# Patient Record
Sex: MALE | Race: WHITE | NOT HISPANIC OR LATINO | Employment: OTHER | URBAN - NONMETROPOLITAN AREA
[De-identification: names, ages, dates, MRNs, and addresses within clinical notes are randomized per-mention and may not be internally consistent; named-entity substitution may affect disease eponyms.]

---

## 2017-03-21 ENCOUNTER — TRANSCRIBE ORDERS (OUTPATIENT)
Dept: LAB | Facility: HOSPITAL | Age: 67
End: 2017-03-21

## 2017-03-21 DIAGNOSIS — N18.30 CHRONIC KIDNEY DISEASE, STAGE III (MODERATE) (HCC): Primary | ICD-10-CM

## 2017-05-10 ENCOUNTER — LAB (OUTPATIENT)
Dept: LAB | Facility: HOSPITAL | Age: 67
End: 2017-05-10

## 2017-05-10 DIAGNOSIS — E29.1 MALE HYPOGONADISM: ICD-10-CM

## 2017-05-10 DIAGNOSIS — N40.0 BENIGN PROSTATIC HYPERPLASIA, PRESENCE OF LOWER URINARY TRACT SYMPTOMS UNSPECIFIED, UNSPECIFIED MORPHOLOGY: Primary | ICD-10-CM

## 2017-05-10 DIAGNOSIS — N18.30 CHRONIC KIDNEY DISEASE, STAGE III (MODERATE) (HCC): ICD-10-CM

## 2017-05-10 LAB
25(OH)D3 SERPL-MCNC: 31.1 NG/ML
ALBUMIN SERPL-MCNC: 4.4 G/DL (ref 3.2–4.8)
ANION GAP SERPL CALCULATED.3IONS-SCNC: 8 MMOL/L (ref 3–11)
BASOPHILS # BLD AUTO: 0.04 10*3/MM3 (ref 0–0.2)
BASOPHILS NFR BLD AUTO: 0.5 % (ref 0–1)
BUN BLD-MCNC: 23 MG/DL (ref 9–23)
BUN/CREAT SERPL: 16.4 (ref 7–25)
CALCIUM SPEC-SCNC: 10 MG/DL (ref 8.7–10.4)
CHLORIDE SERPL-SCNC: 102 MMOL/L (ref 99–109)
CO2 SERPL-SCNC: 32 MMOL/L (ref 20–31)
CREAT BLD-MCNC: 1.4 MG/DL (ref 0.6–1.3)
DEPRECATED RDW RBC AUTO: 47.5 FL (ref 37–54)
EOSINOPHIL # BLD AUTO: 0.2 10*3/MM3 (ref 0.1–0.3)
EOSINOPHIL NFR BLD AUTO: 2.3 % (ref 0–3)
ERYTHROCYTE [DISTWIDTH] IN BLOOD BY AUTOMATED COUNT: 14 % (ref 11.3–14.5)
ESTRADIOL SERPL HS-MCNC: 32 PG/ML
GFR SERPL CREATININE-BSD FRML MDRD: 51 ML/MIN/1.73
GLUCOSE BLD-MCNC: 147 MG/DL (ref 70–100)
HCT VFR BLD AUTO: 44.8 % (ref 38.9–50.9)
HGB BLD-MCNC: 14.2 G/DL (ref 13.1–17.5)
IMM GRANULOCYTES # BLD: 0.04 10*3/MM3 (ref 0–0.03)
IMM GRANULOCYTES NFR BLD: 0.5 % (ref 0–0.6)
LYMPHOCYTES # BLD AUTO: 3.73 10*3/MM3 (ref 0.6–4.8)
LYMPHOCYTES NFR BLD AUTO: 43.7 % (ref 24–44)
MCH RBC QN AUTO: 29.5 PG (ref 27–31)
MCHC RBC AUTO-ENTMCNC: 31.7 G/DL (ref 32–36)
MCV RBC AUTO: 93.1 FL (ref 80–99)
MONOCYTES # BLD AUTO: 0.74 10*3/MM3 (ref 0–1)
MONOCYTES NFR BLD AUTO: 8.7 % (ref 0–12)
NEUTROPHILS # BLD AUTO: 3.79 10*3/MM3 (ref 1.5–8.3)
NEUTROPHILS NFR BLD AUTO: 44.3 % (ref 41–71)
PHOSPHATE SERPL-MCNC: 3.8 MG/DL (ref 2.4–5.1)
PLATELET # BLD AUTO: 210 10*3/MM3 (ref 150–450)
PMV BLD AUTO: 11.1 FL (ref 6–12)
POTASSIUM BLD-SCNC: 5 MMOL/L (ref 3.5–5.5)
RBC # BLD AUTO: 4.81 10*6/MM3 (ref 4.2–5.76)
SODIUM BLD-SCNC: 142 MMOL/L (ref 132–146)
TESTOST SERPL-MCNC: 185.47 NG/DL (ref 86.98–780.1)
WBC NRBC COR # BLD: 8.54 10*3/MM3 (ref 3.5–10.8)

## 2017-05-10 PROCEDURE — 80069 RENAL FUNCTION PANEL: CPT | Performed by: UROLOGY

## 2017-05-10 PROCEDURE — 82670 ASSAY OF TOTAL ESTRADIOL: CPT | Performed by: UROLOGY

## 2017-05-10 PROCEDURE — 82306 VITAMIN D 25 HYDROXY: CPT | Performed by: UROLOGY

## 2017-05-10 PROCEDURE — 84403 ASSAY OF TOTAL TESTOSTERONE: CPT | Performed by: UROLOGY

## 2017-05-10 PROCEDURE — 85025 COMPLETE CBC W/AUTO DIFF WBC: CPT | Performed by: UROLOGY

## 2017-05-10 PROCEDURE — 84153 ASSAY OF PSA TOTAL: CPT | Performed by: UROLOGY

## 2017-05-10 PROCEDURE — 36415 COLL VENOUS BLD VENIPUNCTURE: CPT

## 2017-05-10 PROCEDURE — 83970 ASSAY OF PARATHORMONE: CPT | Performed by: INTERNAL MEDICINE

## 2017-05-11 LAB — PSA SERPL-MCNC: 1.3 NG/ML (ref 0–4)

## 2017-05-12 LAB — PTH-INTACT SERPL-MCNC: 30 PG/ML (ref 15–65)

## 2017-05-16 ENCOUNTER — OFFICE VISIT (OUTPATIENT)
Dept: INTERNAL MEDICINE | Facility: CLINIC | Age: 67
End: 2017-05-16

## 2017-05-16 VITALS
HEART RATE: 78 BPM | HEIGHT: 70 IN | TEMPERATURE: 98 F | SYSTOLIC BLOOD PRESSURE: 138 MMHG | BODY MASS INDEX: 45.1 KG/M2 | DIASTOLIC BLOOD PRESSURE: 76 MMHG | OXYGEN SATURATION: 95 % | WEIGHT: 315 LBS

## 2017-05-16 DIAGNOSIS — I48.91 ATRIAL FIBRILLATION, UNSPECIFIED TYPE (HCC): ICD-10-CM

## 2017-05-16 DIAGNOSIS — M50.30 DEGENERATION OF CERVICAL INTERVERTEBRAL DISC: ICD-10-CM

## 2017-05-16 DIAGNOSIS — K21.9 GASTROESOPHAGEAL REFLUX DISEASE, ESOPHAGITIS PRESENCE NOT SPECIFIED: ICD-10-CM

## 2017-05-16 DIAGNOSIS — E78.2 MIXED HYPERLIPIDEMIA: ICD-10-CM

## 2017-05-16 DIAGNOSIS — E03.8 OTHER SPECIFIED HYPOTHYROIDISM: ICD-10-CM

## 2017-05-16 DIAGNOSIS — E11.9 TYPE 2 DIABETES MELLITUS WITHOUT COMPLICATION, WITHOUT LONG-TERM CURRENT USE OF INSULIN (HCC): ICD-10-CM

## 2017-05-16 DIAGNOSIS — I10 BENIGN ESSENTIAL HYPERTENSION: Primary | ICD-10-CM

## 2017-05-16 PROCEDURE — 90670 PCV13 VACCINE IM: CPT | Performed by: INTERNAL MEDICINE

## 2017-05-16 PROCEDURE — G0009 ADMIN PNEUMOCOCCAL VACCINE: HCPCS | Performed by: INTERNAL MEDICINE

## 2017-05-16 PROCEDURE — 99214 OFFICE O/P EST MOD 30 MIN: CPT | Performed by: INTERNAL MEDICINE

## 2017-05-16 RX ORDER — TIZANIDINE 4 MG/1
8 TABLET ORAL
COMMUNITY
Start: 2017-04-27

## 2017-05-16 RX ORDER — MORPHINE SULFATE 60 MG/1
60 TABLET, FILM COATED, EXTENDED RELEASE ORAL 2 TIMES DAILY
COMMUNITY
Start: 2017-05-03 | End: 2019-05-21 | Stop reason: SDUPTHER

## 2017-05-16 RX ORDER — GLIPIZIDE 5 MG/1
5 TABLET ORAL 3 TIMES DAILY
Qty: 270 TABLET | Refills: 1 | Status: SHIPPED | OUTPATIENT
Start: 2017-05-16 | End: 2017-05-17 | Stop reason: SDUPTHER

## 2017-05-16 RX ORDER — SIMVASTATIN 20 MG
20 TABLET ORAL NIGHTLY
Qty: 90 TABLET | Refills: 3 | Status: SHIPPED | OUTPATIENT
Start: 2017-05-16 | End: 2017-09-19 | Stop reason: SDUPTHER

## 2017-05-16 RX ORDER — OMEPRAZOLE 40 MG/1
40 CAPSULE, DELAYED RELEASE ORAL DAILY
Qty: 90 CAPSULE | Refills: 3 | Status: SHIPPED | OUTPATIENT
Start: 2017-05-16 | End: 2017-09-19 | Stop reason: SDUPTHER

## 2017-05-16 RX ORDER — LEVOTHYROXINE SODIUM 0.05 MG/1
50 TABLET ORAL DAILY
Qty: 90 TABLET | Refills: 3 | Status: SHIPPED | OUTPATIENT
Start: 2017-05-16 | End: 2017-09-19 | Stop reason: SDUPTHER

## 2017-05-17 ENCOUNTER — OFFICE VISIT (OUTPATIENT)
Dept: UROLOGY | Facility: CLINIC | Age: 67
End: 2017-05-17

## 2017-05-17 VITALS — HEIGHT: 70 IN | OXYGEN SATURATION: 95 % | WEIGHT: 315 LBS | BODY MASS INDEX: 45.1 KG/M2

## 2017-05-17 DIAGNOSIS — E29.1 HYPOGONADISM IN MALE: Primary | ICD-10-CM

## 2017-05-17 DIAGNOSIS — R79.89 LOW TESTOSTERONE: ICD-10-CM

## 2017-05-17 LAB
BILIRUB BLD-MCNC: NEGATIVE MG/DL
CHOLEST SERPL-MCNC: 139 MG/DL (ref 0–199)
CLARITY, POC: CLEAR
COLOR UR: YELLOW
GLUCOSE UR STRIP-MCNC: NEGATIVE MG/DL
HBA1C MFR BLD: 7.7 %
HDLC SERPL-MCNC: 39 MG/DL (ref 40–60)
KETONES UR QL: NEGATIVE
LDLC SERPL CALC-MCNC: 63 MG/DL (ref 0–99)
LEUKOCYTE EST, POC: NEGATIVE
NITRITE UR-MCNC: NEGATIVE MG/ML
PH UR: 6 [PH] (ref 5–8)
PROT UR STRIP-MCNC: NEGATIVE MG/DL
RBC # UR STRIP: NEGATIVE /UL
SP GR UR: 1.02 (ref 1–1.03)
T4 FREE SERPL-MCNC: 1.44 NG/DL (ref 0.78–2.19)
TRIGL SERPL-MCNC: 184 MG/DL
TSH SERPL DL<=0.005 MIU/L-ACNC: 3.69 MIU/ML (ref 0.47–4.68)
UROBILINOGEN UR QL: NORMAL
VLDLC SERPL CALC-MCNC: 36.8 MG/DL

## 2017-05-17 PROCEDURE — 81003 URINALYSIS AUTO W/O SCOPE: CPT | Performed by: UROLOGY

## 2017-05-17 PROCEDURE — 99213 OFFICE O/P EST LOW 20 MIN: CPT | Performed by: UROLOGY

## 2017-05-17 PROCEDURE — 11980 IMPLANT HORMONE PELLET(S): CPT | Performed by: UROLOGY

## 2017-05-17 RX ORDER — GLIPIZIDE 10 MG/1
10 TABLET ORAL
Qty: 60 TABLET | Refills: 5 | Status: SHIPPED | OUTPATIENT
Start: 2017-05-17 | End: 2017-09-19 | Stop reason: SDUPTHER

## 2017-08-04 ENCOUNTER — OFFICE VISIT (OUTPATIENT)
Dept: INTERNAL MEDICINE | Facility: CLINIC | Age: 67
End: 2017-08-04

## 2017-08-04 ENCOUNTER — OFFICE VISIT (OUTPATIENT)
Dept: UROLOGY | Facility: CLINIC | Age: 67
End: 2017-08-04

## 2017-08-04 VITALS
BODY MASS INDEX: 45.1 KG/M2 | WEIGHT: 315 LBS | HEART RATE: 64 BPM | TEMPERATURE: 99.6 F | DIASTOLIC BLOOD PRESSURE: 78 MMHG | SYSTOLIC BLOOD PRESSURE: 140 MMHG | HEIGHT: 70 IN | OXYGEN SATURATION: 95 %

## 2017-08-04 VITALS
SYSTOLIC BLOOD PRESSURE: 124 MMHG | OXYGEN SATURATION: 96 % | BODY MASS INDEX: 45.1 KG/M2 | HEART RATE: 94 BPM | HEIGHT: 70 IN | TEMPERATURE: 98.1 F | RESPIRATION RATE: 16 BRPM | WEIGHT: 315 LBS | DIASTOLIC BLOOD PRESSURE: 78 MMHG

## 2017-08-04 DIAGNOSIS — N40.1 BPH (BENIGN PROSTATIC HYPERTROPHY) WITH URINARY OBSTRUCTION: ICD-10-CM

## 2017-08-04 DIAGNOSIS — N13.8 BPH (BENIGN PROSTATIC HYPERTROPHY) WITH URINARY OBSTRUCTION: ICD-10-CM

## 2017-08-04 DIAGNOSIS — Z87.440 HISTORY OF UTI: Primary | ICD-10-CM

## 2017-08-04 DIAGNOSIS — E29.1 HYPOGONADISM MALE: ICD-10-CM

## 2017-08-04 DIAGNOSIS — R35.0 URINARY FREQUENCY: Primary | ICD-10-CM

## 2017-08-04 LAB
BILIRUB BLD-MCNC: NEGATIVE MG/DL
CLARITY, POC: CLEAR
COLOR UR: YELLOW
GLUCOSE UR STRIP-MCNC: NEGATIVE MG/DL
KETONES UR QL: NEGATIVE
LEUKOCYTE EST, POC: NEGATIVE
NITRITE UR-MCNC: NEGATIVE MG/ML
PH UR: 5.5 [PH] (ref 5–8)
PROT UR STRIP-MCNC: NEGATIVE MG/DL
RBC # UR STRIP: NEGATIVE /UL
SP GR UR: 1.01 (ref 1–1.03)
UROBILINOGEN UR QL: NORMAL

## 2017-08-04 PROCEDURE — 76857 US EXAM PELVIC LIMITED: CPT | Performed by: UROLOGY

## 2017-08-04 PROCEDURE — 99214 OFFICE O/P EST MOD 30 MIN: CPT | Performed by: UROLOGY

## 2017-08-04 PROCEDURE — 99213 OFFICE O/P EST LOW 20 MIN: CPT | Performed by: NURSE PRACTITIONER

## 2017-08-04 PROCEDURE — 81003 URINALYSIS AUTO W/O SCOPE: CPT | Performed by: UROLOGY

## 2017-08-04 RX ORDER — TAMSULOSIN HYDROCHLORIDE 0.4 MG/1
1 CAPSULE ORAL NIGHTLY
Qty: 30 CAPSULE | Refills: 0 | Status: SHIPPED | OUTPATIENT
Start: 2017-08-04 | End: 2017-09-05

## 2017-08-04 RX ORDER — CEFUROXIME AXETIL 250 MG/1
TABLET ORAL
COMMUNITY
Start: 2017-06-28 | End: 2017-08-04

## 2017-08-04 RX ORDER — SPIRONOLACTONE 25 MG/1
25 TABLET ORAL DAILY
COMMUNITY
Start: 2017-07-05 | End: 2021-08-30

## 2017-08-04 RX ORDER — SODIUM PHOSPHATE,MONO-DIBASIC 19G-7G/118
1 ENEMA (ML) RECTAL 2 TIMES DAILY
COMMUNITY

## 2017-08-04 NOTE — PROGRESS NOTES
[facility]- UROLOGY  [address], [address]  [place], [place] [address]  [phone]      08/04/2017    Freddie Obinna  1950        Pelvic Ultrasound with PVR    A transabdominal pelvic ultrasound was performed using a 3.5 MHz transducer of a B-K Packer through the suprapubic area.     The purpose of the study was to investigate the patient's voiding difficulty.  There was minimal bladder wall thickening noted.  There were no intravesical filling defects seen.  The post void residual of 38.4 ml was noted.  Prostate was small and was noted to be 18.1 grams.  There was a protrusion of the prostate into the bladder.  Ultrasound images will be scanned into the chart for reference.       CPT code 30785        Performed by Beltran Zelaya MD

## 2017-08-04 NOTE — PROGRESS NOTES
Chief Complaint  Urinary Tract Infection (fup uti patient states he had 6 weeks ago,states it stil burns when he urinates.)        TARUN Yeboah is a 67 y.o. male who     Vitals:    08/04/17 1317   BP: 140/78   Pulse: 64   Temp: 99.6 °F (37.6 °C)   SpO2: 95%       Past Medical History  Past Medical History:   Diagnosis Date   • Back pain    • Hypertension    • Low testosterone    • Sleep apnea        Past Surgical History  Past Surgical History:   Procedure Laterality Date   • APPENDECTOMY     • CARPAL TUNNEL RELEASE     • CATARACT EXTRACTION     • SHOULDER SURGERY     • TOTAL KNEE ARTHROPLASTY         Medications  has a current medication list which includes the following prescription(s): anastrozole, apixaban, aspirin, bumetanide, cefuroxime, digoxin, glipizide, isosorbide mononitrate, levothyroxine, losartan, metformin, metoprolol tartrate, glucosamine chond complex/msm, morphine, multivital, fish oil, omeprazole, oxycodone-acetaminophen, simvastatin, spironolactone, spironolactone-hydrochlorothiazide, and tizanidine.      Allergies  Allergies   Allergen Reactions   • Duloxetine    • Sulfa Antibiotics      He reports renal failure after a round of sulfa   • Sulfamethoxazole-Trimethoprim        Social History  Social History     Social History Narrative       Family History  He has no family history of bladder or kidney cancer  He has no family history of kidney stones      AUA Symptom Score:      Review of Systems  Review of Systems    Physical Exam  Physical Exam    Labs Recent and today in the office:  Results for orders placed or performed in visit on 08/04/17   POC Urinalysis Dipstick, Automated   Result Value Ref Range    Color Yellow Yellow, Straw, Dark Yellow, Elinor    Clarity, UA Clear Clear    Glucose, UA Negative Negative, 1000 mg/dL (3+) mg/dL    Bilirubin Negative Negative    Ketones, UA Negative Negative    Specific Gravity  1.015 1.005 - 1.030    Blood, UA Negative Negative    pH, Urine 5.5 5.0 - 8.0     Protein, POC Negative Negative mg/dL    Urobilinogen, UA Normal Normal    Leukocytes Negative Negative    Nitrite, UA Negative Negative         Assessment & Plan

## 2017-08-04 NOTE — PROGRESS NOTES
Chief Complaint / Reason:      Chief Complaint   Patient presents with   • Fever     up to 100.4 x 6 days. Saw Dr. Zelaya earlier today. Urine was fine.        Subjective     HPI  Presents today for fever for 6 days. He states that the highest was 100.4. He saw Dr. Zelaya urology today and indicated urine was normal. Did not repeat or send culture at this visit today. Denies burning but does report frequency and states he never feels completely empty following voiding. Has tried dietary modifications along with increase fluid intake with minimal relief. Patient does not appear to be in any distress and is currently afebrile.    History taken from: patient    PMH/FH/Social History were reviewed and updated appropriately in the electronic medical record.     Review of Systems:   Review of Systems   Constitutional: Positive for fatigue and fever.   Respiratory: Negative.  Negative for shortness of breath.    Cardiovascular: Negative.  Negative for palpitations and leg swelling.   Gastrointestinal: Negative for abdominal pain.   Genitourinary: Positive for frequency and urgency.   Musculoskeletal: Positive for arthralgias, back pain, neck pain and neck stiffness.   Allergic/Immunologic: Positive for environmental allergies.   Neurological: Negative for numbness.   All other systems reviewed and are negative.    All other systems were reviewed and are negative.  Exceptions are noted in the subjective or above.      Objective     Vital Signs  Vitals:    08/04/17 1617   BP: 124/78   Pulse: 94   Resp: 16   Temp: 98.1 °F (36.7 °C)   SpO2: 96%       Body mass index is 46.17 kg/(m^2).    Physical Exam   Constitutional: He is oriented to person, place, and time. He appears well-developed and well-nourished. No distress.   Cardiovascular: Normal heart sounds and intact distal pulses.    Pulmonary/Chest: Effort normal and breath sounds normal.   Abdominal: Soft. Bowel sounds are normal. There is no tenderness. There is no CVA  tenderness.   Lymphadenopathy:     He has no cervical adenopathy.   Neurological: He is alert and oriented to person, place, and time.   Skin: Skin is warm and dry.   Psychiatric: He has a normal mood and affect. His behavior is normal. Judgment and thought content normal.   Nursing note and vitals reviewed.       Results Review:    I reviewed the patient's urine results from Dr. Zelaya's office.Results are below.    Brief Urine Lab Results  (Last result in the past 365 days)      Color   Clarity   Blood   Leuk Est   Nitrite   Protein   CREAT   Urine HCG        08/04/17 1319 Yellow Clear Negative Negative Negative Negative                 Medication Review:     Current Outpatient Prescriptions:   •  anastrozole (ARIMIDEX) 1 MG chemo tablet, Take 1 tablet by mouth 2 (two) times a week. Take on Sunday and Thursday, Disp: 12 tablet, Rfl: 3  •  apixaban (ELIQUIS) 5 MG tablet tablet, Take 1 tablet by mouth 2 (two) times a day., Disp: , Rfl:   •  aspirin 81 MG tablet, Take 1 tablet by mouth daily., Disp: , Rfl:   •  bumetanide (BUMEX) 1 MG tablet, Take 1 tablet by mouth Daily., Disp: , Rfl:   •  digoxin (LANOXIN) 125 MCG tablet, Take 1 tablet by mouth daily., Disp: , Rfl:   •  glipiZIDE (GLUCOTROL) 10 MG tablet, Take 1 tablet by mouth 2 (Two) Times a Day Before Meals., Disp: 60 tablet, Rfl: 5  •  glucosamine-chondroitin 500-400 MG capsule capsule, Take  by mouth 3 (Three) Times a Day With Meals., Disp: , Rfl:   •  isosorbide mononitrate (IMDUR) 60 MG 24 hr tablet, Take 1 tablet by mouth daily., Disp: , Rfl:   •  levothyroxine (SYNTHROID, LEVOTHROID) 50 MCG tablet, Take 1 tablet by mouth Daily., Disp: 90 tablet, Rfl: 3  •  losartan (COZAAR) 100 MG tablet, Take 1 tablet by mouth daily., Disp: , Rfl:   •  metFORMIN (GLUCOPHAGE) 500 MG tablet,  TAKE THREE TABLETS BY MOUTH EVERY MORNING AND TAKE TWO TABLETS BY MOUTH EVERY EVENING, Disp: 300 tablet, Rfl: 3  •  metoprolol tartrate (LOPRESSOR) 100 MG tablet, 2 (two) times a day.,  Disp: , Rfl:   •  Misc Natural Products (GLUCOSAMINE CHOND COMPLEX/MSM) tablet, Take 1 tablet by mouth daily. As directed, Disp: , Rfl:   •  Morphine (MS CONTIN) 60 MG 12 hr tablet, 60 mg 2 (Two) Times a Day., Disp: , Rfl:   •  Multiple Vitamins-Minerals (MULTIVITAL) tablet, Take  by mouth daily., Disp: , Rfl:   •  Omega-3 Fatty Acids (FISH OIL) 1200 MG capsule capsule, Take  by mouth. Take as directed, Disp: , Rfl:   •  omeprazole (priLOSEC) 40 MG capsule, Take 1 capsule by mouth Daily., Disp: 90 capsule, Rfl: 3  •  oxyCODONE-acetaminophen (PERCOCET)  MG per tablet, Every 8 (Eight) Hours As Needed., Disp: , Rfl:   •  simvastatin (ZOCOR) 20 MG tablet, Take 1 tablet by mouth Every Night., Disp: 90 tablet, Rfl: 3  •  spironolactone (ALDACTONE) 25 MG tablet, , Disp: , Rfl:   •  spironolactone-hydrochlorothiazide (ALDACTAZIDE) 25-25 MG tablet, Take 1 tablet by mouth daily., Disp: , Rfl:   •  tiZANidine (ZANAFLEX) 4 MG tablet, Take 4 mg by mouth every night at bedtime., Disp: , Rfl:   •  tamsulosin (FLOMAX) 0.4 MG capsule 24 hr capsule, Take 1 capsule by mouth Every Night., Disp: 30 capsule, Rfl: 0    Assessment/Plan   Freddie was seen today for fever.    Diagnoses and all orders for this visit:    Urinary frequency  -     tamsulosin (FLOMAX) 0.4 MG capsule 24 hr capsule; Take 1 capsule by mouth Every Night.  Recommend patient to limit caffeine and drinking directly before bed.   Discussed worsening signs and symptoms to closely monitor any blood in urine.   Discussed prostate issues and decreased mobility.  Reviewed patient most recent PSA level.   Follow up in 4 weeks and PRN    Patti Marmolejo, SHIRA  08/04/2017

## 2017-08-04 NOTE — PROGRESS NOTES
Chief Complaint  Urinary Tract Infection (fup uti patient states he had 6 weeks ago,states it stil burns when he urinates.)        TARUN Yeboah is a 67 y.o. male who returns today to rule out urinary tract infection.  He recently had surgery on his neck in [place] and his preop blood work suggested had a urinary tract infection.  We'll obtain that culture and the colony count was less than 100,000 Escherichia coli.  He was treated with antibiotics and returns today stating he is now having some burning when he voids but is primarily concerned about a temperature recently measured at home.    Vitals:    08/04/17 1317   BP: 140/78   Pulse: 64   Temp: 99.6 °F (37.6 °C)   SpO2: 95%       Past Medical History  Past Medical History:   Diagnosis Date   • Back pain    • Hypertension    • Low testosterone    • Sleep apnea        Past Surgical History  Past Surgical History:   Procedure Laterality Date   • APPENDECTOMY     • CARPAL TUNNEL RELEASE     • CATARACT EXTRACTION     • SHOULDER SURGERY     • TOTAL KNEE ARTHROPLASTY         Medications  has a current medication list which includes the following prescription(s): anastrozole, apixaban, aspirin, bumetanide, cefuroxime, digoxin, glipizide, isosorbide mononitrate, levothyroxine, losartan, metformin, metoprolol tartrate, glucosamine chond complex/msm, morphine, multivital, fish oil, omeprazole, oxycodone-acetaminophen, simvastatin, spironolactone, spironolactone-hydrochlorothiazide, and tizanidine.      Allergies  Allergies   Allergen Reactions   • Duloxetine    • Sulfa Antibiotics      He reports renal failure after a round of sulfa   • Sulfamethoxazole-Trimethoprim        Social History  Social History     Social History Narrative       Family History  He has no family history of bladder or kidney cancer  He has no family history of kidney stones      AUA Symptom Score:      Review of Systems  Review of Systems   Constitutional: Positive for fever.   Genitourinary:  Positive for dysuria.   All other systems reviewed and are negative.      Physical Exam  Physical Exam    Labs Recent and today in the office:  Results for orders placed or performed in visit on 08/04/17   POC Urinalysis Dipstick, Automated   Result Value Ref Range    Color Yellow Yellow, Straw, Dark Yellow, Elinor    Clarity, UA Clear Clear    Glucose, UA Negative Negative, 1000 mg/dL (3+) mg/dL    Bilirubin Negative Negative    Ketones, UA Negative Negative    Specific Gravity  1.015 1.005 - 1.030    Blood, UA Negative Negative    pH, Urine 5.5 5.0 - 8.0    Protein, POC Negative Negative mg/dL    Urobilinogen, UA Normal Normal    Leukocytes Negative Negative    Nitrite, UA Negative Negative         Assessment & Plan  Bladder scan reveals minimal postvoid residual on her nails and a small prostate and 18 g.  His urine is crystal clear with no signs of infection so I don't see any  pathology to explain a fever.  I suggested he check back with his family medical doctor for further investigation and he can return here as scheduled in 6 weeks for reevaluation of his hypogonadism prior to returning to [place].

## 2017-08-18 ENCOUNTER — OFFICE VISIT (OUTPATIENT)
Dept: INTERNAL MEDICINE | Facility: CLINIC | Age: 67
End: 2017-08-18

## 2017-08-18 VITALS
HEIGHT: 70 IN | SYSTOLIC BLOOD PRESSURE: 98 MMHG | OXYGEN SATURATION: 94 % | RESPIRATION RATE: 16 BRPM | BODY MASS INDEX: 45.1 KG/M2 | TEMPERATURE: 96.5 F | WEIGHT: 315 LBS | HEART RATE: 72 BPM | DIASTOLIC BLOOD PRESSURE: 58 MMHG

## 2017-08-18 DIAGNOSIS — Z09 FOLLOW UP: Primary | ICD-10-CM

## 2017-08-18 DIAGNOSIS — R03.1 LOW BLOOD PRESSURE READING: ICD-10-CM

## 2017-08-18 DIAGNOSIS — R35.0 URINARY FREQUENCY: ICD-10-CM

## 2017-08-18 PROCEDURE — 99213 OFFICE O/P EST LOW 20 MIN: CPT | Performed by: NURSE PRACTITIONER

## 2017-08-18 NOTE — PROGRESS NOTES
Chief Complaint / Reason:      Chief Complaint   Patient presents with   • Urinary Frequency     f/u-2 week       Subjective     HPI  Patient presents today for two-week follow-up regarding urinary frequency.  He states that he feels like the Flomax has helped some but he still continues to get up frequently at night.  He is on Bumex but takes it during the dayBut it depends on what time of the day he eats as when he takes it.  He states that he has gone up frequently for 30 years now during the night.  Patient states that his blood pressure has been lower that he feels fine.  He is accompanied by his wife who wrote in on a motorcycle.  Discussed with patient and wife safety issues with low blood pressure and on blood thinners.  He stated that if he feels that he wouldn't drive.  Discussed blood pressure parameters with patient and wife otherwise vital signs are stable.  Patient states he probably has not been drinking enough fluids and taking the Bumex along with continuing his blood pressure medicine.  He has lost 1 pound since last visit.  He denies any shortness of breath, chest pain or heart palpitations.  History taken from: patient    PMH/FH/Social History were reviewed and updated appropriately in the electronic medical record.     Review of Systems:   Review of Systems   Constitutional: Positive for fatigue.        Patient states he has occasional fever   Respiratory: Negative.  Negative for shortness of breath.    Cardiovascular: Negative.  Negative for palpitations and leg swelling.   Gastrointestinal: Negative for abdominal pain.   Genitourinary: Positive for frequency and urgency.   Musculoskeletal: Positive for arthralgias, back pain, neck pain and neck stiffness.   Allergic/Immunologic: Positive for environmental allergies.   Neurological: Negative for numbness.   All other systems reviewed and are negative.    All other systems were reviewed and are negative.  Exceptions are noted in the subjective  or above.      Objective     Vital Signs  Vitals:    08/18/17 1435   BP: 98/58   Pulse: 72   Resp: 16   Temp: 96.5 °F (35.8 °C)   SpO2: 94%       Body mass index is 46.02 kg/(m^2).    Physical Exam   Constitutional: He is oriented to person, place, and time. He appears well-developed and well-nourished. No distress.   Cardiovascular: Normal rate, regular rhythm, normal heart sounds and intact distal pulses.    Pulmonary/Chest: Effort normal and breath sounds normal.   Abdominal: Soft. Bowel sounds are normal. There is no tenderness. There is no CVA tenderness.   Musculoskeletal: He exhibits edema (Mild edema in bilateral lower extremities) and tenderness.        Left shoulder: He exhibits decreased range of motion, tenderness, pain and decreased strength. He exhibits normal pulse.   Neurological: He is alert and oriented to person, place, and time.   Skin: Skin is warm and dry.   Psychiatric: He has a normal mood and affect. His behavior is normal. Judgment and thought content normal.   Nursing note and vitals reviewed.      Medication Review:     Current Outpatient Prescriptions:   •  anastrozole (ARIMIDEX) 1 MG chemo tablet, Take 1 tablet by mouth 2 (two) times a week. Take on Sunday and Thursday, Disp: 12 tablet, Rfl: 3  •  apixaban (ELIQUIS) 5 MG tablet tablet, Take 1 tablet by mouth 2 (two) times a day., Disp: , Rfl:   •  aspirin 81 MG tablet, Take 1 tablet by mouth daily., Disp: , Rfl:   •  bumetanide (BUMEX) 1 MG tablet, Take 1 tablet by mouth Daily., Disp: , Rfl:   •  digoxin (LANOXIN) 125 MCG tablet, Take 1 tablet by mouth daily., Disp: , Rfl:   •  glipiZIDE (GLUCOTROL) 10 MG tablet, Take 1 tablet by mouth 2 (Two) Times a Day Before Meals., Disp: 60 tablet, Rfl: 5  •  glucosamine-chondroitin 500-400 MG capsule capsule, Take  by mouth 3 (Three) Times a Day With Meals., Disp: , Rfl:   •  isosorbide mononitrate (IMDUR) 60 MG 24 hr tablet, Take 1 tablet by mouth daily., Disp: , Rfl:   •  levothyroxine  (SYNTHROID, LEVOTHROID) 50 MCG tablet, Take 1 tablet by mouth Daily., Disp: 90 tablet, Rfl: 3  •  losartan (COZAAR) 100 MG tablet, Take 1 tablet by mouth daily., Disp: , Rfl:   •  metFORMIN (GLUCOPHAGE) 500 MG tablet,  TAKE THREE TABLETS BY MOUTH EVERY MORNING AND TAKE TWO TABLETS BY MOUTH EVERY EVENING, Disp: 300 tablet, Rfl: 3  •  metoprolol tartrate (LOPRESSOR) 100 MG tablet, 2 (two) times a day., Disp: , Rfl:   •  Misc Natural Products (GLUCOSAMINE CHOND COMPLEX/MSM) tablet, Take 1 tablet by mouth daily. As directed, Disp: , Rfl:   •  Morphine (MS CONTIN) 60 MG 12 hr tablet, 60 mg 2 (Two) Times a Day., Disp: , Rfl:   •  Multiple Vitamins-Minerals (MULTIVITAL) tablet, Take  by mouth daily., Disp: , Rfl:   •  Omega-3 Fatty Acids (FISH OIL) 1200 MG capsule capsule, Take  by mouth. Take as directed, Disp: , Rfl:   •  omeprazole (priLOSEC) 40 MG capsule, Take 1 capsule by mouth Daily., Disp: 90 capsule, Rfl: 3  •  oxyCODONE-acetaminophen (PERCOCET)  MG per tablet, Every 8 (Eight) Hours As Needed., Disp: , Rfl:   •  simvastatin (ZOCOR) 20 MG tablet, Take 1 tablet by mouth Every Night., Disp: 90 tablet, Rfl: 3  •  spironolactone (ALDACTONE) 25 MG tablet, , Disp: , Rfl:   •  spironolactone-hydrochlorothiazide (ALDACTAZIDE) 25-25 MG tablet, Take 1 tablet by mouth daily., Disp: , Rfl:   •  tamsulosin (FLOMAX) 0.4 MG capsule 24 hr capsule, Take 1 capsule by mouth Every Night., Disp: 30 capsule, Rfl: 0  •  tiZANidine (ZANAFLEX) 4 MG tablet, Take 4 mg by mouth every night at bedtime., Disp: , Rfl:     Assessment/Plan   Freddie was seen today for urinary frequency.    Diagnoses and all orders for this visit:    Follow up    Urinary frequency  Recommend patient take medication as prescribed.  Advised patient to avoid drinking late at night  Low blood pressure reading  Recommend patient closely monitor blood pressure and contact office if remains low.  Discussed blood pressure parameters and signs and symptoms of worsening  condition.  Reviewed patient's medication with him and his wife.    Follow-up when necessary as he has a scheduled appointment with Dr. Vermeesch on 9/19/17  SHIRA Brian  08/18/2017

## 2017-08-29 ENCOUNTER — APPOINTMENT (OUTPATIENT)
Dept: CT IMAGING | Facility: HOSPITAL | Age: 67
End: 2017-08-29

## 2017-08-29 ENCOUNTER — HOSPITAL ENCOUNTER (EMERGENCY)
Facility: HOSPITAL | Age: 67
Discharge: HOME | End: 2017-08-29
Attending: EMERGENCY MEDICINE | Admitting: EMERGENCY MEDICINE

## 2017-08-29 VITALS
HEIGHT: 69 IN | WEIGHT: 312 LBS | RESPIRATION RATE: 18 BRPM | SYSTOLIC BLOOD PRESSURE: 148 MMHG | BODY MASS INDEX: 46.21 KG/M2 | TEMPERATURE: 97.7 F | OXYGEN SATURATION: 92 % | HEART RATE: 82 BPM | DIASTOLIC BLOOD PRESSURE: 78 MMHG

## 2017-08-29 DIAGNOSIS — N28.9 RENAL INSUFFICIENCY: Primary | ICD-10-CM

## 2017-08-29 DIAGNOSIS — N39.0 URINARY TRACT INFECTION, SITE UNSPECIFIED: ICD-10-CM

## 2017-08-29 LAB
ALBUMIN SERPL-MCNC: 4.5 G/DL (ref 3.5–5)
ALBUMIN/GLOB SERPL: 1.4 G/DL (ref 1–2)
ALP SERPL-CCNC: 57 U/L (ref 38–126)
ALT SERPL W P-5'-P-CCNC: 39 U/L (ref 13–69)
ANION GAP SERPL CALCULATED.3IONS-SCNC: 18.7 MMOL/L
APTT PPP: 30.4 SECONDS (ref 25–36)
AST SERPL-CCNC: 27 U/L (ref 15–46)
BACTERIA UR QL AUTO: ABNORMAL /HPF
BASOPHILS # BLD AUTO: 0.04 10*3/MM3 (ref 0–0.2)
BASOPHILS NFR BLD AUTO: 0.2 % (ref 0–2.5)
BILIRUB SERPL-MCNC: 1.4 MG/DL (ref 0.2–1.3)
BILIRUB UR QL STRIP: NEGATIVE
BUN BLD-MCNC: 23 MG/DL (ref 7–20)
BUN/CREAT SERPL: 12.1 (ref 6.3–21.9)
CALCIUM SPEC-SCNC: 9.6 MG/DL (ref 8.4–10.2)
CHLORIDE SERPL-SCNC: 99 MMOL/L (ref 98–107)
CLARITY UR: ABNORMAL
CO2 SERPL-SCNC: 26 MMOL/L (ref 26–30)
COLOR UR: YELLOW
CREAT BLD-MCNC: 1.9 MG/DL (ref 0.6–1.3)
DEPRECATED RDW RBC AUTO: 45.2 FL (ref 37–54)
EOSINOPHIL # BLD AUTO: 0 10*3/MM3 (ref 0–0.7)
EOSINOPHIL NFR BLD AUTO: 0 % (ref 0–7)
ERYTHROCYTE [DISTWIDTH] IN BLOOD BY AUTOMATED COUNT: 14.4 % (ref 11.5–14.5)
GFR SERPL CREATININE-BSD FRML MDRD: 36 ML/MIN/1.73
GLOBULIN UR ELPH-MCNC: 3.3 GM/DL
GLUCOSE BLD-MCNC: 97 MG/DL (ref 74–98)
GLUCOSE UR STRIP-MCNC: NEGATIVE MG/DL
HCT VFR BLD AUTO: 43.8 % (ref 42–52)
HGB BLD-MCNC: 14.2 G/DL (ref 14–18)
HGB UR QL STRIP.AUTO: ABNORMAL
HYALINE CASTS UR QL AUTO: ABNORMAL /LPF
IMM GRANULOCYTES # BLD: 0.1 10*3/MM3 (ref 0–0.06)
IMM GRANULOCYTES NFR BLD: 0.6 % (ref 0–0.6)
INR PPP: 1.98 (ref 0.9–1.1)
KETONES UR QL STRIP: NEGATIVE
LEUKOCYTE ESTERASE UR QL STRIP.AUTO: ABNORMAL
LYMPHOCYTES # BLD AUTO: 2.57 10*3/MM3 (ref 0.6–3.4)
LYMPHOCYTES NFR BLD AUTO: 14.3 % (ref 10–50)
MCH RBC QN AUTO: 28.1 PG (ref 27–31)
MCHC RBC AUTO-ENTMCNC: 32.4 G/DL (ref 30–37)
MCV RBC AUTO: 86.6 FL (ref 80–94)
MONOCYTES # BLD AUTO: 2.84 10*3/MM3 (ref 0–0.9)
MONOCYTES NFR BLD AUTO: 15.8 % (ref 0–12)
NEUTROPHILS # BLD AUTO: 12.4 10*3/MM3 (ref 2–6.9)
NEUTROPHILS NFR BLD AUTO: 69.1 % (ref 37–80)
NITRITE UR QL STRIP: POSITIVE
NRBC BLD MANUAL-RTO: 0 /100 WBC (ref 0–0)
NT-PROBNP SERPL-MCNC: 2570 PG/ML (ref 0–125)
PH UR STRIP.AUTO: 5.5 [PH] (ref 5–8)
PLATELET # BLD AUTO: 177 10*3/MM3 (ref 130–400)
PMV BLD AUTO: 10.5 FL (ref 6–12)
POTASSIUM BLD-SCNC: 4.7 MMOL/L (ref 3.5–5.1)
PROT SERPL-MCNC: 7.8 G/DL (ref 6.3–8.2)
PROT UR QL STRIP: ABNORMAL
PROTHROMBIN TIME: 21.7 SECONDS (ref 9.3–12.1)
RBC # BLD AUTO: 5.06 10*6/MM3 (ref 4.7–6.1)
RBC # UR: ABNORMAL /HPF
REF LAB TEST METHOD: ABNORMAL
SODIUM BLD-SCNC: 139 MMOL/L (ref 137–145)
SP GR UR STRIP: 1.02 (ref 1–1.03)
SQUAMOUS #/AREA URNS HPF: ABNORMAL /HPF
TROPONIN I SERPL-MCNC: <0.012 NG/ML (ref 0–0.03)
UROBILINOGEN UR QL STRIP: ABNORMAL
WBC NRBC COR # BLD: 17.95 10*3/MM3 (ref 4.8–10.8)
WBC UR QL AUTO: ABNORMAL /HPF

## 2017-08-29 PROCEDURE — 87077 CULTURE AEROBIC IDENTIFY: CPT | Performed by: PHYSICIAN ASSISTANT

## 2017-08-29 PROCEDURE — 83880 ASSAY OF NATRIURETIC PEPTIDE: CPT | Performed by: PHYSICIAN ASSISTANT

## 2017-08-29 PROCEDURE — 70450 CT HEAD/BRAIN W/O DYE: CPT

## 2017-08-29 PROCEDURE — 84484 ASSAY OF TROPONIN QUANT: CPT | Performed by: PHYSICIAN ASSISTANT

## 2017-08-29 PROCEDURE — 85730 THROMBOPLASTIN TIME PARTIAL: CPT | Performed by: PHYSICIAN ASSISTANT

## 2017-08-29 PROCEDURE — 93005 ELECTROCARDIOGRAM TRACING: CPT | Performed by: PHYSICIAN ASSISTANT

## 2017-08-29 PROCEDURE — 87086 URINE CULTURE/COLONY COUNT: CPT | Performed by: PHYSICIAN ASSISTANT

## 2017-08-29 PROCEDURE — 81001 URINALYSIS AUTO W/SCOPE: CPT | Performed by: PHYSICIAN ASSISTANT

## 2017-08-29 PROCEDURE — 99283 EMERGENCY DEPT VISIT LOW MDM: CPT

## 2017-08-29 PROCEDURE — 96365 THER/PROPH/DIAG IV INF INIT: CPT

## 2017-08-29 PROCEDURE — 85610 PROTHROMBIN TIME: CPT | Performed by: PHYSICIAN ASSISTANT

## 2017-08-29 PROCEDURE — 25010000002 CEFTRIAXONE: Performed by: PHYSICIAN ASSISTANT

## 2017-08-29 PROCEDURE — 87186 SC STD MICRODIL/AGAR DIL: CPT | Performed by: PHYSICIAN ASSISTANT

## 2017-08-29 PROCEDURE — 85025 COMPLETE CBC W/AUTO DIFF WBC: CPT | Performed by: PHYSICIAN ASSISTANT

## 2017-08-29 PROCEDURE — 80053 COMPREHEN METABOLIC PANEL: CPT | Performed by: PHYSICIAN ASSISTANT

## 2017-08-29 RX ORDER — NITROFURANTOIN 25; 75 MG/1; MG/1
100 CAPSULE ORAL 2 TIMES DAILY
Qty: 14 CAPSULE | Refills: 0 | Status: SHIPPED | OUTPATIENT
Start: 2017-08-29 | End: 2017-09-05

## 2017-08-29 RX ORDER — SODIUM CHLORIDE 0.9 % (FLUSH) 0.9 %
10 SYRINGE (ML) INJECTION AS NEEDED
Status: DISCONTINUED | OUTPATIENT
Start: 2017-08-29 | End: 2017-08-29 | Stop reason: HOSPADM

## 2017-08-29 RX ADMIN — CEFTRIAXONE 1 G: 1 INJECTION, POWDER, FOR SOLUTION INTRAMUSCULAR; INTRAVENOUS at 20:00

## 2017-08-31 LAB — BACTERIA SPEC AEROBE CULT: ABNORMAL

## 2017-09-05 ENCOUNTER — HOSPITAL ENCOUNTER (OUTPATIENT)
Dept: GENERAL RADIOLOGY | Facility: HOSPITAL | Age: 67
Discharge: HOME | End: 2017-09-05
Admitting: ORTHOPAEDIC SURGERY

## 2017-09-05 ENCOUNTER — APPOINTMENT (OUTPATIENT)
Dept: PREADMISSION TESTING | Facility: HOSPITAL | Age: 67
End: 2017-09-05

## 2017-09-05 ENCOUNTER — OFFICE VISIT (OUTPATIENT)
Dept: INTERNAL MEDICINE | Facility: CLINIC | Age: 67
End: 2017-09-05

## 2017-09-05 VITALS — HEIGHT: 70 IN | WEIGHT: 308.4 LBS | BODY MASS INDEX: 44.15 KG/M2

## 2017-09-05 VITALS
HEART RATE: 88 BPM | TEMPERATURE: 99.3 F | DIASTOLIC BLOOD PRESSURE: 70 MMHG | OXYGEN SATURATION: 97 % | BODY MASS INDEX: 44.52 KG/M2 | SYSTOLIC BLOOD PRESSURE: 120 MMHG | HEIGHT: 70 IN | WEIGHT: 311 LBS

## 2017-09-05 DIAGNOSIS — N28.9 RENAL INSUFFICIENCY: ICD-10-CM

## 2017-09-05 DIAGNOSIS — N30.00 ACUTE CYSTITIS WITHOUT HEMATURIA: Primary | ICD-10-CM

## 2017-09-05 LAB
ALBUMIN SERPL-MCNC: 4.5 G/DL (ref 3.2–4.8)
ALBUMIN/GLOB SERPL: 1.6 G/DL (ref 1.5–2.5)
ALP SERPL-CCNC: 89 U/L (ref 25–100)
ALT SERPL W P-5'-P-CCNC: 90 U/L (ref 7–40)
ANION GAP SERPL CALCULATED.3IONS-SCNC: 7 MMOL/L (ref 3–11)
AST SERPL-CCNC: 51 U/L (ref 0–33)
BILIRUB BLD-MCNC: NEGATIVE MG/DL
BILIRUB SERPL-MCNC: 0.4 MG/DL (ref 0.3–1.2)
BUN BLD-MCNC: 24 MG/DL (ref 9–23)
BUN SERPL-MCNC: 23 MG/DL (ref 7–20)
BUN/CREAT SERPL: 15 (ref 7–25)
BUN/CREAT SERPL: 16.4 (ref 6.3–21.9)
CALCIUM SERPL-MCNC: 10.3 MG/DL (ref 8.4–10.2)
CALCIUM SPEC-SCNC: 10.1 MG/DL (ref 8.7–10.4)
CHLORIDE SERPL-SCNC: 104 MMOL/L (ref 99–109)
CHLORIDE SERPL-SCNC: 99 MMOL/L (ref 98–107)
CLARITY, POC: CLEAR
CO2 SERPL-SCNC: 28 MMOL/L (ref 26–30)
CO2 SERPL-SCNC: 29 MMOL/L (ref 20–31)
COLOR UR: YELLOW
CREAT BLD-MCNC: 1.6 MG/DL (ref 0.6–1.3)
CREAT SERPL-MCNC: 1.4 MG/DL (ref 0.6–1.3)
DEPRECATED RDW RBC AUTO: 45 FL (ref 37–54)
ERYTHROCYTE [DISTWIDTH] IN BLOOD BY AUTOMATED COUNT: 14.1 % (ref 11.3–14.5)
GFR SERPL CREATININE-BSD FRML MDRD: 43 ML/MIN/1.73
GLOBULIN UR ELPH-MCNC: 2.9 GM/DL
GLUCOSE BLD-MCNC: 102 MG/DL (ref 70–100)
GLUCOSE SERPL-MCNC: 176 MG/DL (ref 74–98)
GLUCOSE UR STRIP-MCNC: NEGATIVE MG/DL
HBA1C MFR BLD: 6.4 % (ref 4.8–5.6)
HCT VFR BLD AUTO: 49.3 % (ref 38.9–50.9)
HGB BLD-MCNC: 16.6 G/DL (ref 13.1–17.5)
INR PPP: 1.11
KETONES UR QL: NEGATIVE
LEUKOCYTE EST, POC: NEGATIVE
MCH RBC QN AUTO: 29.2 PG (ref 27–31)
MCHC RBC AUTO-ENTMCNC: 33.7 G/DL (ref 32–36)
MCV RBC AUTO: 86.6 FL (ref 80–99)
NITRITE UR-MCNC: NEGATIVE MG/ML
PH UR: 5 [PH] (ref 5–8)
PLATELET # BLD AUTO: 278 10*3/MM3 (ref 150–450)
PMV BLD AUTO: 9.8 FL (ref 6–12)
POTASSIUM BLD-SCNC: 5.1 MMOL/L (ref 3.5–5.5)
POTASSIUM SERPL-SCNC: 4.9 MMOL/L (ref 3.5–5.1)
PROT SERPL-MCNC: 7.4 G/DL (ref 5.7–8.2)
PROT UR STRIP-MCNC: NEGATIVE MG/DL
PROTHROMBIN TIME: 12.1 SECONDS (ref 9.6–11.5)
RBC # BLD AUTO: 5.69 10*6/MM3 (ref 4.2–5.76)
RBC # UR STRIP: NEGATIVE /UL
SODIUM BLD-SCNC: 140 MMOL/L (ref 132–146)
SODIUM SERPL-SCNC: 141 MMOL/L (ref 137–145)
SP GR UR: 1.01 (ref 1–1.03)
UROBILINOGEN UR QL: NORMAL
WBC NRBC COR # BLD: 9.67 10*3/MM3 (ref 3.5–10.8)

## 2017-09-05 PROCEDURE — 99213 OFFICE O/P EST LOW 20 MIN: CPT | Performed by: INTERNAL MEDICINE

## 2017-09-05 PROCEDURE — 81003 URINALYSIS AUTO W/O SCOPE: CPT | Performed by: INTERNAL MEDICINE

## 2017-09-05 PROCEDURE — 36415 COLL VENOUS BLD VENIPUNCTURE: CPT

## 2017-09-05 PROCEDURE — 85027 COMPLETE CBC AUTOMATED: CPT | Performed by: ORTHOPAEDIC SURGERY

## 2017-09-05 PROCEDURE — 80053 COMPREHEN METABOLIC PANEL: CPT | Performed by: ORTHOPAEDIC SURGERY

## 2017-09-05 PROCEDURE — 83036 HEMOGLOBIN GLYCOSYLATED A1C: CPT | Performed by: ORTHOPAEDIC SURGERY

## 2017-09-05 PROCEDURE — 71020 HC CHEST PA AND LATERAL: CPT

## 2017-09-05 PROCEDURE — 85610 PROTHROMBIN TIME: CPT | Performed by: ORTHOPAEDIC SURGERY

## 2017-09-05 NOTE — PAT
MEASURED FOR TEDS/SCDS IN PAT    CALF MEASUREMENT   19   LENGTH MEASUREMENT 16      Patient has a history of Stage III renal disease due to diabetes.  Increased kidney levels recently from 1.4 creat to 1.9 in the ER on 8/29/17.  Patient was also diagnosed with a UTI (+3 Bacteria in urine) in the ER non 8/29/17 with urinary culture showing e coli <100,000.  Patient was started on macrobid for 7 days.  Patient will taking last dose of macrobid toady (9/5/17) after dinnertine.  Patient denies any symptoms of frequency, burning, or flank pain.  Patient went to see primary care doctor today for follow up of UTI.  PCP ordered repeat UA and BMP (results) pending.  I did not obtain a UA in PAT because patient was still taking his antibiotics.    Leesa Rosales notified of recent UTI and round of antibiotics.  Leesa was going to speak with Dr Ray to see if a UA needed to be repeated after antibiotics are completed.    Patient cleared by cardiologist (Dr Rodriguez) on 8/17/17.  Patient to stop Eliquis 5 days before surgery per Dr Rodriguez's instructions.    Too early to draw type and screen in Preop.  Please obtain type and screen for 2 units in preop.

## 2017-09-05 NOTE — PROGRESS NOTES
"Chief Complaint   Patient presents with   • Urinary Tract Infection     [facility] ER FOLLOW UP - HAS ONE MORE DAY OF ANTIBIOTICS      Subjective   Freddie Yeboah is a 67 y.o. male.     HPI Comments: Here for ER follow up from UTI.    Was seen about a week ago and given macrobid.   UC grew E coli sensitive to macrobid.   Was told to hold his bumex for a few days due to worsening renal function and then resume med after a few days of ABX.   He tries to drink mostly water.  Tries to stay away from most carbonated beverages.   He denies any dysuria or hematuria.  No current fevers or chills.  No abdominal pain.  Urinates early am as usual.           The following portions of the patient's history were reviewed and updated as appropriate: allergies, current medications, past family history, past medical history, past social history, past surgical history and problem list.    Review of Systems   Constitutional: Positive for fever. Negative for chills and diaphoresis.   Gastrointestinal: Negative for abdominal pain, constipation and diarrhea.   Genitourinary: Negative.    All other systems reviewed and are negative.      Objective   /70  Pulse 88  Temp 99.3 °F (37.4 °C)  Ht 69.5\" (176.5 cm)  Wt (!) 311 lb (141 kg)  SpO2 97%  BMI 45.27 kg/m2  Body mass index is 45.27 kg/(m^2).  Physical Exam   Constitutional: He is oriented to person, place, and time. He appears well-developed and well-nourished.   HENT:   Head: Normocephalic and atraumatic.   Mouth/Throat: Oropharynx is clear and moist.   Eyes: Conjunctivae and EOM are normal. Pupils are equal, round, and reactive to light.   Neck: Normal range of motion. Neck supple. No thyromegaly present.   Cardiovascular: Normal rate, regular rhythm and intact distal pulses.    Currently in normal sinus rhythm with systolic murmur grade 2/6 at left sternal border   Pulmonary/Chest: Effort normal and breath sounds normal. No respiratory distress.   Abdominal: Soft. Bowel sounds are " normal.   No CVA tenderness and no suprapubic tenderness   Musculoskeletal: He exhibits no edema.   Lymphadenopathy:     He has no cervical adenopathy.   Neurological: He is alert and oriented to person, place, and time. No cranial nerve deficit.   Psychiatric: He has a normal mood and affect. Judgment normal.   Nursing note and vitals reviewed.      Assessment/Plan   Freddie Yeboah is here today and the following problems have been addressed:      Freddie was seen today for urinary tract infection.    Diagnoses and all orders for this visit:    Acute cystitis without hematuria  -     Basic Metabolic Panel  -     POC Urinalysis Dipstick, Automated; Future    Renal insufficiency  -     Basic Metabolic Panel    Will check ua again today  Labs as noted  Stay well hydrated  Finish macrobid    RTc prn    Please note that portions of this note were completed with a voice recognition program.  Efforts were made to edit dictation, but occasionally words are mistranscribed.

## 2017-09-08 ENCOUNTER — ANESTHESIA EVENT (OUTPATIENT)
Dept: PERIOP | Facility: HOSPITAL | Age: 67
End: 2017-09-08

## 2017-09-11 ENCOUNTER — APPOINTMENT (OUTPATIENT)
Dept: GENERAL RADIOLOGY | Facility: HOSPITAL | Age: 67
End: 2017-09-11

## 2017-09-11 ENCOUNTER — HOSPITAL ENCOUNTER (INPATIENT)
Facility: HOSPITAL | Age: 67
LOS: 1 days | Discharge: HOME HEALTH | End: 2017-09-12
Attending: ORTHOPAEDIC SURGERY | Admitting: ORTHOPAEDIC SURGERY

## 2017-09-11 ENCOUNTER — ANESTHESIA (OUTPATIENT)
Dept: PERIOP | Facility: HOSPITAL | Age: 67
End: 2017-09-11

## 2017-09-11 DIAGNOSIS — Z78.9 IMPAIRED MOBILITY AND ADLS: Primary | ICD-10-CM

## 2017-09-11 DIAGNOSIS — Z74.09 IMPAIRED MOBILITY AND ADLS: Primary | ICD-10-CM

## 2017-09-11 DIAGNOSIS — Z74.09 IMPAIRED FUNCTIONAL MOBILITY, BALANCE, GAIT, AND ENDURANCE: ICD-10-CM

## 2017-09-11 PROBLEM — M19.019 SHOULDER ARTHRITIS: Status: ACTIVE | Noted: 2017-09-11

## 2017-09-11 PROBLEM — Z96.612 STATUS POST TOTAL REPLACEMENT OF LEFT SHOULDER: Status: ACTIVE | Noted: 2017-09-11

## 2017-09-11 LAB
ABO GROUP BLD: NORMAL
BILIRUB UR QL STRIP: NEGATIVE
BLD GP AB SCN SERPL QL: NEGATIVE
CLARITY UR: CLEAR
COLOR UR: YELLOW
GLUCOSE BLDC GLUCOMTR-MCNC: 130 MG/DL (ref 70–130)
GLUCOSE BLDC GLUCOMTR-MCNC: 133 MG/DL (ref 70–130)
GLUCOSE BLDC GLUCOMTR-MCNC: 207 MG/DL (ref 70–130)
GLUCOSE BLDC GLUCOMTR-MCNC: 243 MG/DL (ref 70–130)
GLUCOSE UR STRIP-MCNC: NEGATIVE MG/DL
HGB UR QL STRIP.AUTO: NEGATIVE
KETONES UR QL STRIP: NEGATIVE
LEUKOCYTE ESTERASE UR QL STRIP.AUTO: NEGATIVE
NITRITE UR QL STRIP: NEGATIVE
PH UR STRIP.AUTO: <=5 [PH] (ref 5–8)
POTASSIUM BLDA-SCNC: 4.75 MMOL/L (ref 3.5–5.3)
PROT UR QL STRIP: NEGATIVE
RH BLD: POSITIVE
SP GR UR STRIP: 1.02 (ref 1–1.03)
UROBILINOGEN UR QL STRIP: NORMAL

## 2017-09-11 PROCEDURE — 94799 UNLISTED PULMONARY SVC/PX: CPT

## 2017-09-11 PROCEDURE — 25010000002 ONDANSETRON PER 1 MG: Performed by: NURSE ANESTHETIST, CERTIFIED REGISTERED

## 2017-09-11 PROCEDURE — 25010000002 FENTANYL CITRATE (PF) 100 MCG/2ML SOLUTION: Performed by: NURSE ANESTHETIST, CERTIFIED REGISTERED

## 2017-09-11 PROCEDURE — C1713 ANCHOR/SCREW BN/BN,TIS/BN: HCPCS | Performed by: ORTHOPAEDIC SURGERY

## 2017-09-11 PROCEDURE — C1776 JOINT DEVICE (IMPLANTABLE): HCPCS | Performed by: ORTHOPAEDIC SURGERY

## 2017-09-11 PROCEDURE — 25010000002 PROPOFOL 1000 MG/ML EMULSION: Performed by: NURSE ANESTHETIST, CERTIFIED REGISTERED

## 2017-09-11 PROCEDURE — 25010000002 MIDAZOLAM PER 1 MG: Performed by: NURSE ANESTHETIST, CERTIFIED REGISTERED

## 2017-09-11 PROCEDURE — 73020 X-RAY EXAM OF SHOULDER: CPT

## 2017-09-11 PROCEDURE — 0RRK0JZ REPLACEMENT OF LEFT SHOULDER JOINT WITH SYNTHETIC SUBSTITUTE, OPEN APPROACH: ICD-10-PCS | Performed by: ORTHOPAEDIC SURGERY

## 2017-09-11 PROCEDURE — 25010000002 PHENYLEPHRINE PER 1 ML: Performed by: NURSE ANESTHETIST, CERTIFIED REGISTERED

## 2017-09-11 PROCEDURE — 63710000001 INSULIN LISPRO (HUMAN) PER 5 UNITS: Performed by: NURSE PRACTITIONER

## 2017-09-11 PROCEDURE — 25010000002 ROPIVACAINE HCL-NACL 0.2-0.9 % SOLUTION: Performed by: NURSE ANESTHETIST, CERTIFIED REGISTERED

## 2017-09-11 PROCEDURE — 86850 RBC ANTIBODY SCREEN: CPT | Performed by: ORTHOPAEDIC SURGERY

## 2017-09-11 PROCEDURE — 81003 URINALYSIS AUTO W/O SCOPE: CPT | Performed by: ORTHOPAEDIC SURGERY

## 2017-09-11 PROCEDURE — 94660 CPAP INITIATION&MGMT: CPT

## 2017-09-11 PROCEDURE — 0LS40ZZ REPOSITION LEFT UPPER ARM TENDON, OPEN APPROACH: ICD-10-PCS | Performed by: ORTHOPAEDIC SURGERY

## 2017-09-11 PROCEDURE — 25010000002 PROPOFOL 10 MG/ML EMULSION: Performed by: NURSE ANESTHETIST, CERTIFIED REGISTERED

## 2017-09-11 PROCEDURE — 86920 COMPATIBILITY TEST SPIN: CPT

## 2017-09-11 PROCEDURE — 25010000002 NEOSTIGMINE 10 MG/10ML SOLUTION: Performed by: NURSE ANESTHETIST, CERTIFIED REGISTERED

## 2017-09-11 PROCEDURE — 84132 ASSAY OF SERUM POTASSIUM: CPT | Performed by: ORTHOPAEDIC SURGERY

## 2017-09-11 PROCEDURE — 86901 BLOOD TYPING SEROLOGIC RH(D): CPT | Performed by: ORTHOPAEDIC SURGERY

## 2017-09-11 PROCEDURE — 86900 BLOOD TYPING SEROLOGIC ABO: CPT | Performed by: ORTHOPAEDIC SURGERY

## 2017-09-11 PROCEDURE — 82962 GLUCOSE BLOOD TEST: CPT

## 2017-09-11 DEVICE — STEM HUM/SHLDR UNIVERS APEX 10X180MM: Type: IMPLANTABLE DEVICE | Site: SHOULDER | Status: FUNCTIONAL

## 2017-09-11 DEVICE — IMPLANTABLE DEVICE: Type: IMPLANTABLE DEVICE | Site: SHOULDER | Status: FUNCTIONAL

## 2017-09-11 DEVICE — TOTL ARTH SHLDR S3: Type: IMPLANTABLE DEVICE | Site: SHOULDER | Status: FUNCTIONAL

## 2017-09-11 DEVICE — GLEN UNIVERS VAULTLOCK MD: Type: IMPLANTABLE DEVICE | Site: SHOULDER | Status: FUNCTIONAL

## 2017-09-11 DEVICE — DEPUY CMW 2 FAST SET BONE CEMENT 20G: Type: IMPLANTABLE DEVICE | Site: SHOULDER | Status: FUNCTIONAL

## 2017-09-11 RX ORDER — PANTOPRAZOLE SODIUM 40 MG/1
40 TABLET, DELAYED RELEASE ORAL
Status: DISCONTINUED | OUTPATIENT
Start: 2017-09-11 | End: 2017-09-12 | Stop reason: HOSPADM

## 2017-09-11 RX ORDER — BUPIVACAINE HYDROCHLORIDE 2.5 MG/ML
INJECTION, SOLUTION EPIDURAL; INFILTRATION; INTRACAUDAL AS NEEDED
Status: DISCONTINUED | OUTPATIENT
Start: 2017-09-11 | End: 2017-09-11 | Stop reason: SURG

## 2017-09-11 RX ORDER — NEOSTIGMINE METHYLSULFATE 1 MG/ML
INJECTION, SOLUTION INTRAVENOUS AS NEEDED
Status: DISCONTINUED | OUTPATIENT
Start: 2017-09-11 | End: 2017-09-11 | Stop reason: SURG

## 2017-09-11 RX ORDER — PROPOFOL 10 MG/ML
VIAL (ML) INTRAVENOUS AS NEEDED
Status: DISCONTINUED | OUTPATIENT
Start: 2017-09-11 | End: 2017-09-11 | Stop reason: SURG

## 2017-09-11 RX ORDER — MORPHINE SULFATE 30 MG/1
60 TABLET, FILM COATED, EXTENDED RELEASE ORAL EVERY 12 HOURS SCHEDULED
Status: DISCONTINUED | OUTPATIENT
Start: 2017-09-11 | End: 2017-09-12 | Stop reason: HOSPADM

## 2017-09-11 RX ORDER — OXYCODONE AND ACETAMINOPHEN 7.5; 325 MG/1; MG/1
2 TABLET ORAL EVERY 4 HOURS PRN
Status: DISCONTINUED | OUTPATIENT
Start: 2017-09-11 | End: 2017-09-12 | Stop reason: HOSPADM

## 2017-09-11 RX ORDER — GLYCOPYRROLATE 0.2 MG/ML
INJECTION INTRAMUSCULAR; INTRAVENOUS AS NEEDED
Status: DISCONTINUED | OUTPATIENT
Start: 2017-09-11 | End: 2017-09-11 | Stop reason: SURG

## 2017-09-11 RX ORDER — OXYCODONE AND ACETAMINOPHEN 10; 325 MG/1; MG/1
1 TABLET ORAL EVERY 4 HOURS PRN
Status: DISCONTINUED | OUTPATIENT
Start: 2017-09-11 | End: 2017-09-12 | Stop reason: HOSPADM

## 2017-09-11 RX ORDER — ESMOLOL HYDROCHLORIDE 10 MG/ML
INJECTION INTRAVENOUS AS NEEDED
Status: DISCONTINUED | OUTPATIENT
Start: 2017-09-11 | End: 2017-09-11 | Stop reason: SURG

## 2017-09-11 RX ORDER — NICOTINE POLACRILEX 4 MG
15 LOZENGE BUCCAL
Status: DISCONTINUED | OUTPATIENT
Start: 2017-09-11 | End: 2017-09-12 | Stop reason: HOSPADM

## 2017-09-11 RX ORDER — ISOSORBIDE MONONITRATE 60 MG/1
60 TABLET, EXTENDED RELEASE ORAL DAILY
Status: DISCONTINUED | OUTPATIENT
Start: 2017-09-12 | End: 2017-09-12 | Stop reason: HOSPADM

## 2017-09-11 RX ORDER — ONDANSETRON 2 MG/ML
4 INJECTION INTRAMUSCULAR; INTRAVENOUS EVERY 6 HOURS PRN
Status: DISCONTINUED | OUTPATIENT
Start: 2017-09-11 | End: 2017-09-12 | Stop reason: HOSPADM

## 2017-09-11 RX ORDER — LEVOTHYROXINE SODIUM 0.05 MG/1
50 TABLET ORAL NIGHTLY
Status: DISCONTINUED | OUTPATIENT
Start: 2017-09-11 | End: 2017-09-12 | Stop reason: HOSPADM

## 2017-09-11 RX ORDER — ATORVASTATIN CALCIUM 10 MG/1
10 TABLET, FILM COATED ORAL NIGHTLY
Status: DISCONTINUED | OUTPATIENT
Start: 2017-09-11 | End: 2017-09-12 | Stop reason: HOSPADM

## 2017-09-11 RX ORDER — FAMOTIDINE 20 MG/1
20 TABLET, FILM COATED ORAL ONCE
Status: COMPLETED | OUTPATIENT
Start: 2017-09-11 | End: 2017-09-11

## 2017-09-11 RX ORDER — CEFAZOLIN SODIUM IN 0.9 % NACL 3 G/100 ML
3 INTRAVENOUS SOLUTION, PIGGYBACK (ML) INTRAVENOUS ONCE
Status: DISCONTINUED | OUTPATIENT
Start: 2017-09-11 | End: 2017-09-11 | Stop reason: HOSPADM

## 2017-09-11 RX ORDER — MAGNESIUM HYDROXIDE 1200 MG/15ML
LIQUID ORAL AS NEEDED
Status: DISCONTINUED | OUTPATIENT
Start: 2017-09-11 | End: 2017-09-11 | Stop reason: HOSPADM

## 2017-09-11 RX ORDER — CEFAZOLIN SODIUM IN 0.9 % NACL 3 G/100 ML
3 INTRAVENOUS SOLUTION, PIGGYBACK (ML) INTRAVENOUS EVERY 8 HOURS
Status: COMPLETED | OUTPATIENT
Start: 2017-09-11 | End: 2017-09-11

## 2017-09-11 RX ORDER — HYDROMORPHONE HYDROCHLORIDE 1 MG/ML
0.5 INJECTION, SOLUTION INTRAMUSCULAR; INTRAVENOUS; SUBCUTANEOUS
Status: DISCONTINUED | OUTPATIENT
Start: 2017-09-11 | End: 2017-09-12 | Stop reason: HOSPADM

## 2017-09-11 RX ORDER — FAMOTIDINE 10 MG/ML
20 INJECTION, SOLUTION INTRAVENOUS ONCE
Status: DISCONTINUED | OUTPATIENT
Start: 2017-09-11 | End: 2017-09-11

## 2017-09-11 RX ORDER — SODIUM CHLORIDE 9 MG/ML
INJECTION, SOLUTION INTRAVENOUS CONTINUOUS PRN
Status: DISCONTINUED | OUTPATIENT
Start: 2017-09-11 | End: 2017-09-11 | Stop reason: SURG

## 2017-09-11 RX ORDER — NALOXONE HCL 0.4 MG/ML
0.1 VIAL (ML) INJECTION
Status: DISCONTINUED | OUTPATIENT
Start: 2017-09-11 | End: 2017-09-12 | Stop reason: HOSPADM

## 2017-09-11 RX ORDER — HYDRALAZINE HYDROCHLORIDE 20 MG/ML
10 INJECTION INTRAMUSCULAR; INTRAVENOUS EVERY 6 HOURS PRN
Status: DISCONTINUED | OUTPATIENT
Start: 2017-09-11 | End: 2017-09-12 | Stop reason: HOSPADM

## 2017-09-11 RX ORDER — FENTANYL CITRATE 50 UG/ML
INJECTION, SOLUTION INTRAMUSCULAR; INTRAVENOUS AS NEEDED
Status: DISCONTINUED | OUTPATIENT
Start: 2017-09-11 | End: 2017-09-11 | Stop reason: SURG

## 2017-09-11 RX ORDER — METOPROLOL TARTRATE 100 MG/1
100 TABLET ORAL EVERY 12 HOURS SCHEDULED
Status: DISCONTINUED | OUTPATIENT
Start: 2017-09-11 | End: 2017-09-12 | Stop reason: HOSPADM

## 2017-09-11 RX ORDER — MIDAZOLAM HYDROCHLORIDE 1 MG/ML
INJECTION INTRAMUSCULAR; INTRAVENOUS AS NEEDED
Status: DISCONTINUED | OUTPATIENT
Start: 2017-09-11 | End: 2017-09-11 | Stop reason: SURG

## 2017-09-11 RX ORDER — ASPIRIN 325 MG
325 TABLET, DELAYED RELEASE (ENTERIC COATED) ORAL DAILY
Status: DISCONTINUED | OUTPATIENT
Start: 2017-09-12 | End: 2017-09-12 | Stop reason: HOSPADM

## 2017-09-11 RX ORDER — OXYCODONE AND ACETAMINOPHEN 10; 325 MG/1; MG/1
1 TABLET ORAL EVERY 8 HOURS PRN
Status: DISCONTINUED | OUTPATIENT
Start: 2017-09-11 | End: 2017-09-12 | Stop reason: HOSPADM

## 2017-09-11 RX ORDER — ONDANSETRON 2 MG/ML
INJECTION INTRAMUSCULAR; INTRAVENOUS AS NEEDED
Status: DISCONTINUED | OUTPATIENT
Start: 2017-09-11 | End: 2017-09-11 | Stop reason: SURG

## 2017-09-11 RX ORDER — DOCUSATE SODIUM 100 MG/1
100 CAPSULE, LIQUID FILLED ORAL 2 TIMES DAILY PRN
Status: DISCONTINUED | OUTPATIENT
Start: 2017-09-11 | End: 2017-09-12 | Stop reason: HOSPADM

## 2017-09-11 RX ORDER — ROPIVACAINE IN 0.9% SOD CHL/PF 0.2% 545ML
6 ELASTOMERIC PUMP, HI VARIABLE RATE INJECTION CONTINUOUS
Status: DISCONTINUED | OUTPATIENT
Start: 2017-09-11 | End: 2017-09-12 | Stop reason: HOSPADM

## 2017-09-11 RX ORDER — LOSARTAN POTASSIUM 50 MG/1
100 TABLET ORAL DAILY
Status: DISCONTINUED | OUTPATIENT
Start: 2017-09-12 | End: 2017-09-12 | Stop reason: HOSPADM

## 2017-09-11 RX ORDER — GLIPIZIDE 10 MG/1
10 TABLET ORAL
Status: DISCONTINUED | OUTPATIENT
Start: 2017-09-11 | End: 2017-09-12 | Stop reason: HOSPADM

## 2017-09-11 RX ORDER — ATRACURIUM BESYLATE 10 MG/ML
INJECTION, SOLUTION INTRAVENOUS AS NEEDED
Status: DISCONTINUED | OUTPATIENT
Start: 2017-09-11 | End: 2017-09-11 | Stop reason: SURG

## 2017-09-11 RX ORDER — LIDOCAINE HYDROCHLORIDE 10 MG/ML
0.5 INJECTION, SOLUTION EPIDURAL; INFILTRATION; INTRACAUDAL; PERINEURAL ONCE AS NEEDED
Status: COMPLETED | OUTPATIENT
Start: 2017-09-11 | End: 2017-09-11

## 2017-09-11 RX ORDER — TIZANIDINE 4 MG/1
8 TABLET ORAL EVERY 12 HOURS SCHEDULED
Status: DISCONTINUED | OUTPATIENT
Start: 2017-09-11 | End: 2017-09-12 | Stop reason: HOSPADM

## 2017-09-11 RX ORDER — SODIUM CHLORIDE, SODIUM LACTATE, POTASSIUM CHLORIDE, CALCIUM CHLORIDE 600; 310; 30; 20 MG/100ML; MG/100ML; MG/100ML; MG/100ML
9 INJECTION, SOLUTION INTRAVENOUS CONTINUOUS
Status: DISCONTINUED | OUTPATIENT
Start: 2017-09-11 | End: 2017-09-12 | Stop reason: HOSPADM

## 2017-09-11 RX ORDER — SPIRONOLACTONE 25 MG/1
25 TABLET ORAL DAILY
Status: DISCONTINUED | OUTPATIENT
Start: 2017-09-11 | End: 2017-09-12 | Stop reason: HOSPADM

## 2017-09-11 RX ORDER — DEXTROSE MONOHYDRATE 25 G/50ML
25 INJECTION, SOLUTION INTRAVENOUS
Status: DISCONTINUED | OUTPATIENT
Start: 2017-09-11 | End: 2017-09-12 | Stop reason: HOSPADM

## 2017-09-11 RX ORDER — DIGOXIN 125 MCG
125 TABLET ORAL
Status: DISCONTINUED | OUTPATIENT
Start: 2017-09-12 | End: 2017-09-12 | Stop reason: HOSPADM

## 2017-09-11 RX ORDER — LIDOCAINE HYDROCHLORIDE 10 MG/ML
INJECTION, SOLUTION EPIDURAL; INFILTRATION; INTRACAUDAL; PERINEURAL AS NEEDED
Status: DISCONTINUED | OUTPATIENT
Start: 2017-09-11 | End: 2017-09-11 | Stop reason: SURG

## 2017-09-11 RX ORDER — SODIUM CHLORIDE 0.9 % (FLUSH) 0.9 %
1-10 SYRINGE (ML) INJECTION AS NEEDED
Status: DISCONTINUED | OUTPATIENT
Start: 2017-09-11 | End: 2017-09-11 | Stop reason: HOSPADM

## 2017-09-11 RX ADMIN — PHENYLEPHRINE HYDROCHLORIDE 100 MCG: 10 INJECTION INTRAVENOUS at 07:55

## 2017-09-11 RX ADMIN — PROPOFOL 25 MCG/KG/MIN: 10 INJECTION, EMULSION INTRAVENOUS at 07:50

## 2017-09-11 RX ADMIN — OXYCODONE HYDROCHLORIDE AND ACETAMINOPHEN 1 TABLET: 10; 325 TABLET ORAL at 20:02

## 2017-09-11 RX ADMIN — ONDANSETRON 4 MG: 2 INJECTION INTRAMUSCULAR; INTRAVENOUS at 08:48

## 2017-09-11 RX ADMIN — GLYCOPYRROLATE 0.4 MG: 0.2 INJECTION, SOLUTION INTRAMUSCULAR; INTRAVENOUS at 09:07

## 2017-09-11 RX ADMIN — INSULIN LISPRO 3 UNITS: 100 INJECTION, SOLUTION INTRAVENOUS; SUBCUTANEOUS at 23:08

## 2017-09-11 RX ADMIN — SODIUM CHLORIDE: 9 INJECTION, SOLUTION INTRAVENOUS at 07:34

## 2017-09-11 RX ADMIN — LEVOTHYROXINE SODIUM 50 MCG: 50 TABLET ORAL at 21:15

## 2017-09-11 RX ADMIN — LIDOCAINE HYDROCHLORIDE 0.3 ML: 10 INJECTION, SOLUTION EPIDURAL; INFILTRATION; INTRACAUDAL; PERINEURAL at 06:49

## 2017-09-11 RX ADMIN — NEOSTIGMINE METHYLSULFATE 2 MG: 1 INJECTION, SOLUTION INTRAVENOUS at 09:07

## 2017-09-11 RX ADMIN — MIDAZOLAM HYDROCHLORIDE 2 MG: 1 INJECTION, SOLUTION INTRAMUSCULAR; INTRAVENOUS at 07:20

## 2017-09-11 RX ADMIN — PROPOFOL 200 MG: 10 INJECTION, EMULSION INTRAVENOUS at 07:41

## 2017-09-11 RX ADMIN — Medication 6 ML/HR: at 09:13

## 2017-09-11 RX ADMIN — BUPIVACAINE HYDROCHLORIDE 20 ML: 2.5 INJECTION, SOLUTION EPIDURAL; INFILTRATION; INTRACAUDAL; PERINEURAL at 07:01

## 2017-09-11 RX ADMIN — INSULIN LISPRO 3 UNITS: 100 INJECTION, SOLUTION INTRAVENOUS; SUBCUTANEOUS at 16:29

## 2017-09-11 RX ADMIN — LIDOCAINE HYDROCHLORIDE 50 MG: 10 INJECTION, SOLUTION EPIDURAL; INFILTRATION; INTRACAUDAL; PERINEURAL at 07:41

## 2017-09-11 RX ADMIN — BUPIVACAINE HYDROCHLORIDE 5 ML: 2.5 INJECTION, SOLUTION EPIDURAL; INFILTRATION; INTRACAUDAL; PERINEURAL at 09:11

## 2017-09-11 RX ADMIN — CEFAZOLIN SODIUM IN SODIUM CHLORIDE 0.9% IV SOLN 3 GM/100ML 3 G: 3-0.9/1 SOLUTION at 16:28

## 2017-09-11 RX ADMIN — METOPROLOL TARTRATE 100 MG: 100 TABLET ORAL at 21:14

## 2017-09-11 RX ADMIN — FAMOTIDINE 20 MG: 20 TABLET ORAL at 06:49

## 2017-09-11 RX ADMIN — OXYCODONE HYDROCHLORIDE AND ACETAMINOPHEN 1 TABLET: 10; 325 TABLET ORAL at 11:55

## 2017-09-11 RX ADMIN — METFORMIN HYDROCHLORIDE 500 MG: 500 TABLET, FILM COATED ORAL at 16:30

## 2017-09-11 RX ADMIN — OXYCODONE HYDROCHLORIDE AND ACETAMINOPHEN 1 TABLET: 10; 325 TABLET ORAL at 16:30

## 2017-09-11 RX ADMIN — FENTANYL CITRATE 100 MCG: 50 INJECTION, SOLUTION INTRAMUSCULAR; INTRAVENOUS at 07:20

## 2017-09-11 RX ADMIN — ESMOLOL HYDROCHLORIDE 30 MG: 10 INJECTION, SOLUTION INTRAVENOUS at 07:41

## 2017-09-11 RX ADMIN — PHENYLEPHRINE HYDROCHLORIDE 100 MCG: 10 INJECTION INTRAVENOUS at 08:35

## 2017-09-11 RX ADMIN — CEFAZOLIN SODIUM IN SODIUM CHLORIDE 0.9% IV SOLN 3 GM/100ML 3 G: 3-0.9/1 SOLUTION at 21:15

## 2017-09-11 RX ADMIN — TIZANIDINE 8 MG: 4 TABLET ORAL at 21:14

## 2017-09-11 RX ADMIN — SODIUM CHLORIDE, POTASSIUM CHLORIDE, SODIUM LACTATE AND CALCIUM CHLORIDE 9 ML/HR: 600; 310; 30; 20 INJECTION, SOLUTION INTRAVENOUS at 10:12

## 2017-09-11 RX ADMIN — ATRACURIUM BESYLATE 50 MG: 10 INJECTION, SOLUTION INTRAVENOUS at 07:41

## 2017-09-11 RX ADMIN — MORPHINE SULFATE 60 MG: 30 TABLET, EXTENDED RELEASE ORAL at 21:15

## 2017-09-11 RX ADMIN — OXYCODONE HYDROCHLORIDE AND ACETAMINOPHEN 1 TABLET: 10; 325 TABLET ORAL at 16:36

## 2017-09-11 RX ADMIN — GLIPIZIDE 10 MG: 10 TABLET ORAL at 16:30

## 2017-09-11 RX ADMIN — ATORVASTATIN CALCIUM 10 MG: 10 TABLET, FILM COATED ORAL at 21:16

## 2017-09-11 RX ADMIN — SODIUM CHLORIDE, POTASSIUM CHLORIDE, SODIUM LACTATE AND CALCIUM CHLORIDE 9 ML/HR: 600; 310; 30; 20 INJECTION, SOLUTION INTRAVENOUS at 06:50

## 2017-09-11 RX ADMIN — PHENYLEPHRINE HYDROCHLORIDE 200 MCG: 10 INJECTION INTRAVENOUS at 08:01

## 2017-09-11 NOTE — H&P
Patient Name: Freddie Yeboah Jr.  MRN: [id number]  : 1950  DOS: 2017    Attending: William Ray MD    Primary Care Provider: Marilyn K. Vermeesch, MD      Chief complaint:  Left shoulder pain    Subjective   Patient is a 67 y.o. male presented for left total shoulder replacement and biceps tenodesis by Dr. Ray under GA. He tolerated surgery well and is admitted for further medical management.  Has been painful for many years; conservative treatments failed to provide long-term pain relief.  He had previous left shoulder surgery about 10 years ago.    When seen postop he is doing well.  His pain is well controlled. He denies nausea, shortness of breath or chest pain. No hx of DVT or PE.    He has cardiac history including hypertension, hyperlipidemia, A. Fib. He is followed by Dr. Rodriguez, cardiology, and received preop cardiac clearance.    ( Seen later in his room, he is doing well. Pain is controlled. No n/v/sob. Some numbness in left hand but able to move it well. Of note is that patient is followed by  for pain management and is on chronic opiates.)wy    Allergies:  Allergies   Allergen Reactions   • Duloxetine Itching   • Sulfa Antibiotics Other (See Comments)     He reports renal failure after a round of sulfa   • Sulfamethoxazole-Trimethoprim Other (See Comments)     Had to be hospitalized in ICU; renal impairment        Meds:  Prescriptions Prior to Admission   Medication Sig Dispense Refill Last Dose   • anastrozole (ARIMIDEX) 1 MG chemo tablet Take 1 tablet by mouth 2 (two) times a week. Take on  and Thursday 12 tablet 3 9/10/2017 at 0900   • digoxin (LANOXIN) 125 MCG tablet Take 125 mcg by mouth Daily.   2017 at 0500   • glipiZIDE (GLUCOTROL) 10 MG tablet Take 1 tablet by mouth 2 (Two) Times a Day Before Meals. 60 tablet 5 9/10/2017 at 2000   • glucosamine-chondroitin 500-400 MG capsule capsule Take 1 capsule by mouth 2 (Two) Times a Day.   9/10/2017 at    • isosorbide  mononitrate (IMDUR) 60 MG 24 hr tablet Take 60 mg by mouth Daily With Breakfast.   9/11/2017 at 0500   • levothyroxine (SYNTHROID, LEVOTHROID) 50 MCG tablet Take 1 tablet by mouth Daily. (Patient taking differently: Take 50 mcg by mouth Every Night.) 90 tablet 3 9/10/2017 at 2000   • losartan (COZAAR) 100 MG tablet Take 100 mg by mouth Daily.   9/11/2017 at 0500   • metFORMIN (GLUCOPHAGE) 500 MG tablet  TAKE THREE TABLETS BY MOUTH EVERY MORNING AND TAKE TWO TABLETS BY MOUTH EVERY EVENING (Patient taking differently: 2 (Two) Times a Day With Meals.  TAKE THREE TABLETS BY MOUTH EVERY MORNING AND TAKE TWO TABLETS BY MOUTH EVERY EVENING ) 300 tablet 3 9/10/2017 at 2000   • metoprolol tartrate (LOPRESSOR) 100 MG tablet Take 100 mg by mouth 2 (Two) Times a Day.   9/11/2017 at 0500   • Morphine (MS CONTIN) 60 MG 12 hr tablet 60 mg 2 (Two) Times a Day.   9/11/2017 at 0500   • Multiple Vitamins-Minerals (MULTIVITAL) tablet Take 1 tablet by mouth Daily.   9/10/2017 at 0800   • Omega-3 Fatty Acids (FISH OIL) 1200 MG capsule capsule Take 1,200 mg by mouth 2 (Two) Times a Day With Meals. Take as directed   9/6/2017   • omeprazole (priLOSEC) 40 MG capsule Take 1 capsule by mouth Daily. 90 capsule 3 9/10/2017 at 0800   • oxyCODONE-acetaminophen (PERCOCET)  MG per tablet Take 1 tablet by mouth Every 8 (Eight) Hours As Needed for Moderate Pain .   9/10/2017 at 2000   • simvastatin (ZOCOR) 20 MG tablet Take 1 tablet by mouth Every Night. 90 tablet 3 9/10/2017 at 2000   • spironolactone (ALDACTONE) 25 MG tablet 25 mg Daily.   9/10/2017 at 0800   • tiZANidine (ZANAFLEX) 4 MG tablet Take 8 mg by mouth every night at bedtime.   9/10/2017 at 2100   • apixaban (ELIQUIS) 5 MG tablet tablet Take 1 tablet by mouth 2 (two) times a day.   9/6/2017   • aspirin 81 MG tablet Take 1 tablet by mouth daily.   9/6/2017       History:   Past Medical History:   Diagnosis Date   • Arthritis     shoulder, knee. multi joint    • Atrial fibrillation   "  • Back pain    • Chronic kidney disease     stage III due to diabetes   • Diabetes mellitus     several years; checks sugars at home-usually run 100-112   • Diabetic nephropathy    • Disease of thyroid gland     hypothyroidism   • GERD (gastroesophageal reflux disease)    • Hypertension    • Low testosterone    • Sleep apnea     setting of 11   • Urinary tract infection     most recent diagnosis 8/29/17 in the ER (+3 bacteria)-started on Macrobid and rechecked by PCP on 9/5/17     Past Surgical History:   Procedure Laterality Date   • ANTERIOR CERVICAL FUSION     • APPENDECTOMY     • BACK SURGERY     • CARPAL TUNNEL RELEASE Bilateral    • CATARACT EXTRACTION     • CHOLECYSTECTOMY     • COLONOSCOPY  2013   • FINGER/THUMB ARTHROPLASTY Left     thumb replacement   • SHOULDER SURGERY     • TOTAL KNEE ARTHROPLASTY Left      History reviewed. No pertinent family history.  Social History   Substance Use Topics   • Smoking status: Former Smoker     Packs/day: 3.00     Years: 34.00     Quit date: 2002   • Smokeless tobacco: Never Used   • Alcohol use No   He is  with 2 children.  He is retired contractor.    Review of Systems  All systems were reviewed and negative except for:  Gastrointestinal: postitive for  constipation    Vital Signs  /67 (BP Location: Right arm, Patient Position: Lying)  Pulse 70  Temp 97.6 °F (36.4 °C) (Oral)   Resp 16  Ht 69.5\" (176.5 cm)  Wt (!) 308 lb (140 kg)  SpO2 94%  BMI 44.83 kg/m2    Physical Exam:    General Appearance:    Alert, cooperative, in no acute distress. [place]   Head:    Normocephalic, without obvious abnormality, atraumatic   Eyes:            Lids and lashes normal, conjunctivae and sclerae normal, no   icterus, no pallor, corneas clear, PERRLA   Ears:    Ears appear intact with no abnormalities noted   Neck:   No adenopathy, supple, trachea midline, no thyromegaly, no     carotid bruit, no JVD   Lungs:     Clear to auscultation,respirations regular, even and    "                unlabored    Heart:    Regular rhythm and normal rate, normal S1 and S2, no            murmur, no gallop, no rub, no click   Abdomen:     Normal bowel sounds, no masses, no organomegaly, soft        non-tender, non-distended, no guarding, no rebound                 tenderness   Genitalia:    Deferred   Extremities:   Right upper extremity in sling.  Nerve block present.  Covaderm clean dry intact.  Good cap refill and movement of left digits    Pulses:   Pulses palpable and equal bilaterally   Skin:   No bleeding, bruising or rash   Neurologic:   Cranial nerves 2 - 12 grossly intact, sensation intact      I reviewed the patient's new clinical results.         Results from last 7 days  Lab Units 09/05/17  1516   WBC 10*3/mm3 9.67   HEMOGLOBIN g/dL 16.6   HEMATOCRIT % 49.3   PLATELETS 10*3/mm3 278       Results from last 7 days  Lab Units 09/05/17  1516 09/05/17  0954   SODIUM mmol/L 140 141   POTASSIUM mmol/L 5.1 4.9   CHLORIDE mmol/L 104 99   CO2 mmol/L 29.0  --    TOTAL CO2, ARTERIAL mmol/L  --  28.0   BUN mg/dL 24* 23*   CREATININE mg/dL 1.60* 1.40*   CALCIUM mg/dL 10.1 10.3*   BILIRUBIN mg/dL 0.4  --    ALK PHOS U/L 89  --    ALT (SGPT) U/L 90*  --    AST (SGOT) U/L 51*  --    GLUCOSE mg/dL 102*  --      Lab Results   Component Value Date    HGBA1C 6.40 (H) 09/05/2017       Assessment and Plan:   Principal Problem:    Status post total replacement of left shoulder  Active Problems:    Atrial fibrillation    Benign essential hypertension    Benign prostatic hyperplasia    Gastroesophageal reflux disease    Hyperlipidemia    Hypothyroidism    Obstructive sleep apnea syndrome    Renal insufficiency    Type 2 diabetes mellitus without complication    Shoulder arthritis    Postop Pain.    Chronic pain, chronic opiates.    Plan  1. PT/OT- LUE sling, pendulum exercises  2. Pain control-prns, interscalene nerve block  3. IS-encourage  4. DVT proph- mechs/ASA  5. Bowel regimen  6. Resume home medications as  appropriate  7. Monitor post-op labs  8. DC planning for home, likely tomorrow    HTN, HLD, Afib  - Continue Home digoxin, Imdur, Cozaar, Lopressor, Aldactone and Zocor  - Resume Eliquis when okay with Dr. Ray  - Monitor BP q4 hrs  - Holding parameters for BP meds  - PRN hydralazine for SBP >170  GERD  - Continue home PPI    DM  - hgb A1c on 9/5/17 6.4  - Accuchecks ACHS with low dose SSI  - Continue home Glucotrol and metformin per Dr. Ray    Hypothyroid  - Continue home Synthroid  Renal insufficiency  - BMP in a.m.    LUIS FERNANDO  - CPAP at night  - Monitor O2 sats    For pain control, agree with resuming MS Contin and prn Oxy, along with Prn IV meds. Nerve block /interscalene will be a great help. wy  Seen and examined by me. Agree with above. Discussed with patient and family.       Heather Eckert MD  09/11/17  1:54 PM

## 2017-09-11 NOTE — BRIEF OP NOTE
TOTAL SHOULDER ARTHROPLASTY  Procedure Note    Freddie Yeboah JrGisela  9/11/2017    Pre-op Diagnosis:   * No pre-op diagnosis entered *    Post-op Diagnosis:     * No Diagnosis Codes entered *    Procedure/CPT® Codes:      Procedure(s):  TOTAL SHOULDER ARTHROPLASTY LEFT    Surgeon(s):  William Ray MD    Anesthesia: General with Block    Staff:   Circulator: Corine Aponte; Negro Randall RN  Scrub Person: Carmencita Ferguson  Vendor Representative: Chad Conley  Nursing Assistant: Ashvin Cordon  Assistant: OSWALD Hdz    Estimated Blood Loss: *No blood loss documented*  Urine Voided: * No values recorded between 9/11/2017  7:33 AM and 9/11/2017  9:22 AM *    Specimens:                * No specimens in log *      Drains:           Findings: please see OP note    Complications: None noted      William Ray MD     Date: 9/11/2017  Time: 9:32 AM

## 2017-09-11 NOTE — H&P
Pre-Op H&P  Freddie Yeboah Jr.  [id number]  1950    Chief complaint: Left shoulder pain    HPI:    Patient is a 67 y.o.male presents with left shoulder pain and has osteoarthritis and here today for left total shoulder replacement     Review of Systems:  General ROS: negative for chills, fever or skin lesions;  No changes since last office visit  Cardiovascular ROS: no chest pain or dyspnea on exertion.  Follow with Dr. Rodriguez for afib with + cardiac clearance  Respiratory ROS: no cough, shortness of breath, or wheezing  :  No dysuria/frequency.  Recent UTI with treatment of Macrobid and repeat U/A with PCP negative    Allergies:   Allergies   Allergen Reactions   • Duloxetine Itching   • Sulfa Antibiotics Other (See Comments)     He reports renal failure after a round of sulfa   • Sulfamethoxazole-Trimethoprim Other (See Comments)     Had to be hospitalized in ICU; renal impairment        Home Meds:    Prescriptions Prior to Admission   Medication Sig Dispense Refill Last Dose   • anastrozole (ARIMIDEX) 1 MG chemo tablet Take 1 tablet by mouth 2 (two) times a week. Take on Sunday and Thursday 12 tablet 3 9/10/2017 at 0900   • digoxin (LANOXIN) 125 MCG tablet Take 125 mcg by mouth Daily.   9/11/2017 at 0500   • glipiZIDE (GLUCOTROL) 10 MG tablet Take 1 tablet by mouth 2 (Two) Times a Day Before Meals. 60 tablet 5 9/10/2017 at 2000   • glucosamine-chondroitin 500-400 MG capsule capsule Take 1 capsule by mouth 2 (Two) Times a Day.   9/10/2017 at 2000   • isosorbide mononitrate (IMDUR) 60 MG 24 hr tablet Take 60 mg by mouth Daily With Breakfast.   9/11/2017 at 0500   • levothyroxine (SYNTHROID, LEVOTHROID) 50 MCG tablet Take 1 tablet by mouth Daily. (Patient taking differently: Take 50 mcg by mouth Every Night.) 90 tablet 3 9/10/2017 at 2000   • losartan (COZAAR) 100 MG tablet Take 100 mg by mouth Daily.   9/11/2017 at 0500   • metFORMIN (GLUCOPHAGE) 500 MG tablet  TAKE THREE TABLETS BY MOUTH EVERY MORNING AND TAKE  TWO TABLETS BY MOUTH EVERY EVENING (Patient taking differently: 2 (Two) Times a Day With Meals.  TAKE THREE TABLETS BY MOUTH EVERY MORNING AND TAKE TWO TABLETS BY MOUTH EVERY EVENING ) 300 tablet 3 9/10/2017 at 2000   • metoprolol tartrate (LOPRESSOR) 100 MG tablet Take 100 mg by mouth 2 (Two) Times a Day.   9/11/2017 at 0500   • Morphine (MS CONTIN) 60 MG 12 hr tablet 60 mg 2 (Two) Times a Day.   9/11/2017 at 0500   • Multiple Vitamins-Minerals (MULTIVITAL) tablet Take 1 tablet by mouth Daily.   9/10/2017 at 0800   • Omega-3 Fatty Acids (FISH OIL) 1200 MG capsule capsule Take 1,200 mg by mouth 2 (Two) Times a Day With Meals. Take as directed   9/6/2017   • omeprazole (priLOSEC) 40 MG capsule Take 1 capsule by mouth Daily. 90 capsule 3 9/10/2017 at 0800   • oxyCODONE-acetaminophen (PERCOCET)  MG per tablet Take 1 tablet by mouth Every 8 (Eight) Hours As Needed for Moderate Pain .   9/10/2017 at 2000   • simvastatin (ZOCOR) 20 MG tablet Take 1 tablet by mouth Every Night. 90 tablet 3 9/10/2017 at 2000   • spironolactone (ALDACTONE) 25 MG tablet 25 mg Daily.   9/10/2017 at 0800   • tiZANidine (ZANAFLEX) 4 MG tablet Take 8 mg by mouth every night at bedtime.   9/10/2017 at 2100   • apixaban (ELIQUIS) 5 MG tablet tablet Take 1 tablet by mouth 2 (two) times a day.   9/6/2017   • aspirin 81 MG tablet Take 1 tablet by mouth daily.   9/6/2017       PMH:   Past Medical History:   Diagnosis Date   • Arthritis     shoulder, knee. multi joint    • Atrial fibrillation    • Back pain    • Chronic kidney disease     stage III due to diabetes   • Diabetes mellitus     several years; checks sugars at home-usually run 100-112   • Diabetic nephropathy    • Disease of thyroid gland     hypothyroidism   • GERD (gastroesophageal reflux disease)    • Hypertension    • Low testosterone    • Sleep apnea     setting of 11   • Urinary tract infection     most recent diagnosis 8/29/17 in the ER (+3 bacteria)-started on Macrobid and  "rechecked by PCP on 9/5/17     PSH:    Past Surgical History:   Procedure Laterality Date   • ANTERIOR CERVICAL FUSION     • APPENDECTOMY     • BACK SURGERY     • CARPAL TUNNEL RELEASE Bilateral    • CATARACT EXTRACTION     • CHOLECYSTECTOMY     • COLONOSCOPY  2013   • FINGER/THUMB ARTHROPLASTY Left     thumb replacement   • SHOULDER SURGERY     • TOTAL KNEE ARTHROPLASTY Left        Immunization History:  Influenza: no  Pneumococcal: yes  < 5 years  Tetanus: unknown    Social History:   Tobacco:   History   Smoking Status   • Former Smoker   • Packs/day: 3.00   • Years: 34.00   • Quit date: 2002   Smokeless Tobacco   • Never Used      Alcohol:     History   Alcohol Use No       Vitals:           /81  Pulse 69  Temp 97.9 °F (36.6 °C) (Temporal Artery )   Resp 20  Ht 69.5\" (176.5 cm)  Wt (!) 308 lb (140 kg)  SpO2 92%  BMI 44.83 kg/m2    Physical Exam:  General Appearance:    Alert, cooperative, no distress, appears stated age   Head:    Normocephalic, without obvious abnormality, atraumatic   Lungs:     Clear to auscultation bilaterally, respirations unlabored    Heart:   Irregular rate and rhythm, S1 and S2 normal, no murmur, rub    or gallop    Abdomen:    Soft, non-tender.  +bowel sounds   Breast Exam:    deferred   Genitalia:    deferred   Extremities:   Extremities normal, atraumatic, no cyanosis or edema   Skin:   Skin color, texture, turgor normal, no rashes or lesions   Neurologic:   Grossly intact   Results Review  I reviewed the patient's new clinical results.    Cancer Staging (if applicable)  Cancer Patient: __ yes _x_no __unknown; If yes, clinical stage T:__ N:__M:__, stage group or __N/A    Impression: Osteoarthritis    Plan: Left total shoulder replacement    Gretchen Bashir, APRN   9/11/2017   6:47 AM  "

## 2017-09-12 VITALS
SYSTOLIC BLOOD PRESSURE: 114 MMHG | RESPIRATION RATE: 16 BRPM | WEIGHT: 308 LBS | HEIGHT: 70 IN | BODY MASS INDEX: 44.09 KG/M2 | DIASTOLIC BLOOD PRESSURE: 73 MMHG | HEART RATE: 64 BPM | TEMPERATURE: 97.3 F | OXYGEN SATURATION: 93 %

## 2017-09-12 LAB
ANION GAP SERPL CALCULATED.3IONS-SCNC: 7 MMOL/L (ref 3–11)
BASOPHILS # BLD AUTO: 0.01 10*3/MM3 (ref 0–0.2)
BASOPHILS NFR BLD AUTO: 0.1 % (ref 0–1)
BUN BLD-MCNC: 25 MG/DL (ref 9–23)
BUN/CREAT SERPL: 19.2 (ref 7–25)
CALCIUM SPEC-SCNC: 10 MG/DL (ref 8.7–10.4)
CHLORIDE SERPL-SCNC: 103 MMOL/L (ref 99–109)
CO2 SERPL-SCNC: 29 MMOL/L (ref 20–31)
CREAT BLD-MCNC: 1.3 MG/DL (ref 0.6–1.3)
DEPRECATED RDW RBC AUTO: 45.5 FL (ref 37–54)
EOSINOPHIL # BLD AUTO: 0.01 10*3/MM3 (ref 0–0.3)
EOSINOPHIL NFR BLD AUTO: 0.1 % (ref 0–3)
ERYTHROCYTE [DISTWIDTH] IN BLOOD BY AUTOMATED COUNT: 14.3 % (ref 11.3–14.5)
GFR SERPL CREATININE-BSD FRML MDRD: 55 ML/MIN/1.73
GLUCOSE BLD-MCNC: 157 MG/DL (ref 70–100)
GLUCOSE BLDC GLUCOMTR-MCNC: 155 MG/DL (ref 70–130)
HCT VFR BLD AUTO: 42 % (ref 38.9–50.9)
HGB BLD-MCNC: 13.5 G/DL (ref 13.1–17.5)
IMM GRANULOCYTES # BLD: 0.07 10*3/MM3 (ref 0–0.03)
IMM GRANULOCYTES NFR BLD: 0.4 % (ref 0–0.6)
LYMPHOCYTES # BLD AUTO: 3.04 10*3/MM3 (ref 0.6–4.8)
LYMPHOCYTES NFR BLD AUTO: 17.5 % (ref 24–44)
MCH RBC QN AUTO: 28 PG (ref 27–31)
MCHC RBC AUTO-ENTMCNC: 32.1 G/DL (ref 32–36)
MCV RBC AUTO: 87 FL (ref 80–99)
MONOCYTES # BLD AUTO: 0.85 10*3/MM3 (ref 0–1)
MONOCYTES NFR BLD AUTO: 4.9 % (ref 0–12)
NEUTROPHILS # BLD AUTO: 13.39 10*3/MM3 (ref 1.5–8.3)
NEUTROPHILS NFR BLD AUTO: 77 % (ref 41–71)
PLATELET # BLD AUTO: 277 10*3/MM3 (ref 150–450)
PMV BLD AUTO: 9.9 FL (ref 6–12)
POTASSIUM BLD-SCNC: 5.1 MMOL/L (ref 3.5–5.5)
RBC # BLD AUTO: 4.83 10*6/MM3 (ref 4.2–5.76)
SODIUM BLD-SCNC: 139 MMOL/L (ref 132–146)
WBC NRBC COR # BLD: 17.37 10*3/MM3 (ref 3.5–10.8)

## 2017-09-12 PROCEDURE — 97530 THERAPEUTIC ACTIVITIES: CPT

## 2017-09-12 PROCEDURE — 82962 GLUCOSE BLOOD TEST: CPT

## 2017-09-12 PROCEDURE — 85025 COMPLETE CBC W/AUTO DIFF WBC: CPT | Performed by: ORTHOPAEDIC SURGERY

## 2017-09-12 PROCEDURE — 80048 BASIC METABOLIC PNL TOTAL CA: CPT | Performed by: ORTHOPAEDIC SURGERY

## 2017-09-12 PROCEDURE — 94799 UNLISTED PULMONARY SVC/PX: CPT

## 2017-09-12 PROCEDURE — 97165 OT EVAL LOW COMPLEX 30 MIN: CPT

## 2017-09-12 PROCEDURE — 97161 PT EVAL LOW COMPLEX 20 MIN: CPT

## 2017-09-12 RX ORDER — ROPIVACAINE IN 0.9% SOD CHL/PF 0.2% 545ML
6 ELASTOMERIC PUMP, HI VARIABLE RATE INJECTION CONTINUOUS
Start: 2017-09-12

## 2017-09-12 RX ORDER — OXYCODONE AND ACETAMINOPHEN 7.5; 325 MG/1; MG/1
1 TABLET ORAL EVERY 6 HOURS PRN
Qty: 28 TABLET
Start: 2017-09-12 | End: 2018-11-01

## 2017-09-12 RX ORDER — PSEUDOEPHEDRINE HCL 30 MG
100 TABLET ORAL 2 TIMES DAILY PRN
Qty: 60 EACH | Refills: 0 | Status: SHIPPED | OUTPATIENT
Start: 2017-09-12

## 2017-09-12 RX ADMIN — OXYCODONE HYDROCHLORIDE AND ACETAMINOPHEN 1 TABLET: 10; 325 TABLET ORAL at 06:51

## 2017-09-12 RX ADMIN — ISOSORBIDE MONONITRATE 60 MG: 60 TABLET, EXTENDED RELEASE ORAL at 08:02

## 2017-09-12 RX ADMIN — METOPROLOL TARTRATE 100 MG: 100 TABLET ORAL at 08:02

## 2017-09-12 RX ADMIN — GLIPIZIDE 10 MG: 10 TABLET ORAL at 08:35

## 2017-09-12 RX ADMIN — ASPIRIN 325 MG: 325 TABLET, DELAYED RELEASE ORAL at 08:03

## 2017-09-12 RX ADMIN — OXYCODONE HYDROCHLORIDE AND ACETAMINOPHEN 1 TABLET: 10; 325 TABLET ORAL at 11:10

## 2017-09-12 RX ADMIN — LOSARTAN POTASSIUM 100 MG: 50 TABLET ORAL at 08:01

## 2017-09-12 RX ADMIN — OXYCODONE HYDROCHLORIDE AND ACETAMINOPHEN 1 TABLET: 10; 325 TABLET ORAL at 02:55

## 2017-09-12 RX ADMIN — METFORMIN HYDROCHLORIDE 500 MG: 500 TABLET, FILM COATED ORAL at 08:03

## 2017-09-12 RX ADMIN — MORPHINE SULFATE 60 MG: 30 TABLET, EXTENDED RELEASE ORAL at 08:02

## 2017-09-12 RX ADMIN — PANTOPRAZOLE SODIUM 40 MG: 40 TABLET, DELAYED RELEASE ORAL at 05:45

## 2017-09-12 RX ADMIN — INSULIN LISPRO 2 UNITS: 100 INJECTION, SOLUTION INTRAVENOUS; SUBCUTANEOUS at 08:03

## 2017-09-12 RX ADMIN — TIZANIDINE 8 MG: 4 TABLET ORAL at 08:01

## 2017-09-12 RX ADMIN — SPIRONOLACTONE 25 MG: 25 TABLET, FILM COATED ORAL at 08:03

## 2017-09-12 NOTE — NURSING NOTE
Acute Pain Service:  On-Q teaching completed with patient and spouse.  Video demonstration, handout and bracelet provided with [facility] on call central phone number.  Instructed to call with any questions or concerns.  Patient verbalized understanding.  Service will continue to follow until catheter DC'd.  Please contact patient at Home: [phone] if needed.

## 2017-09-12 NOTE — PLAN OF CARE
Problem: Patient Care Overview (Adult)  Goal: Plan of Care Review  Outcome: Ongoing (interventions implemented as appropriate)    09/12/17 1319   Coping/Psychosocial Response Interventions   Plan Of Care Reviewed With patient;spouse   Patient Care Overview   Progress progress toward functional goals as expected   Outcome Evaluation   Outcome Summary/Follow up Plan Pt ambulated 200 feet with CGA, demo mild unsteadiness. Pt anticipates d/c home with assist today.

## 2017-09-12 NOTE — DISCHARGE SUMMARY
Patient Name: Freddie Yeboah Jr.  MRN: [id number]  : 1950  DOS: 2017    Attending: Cyndy att. providers found    Primary Care Provider: Marilyn K. Vermeesch, MD    Date of Admission:.2017  5:24 AM    Date of Discharge:  2017    Discharge Diagnosis: Principal Problem:    Status post total replacement of left shoulder  Active Problems:    Atrial fibrillation    Benign essential hypertension    Benign prostatic hyperplasia    Gastroesophageal reflux disease    Hyperlipidemia    Hypothyroidism    Obstructive sleep apnea syndrome    Renal insufficiency    Type 2 diabetes mellitus without complication    Shoulder arthritis    Leukocytosis , likely reactive.     Hospital Course  Patient is a 67 y.o. male presented for left total shoulder replacement and biceps tenodesis by Dr. Ray under GA. He tolerated surgery well and is admitted for further medical management.  Has been painful for many years; conservative treatments failed to provide long-term pain relief.  He had previous left shoulder surgery about 10 years ago.      He has cardiac history including hypertension, hyperlipidemia, A. Fib. He is followed by Dr. Rodriguez, cardiology, and received preop cardiac clearance.    Patient was provided pain medications as needed for pain control, along with interscalene nerve block infusion of Ropivacaine.    The patient was seen by OT and was taught exercises for his left arm.  The patient used an IS for atelectasis prophylaxis and mechanicals for DVT prophylaxis.  Home medications were resumed as appropriate, and labs were monitored and remained fairly stable.     With the progress he has made, he is ready for DC home today.    The patient will have an On Q pump ( instructed on it during this admit)  Discussed with patient regarding plan and he shows understanding and agreement.        Procedures Performed  Date; 2017     Preoperative diagnosis; left shoulder glenohumeral osteoarthritis and  "bicipital tenosynovitis     Postoperative diagnosis; same     Procedure; left total shoulder replacement and biceps tenodesis     Surgeon; William Ray M.D.       Pertinent Test Results:    I reviewed the patient's new clinical results.     Results from last 7 days  Lab Units 09/12/17  0924   WBC 10*3/mm3 17.37*   HEMOGLOBIN g/dL 13.5   HEMATOCRIT % 42.0   PLATELETS 10*3/mm3 277       Results from last 7 days  Lab Units 09/12/17  0924   SODIUM mmol/L 139   POTASSIUM mmol/L 5.1   CHLORIDE mmol/L 103   CO2 mmol/L 29.0   BUN mg/dL 25*   CREATININE mg/dL 1.30   CALCIUM mg/dL 10.0   GLUCOSE mg/dL 157*   Results for NIYAH CROWE JR. (MRN [id number]) as of 9/12/2017 10:34   Ref. Range 9/11/2017 15:51 9/11/2017 22:09 9/12/2017 07:06   Glucose Latest Ref Range: 70 - 130 mg/dL 243 (H) 207 (H) 155 (H)     I reviewed the patient's new imaging including images and reports.      Physical therapy: OT IE completed. Education completed with pt/spouse for LUE: axilla care, HEP, precautions, leon-dressing, positioning and sling mgmt. Pt set to d/c home with spouse assist today. OT to d/c at this time.    Discharge Assessment:    Vital Signs  /73  Pulse 64  Temp 97.3 °F (36.3 °C) (Oral)   Resp 16  Ht 69.5\" (176.5 cm)  Wt (!) 308 lb (140 kg)  SpO2 93%  BMI 44.83 kg/m2  No data recorded.      General Appearance:    Alert, cooperative, in no acute distress   Lungs:     Clear to auscultation,respirations regular, even and                   unlabored    Heart:    Regular rhythm and normal rate, normal S1 and S2   Abdomen:     Normal bowel sounds, no masses, no organomegaly, soft        non-tender, non-distended, no guarding, no rebound                 tenderness   Extremities:   RUE in sling.  Nerve block present.  Covaderm clean dry intact.  Good cap refill and movement of left digits    Pulses:   Pulses palpable and equal bilaterally   Skin:   No bleeding, bruising or rash   Neurologic:   Cranial nerves 2 - 12 grossly " intact, sensation intact       Discharge Disposition: Home    Discharge Medications   Obinna Freddie .   Home Medication Instructions DARLING:[id number]    Printed on:09/13/17 0072   Medication Information                      anastrozole (ARIMIDEX) 1 MG chemo tablet  Take 1 tablet by mouth 2 (two) times a week. Take on Sunday and Thursday             apixaban (ELIQUIS) 5 MG tablet tablet  Take 1 tablet by mouth 2 (Two) Times a Day. Resume tomorrow 9/13/17             aspirin 81 MG tablet  Take 1 tablet by mouth daily.             digoxin (LANOXIN) 125 MCG tablet  Take 125 mcg by mouth Daily.             docusate sodium 100 MG capsule  Take 100 mg by mouth 2 (Two) Times a Day As Needed (constipation).             glipiZIDE (GLUCOTROL) 10 MG tablet  Take 1 tablet by mouth 2 (Two) Times a Day Before Meals.             glucosamine-chondroitin 500-400 MG capsule capsule  Take 1 capsule by mouth 2 (Two) Times a Day.             isosorbide mononitrate (IMDUR) 60 MG 24 hr tablet  Take 60 mg by mouth Daily With Breakfast.             levothyroxine (SYNTHROID, LEVOTHROID) 50 MCG tablet  Take 1 tablet by mouth Daily.             losartan (COZAAR) 100 MG tablet  Take 100 mg by mouth Daily.             metFORMIN (GLUCOPHAGE) 500 MG tablet   TAKE THREE TABLETS BY MOUTH EVERY MORNING AND TAKE TWO TABLETS BY MOUTH EVERY EVENING             metoprolol tartrate (LOPRESSOR) 100 MG tablet  Take 100 mg by mouth 2 (Two) Times a Day.             Morphine (MS CONTIN) 60 MG 12 hr tablet  60 mg 2 (Two) Times a Day.             Multiple Vitamins-Minerals (MULTIVITAL) tablet  Take 1 tablet by mouth Daily.             Omega-3 Fatty Acids (FISH OIL) 1200 MG capsule capsule  Take 1,200 mg by mouth 2 (Two) Times a Day With Meals. Take as directed             omeprazole (priLOSEC) 40 MG capsule  Take 1 capsule by mouth Daily.             oxyCODONE-acetaminophen (PERCOCET) 7.5-325 MG per tablet  Take 1 tablet by mouth Every 6 (Six) Hours As Needed  for Moderate Pain  or Severe Pain .             Ropivacaine HCl-NaCl (NAROPIN) 0.2-0.9 %  12 mg/hr by Peripheral Nerve route Continuous.             simvastatin (ZOCOR) 20 MG tablet  Take 1 tablet by mouth Every Night.             spironolactone (ALDACTONE) 25 MG tablet  25 mg Daily.             tiZANidine (ZANAFLEX) 4 MG tablet  Take 8 mg by mouth every night at bedtime.                 Discharge Diet: Consistent carb diet    Activity at Discharge: RUE sling, pendulum exercises    Follow-up Appointments  Dr. Ray per his orders    Discharge took over 30 min.  Seen and examined by me. Agree with above. Discussed with patient and wife.     Heather Eckert MD  09/13/17  12:59 PM

## 2017-09-12 NOTE — THERAPY DISCHARGE NOTE
Acute Care - Physical Therapy Initial Eval/Discharge  [facility]     Patient Name: Freddie Yeboah Jr.  : 1950  MRN: [id number]  Today's Date: 2017   Onset of Illness/Injury or Date of Surgery Date: 17  Date of Referral to PT: 17  Referring Physician: MD Cory      Admit Date: 2017    Visit Dx:    ICD-10-CM ICD-9-CM   1. Impaired mobility and ADLs Z74.09 799.89   2. Impaired functional mobility, balance, gait, and endurance Z74.09 V49.89     Patient Active Problem List   Diagnosis   • Elevated alanine aminotransferase (ALT) level   • Asthma   • Atrial fibrillation   • Back pain   • Benign essential hypertension   • Benign prostatic hyperplasia   • Chronic pain   • Edema of lower extremity   • Gastroesophageal reflux disease   • Hyperlipidemia   • Hypothyroidism   • Low back pain   • Adiposity   • Obstructive sleep apnea syndrome   • Osteoarthritis   • BMI 45.0-49.9, adult   • Carotid bruit   • Cor pulmonale   • Hypercalcemia   • Renal insufficiency   • Secondary pulmonary hypertension   • Type 2 diabetes mellitus without complication   • Degeneration of cervical intervertebral disc   • Acute cystitis without hematuria   • Shoulder arthritis   • Status post total replacement of left shoulder     Past Medical History:   Diagnosis Date   • Arthritis     shoulder, knee. multi joint    • Atrial fibrillation    • Back pain    • Chronic kidney disease     stage III due to diabetes   • Diabetes mellitus     several years; checks sugars at home-usually run 100-112   • Diabetic nephropathy    • Disease of thyroid gland     hypothyroidism   • GERD (gastroesophageal reflux disease)    • Hypertension    • Low testosterone    • Sleep apnea     setting of 11   • Urinary tract infection     most recent diagnosis 17 in the ER (+3 bacteria)-started on Macrobid and rechecked by PCP on 17     Past Surgical History:   Procedure Laterality Date   • ANTERIOR CERVICAL FUSION     • APPENDECTOMY     •  BACK SURGERY     • CARPAL TUNNEL RELEASE Bilateral    • CATARACT EXTRACTION     • CHOLECYSTECTOMY     • COLONOSCOPY  2013   • FINGER/THUMB ARTHROPLASTY Left     thumb replacement   • [place] RECONSTR TOTAL SHOULDER IMPLANT Left 9/11/2017    Procedure: TOTAL SHOULDER ARTHROPLASTY LEFT;  Surgeon: William Ray MD;  Location: [facility];  Service: Orthopedics   • SHOULDER SURGERY     • TOTAL KNEE ARTHROPLASTY Left           PT ASSESSMENT (last 72 hours)      PT Evaluation       09/12/17 1106 09/12/17 1022    Rehab Evaluation    Document Type  discharge evaluation/summary  -LM    Subjective Information  no complaints;agree to therapy  -LM    Patient Effort, Rehab Treatment  excellent  -LM    Symptoms Noted During/After Treatment  none  -LM    General Information    Patient Profile Review  yes  -LM    Onset of Illness/Injury or Date of Surgery Date  09/11/17  -LM    Referring Physician  MD Cory  -LM    General Observations  Pt received supine in bed, onQ, spouse present.  -LM    Pertinent History Of Current Problem  Pt admitted for surgical management of L shoulder pain that failed to improve with conservative treatment.  -LM    Precautions/Limitations  shoulder precautions   NWB LUE, LUE Sling  -LM    Prior Level of Function  independent:;all household mobility;community mobility;gait;transfer;bed mobility;ADL's;driving;using stairs  -LM    Equipment Currently Used at Home bipap/ cpap;glucometer   CPAP from [facility].  -RS bipap/ cpap;cane, straight   owns cane  -LM    Plans/Goals Discussed With  patient;spouse/S.O.;agreed upon  -LM    Risks Reviewed  patient:;spouse/S.O.:;LOB;nausea/vomiting;dizziness;increased discomfort;change in vital signs  -LM    Benefits Reviewed  patient:;spouse/S.O.:;improve function;increase independence;increase strength;increase balance;decrease pain  -LM    Barriers to Rehab  none identified  -LM    Living Environment    Lives With spouse  -RS spouse  -LM    Living Arrangements house  -RS  house  -LM    Home Accessibility bed and bath on same level  -RS no concerns  -LM    Transportation Available car;family or friend will provide  -RS     Living Environment Comment  Spouse available to assist.  -LM    Clinical Impression    Date of Referral to PT  09/11/17  -LM    PT Diagnosis  s/p L TSA  -LM    Prognosis  good  -LM    Patient/Family Goals Statement  return home  -LM    Criteria for Skilled Therapeutic Interventions Met  no;no problems identified which require skilled intervention  -LM    Rehab Potential  other (see comments)   Pt to D/C today  -LM    Pain Assessment    Pain Assessment  No/denies pain  -LM    Cognitive Assessment/Intervention    Current Cognitive/Communication Assessment  functional  -LM    Orientation Status  oriented x 4  -LM    Follows Commands/Answers Questions  100% of the time  -LM    Personal Safety  WNL/WFL  -LM    Personal Safety Interventions  gait belt;supervised activity;nonskid shoes/slippers when out of bed  -LM    ROM (Range of Motion)    General ROM  no range of motion deficits identified   defer UE to OT  -LM    MMT (Manual Muscle Testing)    General MMT Assessment  no strength deficits identified   defer UE to OT  -LM    Bed Mobility, Assessment/Treatment    Bed Mobility, Assistive Device  bed rails  -LM    Bed Mob, Supine to Sit, [place]  conditional independence  -LM    Transfer Assessment/Treatment    Transfers, Sit-Stand [place]  supervision required  -LM    Transfers, Stand-Sit [place]  supervision required  -LM    Transfer, Comment  Pt demo good safety awareness  -LM    Gait Assessment/Treatment    Gait, [place] Level  contact guard assist;verbal cues required  -LM    Gait, Assistive Device  other (see comments)   support at gait belt  -LM    Gait, Distance (Feet)  200  -LM    Gait, Gait Pattern Analysis  swing-through gait  -LM    Gait, Gait Deviations  bilateral:;kary decreased;step length decreased;weight-shifting ability decreased   -LM    Gait, Safety Issues  balance decreased during turns;sequencing ability decreased;step length decreased  -LM    Gait, Impairments  impaired balance  -LM    Gait, Comment  Pt ambulated with step-through gait pattern, demo mild unsteadiness and intermittently held onto wall support.   -LM    Motor Skills/Interventions    Additional Documentation  Balance Skills Training (Group)  -LM    Balance Skills Training    Sitting-Level of Assistance  Independent  -LM    Standing-Level of Assistance  Close supervision  -LM    Sensory Assessment/Intervention    Sensory Impairment  --   pt denies n/t in BLEs  -LM    Positioning and Restraints    Pre-Treatment Position  in bed  -LM    Post Treatment Position  bed  -LM    In Bed  notified nsg;sitting EOB;call light within reach;encouraged to call for assist;with family/caregiver;with other staff   with anesthesia staff  -      09/12/17 0816 09/12/17 0750    Rehab Evaluation    Document Type therapy note (daily note);discharge evaluation/summary  -TB     Subjective Information no complaints;agree to therapy  -TB     Patient Effort, Rehab Treatment excellent  -TB     Symptoms Noted During/After Treatment none  -TB     Symptoms Noted Comment NWB L UE, Sling AAT, Pendulum ther ex L shoulder; AROM h/w/e/and scapular retractions per MD order  -TB     General Information    Patient Profile Review yes  -TB     Onset of Illness/Injury or Date of Surgery Date 09/11/17  -TB     Referring Physician oCry  -TB     General Observations Pt rec'vd sitting EOB, room air, IV access and onQ intact; spouse present for teaching  -TB     Pertinent History Of Current Problem Pt admitted for surgical mgmt of L Shoulder pain; s/p L TSA with bicep tenodesis  -TB     Precautions/Limitations shoulder precautions  -TB     Prior Level of Function independent:;all household mobility;community mobility;transfer;bed mobility;ADL's;driving;shopping;using stairs  -TB     Equipment Currently Used at Home  glucometer;bipap/ cpap  -TB     Plans/Goals Discussed With patient and family;agreed upon  -TB     Risks Reviewed patient and family:;increased discomfort;increased drainage;lines disloged  -TB     Benefits Reviewed patient and family:;improve function;increase independence;increase knowledge  -TB     Barriers to Rehab none identified  -TB     Living Environment    Lives With spouse  -TB     Living Arrangements house  -TB     Home Accessibility no concerns  -TB     Vital Signs    Pre Treatment Diastolic BP --   stable  -TB     O2 Delivery Post Treatment room air  -TB     Pre Patient Position Sitting  -TB     Intra Patient Position Standing  -TB     Post Patient Position Sitting  -TB     Pain Assessment    Pain Assessment Rodriguez-Cotton FACES  -TB     Rodriguez-Cotton FACES Pain Rating 2  -TB     Pain Location Elbow  -TB     Pain Orientation Left  -TB     Pain Intervention(s) Ambulation/increased activity;Repositioned  -TB     Response to Interventions Pt tolerated HEP and ADL well  -TB     Vision Assessment/Intervention    Visual Impairment WFL with corrective lenses  -TB     Cognitive Assessment/Intervention    Current Cognitive/Communication Assessment functional  -TB     Orientation Status oriented x 4  -TB     Follows Commands/Answers Questions 100% of the time  -TB     Personal Safety WNL/WFL  -TB     Personal Safety Interventions fall prevention program maintained  -TB     Short/Long Term Memory intact short term memory;intact long term memory;other (see comments)   Shoulder precautions  -TB     ROM (Range of Motion)    General ROM upper extremity range of motion deficits identified  -TB     General UE Assessment    ROM shoulder, left: UE ROM deficit  -TB     ROM Detail R UE WFL; L UE WFL h/w/e, shoulder deferred  -TB     MMT (Manual Muscle Testing)    General MMT Assessment no strength deficits identified  -TB     General MMT Assessment Detail R WFL; L deferred  -TB     Bed Mobility, Assessment/Treatment    Bed  Mobility, Assistive Device bed rails  -TB     Bed Mob, Supine to Sit, [place] conditional independence  -TB     Transfer Assessment/Treatment    Transfers, Sit-Stand [place] supervision required  -TB     Transfers, Stand-Sit [place] supervision required  -TB     Transfer, Comment Good safety, no LOB  -TB     Balance Skills Training    Sitting-Level of Assistance Independent  -TB     Standing-Level of Assistance Close supervision  -TB     Therapy Exercises    Right Upper Extremity AROM:;sitting;shoulder extension/flexion;shoulder abduction/adduction;shoulder horizontal abd/add;shoulder ER/IR;elbow flexion/extension;pronation/supination;hand pumps  -TB     Left Upper Extremity PROM:;shoulder pendulum;AAROM:;elbow flexion/extension;AROM:;pronation/supination;hand pumps;shoulder protraction/retraction   HEP completed per MD order  -TB     Fine Motor Coordination Training    Opposition Right:;intact  -TB     Sensory Assessment/Intervention    Light Touch LUE  -TB LUE  -AL    LUE Light Touch mild impairment  -TB mild impairment  -AL    Positioning and Restraints    Pre-Treatment Position in bed  -TB     Post Treatment Position bed  -TB     In Bed sitting EOB;call light within reach;with family/caregiver  -TB       09/11/17 2002       General Information    Equipment Currently Used at Home glucometer;bipap/ cpap  -LF     Living Environment    Lives With spouse  -LF     Living Arrangements house  -LF     Home Accessibility no concerns  -LF     Stair Railings at Home none  -LF     Type of Financial/Environmental Concern none  -LF     Transportation Available car;family or friend will provide  -LF       User Key  (r) = Recorded By, (t) = Taken By, (c) = Cosigned By    Initials Name Provider Type    TB Kristen Blanchard OT Occupational Therapist    RS Isabella Sorto V     CATA Long RN Registered Nurse    LF Brenda Bernard RN Registered Nurse    LM Paulina Katz, PT Physical Therapist           Physical Therapy Education     Title: PT OT SLP Therapies (Active)     Topic: Physical Therapy (Active)     Point: Mobility training (Done)    Learning Progress Summary    Learner Readiness Method Response Comment Documented by Status   Patient Acceptance SRINIVASA LÓPEZ DU Reviewed benefits of activity, safety with mobility.  09/12/17 1318 Done   Significant Other Acceptance SRINIVASA LÓPEZ DU Reviewed benefits of activity, safety with mobility.  09/12/17 1318 Done               Point: Body mechanics (Done)    Learning Progress Summary    Learner Readiness Method Response Comment Documented by Status   Patient Acceptance SRINIVASA LÓPEZ DU Reviewed benefits of activity, safety with mobility.  09/12/17 1318 Done   Significant Other Acceptance SRINIVASA LÓPEZ DU Reviewed benefits of activity, safety with mobility.  09/12/17 1318 Done               Point: Precautions (Done)    Learning Progress Summary    Learner Readiness Method Response Comment Documented by Status   Patient Acceptance SRINIVASA LÓPEZ DU Reviewed benefits of activity, safety with mobility.  09/12/17 1318 Done   Significant Other Acceptance SRINIVASA LÓPEZ DU Reviewed benefits of activity, safety with mobility.  09/12/17 1318 Done                      User Key     Initials Effective Dates Name Provider Type Discipline     06/09/17 -  Paulina Katz, PT Physical Therapist PT                PT Recommendation and Plan  Anticipated Equipment Needs At Discharge: other (see comments) (none)  Anticipated Discharge Disposition: home with assist  PT Frequency: other (see comments) (pt to d/c today)  Plan of Care Review  Plan Of Care Reviewed With: patient, spouse  Progress: progress toward functional goals as expected  Outcome Summary/Follow up Plan: Pt ambulated 200 feet with CGA, demo mild unsteadiness. Pt anticipates d/c home with assist today.               Outcome Measures       09/12/17 1022 09/12/17 0816       How much help from another person do you currently need...    Turning from  your back to your side while in flat bed without using bedrails? 4  -LM      Moving from lying on back to sitting on the side of a flat bed without bedrails? 4  -LM      Moving to and from a bed to a chair (including a wheelchair)? 3  -LM      Standing up from a chair using your arms (e.g., wheelchair, bedside chair)? 3  -LM      Climbing 3-5 steps with a railing? 3  -LM      To walk in hospital room? 3  -LM      AM-PAC 6 Clicks Score 20  -LM      How much help from another is currently needed...    Putting on and taking off regular lower body clothing?  2  -TB     Bathing (including washing, rinsing, and drying)  2  -TB     Toileting (which includes using toilet bed pan or urinal)  2  -TB     Putting on and taking off regular upper body clothing  2  -TB     Taking care of personal grooming (such as brushing teeth)  3  -TB     Eating meals  3  -TB     Score  14  -TB     Functional Assessment    Outcome Measure Options AM-PAC 6 Clicks Basic Mobility (PT)  -LM AM-PAC 6 Clicks Daily Activity (OT)  -TB       User Key  (r) = Recorded By, (t) = Taken By, (c) = Cosigned By    Initials Name Provider Type    TB Kristen Blanchard, OT Occupational Therapist    LM Paulina Katz PT Physical Therapist           Time Calculation:         PT Charges       09/12/17 1320          Time Calculation    Start Time 1022  -LM      PT Received On 09/12/17  -LM      PT Goal Re-Cert Due Date 09/22/17  -LM      Time Calculation- PT    Total Timed Code Minutes- PT 10 minute(s)  -LM        User Key  (r) = Recorded By, (t) = Taken By, (c) = Cosigned By    Initials Name Provider Type    SAGAR Katz PT Physical Therapist          Therapy Charges for Today     Code Description Service Date Service Provider Modifiers Qty    [id number] HC PT EVAL LOW COMPLEXITY 3 9/12/2017 Paulina Katz PT GP 1          PT G-Codes  Outcome Measure Options: AM-PAC 6 Clicks Basic Mobility (PT)    PT Discharge Summary  Anticipated Discharge  Disposition: home with assist  Reason for Discharge: Discharge from facility  Outcomes Achieved: Discharge from facility occurred on same date as evluation  Discharge Destination: Home with assist    Paulina Katz, PT  9/12/2017

## 2017-09-12 NOTE — PROGRESS NOTES
Discharge Planning Assessment  [facility]     Patient Name: Freddie Yeboah Jr.  MRN: [id number]  Today's Date: 9/12/2017    Admit Date: 9/11/2017          Discharge Needs Assessment       09/12/17 1106    Living Environment    Lives With spouse    Living Arrangements house    Home Accessibility bed and bath on same level    Transportation Available car;family or friend will provide    Living Environment    Quality Of Family Relationships supportive;helpful;involved    Able to Return to Prior Living Arrangements yes    Living Arrangement Comments Mr. Yeboah lives with his wife in a one story home in [place].  Mrs. Yeboah can assist patient at home as needed.    Discharge Needs Assessment    Readmission Within The Last 30 Days no previous admission in last 30 days    Outpatient/Agency/Support Group Needs homecare agency (specify level of care)    Community Agency Name(S) --   [facility],     Equipment Currently Used at Home bipap/ cpap;glucometer   CPAP from [facility].    Equipment Needed After Discharge none    Discharge Facility/Level Of Care Needs home with home health    Discharge Disposition home healthcare service    Discharge Contact Information if Applicable Wife Alia,  [phone]            Discharge Plan       09/12/17 1111    Case Management/Social Work Plan    Plan Home with [facility]    Patient/Family In Agreement With Plan yes    Additional Comments Met with Mr. Yeboah and his wife at the bedside to discuss discharge planning.  Mr. Yeboah has sufficient assistance at home from his wife.  No DME needs.  Discussed home health and Mr. Yeboah requested [facility].  Referred patient to [facility] with [facility].  No other needs identified.  Mr. Yeboah's wife will be transporting him home by car at discharge.  CM will continue to follow.        Discharge Placement     No information found        Expected Discharge Date and Time     Expected Discharge Date Expected Discharge Time     Sep 12, 2017               Demographic Summary       09/12/17 1104    Referral Information    Admission Type inpatient    Arrived From home or self-care    Reason For Consult discharge planning    Record Reviewed clinical discipline documentation;history and physical;medical record    Contact Information    Permission Granted to Share Information With     Primary Care Physician Information    Name Marilyn Vermeesch MD            Functional Status       09/12/17 1104    Functional Status Current    Communication 0-->understands/communicates without difficulty    Functional Status Prior    Ambulation 0-->independent    Transferring 0-->independent    Toileting 0-->independent    Bathing 0-->independent    Dressing 0-->independent    Eating 0-->independent    Communication 0-->understands/communicates without difficulty    Swallowing 0-->swallows foods/liquids without difficulty    IADL    Medications independent    Meal Preparation independent    Housekeeping independent    Laundry independent    Shopping independent    Oral Care independent    Activity Tolerance    Current Activity Limitations none    Usual Activity Tolerance good    Cognitive/Perceptual/Developmental    Current Mental Status/Cognitive Functioning no deficits noted    Recent Changes in Mental Status/Cognitive Functioning no changes    Employment/Financial    Employment/Finance Comments Mr. Yeboah has prescription drug coverage with Medicare, Med D and Misc supplement.  Verified insurance with patient.  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            Psychosocial     None            Abuse/Neglect     None            Legal       09/12/17 1106    Legal    Legal Comments No advance directives.            Substance Abuse       09/12/17 1106    Substance Use, Patient    Substance Use past tobacco use;current alcohol use    Tobacco Type cigarettes, tobacco            Patient Forms     None          Isabella Sorto

## 2017-09-12 NOTE — PROGRESS NOTES
Subjective:  The patient rested comfortably overnight with an expected amount of postoperative pain.  No events overnight.   Medications/Allergies:  Scheduled Meds:  aspirin 325 mg Oral Daily   atorvastatin 10 mg Oral Nightly   digoxin 125 mcg Oral Daily   glipiZIDE 10 mg Oral BID AC   insulin lispro 0-7 Units Subcutaneous 4x Daily AC & at Bedtime   isosorbide mononitrate 60 mg Oral Daily   levothyroxine 50 mcg Oral Nightly   losartan 100 mg Oral Daily   metFORMIN 500 mg Oral BID With Meals   metoprolol tartrate 100 mg Oral Q12H   Morphine 60 mg Oral Q12H   pantoprazole 40 mg Oral Q AM   spironolactone 25 mg Oral Daily   tiZANidine 8 mg Oral Q12H     Continuous Infusions:  lactated ringers 9 mL/hr Last Rate: 9 mL/hr (09/11/17 1012)   Ropivacaine HCl-NaCl 6 mL/hr Last Rate: 6 mL/hr (09/11/17 0913)     PRN Meds:.dextrose  •  dextrose  •  docusate sodium  •  glucagon (human recombinant)  •  hydrALAZINE  •  HYDROmorphone **AND** naloxone  •  influenza vaccine  •  ondansetron  •  ondansetron  •  oxyCODONE-acetaminophen  •  oxyCODONE-acetaminophen  •  oxyCODONE-acetaminophen  •  sodium chloride  Duloxetine; Sulfa antibiotics; and Sulfamethoxazole-trimethoprim    Objective:  Vital Signs   Temp:  [97.6 °F (36.4 °C)-98.6 °F (37 °C)] 98.5 °F (36.9 °C)  Heart Rate:  [54-76] 66  Resp:  [16-17] 17  BP: (107-132)/(58-87) 110/87    Results Review:   I reviewed the patient's new clinical results.    Results from last 7 days  Lab Units 09/05/17  1516   WBC 10*3/mm3 9.67   HEMOGLOBIN g/dL 16.6   HEMATOCRIT % 49.3   PLATELETS 10*3/mm3 278       Results from last 7 days  Lab Units 09/05/17  1516   SODIUM mmol/L 140   POTASSIUM mmol/L 5.1   CHLORIDE mmol/L 104   CO2 mmol/L 29.0   BUN mg/dL 24*   CREATININE mg/dL 1.60*   GLUCOSE mg/dL 102*   CALCIUM mg/dL 10.1       Results from last 7 days  Lab Units 09/05/17  1516   INR  1.11       Results from last 7 days  Lab Units 09/05/17  1516   SODIUM mmol/L 140   POTASSIUM mmol/L 5.1   CHLORIDE  mmol/L 104   CO2 mmol/L 29.0   BUN mg/dL 24*   CREATININE mg/dL 1.60*   CALCIUM mg/dL 10.1   ALK PHOS U/L 89   ALT (SGPT) U/L 90*   AST (SGOT) U/L 51*   GLUCOSE mg/dL 102*       Imaging Results (last 24 hours)     Procedure Component Value Units Date/Time    XR Shoulder 1 View Left [[id number]] Collected:  09/11/17 1107     Updated:  09/11/17 1602    Narrative:       EXAMINATION: XR SHOULDER 1 VW, LEFT-09/11/2017:      INDICATION: SHOULDER PAIN, ANY ETIOLOGY.      COMPARISON: NONE.     FINDINGS: Single view of the left shoulder reveals the patient to be  status post total left shoulder arthroplasty. Please see the procedure  report for full details. No hardware complication or malalignment  identified of the prosthesis.       Impression:       The patient is status post left shoulder replacement. No  hardware complication or malalignment identified of the prosthesis.     D:  09/11/2017  E:  09/11/2017     This report was finalized on 9/11/2017 4:00 PM by Dr. Blanka Looney MD.             Physical Exam:  General: comfortable  Vascular: 2+ radial pulses, symmetric; digits are pink and well perfused  Operative Shoulder Exam: sling is in place  Neurologic: sensation to light touch is intact distally, wrist flexion/wrist extension/hand intrinsics intact, Intact sensation over the deltoid  Dermatologic: surgical dressing is well appearing - clean, dry, and intact; no obvious signs or symptoms of infection or DVT    Assessment/Plan:  The patient is comfortable and does desire to be discharged home today.  We will make arrangements for discharge.  I appreciate the medical management of the internal medicine team.  The patient is okay for discharge once cleared by the medical team.    POD #: 1  Pain control - well controlled  Dressing - clean and can be removed by patient on POD#3 at home.  Okay for RN to replace prior to discharge if dressing is saturated  Sling - in place  PT/OT - sling teaching, non-weight bearing,  elbow/wrist/hand exercises only, pendulums for showering on POD #3 at home and for dressing  Anesthesia - RN will arrange with anesthesia team for OnQ prior to discharge    I discussed the patients findings and my recommendations with patient    OSWALD Hdz  09/12/17  7:11 AM

## 2017-09-12 NOTE — PLAN OF CARE
Problem: Patient Care Overview (Adult)  Goal: Plan of Care Review  Outcome: Ongoing (interventions implemented as appropriate)    09/12/17 0444   Coping/Psychosocial Response Interventions   Plan Of Care Reviewed With patient   Patient Care Overview   Progress progress toward functional goals as expected   Outcome Evaluation   Outcome Summary/Follow up Plan Pt awake off and on duringthe 7p shift. CPAP in use while asleep. OnQ infusing at 6 ml without problems. Pain controlled with PO meds. Pt anticipating discharge on Tues.          Problem: Perioperative Period (Adult)  Goal: Signs and Symptoms of Listed Potential Problems Will be Absent or Manageable (Perioperative Period)  Outcome: Ongoing (interventions implemented as appropriate)    Problem: Mobility, Physical Impaired (Adult)  Goal: Identify Related Risk Factors and Signs and Symptoms  Outcome: Ongoing (interventions implemented as appropriate)  Goal: Enhanced Mobility Skills  Outcome: Ongoing (interventions implemented as appropriate)  Goal: Enhanced Functionality Ability  Outcome: Ongoing (interventions implemented as appropriate)

## 2017-09-12 NOTE — THERAPY DISCHARGE NOTE
Acute Care - Occupational Therapy Initial Eval/Discharge  [facility]     Patient Name: Freddie Yeboah Jr.  : 1950  MRN: [id number]  Today's Date: 2017  Onset of Illness/Injury or Date of Surgery Date: 17  Date of Referral to OT: 17  Referring Physician: Cory      Admit Date: 2017       ICD-10-CM ICD-9-CM   1. Impaired mobility and ADLs Z74.09 799.89     Patient Active Problem List   Diagnosis   • Elevated alanine aminotransferase (ALT) level   • Asthma   • Atrial fibrillation   • Back pain   • Benign essential hypertension   • Benign prostatic hyperplasia   • Chronic pain   • Edema of lower extremity   • Gastroesophageal reflux disease   • Hyperlipidemia   • Hypothyroidism   • Low back pain   • Adiposity   • Obstructive sleep apnea syndrome   • Osteoarthritis   • BMI 45.0-49.9, adult   • Carotid bruit   • Cor pulmonale   • Hypercalcemia   • Renal insufficiency   • Secondary pulmonary hypertension   • Type 2 diabetes mellitus without complication   • Degeneration of cervical intervertebral disc   • Acute cystitis without hematuria   • Shoulder arthritis   • Status post total replacement of left shoulder     Past Medical History:   Diagnosis Date   • Arthritis     shoulder, knee. multi joint    • Atrial fibrillation    • Back pain    • Chronic kidney disease     stage III due to diabetes   • Diabetes mellitus     several years; checks sugars at home-usually run 100-112   • Diabetic nephropathy    • Disease of thyroid gland     hypothyroidism   • GERD (gastroesophageal reflux disease)    • Hypertension    • Low testosterone    • Sleep apnea     setting of 11   • Urinary tract infection     most recent diagnosis 17 in the ER (+3 bacteria)-started on Macrobid and rechecked by PCP on 17     Past Surgical History:   Procedure Laterality Date   • ANTERIOR CERVICAL FUSION     • APPENDECTOMY     • BACK SURGERY     • CARPAL TUNNEL RELEASE Bilateral    • CATARACT EXTRACTION     •  CHOLECYSTECTOMY     • COLONOSCOPY  2013   • FINGER/THUMB ARTHROPLASTY Left     thumb replacement   • SHOULDER SURGERY     • TOTAL KNEE ARTHROPLASTY Left           OT ASSESSMENT FLOWSHEET (last 72 hours)      OT Evaluation       09/12/17 0940 09/12/17 0816 09/11/17 2002          Rehab Evaluation    Document Type  therapy note (daily note);discharge evaluation/summary  -TB       Subjective Information  no complaints;agree to therapy  -TB       Patient Effort, Rehab Treatment  excellent  -TB       Symptoms Noted During/After Treatment  none  -TB       Symptoms Noted Comment  NWB L UE, Sling AAT, Pendulum ther ex L shoulder; AROM h/w/e/and scapular retractions per MD order  -TB       General Information    Patient Profile Review  yes  -TB       Onset of Illness/Injury or Date of Surgery Date  09/11/17  -TB       Referring Physician  Cory  -TB       General Observations  Pt rec'vd sitting EOB, room air, IV access and onQ intact; spouse present for teaching  -TB       Pertinent History Of Current Problem  Pt admitted for surgical mgmt of L Shoulder pain; s/p L TSA with bicep tenodesis  -TB       Precautions/Limitations  shoulder precautions  -TB       Prior Level of Function  independent:;all household mobility;community mobility;transfer;bed mobility;ADL's;driving;shopping;using stairs  -TB       Equipment Currently Used at Home  glucometer;bipap/ cpap  -TB glucometer;bipap/ cpap  -LF      Plans/Goals Discussed With  patient and family;agreed upon  -TB       Risks Reviewed  patient and family:;increased discomfort;increased drainage;lines disloged  -TB       Benefits Reviewed  patient and family:;improve function;increase independence;increase knowledge  -TB       Barriers to Rehab  none identified  -TB       Living Environment    Lives With  spouse  -TB spouse  -LF      Living Arrangements  house  -TB house  -LF      Home Accessibility  no concerns  -TB no concerns  -LF      Stair Railings at Home   none  -LF       Type of Financial/Environmental Concern   none  -LF      Transportation Available   car;family or friend will provide  -LF      Clinical Impression    Date of Referral to OT  09/11/17  -TB       OT Diagnosis  Impaired mobility, transfer and ADL  -TB       Therapy Frequency  evaluation only  -TB       Anticipated Equipment Needs At Discharge  --   None  -TB       Anticipated Discharge Disposition home with assist  -TB        Functional Level Prior    Ambulation   0-->independent  -LF      Transferring   0-->independent  -LF      Toileting   0-->independent  -LF      Bathing   0-->independent  -LF      Dressing   0-->independent  -LF      Eating   0-->independent  -LF      Communication   0-->understands/communicates without difficulty  -LF      Swallowing   0-->swallows foods/liquids without difficulty  -LF      Prior Functional Level Comment  Independent  -TB baseline  -LF      Vital Signs    Pre Treatment Diastolic BP  --   stable  -TB       O2 Delivery Post Treatment  room air  -TB       Pre Patient Position  Sitting  -TB       Intra Patient Position  Standing  -TB       Post Patient Position  Sitting  -TB       Pain Assessment    Pain Assessment  Rodriguez-Cotton FACES  -TB       Rodriguez-Cotton FACES Pain Rating  2  -TB       Pain Location  Elbow  -TB       Pain Orientation  Left  -TB       Pain Intervention(s)  Ambulation/increased activity;Repositioned  -TB       Response to Interventions  Pt tolerated HEP and ADL well  -TB       Vision Assessment/Intervention    Visual Impairment  WFL with corrective lenses  -TB       Cognitive Assessment/Intervention    Current Cognitive/Communication Assessment  functional  -TB       Orientation Status  oriented x 4  -TB       Follows Commands/Answers Questions  100% of the time  -TB       Personal Safety  WNL/WFL  -TB       Personal Safety Interventions  fall prevention program maintained  -TB       Short/Long Term Memory  intact short term memory;intact long term memory;other (see  comments)   Shoulder precautions  -TB       ROM (Range of Motion)    General ROM  upper extremity range of motion deficits identified  -TB       General UE Assessment    ROM  shoulder, left: UE ROM deficit  -TB       ROM Detail  R UE WFL; L UE WFL h/w/e, shoulder deferred  -TB       MMT (Manual Muscle Testing)    General MMT Assessment  no strength deficits identified  -TB       General MMT Assessment Detail  R WFL; L deferred  -TB       Bed Mobility, Assessment/Treatment    Bed Mobility, Assistive Device  bed rails  -TB       Bed Mob, Supine to Sit, Patterson  conditional independence  -TB       Transfer Assessment/Treatment    Transfers, Sit-Stand Patterson  supervision required  -TB       Transfers, Stand-Sit Patterson  supervision required  -TB       Transfer, Comment  Good safety, no LOB  -TB       Functional Mobility    Functional Mobility- Ind. Level  supervision required  -TB       Functional Mobility- Comment  good safety, no LOB  -TB       Upper Body Bathing Assessment/Training    UB Bathing Assess/Train, Comment  Education completed for L UE axilla care  -TB       Upper Body Dressing Assessment/Training    UB Dressing Assess/Train, Clothing Type  donning:;button up   sling mgmt  -TB       UB Dressing Assess/Train, Assist Device  leon technique  -TB       UB Dressing Assess/Train, Position  sitting;edge of bed  -TB       UB Dressing Assess/Train, Patterson  maximum assist (25% patient effort);verbal cues required  -TB       UB Dressing Assess/Train, Impairments  ROM decreased;sensation decreased;strength decreased;pain  -TB       UB Dressing Assess/Train, Comment  Education completed for L UE dressing strategies - spouse demonstrates good understanding  -TB       Lower Body Dressing Assessment/Training    LB Dressing Assess/Train, Clothing Type  donning:;pants  -TB       LB Dressing Assess/Train, Position  sitting;standing  -TB       LB Dressing Assess/Train, Patterson  moderate assist (50%  patient effort);verbal cues required  -TB       LB Dressing Assess/Train, Comment  Spouse assisted  -TB       Balance Skills Training    Sitting-Level of Assistance  Independent  -TB       Standing-Level of Assistance  Close supervision  -TB       Therapy Exercises    Right Upper Extremity  AROM:;sitting;shoulder extension/flexion;shoulder abduction/adduction;shoulder horizontal abd/add;shoulder ER/IR;elbow flexion/extension;pronation/supination;hand pumps  -TB       Left Upper Extremity  PROM:;shoulder pendulum;AAROM:;elbow flexion/extension;AROM:;pronation/supination;hand pumps;shoulder protraction/retraction   HEP completed per MD order  -TB       Fine Motor Coordination Training    Opposition  Right:;intact  -TB       Sensory Assessment/Intervention    Light Touch  LUE  -TB       LUE Light Touch  mild impairment  -TB       Positioning and Restraints    Pre-Treatment Position  in bed  -TB       Post Treatment Position  bed  -TB       In Bed  sitting EOB;call light within reach;with family/caregiver  -TB         User Key  (r) = Recorded By, (t) = Taken By, (c) = Cosigned By    Initials Name Effective Dates    TB Kristen Blanchard OT 06/22/15 -     LF Brenda Bernard RN 03/20/17 -           Occupational Therapy Education     Title: PT OT SLP Therapies (Done)     Topic: Occupational Therapy (Done)     Point: ADL training (Done)    Description: Instruct learner(s) on proper safety adaptation and remediation techniques during self care or transfers.   Instruct in proper use of assistive devices.    Learning Progress Summary    Learner Readiness Method Response Comment Documented by Status   Patient Acceptance E,D,TB,H VU,DU Education completed for shoulder precautions/NWB L UE and HEP per MD order. Teaching completed for L UE: axilla care, leon-dressing, sling mgmt and positioning TB 09/12/17 0936 Done   Family Acceptance E,D,TB,H VU,DU Education completed for shoulder precautions/NWB L UE and HEP per MD order.  Teaching completed for L UE: axilla care, leon-dressing, sling mgmt and positioning TB 09/12/17 0936 Done               Point: Home exercise program (Done)    Description: Instruct learner(s) on appropriate technique for monitoring, assisting and/or progressing therapeutic exercises/activities.    Learning Progress Summary    Learner Readiness Method Response Comment Documented by Status   Patient Acceptance E,D,TB,H VU,DU Education completed for shoulder precautions/NWB L UE and HEP per MD order. Teaching completed for L UE: axilla care, leon-dressing, sling mgmt and positioning TB 09/12/17 0936 Done   Family Acceptance E,D,TB,H VU,DU Education completed for shoulder precautions/NWB L UE and HEP per MD order. Teaching completed for L UE: axilla care, leon-dressing, sling mgmt and positioning TB 09/12/17 0936 Done               Point: Precautions (Done)    Description: Instruct learner(s) on prescribed precautions during self-care and functional transfers.    Learning Progress Summary    Learner Readiness Method Response Comment Documented by Status   Patient Acceptance E,D,TB,H VU,DU Education completed for shoulder precautions/NWB L UE and HEP per MD order. Teaching completed for L UE: axilla care, leon-dressing, sling mgmt and positioning TB 09/12/17 0936 Done   Family Acceptance E,D,TB,H VU,DU Education completed for shoulder precautions/NWB L UE and HEP per MD order. Teaching completed for L UE: axilla care, leon-dressing, sling mgmt and positioning TB 09/12/17 0936 Done                      User Key     Initials Effective Dates Name Provider Type Discipline     06/22/15 -  Kristen Blanchard OT Occupational Therapist OT                OT Recommendation and Plan  Anticipated Equipment Needs At Discharge:  (None)  Anticipated Discharge Disposition: home with assist  Therapy Frequency: evaluation only  Plan of Care Review  Plan Of Care Reviewed With: patient, spouse  Outcome Summary/Follow up Plan: OT IE  completed. Education completed with pt/spouse for LUE: axilla care, HEP, precautions, leon-dressing, positioning and sling mgmt. Pt set to d/c home with spouse assist today. OT to d/c at this time.           OT Goals       09/12/17 0938          Patient Education OT STG    Patient Education OT STG, Time to Achieve 1 day  -TB      Patient Education OT STG, Education Type written program;HEP;precautions per surgeon;brace use/care;positioning;home safety;1 hand/leon technique  -TB      Patient Education OT STG, Education Understanding demonstrates adequately;verbalizes understanding  -TB      Patient Education OT STG Outcome goal met  -TB        User Key  (r) = Recorded By, (t) = Taken By, (c) = Cosigned By    Initials Name Provider Type    JHONNY Blanchard OT Occupational Therapist                Outcome Measures       09/12/17 0816          How much help from another is currently needed...    Putting on and taking off regular lower body clothing? 2  -TB      Bathing (including washing, rinsing, and drying) 2  -TB      Toileting (which includes using toilet bed pan or urinal) 2  -TB      Putting on and taking off regular upper body clothing 2  -TB      Taking care of personal grooming (such as brushing teeth) 3  -TB      Eating meals 3  -TB      Score 14  -TB      Functional Assessment    Outcome Measure Options AM-PAC 6 Clicks Daily Activity (OT)  -TB        User Key  (r) = Recorded By, (t) = Taken By, (c) = Cosigned By    Initials Name Provider Type    JHONNY Blanchard OT Occupational Therapist          Time Calculation:         Time Calculation- OT       09/12/17 0940          Time Calculation- OT    OT Start Time 0816  -TB      Total Timed Code Minutes- OT 30 minute(s)  -TB      OT Received On 09/12/17  -TB        User Key  (r) = Recorded By, (t) = Taken By, (c) = Cosigned By    Initials Name Provider Type    JHONNY Blanchard OT Occupational Therapist          Therapy Charges for Today      Code Description Service Date Service Provider Modifiers Qty    [id number]  OT EVAL LOW COMPLEXITY 3 9/12/2017 Kristen Blanchard OT GO 1    [id number]  OT THERAPEUTIC ACT EA 15 MIN 9/12/2017 Kristen Blanchard OT GO 2               OT Discharge Summary  Anticipated Discharge Disposition: home with assist  Reason for Discharge: All goals achieved, Discharge from facility  Outcomes Achieved: Able to achieve all goals within established timeline  Discharge Destination: Home with assist    Kristen Blanchard OT  9/12/2017

## 2017-09-12 NOTE — PLAN OF CARE
Problem: Patient Care Overview (Adult)  Goal: Plan of Care Review  Outcome: Outcome(s) achieved Date Met:  09/12/17 09/12/17 0938   Coping/Psychosocial Response Interventions   Plan Of Care Reviewed With patient;spouse   Outcome Evaluation   Outcome Summary/Follow up Plan OT IE completed. Education completed with pt/spouse for LUE: axilla care, HEP, precautions, leon-dressing, positioning and sling mgmt. Pt set to d/c home with spouse assist today. OT to d/c at this time.         Problem: Inpatient Occupational Therapy  Goal: Patient Education Goal STG- OT  Outcome: Outcome(s) achieved Date Met:  09/12/17 09/12/17 0938   Patient Education OT STG   Patient Education OT STG, Time to Achieve 1 day   Patient Education OT STG, Education Type written program;HEP;precautions per surgeon;brace use/care;positioning;home safety;1 hand/leon technique   Patient Education OT STG, Education Understanding demonstrates adequately;verbalizes understanding   Patient Education OT STG Outcome goal met

## 2017-09-13 NOTE — PROGRESS NOTES
ATUL Kirby    Nerve Cath Post Op Call    Patient Name: Freddie Yeboah Jr.  :  1950  MRN:  [id number]  Date of Discharge: 2017    Nerve Cath Post Op Call:    Analgesia:Excellent  Pain Score:0/10  Side Effects:None  Catheter Site:clean  Patient Controlled ON Q pump infusion rate: 8ml/hr  Catheter Plan:Will continue with plan at home without changes and The patient was instructed to call ON CALL Anesthesia provider for any questions or problems

## 2017-09-14 ENCOUNTER — TRANSITIONAL CARE MANAGEMENT TELEPHONE ENCOUNTER (OUTPATIENT)
Dept: INTERNAL MEDICINE | Facility: CLINIC | Age: 67
End: 2017-09-14

## 2017-09-14 NOTE — PROGRESS NOTES
ATUL Kirby    Nerve Cath Post Op Call    Patient Name: Freddie Yeboah Jr.  :  1950  MRN:  [id number]  Date of Discharge: 2017    Nerve Cath Post Op Call:    Catheter Plan:Patient/Family member report nerve catheter previously discontinued, tip intact

## 2017-09-15 LAB
ABO + RH BLD: NORMAL
ABO + RH BLD: NORMAL
BH BB BLOOD EXPIRATION DATE: NORMAL
BH BB BLOOD EXPIRATION DATE: NORMAL
BH BB BLOOD TYPE BARCODE: 8400
BH BB BLOOD TYPE BARCODE: 8400
BH BB DISPENSE STATUS: NORMAL
BH BB DISPENSE STATUS: NORMAL
BH BB PRODUCT CODE: NORMAL
BH BB PRODUCT CODE: NORMAL
BH BB UNIT NUMBER: NORMAL
BH BB UNIT NUMBER: NORMAL
CROSSMATCH INTERPRETATION: NORMAL
CROSSMATCH INTERPRETATION: NORMAL
UNIT  ABO: NORMAL
UNIT  ABO: NORMAL
UNIT  RH: NORMAL
UNIT  RH: NORMAL

## 2017-09-19 ENCOUNTER — OFFICE VISIT (OUTPATIENT)
Dept: INTERNAL MEDICINE | Facility: CLINIC | Age: 67
End: 2017-09-19

## 2017-09-19 VITALS
OXYGEN SATURATION: 94 % | SYSTOLIC BLOOD PRESSURE: 126 MMHG | BODY MASS INDEX: 45.1 KG/M2 | WEIGHT: 315 LBS | DIASTOLIC BLOOD PRESSURE: 74 MMHG | TEMPERATURE: 98.4 F | HEART RATE: 68 BPM | HEIGHT: 70 IN

## 2017-09-19 DIAGNOSIS — E03.8 OTHER SPECIFIED HYPOTHYROIDISM: ICD-10-CM

## 2017-09-19 DIAGNOSIS — I10 BENIGN ESSENTIAL HYPERTENSION: ICD-10-CM

## 2017-09-19 DIAGNOSIS — E11.9 TYPE 2 DIABETES MELLITUS WITHOUT COMPLICATION, WITHOUT LONG-TERM CURRENT USE OF INSULIN (HCC): Primary | ICD-10-CM

## 2017-09-19 DIAGNOSIS — E78.2 MIXED HYPERLIPIDEMIA: ICD-10-CM

## 2017-09-19 DIAGNOSIS — K21.9 GASTROESOPHAGEAL REFLUX DISEASE WITHOUT ESOPHAGITIS: ICD-10-CM

## 2017-09-19 PROBLEM — N30.00 ACUTE CYSTITIS WITHOUT HEMATURIA: Status: RESOLVED | Noted: 2017-09-05 | Resolved: 2017-09-19

## 2017-09-19 PROCEDURE — 99214 OFFICE O/P EST MOD 30 MIN: CPT | Performed by: INTERNAL MEDICINE

## 2017-09-19 RX ORDER — SIMVASTATIN 20 MG
20 TABLET ORAL NIGHTLY
Qty: 90 TABLET | Refills: 3 | Status: SHIPPED | OUTPATIENT
Start: 2017-09-19 | End: 2018-03-29 | Stop reason: SDUPTHER

## 2017-09-19 RX ORDER — OMEPRAZOLE 40 MG/1
40 CAPSULE, DELAYED RELEASE ORAL DAILY
Qty: 90 CAPSULE | Refills: 3 | Status: SHIPPED | OUTPATIENT
Start: 2017-09-19 | End: 2018-03-29 | Stop reason: SDUPTHER

## 2017-09-19 RX ORDER — GLIPIZIDE 10 MG/1
10 TABLET ORAL
Qty: 60 TABLET | Refills: 5 | Status: SHIPPED | OUTPATIENT
Start: 2017-09-19 | End: 2017-09-20 | Stop reason: SDUPTHER

## 2017-09-19 RX ORDER — LEVOTHYROXINE SODIUM 0.05 MG/1
50 TABLET ORAL DAILY
Qty: 90 TABLET | Refills: 3 | Status: SHIPPED | OUTPATIENT
Start: 2017-09-19 | End: 2018-03-29 | Stop reason: SDUPTHER

## 2017-09-19 NOTE — PROGRESS NOTES
"Chief Complaint   Patient presents with   • Follow-up     4 months for HTN, GERD, HLD, and DM. No new complaints.      Subjective   Freddie Yeboah Jr. is a 67 y.o. male.     HPI Comments: Here for follow-up of HTN, HLD, hypothyroid, GERD, DM, LUIS FERNANDO, BPH, DJD, prostate CA and Afib.   Patient just underwent a left total shoulder replacement and left biceps tenodesis.  His pain is well controlled.  PT is coming to the house regularly.  His home BP is well controlled.  No CP, SOB or edema.   BS is running about 100-130.  No NTB, no callus.  Last eye exam was a few months ago.  A1C 6.4 a few weeks ago.   He got notification from colorectal surgeon that it is time for colonoscopy.  He will arrange his own appointment soon.  GERD is well controlled.  He takes thyroid med in PM on empty stomach.   Uses CPAP every night.  He sees Dr Kay for this.   He does get up about 4 times a night to urinate.  He sees Dr Zelaya for this.            The following portions of the patient's history were reviewed and updated as appropriate: allergies, current medications, past family history, past medical history, past social history, past surgical history and problem list.    Review of Systems   Respiratory: Negative for shortness of breath.    Cardiovascular: Negative for chest pain, palpitations and leg swelling.   Gastrointestinal: Negative for blood in stool and constipation.   Endocrine: Negative for cold intolerance and heat intolerance.   Genitourinary:        Nocturia 4 times a night   Musculoskeletal:        Left shoulder pain after recent surgery   All other systems reviewed and are negative.      Objective   /74  Pulse 68  Temp 98.4 °F (36.9 °C)  Ht 69.5\" (176.5 cm)  Wt (!) 317 lb (144 kg)  SpO2 94%  BMI 46.14 kg/m2  Body mass index is 46.14 kg/(m^2).  Physical Exam   Constitutional: He is oriented to person, place, and time. He appears well-developed and well-nourished.   Pleasant gentleman in no apparent distress "   HENT:   Head: Normocephalic and atraumatic.   Mouth/Throat: Oropharynx is clear and moist.   Eyes: Conjunctivae and EOM are normal. Pupils are equal, round, and reactive to light.   Neck: Normal range of motion. Neck supple. No thyromegaly present.   Cardiovascular: Normal rate, normal heart sounds and intact distal pulses.    No murmur heard.  Irregularly irregular   Pulmonary/Chest: Effort normal and breath sounds normal. No respiratory distress.   Abdominal: Soft. Bowel sounds are normal.   Musculoskeletal: He exhibits no edema.   Left shoulder in sling today   Lymphadenopathy:     He has no cervical adenopathy.   Neurological: He is alert and oriented to person, place, and time. No cranial nerve deficit.   Skin:   Feet with no significant callus or ulcer, sensation grossly intact   Psychiatric: He has a normal mood and affect. Judgment normal.   Nursing note and vitals reviewed.      Assessment/Plan   Freddie Yeboah Jr. is here today and the following problems have been addressed:      Freddie was seen today for follow-up.    Diagnoses and all orders for this visit:    Type 2 diabetes mellitus without complication, without long-term current use of insulin    Other specified hypothyroidism    Mixed hyperlipidemia    Benign essential hypertension    Gastroesophageal reflux disease without esophagitis    Other orders  -     simvastatin (ZOCOR) 20 MG tablet; Take 1 tablet by mouth Every Night.  -     omeprazole (priLOSEC) 40 MG capsule; Take 1 capsule by mouth Daily.  -     metFORMIN (GLUCOPHAGE) 500 MG tablet;  TAKE THREE TABLETS BY MOUTH EVERY MORNING AND TAKE TWO TABLETS BY MOUTH EVERY EVENING  -     levothyroxine (SYNTHROID, LEVOTHROID) 50 MCG tablet; Take 1 tablet by mouth Daily.  -     glipiZIDE (GLUCOTROL) 10 MG tablet; Take 1 tablet by mouth 2 (Two) Times a Day Before Meals.    Follow diabetic diet  Monitor blood sugars as discussed  See eye doctor annually or as discussed  Wear protective foot wear/no bare  feet  Check feet regularly for calluses or ulcers  Discussed risk of poorly controlled diabetes and long-term complications  Exercise as tolerated up to 30 minutes 5 days a week  Take all medications as prescribed  Follow heart healthy/low salt diet  Avoid processed foods  Monitor blood pressure as discussed  All recent labs reviewed  followup with ortho for left shoulder  Patient to schedule his colonoscopy with colorectal surgeons in [place]  Continue aspirin and eliquis for underlying atrial fibrillation, rate is well controlled  Labs upon return from [place] in the spring    RTC in 7 mo      Please note that portions of this note were completed with a voice recognition program.  Efforts were made to edit dictation, but occasionally words are mistranscribed.

## 2017-09-20 RX ORDER — GLIPIZIDE 10 MG/1
10 TABLET ORAL
Qty: 180 TABLET | Refills: 1 | Status: SHIPPED | OUTPATIENT
Start: 2017-09-20 | End: 2018-03-29 | Stop reason: SDUPTHER

## 2017-10-02 DIAGNOSIS — E29.1 HYPOGONADISM MALE: ICD-10-CM

## 2017-10-03 RX ORDER — ANASTROZOLE 1 MG/1
TABLET ORAL
Qty: 12 TABLET | Refills: 2 | Status: SHIPPED | OUTPATIENT
Start: 2017-10-03 | End: 2017-12-31 | Stop reason: SDUPTHER

## 2017-10-04 ENCOUNTER — EPISODE CHANGES (OUTPATIENT)
Dept: CASE MANAGEMENT | Facility: OTHER | Age: 67
End: 2017-10-04

## 2017-10-20 ENCOUNTER — LAB (OUTPATIENT)
Dept: UROLOGY | Facility: CLINIC | Age: 67
End: 2017-10-20

## 2017-10-20 DIAGNOSIS — E29.1 TESTICULAR HYPOFUNCTION: Primary | ICD-10-CM

## 2017-10-21 LAB
BASOPHILS # BLD AUTO: 0.05 10*3/MM3 (ref 0–0.2)
BASOPHILS NFR BLD AUTO: 0.6 % (ref 0–2.5)
EOSINOPHIL # BLD AUTO: 0.2 10*3/MM3 (ref 0–0.7)
EOSINOPHIL NFR BLD AUTO: 2.2 % (ref 0–7)
ERYTHROCYTE [DISTWIDTH] IN BLOOD BY AUTOMATED COUNT: 14.9 % (ref 11.5–14.5)
ESTRADIOL SERPL-MCNC: <5 PG/ML (ref 7.6–42.6)
HCT VFR BLD AUTO: 41.9 % (ref 42–52)
HGB BLD-MCNC: 13.1 G/DL (ref 14–18)
IMM GRANULOCYTES # BLD: 0.03 10*3/MM3 (ref 0–0.06)
IMM GRANULOCYTES NFR BLD: 0.3 % (ref 0–0.6)
LYMPHOCYTES # BLD AUTO: 3.83 10*3/MM3 (ref 0.6–3.4)
LYMPHOCYTES NFR BLD AUTO: 42.8 % (ref 10–50)
MCH RBC QN AUTO: 27.9 PG (ref 27–31)
MCHC RBC AUTO-ENTMCNC: 31.3 G/DL (ref 30–37)
MCV RBC AUTO: 89.1 FL (ref 80–94)
MONOCYTES # BLD AUTO: 0.93 10*3/MM3 (ref 0–0.9)
MONOCYTES NFR BLD AUTO: 10.4 % (ref 0–12)
NEUTROPHILS # BLD AUTO: 3.9 10*3/MM3 (ref 2–6.9)
NEUTROPHILS NFR BLD AUTO: 43.7 % (ref 37–80)
NRBC BLD AUTO-RTO: 0 /100 WBC (ref 0–0)
PLATELET # BLD AUTO: 221 10*3/MM3 (ref 130–400)
RBC # BLD AUTO: 4.7 10*6/MM3 (ref 4.7–6.1)
TESTOST SERPL-MCNC: 215 NG/DL (ref 264–916)
WBC # BLD AUTO: 8.94 10*3/MM3 (ref 4.8–10.8)

## 2017-10-23 ENCOUNTER — PROCEDURE VISIT (OUTPATIENT)
Dept: UROLOGY | Facility: CLINIC | Age: 67
End: 2017-10-23

## 2017-10-23 VITALS
SYSTOLIC BLOOD PRESSURE: 149 MMHG | TEMPERATURE: 98.1 F | HEART RATE: 86 BPM | OXYGEN SATURATION: 97 % | DIASTOLIC BLOOD PRESSURE: 85 MMHG

## 2017-10-23 DIAGNOSIS — E29.1 HYPOGONADISM IN MALE: Primary | ICD-10-CM

## 2017-10-23 DIAGNOSIS — R79.89 LOW TESTOSTERONE: ICD-10-CM

## 2017-10-23 LAB
BILIRUB BLD-MCNC: NEGATIVE MG/DL
CLARITY, POC: CLEAR
COLOR UR: YELLOW
GLUCOSE UR STRIP-MCNC: NEGATIVE MG/DL
KETONES UR QL: NEGATIVE
LEUKOCYTE EST, POC: ABNORMAL
NITRITE UR-MCNC: NEGATIVE MG/ML
PH UR: 6 [PH] (ref 5–8)
PROT UR STRIP-MCNC: NEGATIVE MG/DL
RBC # UR STRIP: NEGATIVE /UL
SP GR UR: 1.01 (ref 1–1.03)
UROBILINOGEN UR QL: NORMAL

## 2017-10-23 PROCEDURE — 81003 URINALYSIS AUTO W/O SCOPE: CPT | Performed by: UROLOGY

## 2017-10-23 PROCEDURE — 99213 OFFICE O/P EST LOW 20 MIN: CPT | Performed by: UROLOGY

## 2017-10-23 RX ORDER — BUMETANIDE 1 MG/1
1 TABLET ORAL 2 TIMES DAILY
COMMUNITY
Start: 2017-10-02 | End: 2021-12-13 | Stop reason: SDUPTHER

## 2017-10-23 NOTE — PROGRESS NOTES
Chief Complaint  Hypogonadism (4 months fup with labs. possible testopel.)        TARUN Yeboah is a 67 y.o. male who has a history of hypogonadism and low testosterone level although he returns today sometime after his last pellet insertion stating he still feels great with plenty of energy.  With no symptoms of hypogonadism there is no point and treating at this point.    Vitals:    10/23/17 1400   BP: 149/85   Pulse: 86   Temp: 98.1 °F (36.7 °C)   SpO2: 97%       Past Medical History  Past Medical History:   Diagnosis Date   • Arthritis     shoulder, knee. multi joint    • Atrial fibrillation    • Back pain    • Chronic kidney disease     stage III due to diabetes   • Diabetes mellitus     several years; checks sugars at home-usually run 100-112   • Diabetic nephropathy    • Disease of thyroid gland     hypothyroidism   • GERD (gastroesophageal reflux disease)    • Hypertension    • Low testosterone    • Sleep apnea     setting of 11   • Urinary tract infection     most recent diagnosis 8/29/17 in the ER (+3 bacteria)-started on Macrobid and rechecked by PCP on 9/5/17       Past Surgical History  Past Surgical History:   Procedure Laterality Date   • ANTERIOR CERVICAL FUSION     • APPENDECTOMY     • BACK SURGERY     • CARPAL TUNNEL RELEASE Bilateral    • CATARACT EXTRACTION     • CHOLECYSTECTOMY     • COLONOSCOPY  2013   • FINGER/THUMB ARTHROPLASTY Left     thumb replacement   • [place] RECONSTR TOTAL SHOULDER IMPLANT Left 9/11/2017    Procedure: TOTAL SHOULDER ARTHROPLASTY LEFT;  Surgeon: William Ray MD;  Location: [facility];  Service: Orthopedics   • SHOULDER SURGERY     • TOTAL KNEE ARTHROPLASTY Left        Medications  has a current medication list which includes the following prescription(s): bumetanide, digoxin, docusate sodium, glipizide, glucosamine-chondroitin, isosorbide mononitrate, levothyroxine, losartan, metformin, metoprolol tartrate, morphine, multivital, omeprazole, oxycodone-acetaminophen,  ropivacaine hcl-nacl, simvastatin, spironolactone, tizanidine, anastrozole, apixaban, aspirin, and fish oil.      Allergies  Allergies   Allergen Reactions   • Duloxetine Itching   • Sulfa Antibiotics Other (See Comments)     He reports renal failure after a round of sulfa   • Sulfamethoxazole-Trimethoprim Other (See Comments)     Had to be hospitalized in ICU; renal impairment        Social History  Social History     Social History Narrative       Family History  He has no family history of bladder or kidney cancer  He has no family history of kidney stones      AUA Symptom Score:      Review of Systems  Review of Systems   Constitutional: Negative.    Genitourinary: Negative.    All other systems reviewed and are negative.      Physical Exam  Physical Exam    Labs Recent and today in the office:  Results for orders placed or performed in visit on 10/23/17   POC Urinalysis Dipstick, Automated   Result Value Ref Range    Color Yellow Yellow, Straw, Dark Yellow, Elinor    Clarity, UA Clear Clear    Glucose, UA Negative Negative, 1000 mg/dL (3+) mg/dL    Bilirubin Negative Negative    Ketones, UA Negative Negative    Specific Gravity  1.015 1.005 - 1.030    Blood, UA Negative Negative    pH, Urine 6.0 5.0 - 8.0    Protein, POC Negative Negative mg/dL    Urobilinogen, UA Normal Normal    Leukocytes Large (3+) (A) Negative    Nitrite, UA Negative Negative         Assessment & Plan  Low testosterone: Currently has no symptoms of hypogonadism so I would not recommend supplement at this time.  He does have some white cells in his urine but he is uncircumcised and did not retract his foreskin to collect a sample.  He denies any signs or symptoms of UTI.  He'll return in 6 months and when necessary

## 2017-12-31 DIAGNOSIS — E29.1 HYPOGONADISM MALE: ICD-10-CM

## 2018-01-02 RX ORDER — ANASTROZOLE 1 MG/1
TABLET ORAL
Qty: 12 TABLET | Refills: 1 | Status: SHIPPED | OUTPATIENT
Start: 2018-01-02 | End: 2018-04-25 | Stop reason: SDUPTHER

## 2018-03-28 DIAGNOSIS — E03.8 OTHER SPECIFIED HYPOTHYROIDISM: ICD-10-CM

## 2018-03-28 DIAGNOSIS — I10 BENIGN ESSENTIAL HYPERTENSION: Primary | ICD-10-CM

## 2018-03-28 DIAGNOSIS — E78.2 MIXED HYPERLIPIDEMIA: ICD-10-CM

## 2018-03-28 DIAGNOSIS — E11.9 TYPE 2 DIABETES MELLITUS WITHOUT COMPLICATION, WITHOUT LONG-TERM CURRENT USE OF INSULIN (HCC): ICD-10-CM

## 2018-03-28 LAB
ALBUMIN SERPL-MCNC: 4.2 G/DL (ref 3.5–5)
ALBUMIN/GLOB SERPL: 1.6 G/DL (ref 1–2)
ALP SERPL-CCNC: 75 U/L (ref 38–126)
ALT SERPL-CCNC: 43 U/L (ref 13–69)
AST SERPL-CCNC: 38 U/L (ref 15–46)
BASOPHILS # BLD AUTO: 0.05 10*3/MM3 (ref 0–0.2)
BASOPHILS NFR BLD AUTO: 0.6 % (ref 0–2.5)
BILIRUB SERPL-MCNC: 0.7 MG/DL (ref 0.2–1.3)
BUN SERPL-MCNC: 23 MG/DL (ref 7–20)
BUN/CREAT SERPL: 16.4 (ref 6.3–21.9)
CALCIUM SERPL-MCNC: 9.9 MG/DL (ref 8.4–10.2)
CHLORIDE SERPL-SCNC: 99 MMOL/L (ref 98–107)
CHOLEST SERPL-MCNC: 118 MG/DL (ref 0–199)
CO2 SERPL-SCNC: 30 MMOL/L (ref 26–30)
CREAT SERPL-MCNC: 1.4 MG/DL (ref 0.6–1.3)
EOSINOPHIL # BLD AUTO: 0.14 10*3/MM3 (ref 0–0.7)
EOSINOPHIL NFR BLD AUTO: 1.8 % (ref 0–7)
ERYTHROCYTE [DISTWIDTH] IN BLOOD BY AUTOMATED COUNT: 14.3 % (ref 11.5–14.5)
GFR SERPLBLD CREATININE-BSD FMLA CKD-EPI: 50 ML/MIN/1.73
GFR SERPLBLD CREATININE-BSD FMLA CKD-EPI: 61 ML/MIN/1.73
GLOBULIN SER CALC-MCNC: 2.7 GM/DL
GLUCOSE SERPL-MCNC: 145 MG/DL (ref 74–98)
HBA1C MFR BLD: 8.2 %
HCT VFR BLD AUTO: 42.6 % (ref 42–52)
HDLC SERPL-MCNC: 28 MG/DL (ref 40–60)
HGB BLD-MCNC: 13.4 G/DL (ref 14–18)
IMM GRANULOCYTES # BLD: 0.03 10*3/MM3 (ref 0–0.06)
IMM GRANULOCYTES NFR BLD: 0.4 % (ref 0–0.6)
LDLC SERPL CALC-MCNC: 47 MG/DL (ref 0–99)
LYMPHOCYTES # BLD AUTO: 3.35 10*3/MM3 (ref 0.6–3.4)
LYMPHOCYTES NFR BLD AUTO: 42.8 % (ref 10–50)
MCH RBC QN AUTO: 28.6 PG (ref 27–31)
MCHC RBC AUTO-ENTMCNC: 31.5 G/DL (ref 30–37)
MCV RBC AUTO: 91 FL (ref 80–94)
MONOCYTES # BLD AUTO: 0.94 10*3/MM3 (ref 0–0.9)
MONOCYTES NFR BLD AUTO: 12 % (ref 0–12)
NEUTROPHILS # BLD AUTO: 3.32 10*3/MM3 (ref 2–6.9)
NEUTROPHILS NFR BLD AUTO: 42.4 % (ref 37–80)
NRBC BLD AUTO-RTO: 0 /100 WBC (ref 0–0)
PLATELET # BLD AUTO: 229 10*3/MM3 (ref 130–400)
POTASSIUM SERPL-SCNC: 4.7 MMOL/L (ref 3.5–5.1)
PROT SERPL-MCNC: 6.9 G/DL (ref 6.3–8.2)
RBC # BLD AUTO: 4.68 10*6/MM3 (ref 4.7–6.1)
SODIUM SERPL-SCNC: 145 MMOL/L (ref 137–145)
T4 FREE SERPL-MCNC: 1.53 NG/DL (ref 0.78–2.19)
TRIGL SERPL-MCNC: 215 MG/DL
TSH SERPL DL<=0.005 MIU/L-ACNC: 4.21 MIU/ML (ref 0.47–4.68)
VLDLC SERPL CALC-MCNC: 43 MG/DL
WBC # BLD AUTO: 7.83 10*3/MM3 (ref 4.8–10.8)

## 2018-03-29 ENCOUNTER — OFFICE VISIT (OUTPATIENT)
Dept: INTERNAL MEDICINE | Facility: CLINIC | Age: 68
End: 2018-03-29

## 2018-03-29 VITALS
HEIGHT: 70 IN | BODY MASS INDEX: 45.1 KG/M2 | WEIGHT: 315 LBS | HEART RATE: 66 BPM | TEMPERATURE: 98.5 F | SYSTOLIC BLOOD PRESSURE: 122 MMHG | DIASTOLIC BLOOD PRESSURE: 78 MMHG | OXYGEN SATURATION: 94 %

## 2018-03-29 DIAGNOSIS — E03.8 OTHER SPECIFIED HYPOTHYROIDISM: ICD-10-CM

## 2018-03-29 DIAGNOSIS — K21.9 GASTROESOPHAGEAL REFLUX DISEASE WITHOUT ESOPHAGITIS: ICD-10-CM

## 2018-03-29 DIAGNOSIS — E11.9 TYPE 2 DIABETES MELLITUS WITHOUT COMPLICATION, WITHOUT LONG-TERM CURRENT USE OF INSULIN (HCC): ICD-10-CM

## 2018-03-29 DIAGNOSIS — I10 BENIGN ESSENTIAL HYPERTENSION: Primary | ICD-10-CM

## 2018-03-29 PROBLEM — Z96.612 STATUS POST TOTAL REPLACEMENT OF LEFT SHOULDER: Status: RESOLVED | Noted: 2017-09-11 | Resolved: 2018-03-29

## 2018-03-29 PROBLEM — M19.019 SHOULDER ARTHRITIS: Status: RESOLVED | Noted: 2017-09-11 | Resolved: 2018-03-29

## 2018-03-29 PROCEDURE — 99213 OFFICE O/P EST LOW 20 MIN: CPT | Performed by: INTERNAL MEDICINE

## 2018-03-29 RX ORDER — LEVOTHYROXINE SODIUM 0.05 MG/1
50 TABLET ORAL DAILY
Qty: 90 TABLET | Refills: 3 | Status: SHIPPED | OUTPATIENT
Start: 2018-03-29 | End: 2019-04-29 | Stop reason: SDUPTHER

## 2018-03-29 RX ORDER — GLIPIZIDE 10 MG/1
10 TABLET ORAL
Qty: 180 TABLET | Refills: 1 | Status: SHIPPED | OUTPATIENT
Start: 2018-03-29 | End: 2018-10-02 | Stop reason: SDUPTHER

## 2018-03-29 RX ORDER — OMEPRAZOLE 40 MG/1
40 CAPSULE, DELAYED RELEASE ORAL DAILY
Qty: 90 CAPSULE | Refills: 3 | Status: SHIPPED | OUTPATIENT
Start: 2018-03-29 | End: 2019-06-24 | Stop reason: SDUPTHER

## 2018-03-29 RX ORDER — SIMVASTATIN 20 MG
20 TABLET ORAL NIGHTLY
Qty: 90 TABLET | Refills: 3 | Status: SHIPPED | OUTPATIENT
Start: 2018-03-29 | End: 2019-06-24 | Stop reason: SDUPTHER

## 2018-03-29 NOTE — PROGRESS NOTES
"Chief Complaint   Patient presents with   • Follow-up     for HTN, GERD, HLD, Hypothyroidism, and DM. No new complaints.      Subjective   Freddie Yeboah Jr. is a 68 y.o. male.     Here for follow-up of HTN, HLD, hypothyroid, GERD, DM, LUIS FERNANDO, BPH, DJD, prostate CA and Afib.   He did not do well while he was in [place], ate a lot, did not exercise and gained 20 lbs.    His BS is running 130-140 in PM.  He is trying to eat better since he got home.  Still not exercising much.  Last eye exam was last fall.  He does have some tingling in toes.   He urinates about every 3 hours at night.   GERD is intermittent.   Uses his CPAP every night.   BP is well controlled.  No CP, SOA, or palpitations.  He does have some SOA with exertion and some edema.   He is due for colonoscopy. Dr Ward has sent him paperwork and he will arrange this.          The following portions of the patient's history were reviewed and updated as appropriate: allergies, current medications, past family history, past medical history, past social history, past surgical history and problem list.    Review of Systems   Constitutional: Negative for activity change, appetite change and unexpected weight change.   Respiratory: Positive for shortness of breath.    Cardiovascular: Positive for leg swelling. Negative for chest pain and palpitations.   Genitourinary: Positive for frequency.   Neurological: Positive for numbness.        Tingling in toes   All other systems reviewed and are negative.      Objective   /78   Pulse 66   Temp 98.5 °F (36.9 °C)   Ht 176.5 cm (69.5\")   Wt (!) 150 kg (331 lb)   SpO2 94%   BMI 48.18 kg/m²   Body mass index is 48.18 kg/m².  Physical Exam   Constitutional: He is oriented to person, place, and time. He appears well-developed and well-nourished.   Pleasant gentleman in no apparent distress, morbidly obese   HENT:   Head: Normocephalic and atraumatic.   Mouth/Throat: Oropharynx is clear and moist.   Eyes: Conjunctivae " and EOM are normal. Pupils are equal, round, and reactive to light.   Neck: Normal range of motion. Neck supple. No thyromegaly present.   Cardiovascular: Normal rate and intact distal pulses.    Murmur heard.  Irregularly irregular, systolic murmur grade 2/6 at upper sternal borders with radiation to carotids   Pulmonary/Chest: Effort normal and breath sounds normal. No respiratory distress.   Abdominal: Soft. Bowel sounds are normal.   Musculoskeletal: He exhibits no edema.   Lymphadenopathy:     He has no cervical adenopathy.   Neurological: He is alert and oriented to person, place, and time. No cranial nerve deficit.   Skin:   Feet with no callus or ulcer, all toenails are yellowed and thickened, sensation grossly intact   Psychiatric: He has a normal mood and affect. Judgment normal.   Nursing note and vitals reviewed.      Assessment/Plan   Freddie Yeboah JrGisela is here today and the following problems have been addressed:      Freddie was seen today for follow-up.    Diagnoses and all orders for this visit:    Benign essential hypertension    Gastroesophageal reflux disease without esophagitis    Other specified hypothyroidism    Type 2 diabetes mellitus without complication, without long-term current use of insulin    Follow heart healthy/low salt diet  Avoid processed foods  Monitor blood pressure as discussed  Exercise as tolerated up to 30 minutes 5 days per week-reiterated importance of regular exercise due to elevated blood sugars  Take all medications as prescribed  Follow diabetic diet-again encouraged patient to follow a healthy diabetic diet due to elevated A1c  Monitor blood sugars as discussed  See eye doctor annually or as discussed  Wear protective foot wear/no bare feet  Check feet regularly for calluses or ulcers  Discussed risk of poorly controlled diabetes and long-term complications  Labs reviewed with pt    RTC 4 mo      Please note that portions of this note were completed with a voice  recognition program.  Efforts were made to edit dictation, but occasionally words are mistranscribed.

## 2018-04-16 ENCOUNTER — LAB (OUTPATIENT)
Dept: UROLOGY | Facility: CLINIC | Age: 68
End: 2018-04-16

## 2018-04-16 DIAGNOSIS — E29.1 TESTICULAR HYPOGONADISM: Primary | ICD-10-CM

## 2018-04-16 DIAGNOSIS — Z12.5 SCREENING FOR PROSTATE CANCER: ICD-10-CM

## 2018-04-17 LAB
BASOPHILS # BLD AUTO: 0.05 10*3/MM3 (ref 0–0.2)
BASOPHILS NFR BLD AUTO: 0.6 % (ref 0–2.5)
EOSINOPHIL # BLD AUTO: 0.15 10*3/MM3 (ref 0–0.7)
EOSINOPHIL NFR BLD AUTO: 1.7 % (ref 0–7)
ERYTHROCYTE [DISTWIDTH] IN BLOOD BY AUTOMATED COUNT: 13.5 % (ref 11.5–14.5)
ESTRADIOL SERPL-MCNC: 7.2 PG/ML (ref 7.6–42.6)
HCT VFR BLD AUTO: 44.1 % (ref 42–52)
HGB BLD-MCNC: 14 G/DL (ref 14–18)
IMM GRANULOCYTES # BLD: 0.03 10*3/MM3 (ref 0–0.06)
IMM GRANULOCYTES NFR BLD: 0.3 % (ref 0–0.6)
LYMPHOCYTES # BLD AUTO: 3.27 10*3/MM3 (ref 0.6–3.4)
LYMPHOCYTES NFR BLD AUTO: 37.1 % (ref 10–50)
MCH RBC QN AUTO: 28.7 PG (ref 27–31)
MCHC RBC AUTO-ENTMCNC: 31.7 G/DL (ref 30–37)
MCV RBC AUTO: 90.6 FL (ref 80–94)
MONOCYTES # BLD AUTO: 0.87 10*3/MM3 (ref 0–0.9)
MONOCYTES NFR BLD AUTO: 9.9 % (ref 0–12)
NEUTROPHILS # BLD AUTO: 4.44 10*3/MM3 (ref 2–6.9)
NEUTROPHILS NFR BLD AUTO: 50.4 % (ref 37–80)
NRBC BLD AUTO-RTO: 0 /100 WBC (ref 0–0)
PLATELET # BLD AUTO: 247 10*3/MM3 (ref 130–400)
PSA SERPL-MCNC: 0.65 NG/ML (ref 0.06–4)
RBC # BLD AUTO: 4.87 10*6/MM3 (ref 4.7–6.1)
TESTOST SERPL-MCNC: 164 NG/DL (ref 264–916)
WBC # BLD AUTO: 8.81 10*3/MM3 (ref 4.8–10.8)

## 2018-04-23 ENCOUNTER — OFFICE VISIT (OUTPATIENT)
Dept: UROLOGY | Facility: CLINIC | Age: 68
End: 2018-04-23

## 2018-04-23 VITALS — WEIGHT: 315 LBS | HEIGHT: 69 IN | BODY MASS INDEX: 46.65 KG/M2

## 2018-04-23 DIAGNOSIS — E29.1 HYPOGONADISM IN MALE: Primary | ICD-10-CM

## 2018-04-23 LAB
BILIRUB BLD-MCNC: NEGATIVE MG/DL
CLARITY, POC: CLEAR
COLOR UR: YELLOW
GLUCOSE UR STRIP-MCNC: NEGATIVE MG/DL
KETONES UR QL: NEGATIVE
LEUKOCYTE EST, POC: NEGATIVE
NITRITE UR-MCNC: NEGATIVE MG/ML
PH UR: 6 [PH] (ref 5–8)
PROT UR STRIP-MCNC: NEGATIVE MG/DL
RBC # UR STRIP: NEGATIVE /UL
SP GR UR: 1.01 (ref 1–1.03)
UROBILINOGEN UR QL: NORMAL

## 2018-04-23 PROCEDURE — 99214 OFFICE O/P EST MOD 30 MIN: CPT | Performed by: UROLOGY

## 2018-04-23 PROCEDURE — 81003 URINALYSIS AUTO W/O SCOPE: CPT | Performed by: UROLOGY

## 2018-04-23 RX ORDER — OXYCODONE AND ACETAMINOPHEN 10; 325 MG/1; MG/1
10-325 TABLET ORAL AS NEEDED
COMMUNITY
Start: 2018-04-02

## 2018-04-23 NOTE — PROGRESS NOTES
Chief Complaint  History of UTI and Hypogonadism        TARUN Yeboah is a 68 y.o. male who returns today for follow-up with his history of hypogonadism and low testosterone level.  He has achieved great clinical benefit from the supplement although on his last appointment his symptoms did not return for months after the last insertion of pellets.  I suggested a drug holiday but he returns today stating his symptoms have returned with a vengeance and he is anxious to resume his testosterone supplement.  We've reviewed the risks and benefits of androgen suppression and he is anxious to continue.  He does take blood thinner and will need to be off of that for the pellets to be safely inserted.    There were no vitals filed for this visit.    Past Medical History  Past Medical History:   Diagnosis Date   • Arthritis     shoulder, knee. multi joint    • Atrial fibrillation    • Back pain    • Chronic kidney disease     stage III due to diabetes   • Diabetes mellitus     several years; checks sugars at home-usually run 100-112   • Diabetic nephropathy    • Disease of thyroid gland     hypothyroidism   • GERD (gastroesophageal reflux disease)    • Hypertension    • Low testosterone    • Sleep apnea     setting of 11   • Urinary tract infection     most recent diagnosis 8/29/17 in the ER (+3 bacteria)-started on Macrobid and rechecked by PCP on 9/5/17       Past Surgical History  Past Surgical History:   Procedure Laterality Date   • ANTERIOR CERVICAL FUSION     • APPENDECTOMY     • BACK SURGERY     • CARPAL TUNNEL RELEASE Bilateral    • CATARACT EXTRACTION     • CHOLECYSTECTOMY     • COLONOSCOPY  2013   • FINGER/THUMB ARTHROPLASTY Left     thumb replacement   • [place] RECONSTR TOTAL SHOULDER IMPLANT Left 9/11/2017    Procedure: TOTAL SHOULDER ARTHROPLASTY LEFT;  Surgeon: William Ray MD;  Location: [facility];  Service: Orthopedics   • SHOULDER SURGERY     • TOTAL KNEE ARTHROPLASTY Left        Medications  has a current  medication list which includes the following prescription(s): anastrozole, apixaban, aspirin, bumetanide, digoxin, docusate sodium, glipizide, glucosamine-chondroitin, isosorbide mononitrate, levothyroxine, losartan, metformin, metoprolol tartrate, morphine, multivital, fish oil, omeprazole, oxycodone-acetaminophen, oxycodone-acetaminophen, ropivacaine hcl-nacl, simvastatin, spironolactone, and tizanidine.      Allergies  Allergies   Allergen Reactions   • Duloxetine Itching   • Sulfa Antibiotics Other (See Comments)     He reports renal failure after a round of sulfa   • Sulfamethoxazole-Trimethoprim Other (See Comments)     Had to be hospitalized in ICU; renal impairment        Social History  Social History     Social History Narrative   • No narrative on file       Family History  He has no family history of bladder or kidney cancer  He has no family history of kidney stones      AUA Symptom Score:      Review of Systems  Review of Systems   Constitutional: Negative.    Genitourinary: Negative.    All other systems reviewed and are negative.      Physical Exam  Physical Exam    Labs Recent and today in the office:  Results for orders placed or performed in visit on 04/23/18   POC Urinalysis Dipstick, Automated   Result Value Ref Range    Color Yellow Yellow, Straw, Dark Yellow, Elinor    Clarity, UA Clear Clear    Glucose, UA Negative Negative, 1000 mg/dL (3+) mg/dL    Bilirubin Negative Negative    Ketones, UA Negative Negative    Specific Gravity  1.015 1.005 - 1.030    Blood, UA Negative Negative    pH, Urine 6.0 5.0 - 8.0    Protein, POC Negative Negative mg/dL    Urobilinogen, UA Normal Normal    Leukocytes Negative Negative    Nitrite, UA Negative Negative         Assessment & Plan  Hypogonadism: Recent blood work indicates it is safe to continue the supplement and he is anxious to do so.  No sign of polycythemia elevated estradiol for prostate cancer was seen.  The need to closely monitor for toxicity is  reviewed in detail so he will return on Wednesday for insertion of Testopel pellets and then in 4 months for follow-up.

## 2018-04-25 ENCOUNTER — PROCEDURE VISIT (OUTPATIENT)
Dept: UROLOGY | Facility: CLINIC | Age: 68
End: 2018-04-25

## 2018-04-25 VITALS
HEART RATE: 76 BPM | DIASTOLIC BLOOD PRESSURE: 78 MMHG | OXYGEN SATURATION: 97 % | TEMPERATURE: 98.3 F | SYSTOLIC BLOOD PRESSURE: 142 MMHG

## 2018-04-25 DIAGNOSIS — E29.1 HYPOGONADISM IN MALE: ICD-10-CM

## 2018-04-25 DIAGNOSIS — E28.0 ESTRADIOL EXCESS: Primary | ICD-10-CM

## 2018-04-25 DIAGNOSIS — E29.1 HYPOGONADISM MALE: ICD-10-CM

## 2018-04-25 PROCEDURE — 11980 IMPLANT HORMONE PELLET(S): CPT | Performed by: UROLOGY

## 2018-04-25 RX ORDER — ANASTROZOLE 1 MG/1
1 TABLET ORAL 2 TIMES WEEKLY
Qty: 24 TABLET | Refills: 3 | Status: SHIPPED | OUTPATIENT
Start: 2018-04-26 | End: 2019-04-15 | Stop reason: SDUPTHER

## 2018-04-25 NOTE — PROGRESS NOTES
Chief Complaint  Hypogonadism (testopel)        TARUN Yeboah is a 68 y.o. male who returns today for insertion of Testopel pellets to treat his symptoms of hypogonadism and low testosterone level.    Vitals:    04/25/18 1058   BP: 142/78   Pulse: 76   Temp: 98.3 °F (36.8 °C)   SpO2: 97%       Past Medical History  Past Medical History:   Diagnosis Date   • Arthritis     shoulder, knee. multi joint    • Atrial fibrillation    • Back pain    • Chronic kidney disease     stage III due to diabetes   • Diabetes mellitus     several years; checks sugars at home-usually run 100-112   • Diabetic nephropathy    • Disease of thyroid gland     hypothyroidism   • GERD (gastroesophageal reflux disease)    • Hypertension    • Low testosterone    • Sleep apnea     setting of 11   • Urinary tract infection     most recent diagnosis 8/29/17 in the ER (+3 bacteria)-started on Macrobid and rechecked by PCP on 9/5/17       Past Surgical History  Past Surgical History:   Procedure Laterality Date   • ANTERIOR CERVICAL FUSION     • APPENDECTOMY     • BACK SURGERY     • CARPAL TUNNEL RELEASE Bilateral    • CATARACT EXTRACTION     • CHOLECYSTECTOMY     • COLONOSCOPY  2013   • FINGER/THUMB ARTHROPLASTY Left     thumb replacement   • [place] RECONSTR TOTAL SHOULDER IMPLANT Left 9/11/2017    Procedure: TOTAL SHOULDER ARTHROPLASTY LEFT;  Surgeon: William Ray MD;  Location: [facility];  Service: Orthopedics   • SHOULDER SURGERY     • TOTAL KNEE ARTHROPLASTY Left        Medications  has a current medication list which includes the following prescription(s): anastrozole, apixaban, aspirin, bumetanide, digoxin, docusate sodium, glipizide, glucosamine-chondroitin, isosorbide mononitrate, levothyroxine, losartan, metformin, metoprolol tartrate, morphine, multivital, fish oil, omeprazole, oxycodone-acetaminophen, oxycodone-acetaminophen, ropivacaine hcl-nacl, simvastatin, spironolactone, and tizanidine.      Allergies  Allergies   Allergen Reactions   •  Duloxetine Itching   • Sulfa Antibiotics Other (See Comments)     He reports renal failure after a round of sulfa   • Sulfamethoxazole-Trimethoprim Other (See Comments)     Had to be hospitalized in ICU; renal impairment        Social History  Social History     Social History Narrative   • No narrative on file       Family History  He has no family history of bladder or kidney cancer  He has no family history of kidney stones      AUA Symptom Score:      Review of Systems  Review of Systems    Physical Exam  Physical Exam    Labs Recent and today in the office:  Results for orders placed or performed in visit on 04/23/18   POC Urinalysis Dipstick, Automated   Result Value Ref Range    Color Yellow Yellow, Straw, Dark Yellow, Elinor    Clarity, UA Clear Clear    Glucose, UA Negative Negative, 1000 mg/dL (3+) mg/dL    Bilirubin Negative Negative    Ketones, UA Negative Negative    Specific Gravity  1.015 1.005 - 1.030    Blood, UA Negative Negative    pH, Urine 6.0 5.0 - 8.0    Protein, POC Negative Negative mg/dL    Urobilinogen, UA Normal Normal    Leukocytes Negative Negative    Nitrite, UA Negative Negative     Testopel Subcutaneous hormone pellet implantation:    The patient was placed on the exam table in the supine position.  The upper outer quadrant of the hip was identified for insertion.  The area was then prepped with Betadine and injected with 7ml of Lidocaine 2% with Epinephrine to anesthetize superficially and then distally along the trocar tract.  A 3mm incision was made using #11 blade scalpel.  The trocar with a sharp-ended stylet was inserted into the subcutaneous tissue in line with the femur.  The sharp stylet was withdrawn and the Testopel pellets were placed into the well of the trocar.  Testopel was advanced into the tissue using blunt-ended stylet.  A total of 12 pellets were inserted. For the J-code of  the NDC # for the pellets is 91775-592-65. The trocar was removed and the incision was  closed using Steri-strips.  The wound area was cleansed to remove the Betadine and was covered with Steri-strips with an outer Band-aid.  Two subcutaneous tract were developed.  Careful inspection of the area was done.             Assessment & Plan  Hypogonadism: 12 pellets were inserted.  The patient was informed of the post-procedure instructions.  He will return in 4 months to determine scheduling of the next insertion.     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  :

## 2018-07-31 ENCOUNTER — OFFICE VISIT (OUTPATIENT)
Dept: INTERNAL MEDICINE | Facility: CLINIC | Age: 68
End: 2018-07-31

## 2018-07-31 VITALS
WEIGHT: 315 LBS | BODY MASS INDEX: 45.1 KG/M2 | DIASTOLIC BLOOD PRESSURE: 82 MMHG | HEIGHT: 70 IN | OXYGEN SATURATION: 93 % | SYSTOLIC BLOOD PRESSURE: 138 MMHG | TEMPERATURE: 97.3 F | HEART RATE: 67 BPM

## 2018-07-31 DIAGNOSIS — Z00.00 ENCOUNTER FOR MEDICARE ANNUAL WELLNESS EXAM: Primary | ICD-10-CM

## 2018-07-31 PROCEDURE — G0439 PPPS, SUBSEQ VISIT: HCPCS | Performed by: INTERNAL MEDICINE

## 2018-07-31 NOTE — PROGRESS NOTES
QUICK REFERENCE INFORMATION:  The ABCs of the Annual Wellness Visit    Subsequent Medicare Wellness Visit    HEALTH RISK ASSESSMENT    1950    Recent Hospitalizations:  Recently treated at the following:  [facility].        Current Medical Providers:  Patient Care Team:  Marilyn K Vermeesch, MD as PCP - General  Vikas Rodriguez MD as PCP - Claims Attributed  Ivy Goff RN as Care Coordinator ([facility])        Smoking Status:  History   Smoking Status   • Former Smoker   • Packs/day: 3.00   • Years: 34.00   • Quit date: 2002   Smokeless Tobacco   • Never Used       Alcohol Consumption:  History   Alcohol Use No       Depression Screen:   PHQ-2/PHQ-9 Depression Screening 7/31/2018   Little interest or pleasure in doing things 0   Feeling down, depressed, or hopeless 0   Total Score 0       Health Habits and Functional and Cognitive Screening:  Functional & Cognitive Status 7/31/2018   Do you have difficulty preparing food and eating? No   Do you have difficulty bathing yourself, getting dressed or grooming yourself? No   Do you have difficulty using the toilet? No   Do you have difficulty moving around from place to place? Yes   Do you have trouble with steps or getting out of a bed or a chair? Yes   In the past year have you fallen or experienced a near fall? No   Current Diet Limited Junk Food   Dental Exam Up to date   Eye Exam Not up to date   Exercise (times per week) 4 times per week   Current Exercise Activities Include Walking   Do you need help using the phone?  No   Are you deaf or do you have serious difficulty hearing?  Yes   Do you need help with transportation? No   Do you need help shopping? No   Do you need help preparing meals?  No   Do you need help with housework?  No   Do you need help with laundry? No   Do you need help taking your medications? Yes   Do you need help managing money? No   Do you ever drive or ride in a car without wearing a seat belt? Yes    Have you felt unusual stress, anger or loneliness in the last month? No   Who do you live with? Spouse   If you need help, do you have trouble finding someone available to you? No   Do you have difficulty concentrating, remembering or making decisions? Yes           Does the patient have evidence of cognitive impairment? No    Aspirin use counseling: Taking ASA appropriately as indicated      Recent Lab Results:  CMP:  Lab Results   Component Value Date     (H) 03/28/2018    BUN 23 (H) 03/28/2018    CREATININE 1.40 (H) 03/28/2018    EGFRIFNONA 50 (L) 03/28/2018    EGFRIFAFRI 61 03/28/2018    BCR 16.4 03/28/2018     03/28/2018    K 4.7 03/28/2018    CO2 30.0 03/28/2018    CALCIUM 9.9 03/28/2018    PROTENTOTREF 6.9 03/28/2018    ALBUMIN 4.20 03/28/2018    LABGLOBREF 2.7 03/28/2018    LABIL2 1.6 03/28/2018    BILITOT 0.7 03/28/2018    ALKPHOS 75 03/28/2018    AST 38 03/28/2018    ALT 43 03/28/2018     Lipid Panel:  Lab Results   Component Value Date    TRIG 215 (H) 03/28/2018    HDL 28 (L) 03/28/2018    VLDL 43 03/28/2018     HbA1c:  Lab Results   Component Value Date    HGBA1C 8.20 03/28/2018       Visual Acuity:  No exam data present    Age-appropriate Screening Schedule:  Refer to the list below for future screening recommendations based on patient's age, sex and/or medical conditions. Orders for these recommended tests are listed in the plan section. The patient has been provided with a written plan.    Health Maintenance   Topic Date Due   • URINE MICROALBUMIN  05/22/2016   • DIABETIC FOOT EXAM  07/25/2016   • ZOSTER VACCINE (2 of 2) 07/11/2017   • COLONOSCOPY  01/01/2018   • DIABETIC EYE EXAM  07/05/2018   • INFLUENZA VACCINE  08/01/2018   • HEMOGLOBIN A1C  09/28/2018   • LIPID PANEL  03/28/2019   • TDAP/TD VACCINES (2 - Td) 10/09/2022   • PNEUMOCOCCAL VACCINES (65+ LOW/MEDIUM RISK)  Completed        Subjective   History of Present Illness    Freddie Yeboah Jr. is a 68 y.o. male who presents for an  Subsequent Wellness Visit.  PMH of Afib, HTN, HLD, Cor pulmonale, asthma, LUIS FERNANDO, GERD, hypothyroid, DM, LBP, DJD, BPH, CRI.  He has noted his GERD has worsened over the past few months-sxs may occur less than once a week.  It has not bothered him in the past few weeks.  TUMS relieves his sxs.  BS is running 130s.  He is having less burning in feet, has been seeing his foot doctor regularly.  No CP or palpitations.  Takes thyroid med at 0300 or early AM on empty stomach.  He sees Dr Zelaya for prostate.  He has appt for colonoscopy soon with Dr Ward.     The following portions of the patient's history were reviewed and updated as appropriate: allergies, current medications, past family history, past medical history, past social history, past surgical history and problem list.    Outpatient Medications Prior to Visit   Medication Sig Dispense Refill   • anastrozole (ARIMIDEX) 1 MG tablet Take 1 tablet by mouth 2 (Two) Times a Week. On Sunday and Thursday 24 tablet 3   • apixaban (ELIQUIS) 5 MG tablet tablet Take 1 tablet by mouth 2 (Two) Times a Day. Resume tomorrow 9/13/17 60 tablet    • aspirin 81 MG tablet Take 1 tablet by mouth daily.     • bumetanide (BUMEX) 1 MG tablet      • digoxin (LANOXIN) 125 MCG tablet Take 125 mcg by mouth Daily.     • docusate sodium 100 MG capsule Take 100 mg by mouth 2 (Two) Times a Day As Needed (constipation). 60 each 0   • glipiZIDE (GLUCOTROL) 10 MG tablet Take 1 tablet by mouth 2 (Two) Times a Day Before Meals. 180 tablet 1   • glucosamine-chondroitin 500-400 MG capsule capsule Take 1 capsule by mouth 2 (Two) Times a Day.     • isosorbide mononitrate (IMDUR) 60 MG 24 hr tablet Take 60 mg by mouth Daily With Breakfast.     • levothyroxine (SYNTHROID, LEVOTHROID) 50 MCG tablet Take 1 tablet by mouth Daily. 90 tablet 3   • losartan (COZAAR) 100 MG tablet Take 100 mg by mouth Daily.     • metFORMIN (GLUCOPHAGE) 500 MG tablet  TAKE THREE TABLETS BY MOUTH EVERY MORNING AND TAKE TWO  TABLETS BY MOUTH EVERY EVENING 300 tablet 3   • metoprolol tartrate (LOPRESSOR) 100 MG tablet Take 100 mg by mouth 2 (Two) Times a Day.     • Morphine (MS CONTIN) 60 MG 12 hr tablet 60 mg 2 (Two) Times a Day.     • Multiple Vitamins-Minerals (MULTIVITAL) tablet Take 1 tablet by mouth Daily.     • Omega-3 Fatty Acids (FISH OIL) 1200 MG capsule capsule Take 1,200 mg by mouth 2 (Two) Times a Day With Meals. Take as directed     • omeprazole (priLOSEC) 40 MG capsule Take 1 capsule by mouth Daily. 90 capsule 3   • oxyCODONE-acetaminophen (PERCOCET)  MG per tablet Take  tablets by mouth As Needed.     • oxyCODONE-acetaminophen (PERCOCET) 7.5-325 MG per tablet Take 1 tablet by mouth Every 6 (Six) Hours As Needed for Moderate Pain  or Severe Pain . 28 tablet    • Ropivacaine HCl-NaCl (NAROPIN) 0.2-0.9 % 12 mg/hr by Peripheral Nerve route Continuous.     • simvastatin (ZOCOR) 20 MG tablet Take 1 tablet by mouth Every Night. 90 tablet 3   • spironolactone (ALDACTONE) 25 MG tablet 25 mg Daily.     • tiZANidine (ZANAFLEX) 4 MG tablet Take 8 mg by mouth every night at bedtime.       No facility-administered medications prior to visit.        Patient Active Problem List   Diagnosis   • Asthma   • Atrial fibrillation (CMS/HCC)   • Back pain   • Benign essential hypertension   • Benign prostatic hyperplasia   • Chronic pain   • Gastroesophageal reflux disease   • Hyperlipidemia   • Hypothyroidism   • Low back pain   • Adiposity   • Obstructive sleep apnea syndrome   • Osteoarthritis   • BMI 45.0-49.9, adult (CMS/HCC)   • Carotid bruit   • Cor pulmonale (CMS/HCC)   • Hypercalcemia   • Renal insufficiency   • Secondary pulmonary hypertension   • Type 2 diabetes mellitus without complication (CMS/HCC)   • Degeneration of cervical intervertebral disc   • Encounter for Medicare annual wellness exam       Advance Care Planning:  has NO advance directive - information provided to the patient today    Identification of Risk  Factors:  Risk factors include: weight , unhealthy diet, cardiovascular risk, inactivity, chronic pain, hearing limitations and polypharmacy.    Review of Systems   Constitutional: Negative.    HENT: Positive for congestion and hearing loss.    Eyes: Negative.    Respiratory: Negative.    Cardiovascular: Positive for palpitations.   Gastrointestinal: Positive for constipation.   Endocrine: Negative.    Genitourinary: Negative.    Musculoskeletal: Positive for arthralgias. Negative for back pain and neck pain.        Shoulder and hand pain   Skin: Negative.    Allergic/Immunologic: Positive for environmental allergies.   Neurological: Negative.    Hematological: Bruises/bleeds easily.   Psychiatric/Behavioral: Positive for sleep disturbance.       Compared to one year ago, the patient feels his physical health is better.  Compared to one year ago, the patient feels his mental health is the same.    Objective     Physical Exam   Constitutional: He is oriented to person, place, and time. He appears well-developed and well-nourished.   Very pleasant gentleman, morbidly obese   HENT:   Head: Normocephalic and atraumatic.   Right Ear: External ear normal.   Left Ear: External ear normal.   Mouth/Throat: Oropharynx is clear and moist.   Eyes: Pupils are equal, round, and reactive to light. Conjunctivae and EOM are normal.   Neck: Normal range of motion. Neck supple. No thyromegaly present.   Cardiovascular: Normal rate.    Murmur heard.  Irregularly irregular, systolic murmur grade 2/6 heard over precordium  Right DP and PT pulses +2, left DP and PT pulses +1   Pulmonary/Chest: Effort normal and breath sounds normal. No respiratory distress.   Abdominal: Soft. Bowel sounds are normal. There is no tenderness.   Obesity limits exam, umbilical hernia noted   Musculoskeletal: He exhibits edema.   Compression stockings removed, trace edema noted at ankles   Lymphadenopathy:     He has no cervical adenopathy.   Neurological: He  "is alert and oriented to person, place, and time. No cranial nerve deficit.   Skin: No rash noted.   Feet with no callus or ulcer, sensation grossly intact   Psychiatric: He has a normal mood and affect. His behavior is normal. Judgment and thought content normal.   Nursing note and vitals reviewed.      Vitals:    07/31/18 1403   BP: 138/82   Pulse: 67   Temp: 97.3 °F (36.3 °C)   SpO2: 93%   Weight: (!) 153 kg (338 lb)   Height: 176.5 cm (69.5\")   PainSc:   6       Patient's Body mass index is 49.2 kg/m². BMI is above normal parameters. Recommendations include: exercise counseling and nutrition counseling.      Assessment/Plan   Patient Self-Management and Personalized Health Advice  The patient has been provided with information about: diet, exercise, weight management, the relationship between weight and GERD, designing advance directives and supplements and preventive services including:   · Advance directive, Diabetes screening, see lab orders, Exercise counseling provided, Nutrition counseling provided, recommend new shingles shot at local pharmacy.    Visit Diagnoses:    ICD-10-CM ICD-9-CM   1. Encounter for Medicare annual wellness exam Z00.00 V70.0       Orders Placed This Encounter   Procedures   • Comprehensive Metabolic Panel   • Hemoglobin A1c       Outpatient Encounter Prescriptions as of 7/31/2018   Medication Sig Dispense Refill   • anastrozole (ARIMIDEX) 1 MG tablet Take 1 tablet by mouth 2 (Two) Times a Week. On Sunday and Thursday 24 tablet 3   • apixaban (ELIQUIS) 5 MG tablet tablet Take 1 tablet by mouth 2 (Two) Times a Day. Resume tomorrow 9/13/17 60 tablet    • aspirin 81 MG tablet Take 1 tablet by mouth daily.     • bumetanide (BUMEX) 1 MG tablet      • digoxin (LANOXIN) 125 MCG tablet Take 125 mcg by mouth Daily.     • docusate sodium 100 MG capsule Take 100 mg by mouth 2 (Two) Times a Day As Needed (constipation). 60 each 0   • glipiZIDE (GLUCOTROL) 10 MG tablet Take 1 tablet by mouth 2 " (Two) Times a Day Before Meals. 180 tablet 1   • glucosamine-chondroitin 500-400 MG capsule capsule Take 1 capsule by mouth 2 (Two) Times a Day.     • isosorbide mononitrate (IMDUR) 60 MG 24 hr tablet Take 60 mg by mouth Daily With Breakfast.     • levothyroxine (SYNTHROID, LEVOTHROID) 50 MCG tablet Take 1 tablet by mouth Daily. 90 tablet 3   • losartan (COZAAR) 100 MG tablet Take 100 mg by mouth Daily.     • metFORMIN (GLUCOPHAGE) 500 MG tablet  TAKE THREE TABLETS BY MOUTH EVERY MORNING AND TAKE TWO TABLETS BY MOUTH EVERY EVENING 300 tablet 3   • metoprolol tartrate (LOPRESSOR) 100 MG tablet Take 100 mg by mouth 2 (Two) Times a Day.     • Morphine (MS CONTIN) 60 MG 12 hr tablet 60 mg 2 (Two) Times a Day.     • Multiple Vitamins-Minerals (MULTIVITAL) tablet Take 1 tablet by mouth Daily.     • Omega-3 Fatty Acids (FISH OIL) 1200 MG capsule capsule Take 1,200 mg by mouth 2 (Two) Times a Day With Meals. Take as directed     • omeprazole (priLOSEC) 40 MG capsule Take 1 capsule by mouth Daily. 90 capsule 3   • oxyCODONE-acetaminophen (PERCOCET)  MG per tablet Take  tablets by mouth As Needed.     • oxyCODONE-acetaminophen (PERCOCET) 7.5-325 MG per tablet Take 1 tablet by mouth Every 6 (Six) Hours As Needed for Moderate Pain  or Severe Pain . 28 tablet    • Ropivacaine HCl-NaCl (NAROPIN) 0.2-0.9 % 12 mg/hr by Peripheral Nerve route Continuous.     • simvastatin (ZOCOR) 20 MG tablet Take 1 tablet by mouth Every Night. 90 tablet 3   • spironolactone (ALDACTONE) 25 MG tablet 25 mg Daily.     • tiZANidine (ZANAFLEX) 4 MG tablet Take 8 mg by mouth every night at bedtime.       No facility-administered encounter medications on file as of 7/31/2018.        Reviewed use of high risk medication in the elderly: yes  Reviewed for potential of harmful drug interactions in the elderly: yes     Labs as noted  Recommend shingles shot at local pharmacy  Given info on advance directives, would like copy of living will for  chart  Encouraged pt to have eye exam  Discussed exercise and diet with pt to help with weight management and control of diabetes  Take TUMS for GERD prn  He is having colonoscopy soon with Dr Ward in [place]    Follow Up:  Return in about 3 months (around 10/31/2018) for Next scheduled follow up.     An After Visit Summary and PPPS with all of these plans were given to the patient.

## 2018-09-12 ENCOUNTER — OFFICE VISIT (OUTPATIENT)
Dept: UROLOGY | Facility: CLINIC | Age: 68
End: 2018-09-12

## 2018-09-12 VITALS — RESPIRATION RATE: 18 BRPM | HEIGHT: 70 IN | WEIGHT: 315 LBS | BODY MASS INDEX: 45.1 KG/M2

## 2018-09-12 DIAGNOSIS — E29.1 HYPOGONADISM IN MALE: Primary | ICD-10-CM

## 2018-09-12 PROCEDURE — 99214 OFFICE O/P EST MOD 30 MIN: CPT | Performed by: UROLOGY

## 2018-09-12 PROCEDURE — 81003 URINALYSIS AUTO W/O SCOPE: CPT | Performed by: UROLOGY

## 2018-09-12 NOTE — PROGRESS NOTES
Chief Complaint  Hypogonadism (4 months with labs.)        TARUN Yeboah is a 68 y.o. male who returns today for follow-up of hypogonadism and low testosterone level.  He is achieved great clinical benefit from supplement and is anxious to continue.  He does have a problem with conversion to estradiol and takes Arimidex.  He recently came by for blood work to monitor for toxicity of therapy and all parameters were within normal limits.  It's been 4 months since he had a batch of pellets and he is about due for another.  Currently his testosterone level is barely normal.    Vitals:    09/12/18 1452   Resp: 18       Past Medical History  Past Medical History:   Diagnosis Date   • Arthritis     shoulder, knee. multi joint    • Atrial fibrillation (CMS/HCC)    • Back pain    • Chronic kidney disease     stage III due to diabetes   • Diabetes mellitus (CMS/HCC)     several years; checks sugars at home-usually run 100-112   • Diabetic nephropathy (CMS/HCC)    • Disease of thyroid gland     hypothyroidism   • GERD (gastroesophageal reflux disease)    • Hypertension    • Low testosterone    • Sleep apnea     setting of 11   • Urinary tract infection     most recent diagnosis 8/29/17 in the ER (+3 bacteria)-started on Macrobid and rechecked by PCP on 9/5/17       Past Surgical History  Past Surgical History:   Procedure Laterality Date   • ANTERIOR CERVICAL FUSION     • APPENDECTOMY     • BACK SURGERY     • CARPAL TUNNEL RELEASE Bilateral    • CATARACT EXTRACTION     • CHOLECYSTECTOMY     • COLONOSCOPY  2013   • FINGER/THUMB ARTHROPLASTY Left     thumb replacement   • ND RECONSTR TOTAL SHOULDER IMPLANT Left 9/11/2017    Procedure: TOTAL SHOULDER ARTHROPLASTY LEFT;  Surgeon: William Ray MD;  Location: [facility];  Service: Orthopedics   • SHOULDER SURGERY     • TOTAL KNEE ARTHROPLASTY Left        Medications  has a current medication list which includes the following prescription(s): anastrozole, apixaban, aspirin,  bumetanide, digoxin, docusate sodium, glipizide, glucosamine-chondroitin, isosorbide mononitrate, levothyroxine, losartan, metformin, metoprolol tartrate, morphine, multivital, fish oil, omeprazole, oxycodone-acetaminophen, oxycodone-acetaminophen, ropivacaine hcl-nacl, simvastatin, spironolactone, and tizanidine.      Allergies  Allergies   Allergen Reactions   • Duloxetine Itching   • Sulfa Antibiotics Other (See Comments)     He reports renal failure after a round of sulfa   • Sulfamethoxazole-Trimethoprim Other (See Comments)     Had to be hospitalized in ICU; renal impairment        Social History  Social History     Social History Narrative   • No narrative on file       Family History  He has no family history of bladder or kidney cancer  He has no family history of kidney stones      AUA Symptom Score:      Review of Systems  Review of Systems    Physical Exam  Physical Exam   Constitutional: He is oriented to person, place, and time. He appears well-developed and well-nourished.   HENT:   Head: Normocephalic and atraumatic.   Neck: Normal range of motion.   Pulmonary/Chest: Effort normal. No respiratory distress.   Abdominal: Soft. He exhibits no distension and no mass. There is no tenderness. No hernia.   Genitourinary: Rectum normal and prostate normal.   Musculoskeletal: Normal range of motion.   Lymphadenopathy:     He has no cervical adenopathy.   Neurological: He is alert and oriented to person, place, and time.   Skin: Skin is warm and dry.   Psychiatric: He has a normal mood and affect. His behavior is normal.   Vitals reviewed.      Labs Recent and today in the office:  Results for orders placed or performed in visit on 09/12/18   POC Urinalysis Dipstick, Automated   Result Value Ref Range    Color Yellow Yellow, Straw, Dark Yellow, Elinor    Clarity, UA Clear Clear    Specific Gravity  1.015 1.005 - 1.030    pH, Urine 5.5 5.0 - 8.0    Leukocytes Negative Negative    Nitrite, UA Negative Negative     Protein, POC Negative Negative mg/dL    Glucose, UA Negative Negative, 1000 mg/dL (3+) mg/dL    Ketones, UA Negative Negative    Urobilinogen, UA Normal Normal    Bilirubin Negative Negative    Blood, UA Negative Negative         Assessment & Plan  #1 hypogonadism: He is achieved a good clinical benefit from supplement and is anxious to continue.  The risks and benefits along with the need to closely monitor for toxicity of therapy is reviewed in detail with the patient.  He'll therefore be scheduled to return within the next few weeks to get another batch of testosterone pellets.    #2 estradiol excess: Currently under good control on Arimidex.

## 2018-10-01 ENCOUNTER — PROCEDURE VISIT (OUTPATIENT)
Dept: UROLOGY | Facility: CLINIC | Age: 68
End: 2018-10-01

## 2018-10-01 DIAGNOSIS — E29.1 HYPOGONADISM MALE: Primary | ICD-10-CM

## 2018-10-01 PROCEDURE — 11980 IMPLANT HORMONE PELLET(S): CPT | Performed by: UROLOGY

## 2018-10-01 RX ORDER — MORPHINE SULFATE 50 MG/1
CAPSULE, EXTENDED RELEASE ORAL
COMMUNITY
Start: 2018-08-06 | End: 2021-12-13 | Stop reason: SDUPTHER

## 2018-10-01 RX ORDER — MORPHINE SULFATE 30 MG/1
TABLET, FILM COATED, EXTENDED RELEASE ORAL
COMMUNITY
Start: 2018-08-31 | End: 2019-05-21 | Stop reason: SDUPTHER

## 2018-10-01 NOTE — PROGRESS NOTES
Chief Complaint  Hypogonadism (testopel)        TARUN Yeboah is a 68 y.o. male who returns today for Testopel pellet insertion for his hypogonadism    There were no vitals filed for this visit.    Past Medical History  Past Medical History:   Diagnosis Date   • Arthritis     shoulder, knee. multi joint    • Atrial fibrillation (CMS/HCC)    • Back pain    • Chronic kidney disease     stage III due to diabetes   • Diabetes mellitus (CMS/HCC)     several years; checks sugars at home-usually run 100-112   • Diabetic nephropathy (CMS/HCC)    • Disease of thyroid gland     hypothyroidism   • GERD (gastroesophageal reflux disease)    • Hypertension    • Low testosterone    • Sleep apnea     setting of 11   • Urinary tract infection     most recent diagnosis 8/29/17 in the ER (+3 bacteria)-started on Macrobid and rechecked by PCP on 9/5/17       Past Surgical History  Past Surgical History:   Procedure Laterality Date   • ANTERIOR CERVICAL FUSION     • APPENDECTOMY     • BACK SURGERY     • CARPAL TUNNEL RELEASE Bilateral    • CATARACT EXTRACTION     • CHOLECYSTECTOMY     • COLONOSCOPY  2013   • FINGER/THUMB ARTHROPLASTY Left     thumb replacement   • DE RECONSTR TOTAL SHOULDER IMPLANT Left 9/11/2017    Procedure: TOTAL SHOULDER ARTHROPLASTY LEFT;  Surgeon: William Ray MD;  Location: [facility];  Service: Orthopedics   • SHOULDER SURGERY     • TOTAL KNEE ARTHROPLASTY Left        Medications  has a current medication list which includes the following prescription(s): anastrozole, apixaban, aspirin, bumetanide, digoxin, docusate sodium, glipizide, glucosamine-chondroitin, isosorbide mononitrate, levothyroxine, losartan, metformin, metoprolol tartrate, morphine, morphine, morphine, multivital, fish oil, omeprazole, oxycodone-acetaminophen, oxycodone-acetaminophen, ropivacaine hcl-nacl, simvastatin, spironolactone, and tizanidine.      Allergies  Allergies   Allergen Reactions   • Duloxetine Itching   • Sulfa Antibiotics Other  (See Comments)     He reports renal failure after a round of sulfa   • Sulfamethoxazole-Trimethoprim Other (See Comments)     Had to be hospitalized in ICU; renal impairment        Social History  Social History     Social History Narrative   • No narrative on file       Family History  He has no family history of bladder or kidney cancer  He has no family history of kidney stones      AUA Symptom Score:      Review of Systems  Review of Systems    Physical Exam  Physical Exam    Labs Recent and today in the office:  Results for orders placed or performed in visit on 09/12/18   POC Urinalysis Dipstick, Automated   Result Value Ref Range    Color Yellow Yellow, Straw, Dark Yellow, Elinor    Clarity, UA Clear Clear    Specific Gravity  1.015 1.005 - 1.030    pH, Urine 5.5 5.0 - 8.0    Leukocytes Negative Negative    Nitrite, UA Negative Negative    Protein, POC Negative Negative mg/dL    Glucose, UA Negative Negative, 1000 mg/dL (3+) mg/dL    Ketones, UA Negative Negative    Urobilinogen, UA Normal Normal    Bilirubin Negative Negative    Blood, UA Negative Negative       Testopel Subcutaneous hormone pellet implantation:    The patient was placed on the exam table in the supine position.  The upper outer quadrant of the hip was identified for insertion.  The area was then prepped with Betadine and injected with 7ml of Lidocaine 2% with Epinephrine to anesthetize superficially and then distally along the trocar tract.  A 3mm incision was made using #11 blade scalpel.  The trocar with a sharp-ended stylet was inserted into the subcutaneous tissue in line with the femur.  The sharp stylet was withdrawn and the Testopel pellets were placed into the well of the trocar.  Testopel was advanced into the tissue using blunt-ended stylet.  A total of 12 pellets were inserted. For the J-code of  the NDC # for the pellets is 93352-761-88. The trocar was removed and the incision was closed using Steri-strips.  The wound area  was cleansed to remove the Betadine and was covered with Steri-strips with an outer Band-aid.  Two subcutaneous tract were developed.  Careful inspection of the area was done.        Assessment & Plan  Hypogonadism: The patient was informed of the post-procedure instructions.  He will return in 1 month to monitor testosterone levels and then again at 4 months to determine scheduling of the next insertion.

## 2018-10-03 RX ORDER — GLIPIZIDE 10 MG/1
TABLET ORAL
Qty: 180 TABLET | Refills: 1 | Status: SHIPPED | OUTPATIENT
Start: 2018-10-03 | End: 2019-03-30 | Stop reason: SDUPTHER

## 2018-10-24 ENCOUNTER — EPISODE CHANGES (OUTPATIENT)
Dept: CASE MANAGEMENT | Facility: OTHER | Age: 68
End: 2018-10-24

## 2018-11-01 ENCOUNTER — OFFICE VISIT (OUTPATIENT)
Dept: INTERNAL MEDICINE | Facility: CLINIC | Age: 68
End: 2018-11-01

## 2018-11-01 VITALS
HEIGHT: 70 IN | RESPIRATION RATE: 16 BRPM | BODY MASS INDEX: 45.1 KG/M2 | SYSTOLIC BLOOD PRESSURE: 136 MMHG | OXYGEN SATURATION: 96 % | DIASTOLIC BLOOD PRESSURE: 82 MMHG | HEART RATE: 56 BPM | TEMPERATURE: 99.1 F | WEIGHT: 315 LBS

## 2018-11-01 DIAGNOSIS — G47.33 OBSTRUCTIVE SLEEP APNEA SYNDROME: ICD-10-CM

## 2018-11-01 DIAGNOSIS — E11.9 TYPE 2 DIABETES MELLITUS WITHOUT COMPLICATION, WITHOUT LONG-TERM CURRENT USE OF INSULIN (HCC): ICD-10-CM

## 2018-11-01 DIAGNOSIS — K21.9 GASTROESOPHAGEAL REFLUX DISEASE WITHOUT ESOPHAGITIS: ICD-10-CM

## 2018-11-01 DIAGNOSIS — I10 BENIGN ESSENTIAL HYPERTENSION: Primary | ICD-10-CM

## 2018-11-01 DIAGNOSIS — E03.8 OTHER SPECIFIED HYPOTHYROIDISM: ICD-10-CM

## 2018-11-01 LAB
ALBUMIN SERPL-MCNC: 4.4 G/DL (ref 3.5–5)
ALBUMIN/GLOB SERPL: 1.6 G/DL (ref 1–2)
ALP SERPL-CCNC: 66 U/L (ref 38–126)
ALT SERPL-CCNC: 44 U/L (ref 13–69)
AST SERPL-CCNC: 37 U/L (ref 15–46)
BILIRUB SERPL-MCNC: 0.9 MG/DL (ref 0.2–1.3)
BUN SERPL-MCNC: 18 MG/DL (ref 7–20)
BUN/CREAT SERPL: 12 (ref 6.3–21.9)
CALCIUM SERPL-MCNC: 9.7 MG/DL (ref 8.4–10.2)
CHLORIDE SERPL-SCNC: 98 MMOL/L (ref 98–107)
CO2 SERPL-SCNC: 32 MMOL/L (ref 26–30)
CREAT SERPL-MCNC: 1.5 MG/DL (ref 0.6–1.3)
GLOBULIN SER CALC-MCNC: 2.8 GM/DL
GLUCOSE SERPL-MCNC: 228 MG/DL (ref 74–98)
HBA1C MFR BLD: 8.8 %
POTASSIUM SERPL-SCNC: 4.8 MMOL/L (ref 3.5–5.1)
PROT SERPL-MCNC: 7.2 G/DL (ref 6.3–8.2)
SODIUM SERPL-SCNC: 139 MMOL/L (ref 137–145)

## 2018-11-01 PROCEDURE — 99213 OFFICE O/P EST LOW 20 MIN: CPT | Performed by: INTERNAL MEDICINE

## 2018-11-01 NOTE — PROGRESS NOTES
Chief Complaint   Patient presents with   • Follow-up     3mo: HTN, DM, A-FIB      Subjective   Freddie Yeboah Jr. is a 68 y.o. male.     Here today for follow up of HTN, HLD, DM, CAD, Afib, LUIS FERNANDO, asthma, GERD, hypothyroid, LBP/DJD, BPH.   HTN/HLD/CAD/Afib- his BP is well controlled today.  His home HR and BP are well controlled.  No CP, palpitations.  He does have some edema. Renal doctor told him to increase bumex, but cardiologist told him not to increase med.  He has not been wearing compression socks regularly  DM- BS runs 125 avg.  No NTB of feet.  Last eye exam was a few weeks ago, no changes noted.    LUIS FERNANDO- he wears his CPAP every night.  The band bothers his scalp.  He has changed the head gear but it does not help much  Asthma- not currently bothersome  GERD- he does take omeprazole daily as directed, states his GERD is bothersome.  He is not exercising.  He does not eating well  Hypothyroid- takes thyroid med daily  LBP/DJD- he sees pain clinic every 2 mo for his LBP  BPH- he in on testosterone pellets per Dr Zelaya.    HCM- colonoscopy done 2013.  Pneumovax is UTD.  He needs flu shot today.         The following portions of the patient's history were reviewed and updated as appropriate: allergies, current medications, past family history, past medical history, past social history, past surgical history and problem list.    Review of Systems   Constitutional: Negative for activity change, appetite change and unexpected weight change.   Respiratory: Negative for shortness of breath.    Cardiovascular: Positive for leg swelling. Negative for chest pain and palpitations.   Gastrointestinal: Negative for abdominal pain.   Musculoskeletal: Positive for back pain.   Neurological: Negative for numbness and headaches.   Psychiatric/Behavioral: Negative for dysphoric mood and sleep disturbance.   All other systems reviewed and are negative.      Objective   /82   Pulse 56   Temp 99.1 °F (37.3 °C) (Temporal  "Artery )   Resp 16   Ht 176.5 cm (69.5\")   Wt (!) 156 kg (345 lb)   SpO2 96%   BMI 50.22 kg/m²   Body mass index is 50.22 kg/m².  Physical Exam   Constitutional: He is oriented to person, place, and time. He appears well-developed and well-nourished.   Pleasant gentleman in no apparent distress, morbidly obese   HENT:   Head: Normocephalic and atraumatic.   Right Ear: External ear normal.   Left Ear: External ear normal.   Mouth/Throat: Oropharynx is clear and moist.   Eyes: Pupils are equal, round, and reactive to light. Conjunctivae and EOM are normal.   Neck: Normal range of motion. Neck supple. No thyromegaly present.   Cardiovascular: Normal rate, regular rhythm, normal heart sounds and intact distal pulses.    No murmur heard.  Currently in normal sinus rhythm   Pulmonary/Chest: Effort normal and breath sounds normal. No respiratory distress. He has no wheezes.   Abdominal: Soft. Bowel sounds are normal. He exhibits no distension. There is no tenderness.   Musculoskeletal: Normal range of motion. He exhibits edema.   Distal lower extremities with chronic venous changes of mild erythema and +2 edema to mid shins, right lower extremity with 2 areas of scabbing that are well healed   Lymphadenopathy:     He has no cervical adenopathy.   Neurological: He is alert and oriented to person, place, and time. No cranial nerve deficit. Coordination normal.   Skin:   Feet with no callus or ulcer, sensation is grossly intact   Psychiatric: He has a normal mood and affect. His behavior is normal. Judgment and thought content normal.   Nursing note and vitals reviewed.      Assessment/Plan   Freddie Yeboah  is here today and the following problems have been addressed:      Freddie was seen today for follow-up.    Diagnoses and all orders for this visit:    Benign essential hypertension    Gastroesophageal reflux disease without esophagitis    Other specified hypothyroidism    Type 2 diabetes mellitus without complication, " without long-term current use of insulin (CMS/[facility])    Obstructive sleep apnea syndrome      Labs to be drawn today from July office visit  Follow heart healthy/low salt/diabetic diet, encouraged patient to work on weight loss  Avoid processed foods  Monitor blood pressure and BS as discussed  Exercise as tolerated up to 30 minutes 5 days per week  Take all medications as prescribed  See eye doctor annually or as discussed  Wear protective foot wear/no bare feet  Check feet regularly for calluses or ulcers  Discussed risk of poorly controlled diabetes and long-term complications  Continue to wear his CPAP every night for obstructive sleep apnea  Continue omeprazole for GERD symptoms  Recommend flu shot, hepatitis A and shingles vaccine at local pharmacy      Return in about 4 months (around 3/1/2019) for Next scheduled follow up.      Marilyn K. Vermeesch, MD      Please note that portions of this note were completed with a voice recognition program.  Efforts were made to edit dictation, but occasionally words are mistranscribed.

## 2018-11-13 ENCOUNTER — FLU SHOT (OUTPATIENT)
Dept: INTERNAL MEDICINE | Facility: CLINIC | Age: 68
End: 2018-11-13

## 2018-11-13 DIAGNOSIS — Z23 NEED FOR INFLUENZA VACCINATION: Primary | ICD-10-CM

## 2018-11-13 PROCEDURE — 90662 IIV NO PRSV INCREASED AG IM: CPT | Performed by: INTERNAL MEDICINE

## 2018-11-13 PROCEDURE — G0008 ADMIN INFLUENZA VIRUS VAC: HCPCS | Performed by: INTERNAL MEDICINE

## 2018-11-13 RX ORDER — INSULIN GLARGINE 100 [IU]/ML
20 INJECTION, SOLUTION SUBCUTANEOUS NIGHTLY
Qty: 5 ML | Refills: 3 | Status: SHIPPED | OUTPATIENT
Start: 2018-11-13 | End: 2019-01-03 | Stop reason: SDUPTHER

## 2018-11-13 RX ORDER — PEN NEEDLE, DIABETIC 30 GX3/16"
NEEDLE, DISPOSABLE MISCELLANEOUS
Qty: 50 EACH | Refills: 5 | Status: SHIPPED | OUTPATIENT
Start: 2018-11-13 | End: 2019-03-29 | Stop reason: SDUPTHER

## 2018-11-16 ENCOUNTER — TELEPHONE (OUTPATIENT)
Dept: INTERNAL MEDICINE | Facility: CLINIC | Age: 68
End: 2018-11-16

## 2018-11-16 NOTE — TELEPHONE ENCOUNTER
Patient's wife called stating that the patient had received a call from the office and would like a return call to know what the call was concerning. Please advise.

## 2018-11-19 RX ORDER — BLOOD-GLUCOSE METER
KIT MISCELLANEOUS
Qty: 1 EACH | Refills: 0 | Status: SHIPPED | OUTPATIENT
Start: 2018-11-19

## 2018-11-19 RX ORDER — LANCETS 30 GAUGE
EACH MISCELLANEOUS
Qty: 200 EACH | Refills: 3 | Status: SHIPPED | OUTPATIENT
Start: 2018-11-19 | End: 2019-01-01 | Stop reason: SDUPTHER

## 2018-12-12 ENCOUNTER — OFFICE VISIT (OUTPATIENT)
Dept: INTERNAL MEDICINE | Facility: CLINIC | Age: 68
End: 2018-12-12

## 2018-12-12 VITALS
HEIGHT: 69 IN | BODY MASS INDEX: 46.65 KG/M2 | DIASTOLIC BLOOD PRESSURE: 73 MMHG | TEMPERATURE: 97.8 F | SYSTOLIC BLOOD PRESSURE: 149 MMHG | OXYGEN SATURATION: 92 % | HEART RATE: 70 BPM | WEIGHT: 315 LBS

## 2018-12-12 DIAGNOSIS — Z79.4 TYPE 2 DIABETES MELLITUS WITHOUT COMPLICATION, WITH LONG-TERM CURRENT USE OF INSULIN (HCC): Primary | ICD-10-CM

## 2018-12-12 DIAGNOSIS — E11.9 TYPE 2 DIABETES MELLITUS WITHOUT COMPLICATION, WITH LONG-TERM CURRENT USE OF INSULIN (HCC): Primary | ICD-10-CM

## 2018-12-12 PROCEDURE — 99214 OFFICE O/P EST MOD 30 MIN: CPT | Performed by: INTERNAL MEDICINE

## 2018-12-12 NOTE — PROGRESS NOTES
"Chief Complaint   Patient presents with   • Follow-up     follow up on diabetes     Subjective   Freddie Yeboah Jr. is a 68 y.o. male.     Here today for follow up of DM.   Pt was recently started on lantus insulin 20 u qhs due to A1C of 8.8.  He is also taking glipizide 10 mg BID. We discontinued metformin due to CKD.   His BS is running 155-368. No current ulcers or callus.  He does have tingling in feet, no numbness.             The following portions of the patient's history were reviewed and updated as appropriate: allergies, current medications, past family history, past medical history, past social history, past surgical history and problem list.    Review of Systems   Constitutional: Negative for activity change, appetite change and unexpected weight change.   Respiratory: Negative for shortness of breath.    Cardiovascular: Negative for chest pain, palpitations and leg swelling.   Neurological: Negative for numbness.        Tingling with occasional burning in feet       Objective   /73   Pulse 70   Temp 97.8 °F (36.6 °C)   Ht 176.5 cm (69.49\")   Wt (!) 152 kg (336 lb)   SpO2 92%   BMI 48.92 kg/m²   Body mass index is 48.92 kg/m².  Physical Exam   Constitutional: He is oriented to person, place, and time. He appears well-developed and well-nourished. No distress.   HENT:   Head: Normocephalic and atraumatic.   Eyes: EOM are normal. Pupils are equal, round, and reactive to light.   Pulmonary/Chest: Effort normal.   Neurological: He is alert and oriented to person, place, and time.   Psychiatric: He has a normal mood and affect. His behavior is normal. Judgment and thought content normal.   Nursing note and vitals reviewed.      Assessment/Plan   Freddie Yeboah Jr. is here today and the following problems have been addressed:      Freddie was seen today for follow-up.    Diagnoses and all orders for this visit:    Type 2 diabetes mellitus without complication, with long-term current use of insulin " (CMS/[facility])        Lab Results   Component Value Date    HGBA1C 8.80 11/01/2018    HGBA1C 8.20 03/28/2018    HGBA1C 6.40 (H) 09/05/2017     Glucose   Date Value Ref Range Status   09/12/2017 157 (H) 70 - 100 mg/dL Final     BUN   Date Value Ref Range Status   11/01/2018 18 7 - 20 mg/dL Final   09/12/2017 25 (H) 9 - 23 mg/dL Final     Creatinine   Date Value Ref Range Status   11/01/2018 1.50 (H) 0.60 - 1.30 mg/dL Final   09/12/2017 1.30 0.60 - 1.30 mg/dL Final     Sodium   Date Value Ref Range Status   11/01/2018 139 137 - 145 mmol/L Final   09/12/2017 139 132 - 146 mmol/L Final     Potassium   Date Value Ref Range Status   11/01/2018 4.8 3.5 - 5.1 mmol/L Final   09/12/2017 5.1 3.5 - 5.5 mmol/L Final     Chloride   Date Value Ref Range Status   11/01/2018 98 98 - 107 mmol/L Final   09/12/2017 103 99 - 109 mmol/L Final     CO2   Date Value Ref Range Status   09/12/2017 29.0 20.0 - 31.0 mmol/L Final     Total CO2   Date Value Ref Range Status   11/01/2018 32.0 (H) 26.0 - 30.0 mmol/L Final     Calcium   Date Value Ref Range Status   11/01/2018 9.7 8.4 - 10.2 mg/dL Final   09/12/2017 10.0 8.7 - 10.4 mg/dL Final     Total Protein   Date Value Ref Range Status   09/05/2017 7.4 5.7 - 8.2 g/dL Final     Albumin   Date Value Ref Range Status   11/01/2018 4.40 3.50 - 5.00 g/dL Final   09/05/2017 4.50 3.20 - 4.80 g/dL Final     ALT (SGPT)   Date Value Ref Range Status   11/01/2018 44 13 - 69 U/L Final   09/05/2017 90 (H) 7 - 40 U/L Final     AST (SGOT)   Date Value Ref Range Status   11/01/2018 37 15 - 46 U/L Final   09/05/2017 51 (H) 0 - 33 U/L Final     Alkaline Phosphatase   Date Value Ref Range Status   11/01/2018 66 38 - 126 U/L Final   09/05/2017 89 25 - 100 U/L Final     Total Bilirubin   Date Value Ref Range Status   11/01/2018 0.9 0.2 - 1.3 mg/dL Final   09/05/2017 0.4 0.3 - 1.2 mg/dL Final     eGFR Non  Amer   Date Value Ref Range Status   09/12/2017 55 (L) >60 mL/min/1.73 Final     eGFR Non African Am   Date  Value Ref Range Status   11/01/2018 47 (L) >60 mL/min/1.73 Final     Comment:     GFR Normal >60, Chronic Kidney Disease <60, Kidney Failure  <15       A/G Ratio   Date Value Ref Range Status   11/01/2018 1.6 1.0 - 2.0 g/dL Final     BUN/Creatinine Ratio   Date Value Ref Range Status   11/01/2018 12.0 6.3 - 21.9 Final   09/12/2017 19.2 7.0 - 25.0 Final     Anion Gap   Date Value Ref Range Status   09/12/2017 7.0 3.0 - 11.0 mmol/L Final       Follow diabetic diet  Monitor blood sugars as discussed  See eye doctor annually or as discussed  Wear protective foot wear/no bare feet  Check feet regularly for calluses or ulcers  Discussed risk of poorly controlled diabetes and long-term complications  Exercise as tolerated up to 30 minutes 5 days a week  Take all medications as prescribed  Increase lantus by 3 u every 3 days until fasting morning BS is 150 or less      Return in about 3 months (around 3/12/2019) for Next scheduled follow up.    Marilyn K. Vermeesch, MD      Please note that portions of this note were completed with a voice recognition program.  Efforts were made to edit dictation, but occasionally words are mistranscribed.

## 2019-01-02 ENCOUNTER — TELEPHONE (OUTPATIENT)
Dept: INTERNAL MEDICINE | Facility: CLINIC | Age: 69
End: 2019-01-02

## 2019-01-02 RX ORDER — LANCETS 33 GAUGE
EACH MISCELLANEOUS
Qty: 100 EACH | Refills: 12 | Status: SHIPPED | OUTPATIENT
Start: 2019-01-02 | End: 2019-11-06 | Stop reason: SDUPTHER

## 2019-01-02 NOTE — TELEPHONE ENCOUNTER
Pt's wife lvm stating Dr. FISH advised to bump up insulin until it gets leveled off and in doing to he is running out faster.  Needs new Rx before the weekend as he will be going out of town.

## 2019-01-03 DIAGNOSIS — E11.8 TYPE 2 DIABETES MELLITUS WITH COMPLICATION, UNSPECIFIED WHETHER LONG TERM INSULIN USE: Primary | ICD-10-CM

## 2019-01-03 RX ORDER — INSULIN GLARGINE 100 [IU]/ML
INJECTION, SOLUTION SUBCUTANEOUS
Qty: 10 ML | Refills: 3 | Status: SHIPPED | OUTPATIENT
Start: 2019-01-03 | End: 2019-01-03 | Stop reason: SDUPTHER

## 2019-01-03 RX ORDER — INSULIN GLARGINE 100 [IU]/ML
INJECTION, SOLUTION SUBCUTANEOUS
Qty: 18 ML | Refills: 3 | Status: SHIPPED | OUTPATIENT
Start: 2019-01-03 | End: 2019-01-04 | Stop reason: ALTCHOICE

## 2019-01-04 ENCOUNTER — TELEPHONE (OUTPATIENT)
Dept: INTERNAL MEDICINE | Facility: CLINIC | Age: 69
End: 2019-01-04

## 2019-01-04 NOTE — TELEPHONE ENCOUNTER
Patient's wife called stating that the insulin that was called in to the pharmacy was 44 units in a vile and the patient is needing 44 units in a pen. She states that he is going out of town tomorrow and is hoping to get this filled by the end of the day. Please advise. Confirmed call back number is [phone]

## 2019-01-28 DIAGNOSIS — E11.9 TYPE 2 DIABETES MELLITUS WITHOUT COMPLICATION, WITH LONG-TERM CURRENT USE OF INSULIN (HCC): Primary | ICD-10-CM

## 2019-01-28 DIAGNOSIS — Z79.4 TYPE 2 DIABETES MELLITUS WITHOUT COMPLICATION, WITH LONG-TERM CURRENT USE OF INSULIN (HCC): Primary | ICD-10-CM

## 2019-01-28 NOTE — TELEPHONE ENCOUNTER
Frankie yu requesting Lantus be sent in for 65units daily.  Stated pt is going out of town and needs this sent in asap to the [facility].    Per las office note patient was to increase by 3 units every 3 days until  Fasting bs is 150 or less    rx sent for 65units.

## 2019-02-25 DIAGNOSIS — Z79.4 TYPE 2 DIABETES MELLITUS WITHOUT COMPLICATION, WITH LONG-TERM CURRENT USE OF INSULIN (HCC): ICD-10-CM

## 2019-02-25 DIAGNOSIS — E11.9 TYPE 2 DIABETES MELLITUS WITHOUT COMPLICATION, WITH LONG-TERM CURRENT USE OF INSULIN (HCC): ICD-10-CM

## 2019-02-25 NOTE — TELEPHONE ENCOUNTER
Patient's wife called stating that Freddie was all out of his insulin and needed his lantus called in to the [facility] pharmacy please.

## 2019-02-25 NOTE — TELEPHONE ENCOUNTER
Pt wife lvm stating pt is up to 76units of insulin and requested new rx be sent in for 76units.    rx sent

## 2019-02-27 ENCOUNTER — TRANSCRIBE ORDERS (OUTPATIENT)
Dept: LAB | Facility: HOSPITAL | Age: 69
End: 2019-02-27

## 2019-02-27 ENCOUNTER — LAB (OUTPATIENT)
Dept: LAB | Facility: HOSPITAL | Age: 69
End: 2019-02-27

## 2019-02-27 DIAGNOSIS — Z12.5 ENCOUNTER FOR SPECIAL SCREENING EXAMINATION FOR NEOPLASM OF PROSTATE: ICD-10-CM

## 2019-02-27 DIAGNOSIS — E29.1 MALE HYPOGONADISM: Primary | ICD-10-CM

## 2019-02-27 DIAGNOSIS — E29.1 MALE HYPOGONADISM: ICD-10-CM

## 2019-02-27 LAB
BASOPHILS # BLD AUTO: 0.06 10*3/MM3 (ref 0–0.2)
BASOPHILS NFR BLD AUTO: 0.7 % (ref 0–2.5)
DEPRECATED RDW RBC AUTO: 49 FL (ref 37–54)
EOSINOPHIL # BLD AUTO: 0.19 10*3/MM3 (ref 0–0.7)
EOSINOPHIL NFR BLD AUTO: 2.2 % (ref 0–7)
ERYTHROCYTE [DISTWIDTH] IN BLOOD BY AUTOMATED COUNT: 17 % (ref 11.5–14.5)
HCT VFR BLD AUTO: 41.6 % (ref 42–52)
HGB BLD-MCNC: 12.9 G/DL (ref 14–18)
IMM GRANULOCYTES # BLD AUTO: 0.04 10*3/MM3 (ref 0–0.06)
IMM GRANULOCYTES NFR BLD AUTO: 0.5 % (ref 0–0.6)
LYMPHOCYTES # BLD AUTO: 3.53 10*3/MM3 (ref 0.6–3.4)
LYMPHOCYTES NFR BLD AUTO: 40.2 % (ref 10–50)
MCH RBC QN AUTO: 25 PG (ref 27–31)
MCHC RBC AUTO-ENTMCNC: 31 G/DL (ref 30–37)
MCV RBC AUTO: 80.5 FL (ref 80–94)
MONOCYTES # BLD AUTO: 1.1 10*3/MM3 (ref 0–0.9)
MONOCYTES NFR BLD AUTO: 12.5 % (ref 0–12)
NEUTROPHILS # BLD AUTO: 3.86 10*3/MM3 (ref 2–6.9)
NEUTROPHILS NFR BLD AUTO: 43.9 % (ref 37–80)
NRBC BLD AUTO-RTO: 0 /100 WBC (ref 0–0)
PLATELET # BLD AUTO: 227 10*3/MM3 (ref 130–400)
PMV BLD AUTO: 10.5 FL (ref 6–12)
PSA SERPL-MCNC: 1.44 NG/ML (ref 0.06–4)
RBC # BLD AUTO: 5.17 10*6/MM3 (ref 4.7–6.1)
WBC NRBC COR # BLD: 8.78 10*3/MM3 (ref 4.8–10.8)

## 2019-02-27 PROCEDURE — 82670 ASSAY OF TOTAL ESTRADIOL: CPT

## 2019-02-27 PROCEDURE — 36415 COLL VENOUS BLD VENIPUNCTURE: CPT

## 2019-02-27 PROCEDURE — 84403 ASSAY OF TOTAL TESTOSTERONE: CPT

## 2019-02-27 PROCEDURE — G0103 PSA SCREENING: HCPCS

## 2019-02-27 PROCEDURE — 85025 COMPLETE CBC W/AUTO DIFF WBC: CPT

## 2019-02-28 LAB
ESTRADIOL SERPL HS-MCNC: <5 PG/ML (ref 7.6–42.6)
TESTOST SERPL-MCNC: 376 NG/DL (ref 264–916)

## 2019-03-04 ENCOUNTER — OFFICE VISIT (OUTPATIENT)
Dept: UROLOGY | Facility: CLINIC | Age: 69
End: 2019-03-04

## 2019-03-04 VITALS
BODY MASS INDEX: 46.65 KG/M2 | DIASTOLIC BLOOD PRESSURE: 85 MMHG | SYSTOLIC BLOOD PRESSURE: 132 MMHG | HEIGHT: 69 IN | HEART RATE: 53 BPM | WEIGHT: 315 LBS | OXYGEN SATURATION: 93 % | TEMPERATURE: 97.8 F

## 2019-03-04 DIAGNOSIS — E29.1 HYPOGONADISM IN MALE: Primary | ICD-10-CM

## 2019-03-04 PROCEDURE — 99214 OFFICE O/P EST MOD 30 MIN: CPT | Performed by: UROLOGY

## 2019-03-04 PROCEDURE — 81003 URINALYSIS AUTO W/O SCOPE: CPT | Performed by: UROLOGY

## 2019-03-04 NOTE — PROGRESS NOTES
Chief Complaint  Follow-up (4 month-labs)        TARUN Yeboah is a 69 y.o. male who returns today for follow-up of hypogonadism and low testosterone level.  He is achieved good clinical response to supplement and is anxious to continue.  He understands the need to monitor him closely for toxicity of therapy and came back recently with blood work.    Vitals:    03/04/19 1520   BP: 132/85   Pulse: 53   Temp: 97.8 °F (36.6 °C)   SpO2: 93%       Past Medical History  Past Medical History:   Diagnosis Date   • Arthritis     shoulder, knee. multi joint    • Atrial fibrillation (CMS/HCC)    • Back pain    • Chronic kidney disease     stage III due to diabetes   • Diabetes mellitus (CMS/HCC)     several years; checks sugars at home-usually run 100-112   • Diabetic nephropathy (CMS/HCC)    • Disease of thyroid gland     hypothyroidism   • GERD (gastroesophageal reflux disease)    • Hypertension    • Low testosterone    • Sleep apnea     setting of 11   • Urinary tract infection     most recent diagnosis 8/29/17 in the ER (+3 bacteria)-started on Macrobid and rechecked by PCP on 9/5/17       Past Surgical History  Past Surgical History:   Procedure Laterality Date   • ANTERIOR CERVICAL FUSION     • APPENDECTOMY     • BACK SURGERY     • CARPAL TUNNEL RELEASE Bilateral    • CATARACT EXTRACTION     • CHOLECYSTECTOMY     • COLONOSCOPY  2013   • FINGER/THUMB ARTHROPLASTY Left     thumb replacement   • CA RECONSTR TOTAL SHOULDER IMPLANT Left 9/11/2017    Procedure: TOTAL SHOULDER ARTHROPLASTY LEFT;  Surgeon: William Ray MD;  Location: [facility];  Service: Orthopedics   • SHOULDER SURGERY     • TOTAL KNEE ARTHROPLASTY Left        Medications  has a current medication list which includes the following prescription(s): anastrozole, aspirin, bumetanide, digoxin, docusate sodium, glipizide, glucosamine-chondroitin, glucose blood, glucose monitor, insulin glargine, pen needles, isosorbide mononitrate, levothyroxine, losartan,  metoprolol tartrate, morphine, morphine, morphine, multivital, fish oil, omeprazole, onetouch delica lancets 33g, oxycodone-acetaminophen, ropivacaine hcl-nacl, simvastatin, spironolactone, tizanidine, and apixaban.      Allergies  Allergies   Allergen Reactions   • Duloxetine Itching   • Sulfa Antibiotics Other (See Comments)     He reports renal failure after a round of sulfa   • Sulfamethoxazole-Trimethoprim Other (See Comments)     Had to be hospitalized in ICU; renal impairment        Social History  Social History     Social History Narrative   • Not on file       Family History  He has no family history of bladder or kidney cancer  He has no family history of kidney stones      AUA Symptom Score:      Review of Systems  Review of Systems   Constitutional: Negative.    Genitourinary: Negative.    All other systems reviewed and are negative.      Physical Exam  Physical Exam    Labs Recent and today in the office:  Results for orders placed or performed in visit on 02/27/19   PSA Screen   Result Value Ref Range    PSA 1.440 0.060 - 4.000 ng/mL   Testosterone   Result Value Ref Range    Testosterone, Total 376 264 - 916 ng/dL   Estradiol   Result Value Ref Range    Estradiol <5.0 (L) 7.6 - 42.6 pg/mL   CBC Auto Differential   Result Value Ref Range    WBC 8.78 4.80 - 10.80 10*3/mm3    RBC 5.17 4.70 - 6.10 10*6/mm3    Hemoglobin 12.9 (L) 14.0 - 18.0 g/dL    Hematocrit 41.6 (L) 42.0 - 52.0 %    MCV 80.5 80.0 - 94.0 fL    MCH 25.0 (L) 27.0 - 31.0 pg    MCHC 31.0 30.0 - 37.0 g/dL    RDW 17.0 (H) 11.5 - 14.5 %    RDW-SD 49.0 37.0 - 54.0 fl    MPV 10.5 6.0 - 12.0 fL    Platelets 227 130 - 400 10*3/mm3    Neutrophil % 43.9 37.0 - 80.0 %    Lymphocyte % 40.2 10.0 - 50.0 %    Monocyte % 12.5 (H) 0.0 - 12.0 %    Eosinophil % 2.2 0.0 - 7.0 %    Basophil % 0.7 0.0 - 2.5 %    Immature Grans % 0.5 0.0 - 0.6 %    Neutrophils, Absolute 3.86 2.00 - 6.90 10*3/mm3    Lymphocytes, Absolute 3.53 (H) 0.60 - 3.40 10*3/mm3     Monocytes, Absolute 1.10 (H) 0.00 - 0.90 10*3/mm3    Eosinophils, Absolute 0.19 0.00 - 0.70 10*3/mm3    Basophils, Absolute 0.06 0.00 - 0.20 10*3/mm3    Immature Grans, Absolute 0.04 0.00 - 0.06 10*3/mm3    nRBC 0.0 0.0 - 0.0 /100 WBC         Assessment & Plan  #1 hypogonadism: Patient has had a good response to therapy and is anxious to continue the supplement.  It has been 4 months since his last batch of Testopel pellets were inserted and he states he can already tell his level is dropping.  Fortunately all other signs of toxicity are stable and so he is scheduled to return for pellets in the next few weeks.

## 2019-03-20 ENCOUNTER — OFFICE VISIT (OUTPATIENT)
Dept: UROLOGY | Facility: CLINIC | Age: 69
End: 2019-03-20

## 2019-03-20 DIAGNOSIS — E29.1 HYPOGONADISM IN MALE: ICD-10-CM

## 2019-03-20 PROCEDURE — 11980 IMPLANT HORMONE PELLET(S): CPT | Performed by: UROLOGY

## 2019-03-20 RX ORDER — TELMISARTAN 20 MG/1
TABLET ORAL
COMMUNITY
Start: 2019-03-07 | End: 2021-12-13 | Stop reason: SDUPTHER

## 2019-03-20 NOTE — PROGRESS NOTES
Chief Complaint  Testopel (12 pellets)        TARUN Yeboah is a 69 y.o. male who is today for treatment of hypogonadism and low testosterone having achieved a great benefit from supplement.  He received Testopel pellets every 4-5 months with good effect and returns today.    There were no vitals filed for this visit.    Past Medical History  Past Medical History:   Diagnosis Date   • Arthritis     shoulder, knee. multi joint    • Atrial fibrillation (CMS/HCC)    • Back pain    • Chronic kidney disease     stage III due to diabetes   • Diabetes mellitus (CMS/HCC)     several years; checks sugars at home-usually run 100-112   • Diabetic nephropathy (CMS/HCC)    • Disease of thyroid gland     hypothyroidism   • GERD (gastroesophageal reflux disease)    • Hypertension    • Low testosterone    • Sleep apnea     setting of 11   • Urinary tract infection     most recent diagnosis 8/29/17 in the ER (+3 bacteria)-started on Macrobid and rechecked by PCP on 9/5/17       Past Surgical History  Past Surgical History:   Procedure Laterality Date   • ANTERIOR CERVICAL FUSION     • APPENDECTOMY     • BACK SURGERY     • CARPAL TUNNEL RELEASE Bilateral    • CATARACT EXTRACTION     • CHOLECYSTECTOMY     • COLONOSCOPY  2013   • FINGER/THUMB ARTHROPLASTY Left     thumb replacement   • SD RECONSTR TOTAL SHOULDER IMPLANT Left 9/11/2017    Procedure: TOTAL SHOULDER ARTHROPLASTY LEFT;  Surgeon: William Ray MD;  Location: [facility];  Service: Orthopedics   • SHOULDER SURGERY     • TOTAL KNEE ARTHROPLASTY Left        Medications  has a current medication list which includes the following prescription(s): anastrozole, apixaban, aspirin, bumetanide, digoxin, docusate sodium, glipizide, glucosamine-chondroitin, glucose blood, glucose monitor, insulin glargine, pen needles, isosorbide mononitrate, levothyroxine, losartan, metoprolol tartrate, morphine, morphine, morphine, multivital, fish oil, omeprazole, onetouch delica lancets 33g,  oxycodone-acetaminophen, ropivacaine hcl-nacl, simvastatin, spironolactone, telmisartan, and tizanidine.      Allergies  Allergies   Allergen Reactions   • Duloxetine Itching   • Sulfa Antibiotics Other (See Comments)     He reports renal failure after a round of sulfa   • Sulfamethoxazole-Trimethoprim Other (See Comments)     Had to be hospitalized in ICU; renal impairment        Social History  Social History     Social History Narrative   • Not on file       Family History  He has no family history of bladder or kidney cancer  He has no family history of kidney stones      AUA Symptom Score:      Review of Systems  Review of Systems    Physical Exam  Physical Exam    Labs Recent and today in the office:  Results for orders placed or performed in visit on 03/04/19   POC Urinalysis Dipstick, Automated   Result Value Ref Range    Color Yellow Yellow, Straw, Dark Yellow, Elinor    Clarity, UA Clear Clear    Specific Gravity  1.015 1.005 - 1.030    pH, Urine 6.0 5.0 - 8.0    Leukocytes Negative Negative    Nitrite, UA Negative Negative    Protein, POC Negative Negative mg/dL    Glucose, UA Negative Negative, 1000 mg/dL (3+) mg/dL    Ketones, UA Negative Negative    Urobilinogen, UA Normal Normal    Bilirubin Negative Negative    Blood, UA Negative Negative     Testopel Subcutaneous hormone pellet implantation:    The patient was placed on the exam table in the supine position.  The upper outer quadrant of the hip was identified for insertion.  The area was then prepped with Betadine and injected with 7ml of Lidocaine 2% with Epinephrine to anesthetize superficially and then distally along the trocar tract.  A 3mm incision was made using #11 blade scalpel.  The trocar with a sharp-ended stylet was inserted into the subcutaneous tissue in line with the femur.  The sharp stylet was withdrawn and the Testopel pellets were placed into the well of the trocar.  Testopel was advanced into the tissue using blunt-ended stylet.   A total of 12 pellets were inserted. For the J-code of  the NDC # for the pellets is 17746-423-81. The trocar was removed and the incision was closed using Steri-strips.  The wound area was cleansed to remove the Betadine and was covered with Steri-strips with an outer Band-aid.  Two subcutaneous tract were developed.  Careful inspection of the area was done.      The patient was informed of the post-procedure instructions.  He will return in 1 month to monitor testosterone levels and then again at 4 months to determine scheduling of the next insertion.          Assessment & Plan  #1 hypogonadism: 12 pellets are inserted and he will return in 4 months with blood work to monitor for toxicity of therapy.

## 2019-03-29 ENCOUNTER — OFFICE VISIT (OUTPATIENT)
Dept: INTERNAL MEDICINE | Facility: CLINIC | Age: 69
End: 2019-03-29

## 2019-03-29 VITALS
OXYGEN SATURATION: 94 % | WEIGHT: 315 LBS | DIASTOLIC BLOOD PRESSURE: 80 MMHG | BODY MASS INDEX: 46.65 KG/M2 | SYSTOLIC BLOOD PRESSURE: 128 MMHG | HEIGHT: 69 IN | TEMPERATURE: 98.1 F | HEART RATE: 63 BPM

## 2019-03-29 DIAGNOSIS — I10 BENIGN ESSENTIAL HYPERTENSION: Primary | ICD-10-CM

## 2019-03-29 DIAGNOSIS — E03.8 OTHER SPECIFIED HYPOTHYROIDISM: ICD-10-CM

## 2019-03-29 DIAGNOSIS — I48.91 ATRIAL FIBRILLATION, UNSPECIFIED TYPE (HCC): ICD-10-CM

## 2019-03-29 DIAGNOSIS — K21.9 GASTROESOPHAGEAL REFLUX DISEASE WITHOUT ESOPHAGITIS: ICD-10-CM

## 2019-03-29 DIAGNOSIS — E11.9 TYPE 2 DIABETES MELLITUS WITHOUT COMPLICATION, WITH LONG-TERM CURRENT USE OF INSULIN (HCC): ICD-10-CM

## 2019-03-29 DIAGNOSIS — Z79.4 TYPE 2 DIABETES MELLITUS WITHOUT COMPLICATION, WITH LONG-TERM CURRENT USE OF INSULIN (HCC): ICD-10-CM

## 2019-03-29 DIAGNOSIS — G47.33 OBSTRUCTIVE SLEEP APNEA SYNDROME: ICD-10-CM

## 2019-03-29 DIAGNOSIS — E78.2 MIXED HYPERLIPIDEMIA: ICD-10-CM

## 2019-03-29 DIAGNOSIS — N28.9 RENAL INSUFFICIENCY: ICD-10-CM

## 2019-03-29 PROCEDURE — 99214 OFFICE O/P EST MOD 30 MIN: CPT | Performed by: INTERNAL MEDICINE

## 2019-03-29 RX ORDER — PEN NEEDLE, DIABETIC 30 GX3/16"
NEEDLE, DISPOSABLE MISCELLANEOUS
Qty: 50 EACH | Refills: 12 | Status: SHIPPED | OUTPATIENT
Start: 2019-03-29 | End: 2019-11-06 | Stop reason: SDUPTHER

## 2019-03-29 NOTE — PROGRESS NOTES
Chief Complaint   Patient presents with   • Follow-up     4 months for HTN, GERD, HLD, and DM. Pt states he thinks he is holding fluid at times.      Subjective   Freddie Yeboah Jr. is a 69 y.o. male.     Here today for follow up of HTN, HLD, DM, CAD, Afib, LUIS FERNANDO, GERD, hypothyroid, LBP/DJD, BPH.   HTN/HLD/CAD/Afib- his BP is well controlled today.  His home HR and BP are well controlled.  No CP, palpitations.  He does have some edema in legs.   DM- BS runs 150-230.  His lantus is at 83u.  He has some NTB of feet, but less since blood sugars are improving.  Last eye exam was 4 mo ago, no changes      LUIS FERNANDO- he wears his CPAP every night. He does not sleep well.  He awakens 3 times a night to urinate  GERD- he does take omeprazole daily as directed, states his GERD is bothersome.  He is not exercising.  He does not eating well  Hypothyroid- takes thyroid med daily on empty stomach  LBP/DJD- he sees pain clinic every 2 mo for his LBP  BPH- he in on testosterone pellets per Dr Zelaya, these were just replaced last week.    HCM- colonoscopy done 2013.  Pneumovax is UTD. He did not have Hep a or shingles vaccine         The following portions of the patient's history were reviewed and updated as appropriate: allergies, current medications, past family history, past medical history, past social history, past surgical history and problem list.    Review of Systems   Constitutional: Negative for activity change, appetite change and fatigue.   Eyes: Negative for visual disturbance.   Respiratory: Negative for shortness of breath.    Cardiovascular: Positive for leg swelling. Negative for chest pain and palpitations.   Gastrointestinal: Negative for constipation.        Occasional GERD symptoms   Endocrine: Negative for polydipsia and polyuria.   Genitourinary: Negative for frequency.        Nocturia approximately 3 times every night   Musculoskeletal: Positive for back pain. Negative for gait problem.   Skin: Negative for wound.  "  Neurological: Positive for numbness.        Numbness, tingling and burning of feet   Psychiatric/Behavioral: Positive for sleep disturbance. Negative for dysphoric mood. The patient is not nervous/anxious.    All other systems reviewed and are negative.      Objective   /80   Pulse 63   Temp 98.1 °F (36.7 °C)   Ht 176.5 cm (69.49\")   Wt (!) 155 kg (341 lb)   SpO2 94%   BMI 49.65 kg/m²   Body mass index is 49.65 kg/m².  Physical Exam   Constitutional: He is oriented to person, place, and time. He appears well-developed and well-nourished. No distress.   Pleasant gentleman, morbidly obese, no distress today   HENT:   Head: Normocephalic and atraumatic.   Right Ear: External ear normal.   Left Ear: External ear normal.   Mouth/Throat: Oropharynx is clear and moist.   Eyes: Conjunctivae and EOM are normal. Pupils are equal, round, and reactive to light. Right eye exhibits no discharge. Left eye exhibits no discharge.   Neck: Normal range of motion. Neck supple. No thyromegaly present.   Cardiovascular: Normal rate, regular rhythm, normal heart sounds and intact distal pulses.   No murmur heard.  No carotid bruits noted   Pulmonary/Chest: Effort normal and breath sounds normal. No respiratory distress. He has no wheezes.   Abdominal: Soft. Bowel sounds are normal. He exhibits no distension. There is no tenderness.   Obesity limits exam   Musculoskeletal: Normal range of motion. He exhibits no edema.   Lymphadenopathy:     He has no cervical adenopathy.   Neurological: He is alert and oriented to person, place, and time. No cranial nerve deficit. Coordination normal.   Skin:   Feet with no callus or ulcer, sensation decreased to light touch in feet   Psychiatric: He has a normal mood and affect. His behavior is normal. Judgment and thought content normal.   Nursing note and vitals reviewed.      Assessment/Plan   Freddie Yeboah Jr. is here today and the following problems have been addressed:      Freddie was " seen today for follow-up.    Diagnoses and all orders for this visit:    Benign essential hypertension  -     CBC & Differential  -     Comprehensive Metabolic Panel    Atrial fibrillation, unspecified type (CMS/HCC)    Mixed hyperlipidemia  -     Comprehensive Metabolic Panel  -     Lipid Panel With / Chol / HDL Ratio    Obstructive sleep apnea syndrome    Gastroesophageal reflux disease without esophagitis    Other specified hypothyroidism  -     T4, Free  -     TSH    Type 2 diabetes mellitus without complication, with long-term current use of insulin (CMS/HCC)  -     Comprehensive Metabolic Panel  -     Hemoglobin A1c  -     Insulin Glargine (LANTUS SOLOSTAR) 100 UNIT/ML injection pen; Inject 86 units under the skin into the appropriate area as directed every night.    Renal insufficiency  -     Comprehensive Metabolic Panel    Other orders  -     Insulin Lispro 100 UNIT/ML solution cartridge; Inject 10 Units under the skin into the appropriate area as directed 3 (Three) Times a Day With Meals.        Follow heart healthy/low salt/diabetic diet  Avoid processed foods  Monitor blood pressure and BS as discussed  Exercise as tolerated up to 150 minutes per week  Take all medications as prescribed  See eye doctor annually or as discussed  Wear protective foot wear/no bare feet  Check feet regularly for calluses or ulcers  Labs as noted  Increase lantus to 86 u qhs  Start humalog 5 u with each meal  Follow-up with Dr. Zelaya regarding nocturia and BPH symptoms  Follow-up with pain clinic regarding chronic low back pain  Continue omeprazole for GERD symptoms, however this needs to be taken on an empty stomach every morning  Encouraged patient to also take his thyroid medication on empty stomach          No Follow-up on file.      Marilyn K. Vermeesch, MD      Please note that portions of this note were completed with a voice recognition program.  Efforts were made to edit dictation, but occasionally words are  mistranscribed.

## 2019-04-01 RX ORDER — GLIPIZIDE 10 MG/1
TABLET ORAL
Qty: 180 TABLET | Refills: 1 | Status: SHIPPED | OUTPATIENT
Start: 2019-04-01 | End: 2019-08-22 | Stop reason: SDUPTHER

## 2019-04-15 DIAGNOSIS — E29.1 HYPOGONADISM MALE: ICD-10-CM

## 2019-04-15 RX ORDER — ANASTROZOLE 1 MG/1
1 TABLET ORAL 2 TIMES WEEKLY
Qty: 24 TABLET | Refills: 3 | Status: SHIPPED | OUTPATIENT
Start: 2019-04-15 | End: 2020-01-22

## 2019-04-29 RX ORDER — LEVOTHYROXINE SODIUM 0.05 MG/1
TABLET ORAL
Qty: 90 TABLET | Refills: 2 | Status: SHIPPED | OUTPATIENT
Start: 2019-04-29 | End: 2019-05-29 | Stop reason: SDUPTHER

## 2019-05-21 ENCOUNTER — OFFICE VISIT (OUTPATIENT)
Dept: INTERNAL MEDICINE | Facility: CLINIC | Age: 69
End: 2019-05-21

## 2019-05-21 VITALS
SYSTOLIC BLOOD PRESSURE: 128 MMHG | WEIGHT: 315 LBS | HEART RATE: 60 BPM | HEIGHT: 69 IN | BODY MASS INDEX: 46.65 KG/M2 | OXYGEN SATURATION: 93 % | TEMPERATURE: 98 F | DIASTOLIC BLOOD PRESSURE: 78 MMHG

## 2019-05-21 DIAGNOSIS — Z79.4 TYPE 2 DIABETES MELLITUS WITHOUT COMPLICATION, WITH LONG-TERM CURRENT USE OF INSULIN (HCC): Primary | ICD-10-CM

## 2019-05-21 DIAGNOSIS — E11.9 TYPE 2 DIABETES MELLITUS WITHOUT COMPLICATION, WITH LONG-TERM CURRENT USE OF INSULIN (HCC): Primary | ICD-10-CM

## 2019-05-21 PROCEDURE — 99213 OFFICE O/P EST LOW 20 MIN: CPT | Performed by: INTERNAL MEDICINE

## 2019-05-21 NOTE — PROGRESS NOTES
"Chief Complaint   Patient presents with   • Follow-up     8 weeks for DM      Subjective   Freddie Yeboah Jr. is a 69 y.o. male.     Here today for follow up of DM.   Last visit we increased lantus to 86 u and added humalog 5 units with each meal.   His BS is running 125-155 fasting in the morning.  He does not check any other time of day.  He has not had any lows that he can feel.    He has noted less NTB in his feet.  He feels only a little bit of tingling in toes now, much less pain in his feet.   His PM meal is always a TO GO meal from somewhere.           The following portions of the patient's history were reviewed and updated as appropriate: allergies, current medications, past family history, past medical history, past social history, past surgical history and problem list.    Review of Systems   Constitutional: Negative for activity change, appetite change and unexpected weight change.   Respiratory: Negative for shortness of breath.    Cardiovascular: Positive for leg swelling. Negative for chest pain and palpitations.   Neurological: Positive for numbness.        Decreased numbness, tingling and burning in feet, still present but significantly lessened       Objective   /78   Pulse 60   Temp 98 °F (36.7 °C)   Ht 176.5 cm (69.49\")   Wt (!) 152 kg (335 lb)   SpO2 93%   BMI 48.78 kg/m²   Body mass index is 48.78 kg/m².  Physical Exam   Constitutional: He is oriented to person, place, and time. He appears well-developed and well-nourished. No distress.   Pleasant gentleman, morbidly obese, no distress today   HENT:   Head: Normocephalic and atraumatic.   Eyes: Conjunctivae and EOM are normal. Pupils are equal, round, and reactive to light. Right eye exhibits no discharge. Left eye exhibits no discharge.   Neck: Normal range of motion. Neck supple. No thyromegaly present.   Cardiovascular: Normal rate, normal heart sounds and intact distal pulses.   No murmur heard.  Irregularly irregular "   Pulmonary/Chest: Effort normal and breath sounds normal. No respiratory distress. He has no wheezes.   Musculoskeletal: Normal range of motion. He exhibits edema.   Trace edema noted at ankles   Lymphadenopathy:     He has no cervical adenopathy.   Neurological: He is alert and oriented to person, place, and time. No cranial nerve deficit. Coordination normal.   Psychiatric: He has a normal mood and affect. His behavior is normal. Judgment and thought content normal.   Nursing note and vitals reviewed.      Assessment/Plan   Freddie Yeboah Jr. is here today and the following problems have been addressed:      Freddie was seen today for follow-up.    Diagnoses and all orders for this visit:    Type 2 diabetes mellitus without complication, with long-term current use of insulin (CMS/[facility])        Lab Results   Component Value Date    HGBA1C 8.80 11/01/2018    HGBA1C 8.20 03/28/2018    HGBA1C 6.40 (H) 09/05/2017     Glucose   Date Value Ref Range Status   09/12/2017 157 (H) 70 - 100 mg/dL Final     BUN   Date Value Ref Range Status   11/01/2018 18 7 - 20 mg/dL Final   09/12/2017 25 (H) 9 - 23 mg/dL Final     Creatinine   Date Value Ref Range Status   11/01/2018 1.50 (H) 0.60 - 1.30 mg/dL Final   09/12/2017 1.30 0.60 - 1.30 mg/dL Final     Sodium   Date Value Ref Range Status   11/01/2018 139 137 - 145 mmol/L Final   09/12/2017 139 132 - 146 mmol/L Final     Potassium   Date Value Ref Range Status   11/01/2018 4.8 3.5 - 5.1 mmol/L Final   09/12/2017 5.1 3.5 - 5.5 mmol/L Final     Chloride   Date Value Ref Range Status   11/01/2018 98 98 - 107 mmol/L Final   09/12/2017 103 99 - 109 mmol/L Final     CO2   Date Value Ref Range Status   09/12/2017 29.0 20.0 - 31.0 mmol/L Final     Total CO2   Date Value Ref Range Status   11/01/2018 32.0 (H) 26.0 - 30.0 mmol/L Final     Calcium   Date Value Ref Range Status   11/01/2018 9.7 8.4 - 10.2 mg/dL Final   09/12/2017 10.0 8.7 - 10.4 mg/dL Final     Total Protein   Date Value Ref Range  Status   09/05/2017 7.4 5.7 - 8.2 g/dL Final     Albumin   Date Value Ref Range Status   11/01/2018 4.40 3.50 - 5.00 g/dL Final   09/05/2017 4.50 3.20 - 4.80 g/dL Final     ALT (SGPT)   Date Value Ref Range Status   11/01/2018 44 13 - 69 U/L Final   09/05/2017 90 (H) 7 - 40 U/L Final     AST (SGOT)   Date Value Ref Range Status   11/01/2018 37 15 - 46 U/L Final   09/05/2017 51 (H) 0 - 33 U/L Final     Alkaline Phosphatase   Date Value Ref Range Status   11/01/2018 66 38 - 126 U/L Final   09/05/2017 89 25 - 100 U/L Final     Total Bilirubin   Date Value Ref Range Status   11/01/2018 0.9 0.2 - 1.3 mg/dL Final   09/05/2017 0.4 0.3 - 1.2 mg/dL Final     eGFR Non  Am   Date Value Ref Range Status   11/01/2018 47 (L) >60 mL/min/1.73 Final     Comment:     GFR Normal >60, Chronic Kidney Disease <60, Kidney Failure  <15       eGFR Non  Amer   Date Value Ref Range Status   09/12/2017 55 (L) >60 mL/min/1.73 Final     A/G Ratio   Date Value Ref Range Status   11/01/2018 1.6 1.0 - 2.0 g/dL Final     BUN/Creatinine Ratio   Date Value Ref Range Status   11/01/2018 12.0 6.3 - 21.9 Final   09/12/2017 19.2 7.0 - 25.0 Final     Anion Gap   Date Value Ref Range Status   09/12/2017 7.0 3.0 - 11.0 mmol/L Final       Follow diabetic diet  Monitor blood sugars 3 times a day, always before a meal and at bedtime  See eye doctor annually or as discussed  Wear protective foot wear/no bare feet  Check feet regularly for calluses or ulcers  Exercise as tolerated   Take all medications as prescribed  Encourage patient to get labs done soon  Continue Lantus 86 units daily and Humalog 5 units with each meal    Return in about 3 months (around 8/21/2019) for Next scheduled follow up.    Marilyn K. Vermeesch, MD      Please note that portions of this note were completed with a voice recognition program.  Efforts were made to edit dictation, but occasionally words are mistranscribed.

## 2019-05-24 LAB
ALBUMIN SERPL-MCNC: 4.3 G/DL (ref 3.5–5)
ALBUMIN/GLOB SERPL: 1.4 G/DL (ref 1–2)
ALP SERPL-CCNC: 71 U/L (ref 38–126)
ALT SERPL-CCNC: 37 U/L (ref 13–69)
AST SERPL-CCNC: 27 U/L (ref 15–46)
BASOPHILS # BLD AUTO: 0.06 10*3/MM3 (ref 0–0.2)
BASOPHILS NFR BLD AUTO: 0.8 % (ref 0–1.5)
BILIRUB SERPL-MCNC: 0.9 MG/DL (ref 0.2–1.3)
BUN SERPL-MCNC: 27 MG/DL (ref 7–20)
BUN/CREAT SERPL: 15 (ref 6.3–21.9)
CALCIUM SERPL-MCNC: 9.4 MG/DL (ref 8.4–10.2)
CHLORIDE SERPL-SCNC: 97 MMOL/L (ref 98–107)
CHOLEST SERPL-MCNC: 115 MG/DL (ref 0–199)
CHOLEST/HDLC SERPL: 4.11 {RATIO}
CO2 SERPL-SCNC: 31 MMOL/L (ref 26–30)
CREAT SERPL-MCNC: 1.8 MG/DL (ref 0.6–1.3)
EOSINOPHIL # BLD AUTO: 0.12 10*3/MM3 (ref 0–0.4)
EOSINOPHIL NFR BLD AUTO: 1.6 % (ref 0.3–6.2)
ERYTHROCYTE [DISTWIDTH] IN BLOOD BY AUTOMATED COUNT: 16.4 % (ref 12.3–15.4)
GLOBULIN SER CALC-MCNC: 3 GM/DL
GLUCOSE SERPL-MCNC: 101 MG/DL (ref 74–98)
HBA1C MFR BLD: 8.7 % (ref 4.8–5.6)
HCT VFR BLD AUTO: 41.3 % (ref 37.5–51)
HDLC SERPL-MCNC: 28 MG/DL (ref 40–60)
HGB BLD-MCNC: 12.1 G/DL (ref 13–17.7)
IMM GRANULOCYTES # BLD AUTO: 0.03 10*3/MM3 (ref 0–0.05)
IMM GRANULOCYTES NFR BLD AUTO: 0.4 % (ref 0–0.5)
LDLC SERPL CALC-MCNC: 69 MG/DL (ref 0–99)
LYMPHOCYTES # BLD AUTO: 2.91 10*3/MM3 (ref 0.7–3.1)
LYMPHOCYTES NFR BLD AUTO: 38.3 % (ref 19.6–45.3)
MCH RBC QN AUTO: 22.7 PG (ref 26.6–33)
MCHC RBC AUTO-ENTMCNC: 29.3 G/DL (ref 31.5–35.7)
MCV RBC AUTO: 77.3 FL (ref 79–97)
MONOCYTES # BLD AUTO: 1.01 10*3/MM3 (ref 0.1–0.9)
MONOCYTES NFR BLD AUTO: 13.3 % (ref 5–12)
NEUTROPHILS # BLD AUTO: 3.46 10*3/MM3 (ref 1.7–7)
NEUTROPHILS NFR BLD AUTO: 45.6 % (ref 42.7–76)
NRBC BLD AUTO-RTO: 0 /100 WBC (ref 0–0.2)
PLATELET # BLD AUTO: 282 10*3/MM3 (ref 140–450)
POTASSIUM SERPL-SCNC: 5.3 MMOL/L (ref 3.5–5.1)
PROT SERPL-MCNC: 7.3 G/DL (ref 6.3–8.2)
RBC # BLD AUTO: 5.34 10*6/MM3 (ref 4.14–5.8)
SODIUM SERPL-SCNC: 140 MMOL/L (ref 137–145)
T4 FREE SERPL-MCNC: 1.1 NG/DL (ref 0.78–2.19)
TRIGL SERPL-MCNC: 91 MG/DL
TSH SERPL DL<=0.005 MIU/L-ACNC: 5.17 MIU/ML (ref 0.47–4.68)
VLDLC SERPL CALC-MCNC: 18.2 MG/DL
WBC # BLD AUTO: 7.59 10*3/MM3 (ref 3.4–10.8)

## 2019-05-29 DIAGNOSIS — Z79.4 TYPE 2 DIABETES MELLITUS WITHOUT COMPLICATION, WITH LONG-TERM CURRENT USE OF INSULIN (HCC): ICD-10-CM

## 2019-05-29 DIAGNOSIS — E11.9 TYPE 2 DIABETES MELLITUS WITHOUT COMPLICATION, WITH LONG-TERM CURRENT USE OF INSULIN (HCC): ICD-10-CM

## 2019-05-29 RX ORDER — LEVOTHYROXINE SODIUM 0.07 MG/1
75 TABLET ORAL DAILY
Qty: 30 TABLET | Refills: 2 | Status: SHIPPED | OUTPATIENT
Start: 2019-05-29 | End: 2019-09-02 | Stop reason: SDUPTHER

## 2019-06-24 RX ORDER — SIMVASTATIN 20 MG
TABLET ORAL
Qty: 90 TABLET | Refills: 2 | Status: SHIPPED | OUTPATIENT
Start: 2019-06-24 | End: 2019-08-22 | Stop reason: SDUPTHER

## 2019-06-24 RX ORDER — OMEPRAZOLE 40 MG/1
CAPSULE, DELAYED RELEASE ORAL
Qty: 90 CAPSULE | Refills: 2 | Status: SHIPPED | OUTPATIENT
Start: 2019-06-24 | End: 2019-08-22 | Stop reason: SDUPTHER

## 2019-07-19 ENCOUNTER — LAB (OUTPATIENT)
Dept: UROLOGY | Facility: CLINIC | Age: 69
End: 2019-07-19

## 2019-07-19 DIAGNOSIS — E29.1 HYPOGONADISM MALE: Primary | ICD-10-CM

## 2019-07-19 DIAGNOSIS — Z12.5 SCREENING PSA (PROSTATE SPECIFIC ANTIGEN): ICD-10-CM

## 2019-07-20 LAB
BASOPHILS # BLD AUTO: 0.06 10*3/MM3 (ref 0–0.2)
BASOPHILS NFR BLD AUTO: 0.6 % (ref 0–1.5)
DIFFERENTIAL COMMENT: NORMAL
EOSINOPHIL # BLD AUTO: 0.14 10*3/MM3 (ref 0–0.4)
EOSINOPHIL NFR BLD AUTO: 1.4 % (ref 0.3–6.2)
ERYTHROCYTE [DISTWIDTH] IN BLOOD BY AUTOMATED COUNT: 20.7 % (ref 12.3–15.4)
ESTRADIOL SERPL-MCNC: <5 PG/ML (ref 7.6–42.6)
HCT VFR BLD AUTO: 45.1 % (ref 37.5–51)
HGB BLD-MCNC: 12.7 G/DL (ref 13–17.7)
IMM GRANULOCYTES # BLD AUTO: 0.04 10*3/MM3 (ref 0–0.05)
IMM GRANULOCYTES NFR BLD AUTO: 0.4 % (ref 0–0.5)
LYMPHOCYTES # BLD AUTO: 3.86 10*3/MM3 (ref 0.7–3.1)
LYMPHOCYTES NFR BLD AUTO: 39.5 % (ref 19.6–45.3)
MCH RBC QN AUTO: 22 PG (ref 26.6–33)
MCHC RBC AUTO-ENTMCNC: 28.2 G/DL (ref 31.5–35.7)
MCV RBC AUTO: 78 FL (ref 79–97)
MONOCYTES # BLD AUTO: 1.22 10*3/MM3 (ref 0.1–0.9)
MONOCYTES NFR BLD AUTO: 12.5 % (ref 5–12)
NEUTROPHILS # BLD AUTO: 4.45 10*3/MM3 (ref 1.7–7)
NEUTROPHILS NFR BLD AUTO: 45.6 % (ref 42.7–76)
NRBC BLD AUTO-RTO: 0 /100 WBC (ref 0–0.2)
PLATELET # BLD AUTO: 244 10*3/MM3 (ref 140–450)
PLATELET BLD QL SMEAR: NORMAL
PSA SERPL-MCNC: 0.81 NG/ML (ref 0.06–4)
RBC # BLD AUTO: 5.78 10*6/MM3 (ref 4.14–5.8)
RBC MORPH BLD: NORMAL
TESTOST SERPL-MCNC: 389 NG/DL (ref 264–916)
WBC # BLD AUTO: 9.77 10*3/MM3 (ref 3.4–10.8)

## 2019-07-22 ENCOUNTER — OFFICE VISIT (OUTPATIENT)
Dept: UROLOGY | Facility: CLINIC | Age: 69
End: 2019-07-22

## 2019-07-22 DIAGNOSIS — E28.0 ESTRADIOL EXCESS: ICD-10-CM

## 2019-07-22 DIAGNOSIS — N40.0 BENIGN NON-NODULAR PROSTATIC HYPERPLASIA WITHOUT LOWER URINARY TRACT SYMPTOMS: Primary | ICD-10-CM

## 2019-07-22 DIAGNOSIS — E29.1 TESTICULAR HYPOGONADISM: ICD-10-CM

## 2019-07-22 PROCEDURE — 99214 OFFICE O/P EST MOD 30 MIN: CPT | Performed by: UROLOGY

## 2019-07-22 RX ORDER — CALCIPOTRIENE 50 UG/G
CREAM TOPICAL
COMMUNITY
Start: 2019-05-30

## 2019-07-22 RX ORDER — FLUOROURACIL 50 MG/G
CREAM TOPICAL
COMMUNITY
Start: 2019-05-30

## 2019-07-22 NOTE — PROGRESS NOTES
Chief Complaint  Hypogonadism (4 month follow up with labs.)        TARUN Yeboah is a 69 y.o. male who returns today for follow-up of hypogonadism and low testosterone level.  He is achieved great benefit from supplement in terms of increased strength stamina energy level muscle mass and attitude.  He is anxious to continue and came by recently with blood work to monitor for toxicity of therapy.  Fortunately all parameters are nominal at this point.  He states his energy level is falling just recently and I suspect he will need another batch of pellets very soon.  Today his testosterone was still normal.    There were no vitals filed for this visit.    Past Medical History  Past Medical History:   Diagnosis Date   • Arthritis     shoulder, knee. multi joint    • Atrial fibrillation (CMS/HCC)    • Back pain    • Chronic kidney disease     stage III due to diabetes   • Diabetes mellitus (CMS/HCC)     several years; checks sugars at home-usually run 100-112   • Diabetic nephropathy (CMS/HCC)    • Disease of thyroid gland     hypothyroidism   • GERD (gastroesophageal reflux disease)    • Hypertension    • Low testosterone    • Sleep apnea     setting of 11   • Urinary tract infection     most recent diagnosis 8/29/17 in the ER (+3 bacteria)-started on Macrobid and rechecked by PCP on 9/5/17       Past Surgical History  Past Surgical History:   Procedure Laterality Date   • ANTERIOR CERVICAL FUSION     • APPENDECTOMY     • BACK SURGERY     • CARPAL TUNNEL RELEASE Bilateral    • CATARACT EXTRACTION     • CHOLECYSTECTOMY     • COLONOSCOPY  2013   • FINGER/THUMB ARTHROPLASTY Left     thumb replacement   • OR RECONSTR TOTAL SHOULDER IMPLANT Left 9/11/2017    Procedure: TOTAL SHOULDER ARTHROPLASTY LEFT;  Surgeon: William Ray MD;  Location: [facility];  Service: Orthopedics   • SHOULDER SURGERY     • TOTAL KNEE ARTHROPLASTY Left        Medications  has a current medication list which includes the following prescription(s):  anastrozole, apixaban, aspirin, bumetanide, calcipotriene, digoxin, docusate sodium, fluorouracil, glipizide, glucosamine-chondroitin, glucose blood, glucose monitor, insulin glargine, insulin lispro, pen needles, isosorbide mononitrate, levothyroxine, losartan, metoprolol tartrate, morphine, multivital, fish oil, omeprazole, onetouch delica lancets 33g, oxycodone-acetaminophen, ropivacaine hcl-nacl, simvastatin, spironolactone, telmisartan, and tizanidine.      Allergies  Allergies   Allergen Reactions   • Duloxetine Itching   • Sulfa Antibiotics Other (See Comments)     He reports renal failure after a round of sulfa   • Sulfamethoxazole-Trimethoprim Other (See Comments)     Had to be hospitalized in ICU; renal impairment        Social History  Social History     Social History Narrative   • Not on file       Family History  He has no family history of bladder or kidney cancer  He has no family history of kidney stones      AUA Symptom Score:      Review of Systems  Review of Systems   Constitutional: Negative.    Genitourinary: Negative.    All other systems reviewed and are negative.      Physical Exam  Physical Exam   Constitutional: He is oriented to person, place, and time. He appears well-developed and well-nourished.   HENT:   Head: Normocephalic and atraumatic.   Neck: Normal range of motion.   Pulmonary/Chest: Effort normal. No respiratory distress.   Abdominal: Soft. He exhibits no distension and no mass. There is no tenderness. No hernia.   Genitourinary: Rectum normal and prostate normal.   Musculoskeletal: Normal range of motion.   Lymphadenopathy:     He has no cervical adenopathy.   Neurological: He is alert and oriented to person, place, and time.   Skin: Skin is warm and dry.   Psychiatric: He has a normal mood and affect. His behavior is normal.   Vitals reviewed.      Labs Recent and today in the office:  Results for orders placed or performed in visit on 07/19/19   Testosterone   Result Value  Ref Range    Testosterone, Total 389 264 - 916 ng/dL   Estradiol   Result Value Ref Range    Estradiol <5.0 (L) 7.6 - 42.6 pg/mL   PSA Screen   Result Value Ref Range    PSA 0.805 0.060 - 4.000 ng/mL   CBC & Differential   Result Value Ref Range    WBC 9.77 3.40 - 10.80 10*3/mm3    RBC 5.78 4.14 - 5.80 10*6/mm3    Hemoglobin 12.7 (L) 13.0 - 17.7 g/dL    Hematocrit 45.1 37.5 - 51.0 %    MCV 78.0 (L) 79.0 - 97.0 fL    MCH 22.0 (L) 26.6 - 33.0 pg    MCHC 28.2 (L) 31.5 - 35.7 g/dL    RDW 20.7 (H) 12.3 - 15.4 %    Platelets 244 140 - 450 10*3/mm3    Neutrophil Rel % 45.6 42.7 - 76.0 %    Lymphocyte Rel % 39.5 19.6 - 45.3 %    Monocyte Rel % 12.5 (H) 5.0 - 12.0 %    Eosinophil Rel % 1.4 0.3 - 6.2 %    Basophil Rel % 0.6 0.0 - 1.5 %    Neutrophils Absolute 4.45 1.70 - 7.00 10*3/mm3    Lymphocytes Absolute 3.86 (H) 0.70 - 3.10 10*3/mm3    Monocytes Absolute 1.22 (H) 0.10 - 0.90 10*3/mm3    Eosinophils Absolute 0.14 0.00 - 0.40 10*3/mm3    Basophils Absolute 0.06 0.00 - 0.20 10*3/mm3    Immature Granulocyte Rel % 0.4 0.0 - 0.5 %    Immature Grans Absolute 0.04 0.00 - 0.05 10*3/mm3    nRBC 0.0 0.0 - 0.2 /100 WBC   Manual Differential   Result Value Ref Range    Differential Comment Comment     Comment Comment     Plt Comment Comment          Assessment & Plan  Hypogonadism: He is achieved great benefit from supplement and is anxious to continue.  He is reminded of the critical need to monitor for toxicity of therapy and came by recently for blood work were all parameters were nominal.  His energy level has started to drop and he will need another batch of pellets in the very near future so this is scheduled.    Estradiol access: He requires Arimidex 1 mg 2 times per week to prevent conversion to estrogen level and minimize his cardiovascular risk.

## 2019-08-15 DIAGNOSIS — Z79.4 TYPE 2 DIABETES MELLITUS WITHOUT COMPLICATION, WITH LONG-TERM CURRENT USE OF INSULIN (HCC): ICD-10-CM

## 2019-08-15 DIAGNOSIS — E11.9 TYPE 2 DIABETES MELLITUS WITHOUT COMPLICATION, WITH LONG-TERM CURRENT USE OF INSULIN (HCC): ICD-10-CM

## 2019-08-20 ENCOUNTER — OFFICE VISIT (OUTPATIENT)
Dept: UROLOGY | Facility: CLINIC | Age: 69
End: 2019-08-20

## 2019-08-20 DIAGNOSIS — E29.1 HYPOGONADISM MALE: Primary | ICD-10-CM

## 2019-08-20 PROCEDURE — 11980 IMPLANT HORMONE PELLET(S): CPT | Performed by: UROLOGY

## 2019-08-20 NOTE — PROGRESS NOTES
Chief Complaint  Funmilayo Yeboah is a 69 y.o. male who returns today for treatment of his hypogonadism delighted with his clinical benefit and anxious to continue with supplement.    There were no vitals filed for this visit.    Past Medical History  Past Medical History:   Diagnosis Date   • Arthritis     shoulder, knee. multi joint    • Atrial fibrillation (CMS/HCC)    • Back pain    • Chronic kidney disease     stage III due to diabetes   • Diabetes mellitus (CMS/HCC)     several years; checks sugars at home-usually run 100-112   • Diabetic nephropathy (CMS/HCC)    • Disease of thyroid gland     hypothyroidism   • GERD (gastroesophageal reflux disease)    • Hypertension    • Low testosterone    • Sleep apnea     setting of 11   • Urinary tract infection     most recent diagnosis 8/29/17 in the ER (+3 bacteria)-started on Macrobid and rechecked by PCP on 9/5/17       Past Surgical History  Past Surgical History:   Procedure Laterality Date   • ANTERIOR CERVICAL FUSION     • APPENDECTOMY     • BACK SURGERY     • CARPAL TUNNEL RELEASE Bilateral    • CATARACT EXTRACTION     • CHOLECYSTECTOMY     • COLONOSCOPY  2013   • FINGER/THUMB ARTHROPLASTY Left     thumb replacement   • TX RECONSTR TOTAL SHOULDER IMPLANT Left 9/11/2017    Procedure: TOTAL SHOULDER ARTHROPLASTY LEFT;  Surgeon: William Ray MD;  Location: [facility];  Service: Orthopedics   • SHOULDER SURGERY     • TOTAL KNEE ARTHROPLASTY Left        Medications  has a current medication list which includes the following prescription(s): anastrozole, apixaban, aspirin, bumetanide, calcipotriene, digoxin, docusate sodium, fluorouracil, glipizide, glucosamine-chondroitin, glucose blood, glucose monitor, insulin glargine, insulin glargine, insulin lispro, pen needles, isosorbide mononitrate, levothyroxine, losartan, metoprolol tartrate, morphine, multivital, fish oil, omeprazole, onetouch delica lancets 33g, oxycodone-acetaminophen, ropivacaine  hcl-nacl, simvastatin, spironolactone, telmisartan, and tizanidine.      Allergies  Allergies   Allergen Reactions   • Duloxetine Itching   • Sulfa Antibiotics Other (See Comments)     He reports renal failure after a round of sulfa   • Sulfamethoxazole-Trimethoprim Other (See Comments)     Had to be hospitalized in ICU; renal impairment        Social History  Social History     Social History Narrative   • Not on file       Family History  He has no family history of bladder or kidney cancer  He has no family history of kidney stones      AUA Symptom Score:      Review of Systems  Review of Systems    Physical Exam  Physical Exam    Labs Recent and today in the office:  Results for orders placed or performed in visit on 07/19/19   Testosterone   Result Value Ref Range    Testosterone, Total 389 264 - 916 ng/dL   Estradiol   Result Value Ref Range    Estradiol <5.0 (L) 7.6 - 42.6 pg/mL   PSA Screen   Result Value Ref Range    PSA 0.805 0.060 - 4.000 ng/mL   CBC & Differential   Result Value Ref Range    WBC 9.77 3.40 - 10.80 10*3/mm3    RBC 5.78 4.14 - 5.80 10*6/mm3    Hemoglobin 12.7 (L) 13.0 - 17.7 g/dL    Hematocrit 45.1 37.5 - 51.0 %    MCV 78.0 (L) 79.0 - 97.0 fL    MCH 22.0 (L) 26.6 - 33.0 pg    MCHC 28.2 (L) 31.5 - 35.7 g/dL    RDW 20.7 (H) 12.3 - 15.4 %    Platelets 244 140 - 450 10*3/mm3    Neutrophil Rel % 45.6 42.7 - 76.0 %    Lymphocyte Rel % 39.5 19.6 - 45.3 %    Monocyte Rel % 12.5 (H) 5.0 - 12.0 %    Eosinophil Rel % 1.4 0.3 - 6.2 %    Basophil Rel % 0.6 0.0 - 1.5 %    Neutrophils Absolute 4.45 1.70 - 7.00 10*3/mm3    Lymphocytes Absolute 3.86 (H) 0.70 - 3.10 10*3/mm3    Monocytes Absolute 1.22 (H) 0.10 - 0.90 10*3/mm3    Eosinophils Absolute 0.14 0.00 - 0.40 10*3/mm3    Basophils Absolute 0.06 0.00 - 0.20 10*3/mm3    Immature Granulocyte Rel % 0.4 0.0 - 0.5 %    Immature Grans Absolute 0.04 0.00 - 0.05 10*3/mm3    nRBC 0.0 0.0 - 0.2 /100 WBC   Manual Differential   Result Value Ref Range     Differential Comment Comment     Comment Comment     Plt Comment Comment      Testopel Subcutaneous hormone pellet implantation:    The patient was placed on the exam table in the supine position.  The upper outer quadrant of the hip was identified for insertion.  The area was then prepped with Betadine and injected with 7ml of Lidocaine 2% with Epinephrine to anesthetize superficially and then distally along the trocar tract.  A 3mm incision was made using #11 blade scalpel.  The trocar with a sharp-ended stylet was inserted into the subcutaneous tissue in line with the femur.  The sharp stylet was withdrawn and the Testopel pellets were placed into the well of the trocar.  Testopel was advanced into the tissue using blunt-ended stylet.  A total of 12 pellets were inserted. For the J-code of  the NDC # for the pellets is 56458-895-39. The trocar was removed and the incision was closed using Steri-strips.  The wound area was cleansed to remove the Betadine and was covered with Steri-strips with an outer Band-aid.  Two subcutaneous tract were developed.  Careful inspection of the area was done.            Assessment & Plan  The patient was informed of the post-procedure instructions.  He will r eturn in 1 month to monitor testosterone levels and then again at 4 months to determine scheduling of the next insertion.

## 2019-08-22 ENCOUNTER — OFFICE VISIT (OUTPATIENT)
Dept: INTERNAL MEDICINE | Facility: CLINIC | Age: 69
End: 2019-08-22

## 2019-08-22 VITALS
DIASTOLIC BLOOD PRESSURE: 74 MMHG | TEMPERATURE: 97 F | SYSTOLIC BLOOD PRESSURE: 130 MMHG | WEIGHT: 315 LBS | BODY MASS INDEX: 46.65 KG/M2 | OXYGEN SATURATION: 95 % | HEART RATE: 70 BPM | HEIGHT: 69 IN

## 2019-08-22 DIAGNOSIS — I27.81 COR PULMONALE (HCC): ICD-10-CM

## 2019-08-22 DIAGNOSIS — E11.9 TYPE 2 DIABETES MELLITUS WITHOUT COMPLICATION, WITH LONG-TERM CURRENT USE OF INSULIN (HCC): ICD-10-CM

## 2019-08-22 DIAGNOSIS — E78.2 MIXED HYPERLIPIDEMIA: ICD-10-CM

## 2019-08-22 DIAGNOSIS — Z79.4 TYPE 2 DIABETES MELLITUS WITHOUT COMPLICATION, WITH LONG-TERM CURRENT USE OF INSULIN (HCC): ICD-10-CM

## 2019-08-22 DIAGNOSIS — I10 BENIGN ESSENTIAL HYPERTENSION: Primary | ICD-10-CM

## 2019-08-22 DIAGNOSIS — E03.8 OTHER SPECIFIED HYPOTHYROIDISM: ICD-10-CM

## 2019-08-22 DIAGNOSIS — K21.9 GASTROESOPHAGEAL REFLUX DISEASE WITHOUT ESOPHAGITIS: ICD-10-CM

## 2019-08-22 DIAGNOSIS — G47.33 OBSTRUCTIVE SLEEP APNEA SYNDROME: ICD-10-CM

## 2019-08-22 DIAGNOSIS — G89.4 CHRONIC PAIN SYNDROME: ICD-10-CM

## 2019-08-22 DIAGNOSIS — I48.91 ATRIAL FIBRILLATION, UNSPECIFIED TYPE (HCC): ICD-10-CM

## 2019-08-22 PROCEDURE — 99214 OFFICE O/P EST MOD 30 MIN: CPT | Performed by: INTERNAL MEDICINE

## 2019-08-22 RX ORDER — OMEPRAZOLE 40 MG/1
40 CAPSULE, DELAYED RELEASE ORAL DAILY
Qty: 90 CAPSULE | Refills: 3 | Status: SHIPPED | OUTPATIENT
Start: 2019-08-22 | End: 2019-11-06 | Stop reason: SDUPTHER

## 2019-08-22 RX ORDER — GLIPIZIDE 10 MG/1
10 TABLET ORAL
Qty: 180 TABLET | Refills: 1 | Status: SHIPPED | OUTPATIENT
Start: 2019-08-22 | End: 2019-11-06 | Stop reason: SDUPTHER

## 2019-08-22 RX ORDER — SIMVASTATIN 20 MG
20 TABLET ORAL NIGHTLY
Qty: 90 TABLET | Refills: 1 | Status: SHIPPED | OUTPATIENT
Start: 2019-08-22 | End: 2019-11-06 | Stop reason: SDUPTHER

## 2019-08-22 NOTE — PROGRESS NOTES
Chief Complaint   Patient presents with   • Follow-up     3 months for HTN, GERD, HLD, Hypothyroidism, and DM.      Subjective   Freddie Yeboah Jr. is a 69 y.o. male.     Here today for follow up of HTN, HLD, DM, CAD, Afib, LUIS FERNANDO, GERD, hypothyroid, LBP/DJD, BPH.   HTN/HLD/CAD/Afib- his BP is well controlled today.  No CP, palpitations.  He does have some edema in legs.   DM- BS runs 111-148.  His lantus is at 90 u daily with 10 u short acting insulin with each meal.   He has some NTB of feet, but less since blood sugars are improving.  Last eye exam was a yr ago with DR Kauffman.  He sees the foot doctor every 3 mo.       LUIS FERNANDO- he wears his CPAP every night. He awakens 3 times a night to urinate.    GERD- he does take omeprazole daily as directed, states his GERD is well controlled.    Hypothyroid- takes thyroid med daily on empty stomach, denies any problems with trouble swallowing, change in voice or constipation  LBP/DJD- he sees pain clinic every 2 mo for his LBP, he says his back pain is doing well at this time  BPH- he in on testosterone pellets per Dr Zelaya, these were just replaced last week.    HCM- colonoscopy done 2018.  Pneumovax is UTD. He did not have Hep a or shingles vaccine  Mr. Yeboah is interested in bariatric surgery.  He is aware that he will need to follow a high-protein and low carbohydrate diet and states that he tries to do this a majority of the time now.  His weight has increased 5 pounds over the last 3 months though.  He does not exercise much, although states his back pain has improved recently.  He is willing to try to follow this diet better if this will enable him to have the bariatric surgery         The following portions of the patient's history were reviewed and updated as appropriate: allergies, current medications, past family history, past medical history, past social history, past surgical history and problem list.    Review of Systems   Constitutional: Negative for activity  "change, appetite change and unexpected weight change.   Eyes: Negative for visual disturbance.   Respiratory: Negative for shortness of breath.    Cardiovascular: Positive for leg swelling. Negative for chest pain and palpitations.   Gastrointestinal: Negative for abdominal pain.   Genitourinary: Positive for frequency.   Musculoskeletal: Positive for back pain.   Neurological: Positive for numbness. Negative for headaches.   Psychiatric/Behavioral: Negative for dysphoric mood and sleep disturbance. The patient is not nervous/anxious.    All other systems reviewed and are negative.      Objective   /74   Pulse 70   Temp 97 °F (36.1 °C)   Ht 176.5 cm (69.49\")   Wt (!) 154 kg (340 lb)   SpO2 95%   BMI 49.50 kg/m²   Body mass index is 49.5 kg/m².  Physical Exam   Constitutional: He is oriented to person, place, and time. He appears well-developed and well-nourished. No distress.   Very pleasant gentleman who appears his stated age, he is morbidly obese   HENT:   Head: Normocephalic and atraumatic.   Right Ear: External ear normal.   Left Ear: External ear normal.   Mouth/Throat: Oropharynx is clear and moist.   Eyes: Conjunctivae and EOM are normal. Pupils are equal, round, and reactive to light.   Neck: Normal range of motion. Neck supple. No thyromegaly present.   Cardiovascular: Normal rate, normal heart sounds and intact distal pulses.   No murmur heard.  No carotid bruits  Irregularly irregular with distant heart sounds due to body habitus   Pulmonary/Chest: Effort normal and breath sounds normal. No respiratory distress. He has no wheezes.   Abdominal: Soft. Bowel sounds are normal. He exhibits no distension. There is no tenderness.   Obesity limits exam   Musculoskeletal: Normal range of motion. He exhibits edema.   Trace edema at ankles   Lymphadenopathy:     He has no cervical adenopathy.   Neurological: He is alert and oriented to person, place, and time. No cranial nerve deficit. Coordination " normal.   Skin:   Feet with no callus or ulcer, diminished sensation to light touch over distal feet   Psychiatric: He has a normal mood and affect. His behavior is normal. Judgment and thought content normal.   Nursing note and vitals reviewed.      Assessment/Plan   Freddie Yeboah Jr. is here today and the following problems have been addressed:      Freddie was seen today for follow-up.    Diagnoses and all orders for this visit:    Benign essential hypertension    Mixed hyperlipidemia    Atrial fibrillation, unspecified type (CMS/[facility])    Type 2 diabetes mellitus without complication, with long-term current use of insulin (CMS/[facility])  -     Comprehensive Metabolic Panel  -     Hemoglobin A1c    Other specified hypothyroidism    Gastroesophageal reflux disease without esophagitis    Obstructive sleep apnea syndrome    Cor pulmonale (CMS/[facility])    Chronic pain syndrome    BMI 45.0-49.9, adult (CMS/[facility])  -     Ambulatory Referral to Bariatric Surgery    Other orders  -     glipiZIDE (GLUCOTROL) 10 MG tablet; Take 1 tablet by mouth 2 (Two) Times a Day Before Meals.  -     glucose blood test strip; Use twice daily to check blood sugar levels. DX: E11.8  -     Insulin Lispro (HUMALOG) 100 UNIT/ML solution pen-injector; Inject 10 Units under the skin into the appropriate area as directed 3 (Three) Times a Day With Meals.  -     omeprazole (priLOSEC) 40 MG capsule; Take 1 capsule by mouth Daily.  -     simvastatin (ZOCOR) 20 MG tablet; Take 1 tablet by mouth Every Night.        Follow heart healthy/low salt/diabetic diet  Avoid processed foods  Monitor blood pressure and BS as discussed  Exercise as tolerated   Take all medications as prescribed  See eye doctor annually or as discussed-it has been 1 year since last eye exam, encouraged patient to make appointment with Dr. Kauffman  Wear protective foot wear/no bare feet  Check feet regularly for calluses or ulcers  Labs as noted  Refer to bariatric surgeon for further  evaluation  Continue CPAP for underlying sleep apnea and pulmonary hypertension  Continue to see pain clinic for chronic back pain  Thyroid labs are up-to-date continue current thyroid medication dose  Continue Eliquis for underlying atrial fibrillation, rate is well controlled  GERD is well controlled with omeprazole    Return in about 3 months (around 11/22/2019) for Medicare Wellness.      Marilyn K. Vermeesch, MD      Please note that portions of this note were completed with a voice recognition program.  Efforts were made to edit dictation, but occasionally words are mistranscribed.

## 2019-08-23 LAB
ALBUMIN SERPL-MCNC: 4.5 G/DL (ref 3.5–5.2)
ALBUMIN/GLOB SERPL: 1.9 G/DL
ALP SERPL-CCNC: 77 U/L (ref 39–117)
ALT SERPL-CCNC: 21 U/L (ref 1–41)
AST SERPL-CCNC: 20 U/L (ref 1–40)
BILIRUB SERPL-MCNC: 0.4 MG/DL (ref 0.2–1.2)
BUN SERPL-MCNC: 23 MG/DL (ref 8–23)
BUN/CREAT SERPL: 12.2 (ref 7–25)
CALCIUM SERPL-MCNC: 9.4 MG/DL (ref 8.6–10.5)
CHLORIDE SERPL-SCNC: 99 MMOL/L (ref 98–107)
CO2 SERPL-SCNC: 29.9 MMOL/L (ref 22–29)
CREAT SERPL-MCNC: 1.88 MG/DL (ref 0.76–1.27)
GLOBULIN SER CALC-MCNC: 2.4 GM/DL
GLUCOSE SERPL-MCNC: 115 MG/DL (ref 65–99)
HBA1C MFR BLD: 7.9 % (ref 4.8–5.6)
POTASSIUM SERPL-SCNC: 5.4 MMOL/L (ref 3.5–5.2)
PROT SERPL-MCNC: 6.9 G/DL (ref 6–8.5)
SODIUM SERPL-SCNC: 141 MMOL/L (ref 136–145)

## 2019-08-23 NOTE — PROGRESS NOTES
Please call patient with lab results.  A1c has decreased to 7.9 suggesting average blood sugar of 170.  Kidney function is unchanged.  His potassium level is slightly elevated.  Medication list states that he is on both micardis and losartan, please clarify which medication he is taking, as he should not be on both and this could be because of elevated potassium if he is.  Please let me know and I will make adjustments if necessary.

## 2019-08-30 ENCOUNTER — TELEPHONE (OUTPATIENT)
Dept: INTERNAL MEDICINE | Facility: CLINIC | Age: 69
End: 2019-08-30

## 2019-08-30 NOTE — TELEPHONE ENCOUNTER
Pt's wife stopped by today to discuss medications and his recent labs. When Elaina spoke with the Pt he advised her that he was taking both Losartan and Micardis. Wife states Pt was taking Losartan but his heart doctor discontinued it due to the recall. Pt has only been taking Micardis.     Losartan has been taking off Pt's med list.

## 2019-09-03 RX ORDER — LEVOTHYROXINE SODIUM 0.07 MG/1
TABLET ORAL
Qty: 90 TABLET | Refills: 1 | Status: SHIPPED | OUTPATIENT
Start: 2019-09-03 | End: 2019-11-06 | Stop reason: SDUPTHER

## 2019-11-06 DIAGNOSIS — Z79.4 TYPE 2 DIABETES MELLITUS WITHOUT COMPLICATION, WITH LONG-TERM CURRENT USE OF INSULIN (HCC): ICD-10-CM

## 2019-11-06 DIAGNOSIS — E11.9 TYPE 2 DIABETES MELLITUS WITHOUT COMPLICATION, WITH LONG-TERM CURRENT USE OF INSULIN (HCC): ICD-10-CM

## 2019-11-06 RX ORDER — LEVOTHYROXINE SODIUM 0.07 MG/1
75 TABLET ORAL DAILY
Qty: 90 TABLET | Refills: 1 | Status: SHIPPED | OUTPATIENT
Start: 2019-11-06 | End: 2020-03-02

## 2019-11-06 RX ORDER — SIMVASTATIN 20 MG
20 TABLET ORAL NIGHTLY
Qty: 90 TABLET | Refills: 1 | Status: SHIPPED | OUTPATIENT
Start: 2019-11-06 | End: 2020-05-28 | Stop reason: SDUPTHER

## 2019-11-06 RX ORDER — PEN NEEDLE, DIABETIC 30 GX3/16"
NEEDLE, DISPOSABLE MISCELLANEOUS
Qty: 200 EACH | Refills: 12 | Status: SHIPPED | OUTPATIENT
Start: 2019-11-06 | End: 2020-12-08

## 2019-11-06 RX ORDER — GLIPIZIDE 10 MG/1
10 TABLET ORAL
Qty: 180 TABLET | Refills: 1 | Status: SHIPPED | OUTPATIENT
Start: 2019-11-06 | End: 2020-05-28 | Stop reason: SDUPTHER

## 2019-11-06 RX ORDER — LANCETS 33 GAUGE
EACH MISCELLANEOUS
Qty: 300 EACH | Refills: 3 | Status: SHIPPED | OUTPATIENT
Start: 2019-11-06 | End: 2021-11-29 | Stop reason: SDUPTHER

## 2019-11-06 RX ORDER — OMEPRAZOLE 40 MG/1
40 CAPSULE, DELAYED RELEASE ORAL DAILY
Qty: 90 CAPSULE | Refills: 3 | Status: SHIPPED | OUTPATIENT
Start: 2019-11-06 | End: 2020-05-28 | Stop reason: SDUPTHER

## 2019-12-23 DIAGNOSIS — E11.9 TYPE 2 DIABETES MELLITUS WITHOUT COMPLICATION, WITH LONG-TERM CURRENT USE OF INSULIN (HCC): ICD-10-CM

## 2019-12-23 DIAGNOSIS — Z79.4 TYPE 2 DIABETES MELLITUS WITHOUT COMPLICATION, WITH LONG-TERM CURRENT USE OF INSULIN (HCC): ICD-10-CM

## 2020-01-21 DIAGNOSIS — E29.1 HYPOGONADISM MALE: ICD-10-CM

## 2020-01-22 RX ORDER — ANASTROZOLE 1 MG/1
TABLET ORAL
Qty: 24 TABLET | Refills: 4 | Status: SHIPPED | OUTPATIENT
Start: 2020-01-22 | End: 2020-07-16 | Stop reason: SDUPTHER

## 2020-01-28 DIAGNOSIS — E11.9 TYPE 2 DIABETES MELLITUS WITHOUT COMPLICATION, WITH LONG-TERM CURRENT USE OF INSULIN (HCC): ICD-10-CM

## 2020-01-28 DIAGNOSIS — Z79.4 TYPE 2 DIABETES MELLITUS WITHOUT COMPLICATION, WITH LONG-TERM CURRENT USE OF INSULIN (HCC): ICD-10-CM

## 2020-01-28 NOTE — TELEPHONE ENCOUNTER
Wife of patient calling - pt is getting very low on both of his diabetic pens.    Please call into the [facility]

## 2020-02-24 DIAGNOSIS — N40.0 BENIGN PROSTATIC HYPERPLASIA, UNSPECIFIED WHETHER LOWER URINARY TRACT SYMPTOMS PRESENT: ICD-10-CM

## 2020-02-24 DIAGNOSIS — E29.1 HYPOGONADISM IN MALE: Primary | ICD-10-CM

## 2020-02-25 LAB
BASOPHILS # BLD AUTO: 0.09 10*3/MM3 (ref 0–0.2)
BASOPHILS NFR BLD AUTO: 0.8 % (ref 0–1.5)
EOSINOPHIL # BLD AUTO: 0.16 10*3/MM3 (ref 0–0.4)
EOSINOPHIL NFR BLD AUTO: 1.5 % (ref 0.3–6.2)
ERYTHROCYTE [DISTWIDTH] IN BLOOD BY AUTOMATED COUNT: 18.5 % (ref 12.3–15.4)
ESTRADIOL SERPL-MCNC: 13.8 PG/ML (ref 7.6–42.6)
HCT VFR BLD AUTO: 41 % (ref 37.5–51)
HGB BLD-MCNC: 12.4 G/DL (ref 13–17.7)
IMM GRANULOCYTES # BLD AUTO: 0.06 10*3/MM3 (ref 0–0.05)
IMM GRANULOCYTES NFR BLD AUTO: 0.6 % (ref 0–0.5)
LYMPHOCYTES # BLD AUTO: 3.7 10*3/MM3 (ref 0.7–3.1)
LYMPHOCYTES NFR BLD AUTO: 34.9 % (ref 19.6–45.3)
MCH RBC QN AUTO: 23.4 PG (ref 26.6–33)
MCHC RBC AUTO-ENTMCNC: 30.2 G/DL (ref 31.5–35.7)
MCV RBC AUTO: 77.2 FL (ref 79–97)
MONOCYTES # BLD AUTO: 1.12 10*3/MM3 (ref 0.1–0.9)
MONOCYTES NFR BLD AUTO: 10.6 % (ref 5–12)
NEUTROPHILS # BLD AUTO: 5.47 10*3/MM3 (ref 1.7–7)
NEUTROPHILS NFR BLD AUTO: 51.6 % (ref 42.7–76)
NRBC BLD AUTO-RTO: 0.1 /100 WBC (ref 0–0.2)
PLATELET # BLD AUTO: 263 10*3/MM3 (ref 140–450)
PSA SERPL-MCNC: 0.86 NG/ML (ref 0–4)
RBC # BLD AUTO: 5.31 10*6/MM3 (ref 4.14–5.8)
TESTOST SERPL-MCNC: 242 NG/DL (ref 264–916)
WBC # BLD AUTO: 10.6 10*3/MM3 (ref 3.4–10.8)

## 2020-02-27 ENCOUNTER — OFFICE VISIT (OUTPATIENT)
Dept: UROLOGY | Facility: CLINIC | Age: 70
End: 2020-02-27

## 2020-02-27 DIAGNOSIS — E28.0 ESTRADIOL EXCESS: ICD-10-CM

## 2020-02-27 DIAGNOSIS — E29.1 HYPOGONADISM IN MALE: Primary | ICD-10-CM

## 2020-02-27 LAB
BILIRUB BLD-MCNC: NEGATIVE MG/DL
CLARITY, POC: CLEAR
COLOR UR: YELLOW
GLUCOSE UR STRIP-MCNC: NEGATIVE MG/DL
KETONES UR QL: NEGATIVE
LEUKOCYTE EST, POC: NEGATIVE
NITRITE UR-MCNC: NEGATIVE MG/ML
PH UR: 6 [PH] (ref 5–8)
PROT UR STRIP-MCNC: ABNORMAL MG/DL
RBC # UR STRIP: NEGATIVE /UL
SP GR UR: 1.02 (ref 1–1.03)
UROBILINOGEN UR QL: NORMAL

## 2020-02-27 PROCEDURE — 81003 URINALYSIS AUTO W/O SCOPE: CPT | Performed by: UROLOGY

## 2020-02-27 PROCEDURE — 99214 OFFICE O/P EST MOD 30 MIN: CPT | Performed by: UROLOGY

## 2020-02-27 NOTE — PROGRESS NOTES
Chief Complaint  Hypogonadism (4 months with labs.)        TARUN Yeboah is a 69 y.o. male who returns today having just come back from [place] 6 months after his last injection of Testopel pellets.  He has a history of hypogonadism and is enjoyed great benefit from supplement.  He states for the past month he is totally run out of energy stamina strength and is anxious to receive his next batch of pellets.  Unfortunately he takes Eliquis and forgot to stop this.  Therefore we will reschedule him to come back next week when he is off blood thinner.    There were no vitals filed for this visit.    Past Medical History  Past Medical History:   Diagnosis Date   • Arthritis     shoulder, knee. multi joint    • Atrial fibrillation (CMS/HCC)    • Back pain    • Chronic kidney disease     stage III due to diabetes   • Diabetes mellitus (CMS/HCC)     several years; checks sugars at home-usually run 100-112   • Diabetic nephropathy (CMS/HCC)    • Disease of thyroid gland     hypothyroidism   • GERD (gastroesophageal reflux disease)    • Hypertension    • Low testosterone    • Sleep apnea     setting of 11   • Urinary tract infection     most recent diagnosis 8/29/17 in the ER (+3 bacteria)-started on Macrobid and rechecked by PCP on 9/5/17       Past Surgical History  Past Surgical History:   Procedure Laterality Date   • ANTERIOR CERVICAL FUSION     • APPENDECTOMY     • BACK SURGERY     • CARPAL TUNNEL RELEASE Bilateral    • CATARACT EXTRACTION     • CHOLECYSTECTOMY     • COLONOSCOPY  2013   • FINGER/THUMB ARTHROPLASTY Left     thumb replacement   • [place] RECONSTR TOTAL SHOULDER IMPLANT Left 9/11/2017    Procedure: TOTAL SHOULDER ARTHROPLASTY LEFT;  Surgeon: William Ray MD;  Location: [facility];  Service: Orthopedics   • SHOULDER SURGERY     • TOTAL KNEE ARTHROPLASTY Left        Medications  has a current medication list which includes the following prescription(s): anastrozole, apixaban, aspirin, bumetanide, calcipotriene,  digoxin, docusate sodium, fluorouracil, glipizide, glucosamine-chondroitin, glucose blood, glucose monitor, insulin glargine, insulin lispro, pen needles, isosorbide mononitrate, levothyroxine, metoprolol tartrate, morphine, multivital, fish oil, omeprazole, onetouch delica lancets 33g, oxycodone-acetaminophen, ropivacaine hcl-nacl, simvastatin, spironolactone, telmisartan, and tizanidine.      Allergies  Allergies   Allergen Reactions   • Duloxetine Itching   • Sulfa Antibiotics Other (See Comments)     He reports renal failure after a round of sulfa   • Sulfamethoxazole-Trimethoprim Other (See Comments)     Had to be hospitalized in ICU; renal impairment        Social History  Social History     Socioeconomic History   • Marital status:      Spouse name: Not on file   • Number of children: Not on file   • Years of education: Not on file   • Highest education level: Not on file   Occupational History     Employer: RETIRED   Tobacco Use   • Smoking status: Former Smoker     Packs/day: 3.00     Years: 34.00     Pack years: 102.00     Last attempt to quit:      Years since quittin.1   • Smokeless tobacco: Never Used   Substance and Sexual Activity   • Alcohol use: No   • Drug use: No   • Sexual activity: Yes     Partners: Female       Family History  He has no family history of bladder or kidney cancer  He has no family history of kidney stones      AUA Symptom Score:      Review of Systems  Review of Systems   Constitutional: Positive for fatigue. Negative for activity change, appetite change, chills, fever, unexpected weight gain and unexpected weight loss.   Respiratory: Negative for apnea, cough, chest tightness, shortness of breath, wheezing and stridor.    Cardiovascular: Negative for chest pain, palpitations and leg swelling.   Gastrointestinal: Negative for abdominal distention, abdominal pain, anal bleeding, blood in stool, constipation, diarrhea, nausea, rectal pain, vomiting, GERD and  indigestion.   Genitourinary: Negative for decreased libido, decreased urine volume, difficulty urinating, discharge, dysuria, flank pain, frequency, genital sores, hematuria, nocturia, penile pain, erectile dysfunction, penile swelling, scrotal swelling, testicular pain, urgency and urinary incontinence.   Musculoskeletal: Negative for back pain and joint swelling.   Neurological: Negative for tremors, seizures, speech difficulty, weakness and numbness.   Psychiatric/Behavioral: Negative for agitation, decreased concentration, sleep disturbance, depressed mood and stress. The patient is not nervous/anxious.        Physical Exam  Physical Exam   Constitutional: He is oriented to person, place, and time. He appears well-developed and well-nourished.   HENT:   Head: Normocephalic and atraumatic.   Neck: Normal range of motion.   Pulmonary/Chest: Effort normal. No respiratory distress.   Abdominal: He exhibits no distension and no mass. There is no tenderness. No hernia.   Musculoskeletal: Normal range of motion.   Lymphadenopathy:     He has no cervical adenopathy.   Neurological: He is alert and oriented to person, place, and time.   Skin: Skin is warm and dry.   Psychiatric: He has a normal mood and affect. His behavior is normal.   Vitals reviewed.      Labs Recent and today in the office:  Results for orders placed or performed in visit on 02/24/20   PSA DIAGNOSTIC   Result Value Ref Range    PSA 0.864 0.000 - 4.000 ng/mL   Testosterone   Result Value Ref Range    Testosterone, Total 242 (L) 264 - 916 ng/dL   Estradiol   Result Value Ref Range    Estradiol 13.8 7.6 - 42.6 pg/mL   CBC & Differential   Result Value Ref Range    WBC 10.60 3.40 - 10.80 10*3/mm3    RBC 5.31 4.14 - 5.80 10*6/mm3    Hemoglobin 12.4 (L) 13.0 - 17.7 g/dL    Hematocrit 41.0 37.5 - 51.0 %    MCV 77.2 (L) 79.0 - 97.0 fL    MCH 23.4 (L) 26.6 - 33.0 pg    MCHC 30.2 (L) 31.5 - 35.7 g/dL    RDW 18.5 (H) 12.3 - 15.4 %    Platelets 263 140 - 450  10*3/mm3    Neutrophil Rel % 51.6 42.7 - 76.0 %    Lymphocyte Rel % 34.9 19.6 - 45.3 %    Monocyte Rel % 10.6 5.0 - 12.0 %    Eosinophil Rel % 1.5 0.3 - 6.2 %    Basophil Rel % 0.8 0.0 - 1.5 %    Neutrophils Absolute 5.47 1.70 - 7.00 10*3/mm3    Lymphocytes Absolute 3.70 (H) 0.70 - 3.10 10*3/mm3    Monocytes Absolute 1.12 (H) 0.10 - 0.90 10*3/mm3    Eosinophils Absolute 0.16 0.00 - 0.40 10*3/mm3    Basophils Absolute 0.09 0.00 - 0.20 10*3/mm3    Immature Granulocyte Rel % 0.6 (H) 0.0 - 0.5 %    Immature Grans Absolute 0.06 (H) 0.00 - 0.05 10*3/mm3    nRBC 0.1 0.0 - 0.2 /100 WBC         Assessment & Plan  Hypogonadism: He is achieved a great benefit from supplement and is anxious to continue.  He is currently getting about a 5-month benefit from each injection of Testopel pellets but has really had a drop in his energy level recently.  There is a delay in getting back from the [place] after the death of his sister.  He is scheduled for Testopel pellet insertion after he is been off his blood thinner for a few days.    Estradiol excess: He has a big problem with conversion but is currently normal on Arimidex 1 mg 2 times per week.

## 2020-03-02 RX ORDER — LEVOTHYROXINE SODIUM 0.07 MG/1
TABLET ORAL
Qty: 90 TABLET | Refills: 1 | Status: SHIPPED | OUTPATIENT
Start: 2020-03-02 | End: 2020-05-28 | Stop reason: SDUPTHER

## 2020-03-03 ENCOUNTER — PROCEDURE VISIT (OUTPATIENT)
Dept: UROLOGY | Facility: CLINIC | Age: 70
End: 2020-03-03

## 2020-03-03 DIAGNOSIS — E29.1 HYPOGONADISM MALE: Primary | ICD-10-CM

## 2020-03-03 PROCEDURE — 11980 IMPLANT HORMONE PELLET(S): CPT | Performed by: UROLOGY

## 2020-03-03 NOTE — PROGRESS NOTES
Chief Complaint  Hypogonadism (Testopel (12 pellets))        TARUN Yeboah is a 70 y.o. male who returns today for insertion of Testopel pellets    There were no vitals filed for this visit.    Past Medical History  Past Medical History:   Diagnosis Date   • Arthritis     shoulder, knee. multi joint    • Atrial fibrillation (CMS/HCC)    • Back pain    • Chronic kidney disease     stage III due to diabetes   • Diabetes mellitus (CMS/HCC)     several years; checks sugars at home-usually run 100-112   • Diabetic nephropathy (CMS/HCC)    • Disease of thyroid gland     hypothyroidism   • GERD (gastroesophageal reflux disease)    • Hypertension    • Low testosterone    • Sleep apnea     setting of 11   • Urinary tract infection     most recent diagnosis 8/29/17 in the ER (+3 bacteria)-started on Macrobid and rechecked by PCP on 9/5/17       Past Surgical History  Past Surgical History:   Procedure Laterality Date   • ANTERIOR CERVICAL FUSION     • APPENDECTOMY     • BACK SURGERY     • CARPAL TUNNEL RELEASE Bilateral    • CATARACT EXTRACTION     • CHOLECYSTECTOMY     • COLONOSCOPY  2013   • FINGER/THUMB ARTHROPLASTY Left     thumb replacement   • [place] RECONSTR TOTAL SHOULDER IMPLANT Left 9/11/2017    Procedure: TOTAL SHOULDER ARTHROPLASTY LEFT;  Surgeon: William Ray MD;  Location: [facility];  Service: Orthopedics   • SHOULDER SURGERY     • TOTAL KNEE ARTHROPLASTY Left        Medications  has a current medication list which includes the following prescription(s): anastrozole, bumetanide, calcipotriene, digoxin, docusate sodium, fluorouracil, glipizide, glucosamine-chondroitin, glucose blood, glucose monitor, insulin glargine, insulin lispro, pen needles, isosorbide mononitrate, levothyroxine, metoprolol tartrate, morphine, multivital, fish oil, omeprazole, onetouch delica lancets 33g, oxycodone-acetaminophen, ropivacaine hcl-nacl, simvastatin, spironolactone, telmisartan, tizanidine, apixaban, and  aspirin.      Allergies  Allergies   Allergen Reactions   • Duloxetine Itching   • Sulfa Antibiotics Other (See Comments)     He reports renal failure after a round of sulfa   • Sulfamethoxazole-Trimethoprim Other (See Comments)     Had to be hospitalized in ICU; renal impairment        Social History  Social History     Socioeconomic History   • Marital status:      Spouse name: Not on file   • Number of children: Not on file   • Years of education: Not on file   • Highest education level: Not on file   Occupational History     Employer: RETIRED   Tobacco Use   • Smoking status: Former Smoker     Packs/day: 3.00     Years: 34.00     Pack years: 102.00     Last attempt to quit:      Years since quittin.1   • Smokeless tobacco: Never Used   Substance and Sexual Activity   • Alcohol use: No   • Drug use: No   • Sexual activity: Yes     Partners: Female       Family History  He has no family history of bladder or kidney cancer  He has no family history of kidney stones      AUA Symptom Score:      Review of Systems  Review of Systems    Physical Exam  Physical Exam    Labs Recent and today in the office:  Results for orders placed or performed in visit on 20   POC Urinalysis Dipstick, Automated   Result Value Ref Range    Color Yellow Yellow, Straw, Dark Yellow, Elinor    Clarity, UA Clear Clear    Specific Gravity  1.020 1.005 - 1.030    pH, Urine 6.0 5.0 - 8.0    Leukocytes Negative Negative    Nitrite, UA Negative Negative    Protein, POC Trace (A) Negative mg/dL    Glucose, UA Negative Negative, 1000 mg/dL (3+) mg/dL    Ketones, UA Negative Negative    Urobilinogen, UA Normal Normal    Bilirubin Negative Negative    Blood, UA Negative Negative     Testopel Subcutaneous hormone pellet implantation:    The patient was placed on the exam table in the supine position.  The upper outer quadrant of the hip was identified for insertion.  The area was then prepped with Betadine and injected with 7ml of  Lidocaine 2% with Epinephrine to anesthetize superficially and then distally along the trocar tract.  A 3mm incision was made using #11 blade scalpel.  The trocar with a sharp-ended stylet was inserted into the subcutaneous tissue in line with the femur.  The sharp stylet was withdrawn and the Testopel pellets were placed into the well of the trocar.  Testopel was advanced into the tissue using blunt-ended stylet.  A total of 12 pellets were inserted. For the J-code of  the NDC # for the pellets is 34376-721-18. The trocar was removed and the incision was closed using Steri-strips.  The wound area was cleansed to remove the Betadine and was covered with Steri-strips with an outer Band-aid.  Two subcutaneous tract were developed.  Careful inspection of the area was done.              Assessment & Plan  Hypogonadism: Patient will return in 4 months with follow-up blood work to consider at the next batch of pellets.  This is critical to monitor him for toxicity of therapy.

## 2020-03-20 DIAGNOSIS — E11.9 TYPE 2 DIABETES MELLITUS WITHOUT COMPLICATION, WITH LONG-TERM CURRENT USE OF INSULIN (HCC): Primary | ICD-10-CM

## 2020-03-20 DIAGNOSIS — Z79.4 TYPE 2 DIABETES MELLITUS WITHOUT COMPLICATION, WITH LONG-TERM CURRENT USE OF INSULIN (HCC): Primary | ICD-10-CM

## 2020-03-20 DIAGNOSIS — N28.9 RENAL INSUFFICIENCY: ICD-10-CM

## 2020-05-28 ENCOUNTER — OFFICE VISIT (OUTPATIENT)
Dept: INTERNAL MEDICINE | Facility: CLINIC | Age: 70
End: 2020-05-28

## 2020-05-28 VITALS
HEIGHT: 69 IN | WEIGHT: 315 LBS | DIASTOLIC BLOOD PRESSURE: 78 MMHG | HEART RATE: 86 BPM | SYSTOLIC BLOOD PRESSURE: 136 MMHG | BODY MASS INDEX: 46.65 KG/M2 | TEMPERATURE: 97.1 F | OXYGEN SATURATION: 92 %

## 2020-05-28 DIAGNOSIS — I10 BENIGN ESSENTIAL HYPERTENSION: Primary | ICD-10-CM

## 2020-05-28 DIAGNOSIS — K21.9 GASTROESOPHAGEAL REFLUX DISEASE WITHOUT ESOPHAGITIS: ICD-10-CM

## 2020-05-28 DIAGNOSIS — Z79.4 TYPE 2 DIABETES MELLITUS WITHOUT COMPLICATION, WITH LONG-TERM CURRENT USE OF INSULIN (HCC): ICD-10-CM

## 2020-05-28 DIAGNOSIS — E11.9 TYPE 2 DIABETES MELLITUS WITHOUT COMPLICATION, WITH LONG-TERM CURRENT USE OF INSULIN (HCC): ICD-10-CM

## 2020-05-28 DIAGNOSIS — E03.8 OTHER SPECIFIED HYPOTHYROIDISM: ICD-10-CM

## 2020-05-28 DIAGNOSIS — E66.01 CLASS 3 SEVERE OBESITY DUE TO EXCESS CALORIES WITH SERIOUS COMORBIDITY AND BODY MASS INDEX (BMI) OF 50.0 TO 59.9 IN ADULT (HCC): ICD-10-CM

## 2020-05-28 DIAGNOSIS — E78.2 MIXED HYPERLIPIDEMIA: ICD-10-CM

## 2020-05-28 DIAGNOSIS — I48.91 ATRIAL FIBRILLATION, UNSPECIFIED TYPE (HCC): ICD-10-CM

## 2020-05-28 DIAGNOSIS — G47.33 OBSTRUCTIVE SLEEP APNEA SYNDROME: ICD-10-CM

## 2020-05-28 PROBLEM — E66.09 OBESITY DUE TO EXCESS CALORIES WITH SERIOUS COMORBIDITY: Status: ACTIVE | Noted: 2020-05-28

## 2020-05-28 PROCEDURE — 99214 OFFICE O/P EST MOD 30 MIN: CPT | Performed by: INTERNAL MEDICINE

## 2020-05-28 RX ORDER — GLIPIZIDE 10 MG/1
10 TABLET ORAL
Qty: 180 TABLET | Refills: 1 | Status: SHIPPED | OUTPATIENT
Start: 2020-05-28 | End: 2020-11-18

## 2020-05-28 RX ORDER — SIMVASTATIN 20 MG
20 TABLET ORAL NIGHTLY
Qty: 90 TABLET | Refills: 1 | Status: SHIPPED | OUTPATIENT
Start: 2020-05-28 | End: 2021-04-19 | Stop reason: SDUPTHER

## 2020-05-28 RX ORDER — LEVOTHYROXINE SODIUM 0.07 MG/1
75 TABLET ORAL DAILY
Qty: 90 TABLET | Refills: 1 | Status: SHIPPED | OUTPATIENT
Start: 2020-05-28 | End: 2021-04-19 | Stop reason: SDUPTHER

## 2020-05-28 RX ORDER — OMEPRAZOLE 40 MG/1
40 CAPSULE, DELAYED RELEASE ORAL DAILY
Qty: 90 CAPSULE | Refills: 3 | Status: SHIPPED | OUTPATIENT
Start: 2020-05-28 | End: 2021-04-19 | Stop reason: SDUPTHER

## 2020-05-28 NOTE — PROGRESS NOTES
Chief Complaint   Patient presents with   • Follow-up     for HTN, GERD, HLD, DM, and hypothyroidism      Subjective   Freddie Yeboah Jr. is a 70 y.o. male.     Here today for follow up of HTN, HLD, DM, CAD, Afib, LUIS FERNANDO, GERD, hypothyroid, LBP/DJD, BPH.   HTN/HLD/CAD/Afib- his BP and HR are well controlled today.  No CP, palpitations.  He does have some edema in legs-he started taking Lasix on a daily basis a few weeks ago and edema has improved.   DM-   A1C 7.9 in 2019.  BS has been running less than 140 in AM.  His lantus is at 120 u daily with 16 u short acting insulin with each meal.   He has some NTB of feet.  Last eye exam was a yr ago with DR Kauffman.  He sees the foot doctor every 3 mo-he just saw them and got paperwork for new shoes.       LUIS FERNANDO- he wears his CPAP every night. He awakens 3-4 times a night to urinate.    GERD- he does take omeprazole daily as directed, states his GERD is well controlled.    Hypothyroid- takes thyroid med daily on empty stomach, denies any problems with trouble swallowing, change in voice or constipation  LBP/DJD- he sees pain clinic every 2 mo for his LBP, he says his back pain is doing well at this time.    BPH- he in on testosterone pellets per Dr Zelaya   HCM- colonoscopy done 2018.  Pneumovax is UTD. He did not have Hep a or shingles vaccine  He is still interested in the bariatric surgery. He has been not been exercising and does not eat a HH diet.    He is bored.  He is not sad or anxious.  He has been staying home during coronavirus pandemic.  He is compliant with social distancing and wearing a mask  His sister  of MI at age 60 and brother  of brain aneurysm, both in past 6 mo.         The following portions of the patient's history were reviewed and updated as appropriate: allergies, current medications, past family history, past medical history, past social history, past surgical history and problem list.    Review of Systems   Constitutional: Negative for  "activity change, appetite change and unexpected weight change.   Eyes: Negative for visual disturbance.   Respiratory: Negative for shortness of breath.    Cardiovascular: Positive for leg swelling. Negative for chest pain and palpitations.   Gastrointestinal: Negative for abdominal pain.   Musculoskeletal: Positive for back pain.   Neurological: Positive for numbness. Negative for headaches.        Tingling and burning in feet   Psychiatric/Behavioral: Negative for dysphoric mood and sleep disturbance. The patient is not nervous/anxious.        Objective   /78   Pulse 86   Temp 97.1 °F (36.2 °C)   Ht 176.5 cm (69.49\")   Wt (!) 162 kg (358 lb)   SpO2 92%   BMI 52.12 kg/m²   Body mass index is 52.12 kg/m².  Physical Exam   Constitutional: He is oriented to person, place, and time. He appears well-developed and well-nourished. No distress.   Very kind and pleasant gentleman who appears his stated age, he is wearing a mask today, no distress, morbidly obese   HENT:   Head: Normocephalic and atraumatic.   Right Ear: External ear normal.   Left Ear: External ear normal.   Eyes: Pupils are equal, round, and reactive to light. Conjunctivae and EOM are normal.   Neck: Normal range of motion. Neck supple. No thyromegaly present.   Cardiovascular: Normal rate, normal heart sounds and intact distal pulses.   No murmur heard.  No carotid bruits  Irregularly irregular   Pulmonary/Chest: Effort normal and breath sounds normal. No respiratory distress. He has no wheezes.   Abdominal: Soft. Bowel sounds are normal. He exhibits no distension. There is no tenderness.   Obesity limits exam  Umbilical hernia noted   Musculoskeletal: Normal range of motion. He exhibits no edema.   No significant edema noted on exam today   Lymphadenopathy:     He has no cervical adenopathy.   Neurological: He is alert and oriented to person, place, and time. No cranial nerve deficit. Coordination normal.   Skin:   Feet with no callus or " ulcer, slightly diminished sensation over distal tips of toes to light touch bilaterally   Psychiatric: He has a normal mood and affect. His behavior is normal. Judgment and thought content normal.   Nursing note and vitals reviewed.      Assessment/Plan   Freddie Yeboah Jr. is here today and the following problems have been addressed:      Freddie was seen today for follow-up.    Diagnoses and all orders for this visit:    Benign essential hypertension  -     CBC & Differential  -     Comprehensive Metabolic Panel    Type 2 diabetes mellitus without complication, with long-term current use of insulin (CMS/[facility])  -     Insulin Glargine (LANTUS SOLOSTAR) 100 UNIT/ML injection pen; Inject 120 units once daily.  -     Comprehensive Metabolic Panel  -     Hemoglobin A1c    Mixed hyperlipidemia  -     Comprehensive Metabolic Panel  -     Lipid Panel With / Chol / HDL Ratio    Atrial fibrillation, unspecified type (CMS/[facility])    Other specified hypothyroidism  -     T4, Free  -     TSH    Obstructive sleep apnea syndrome    Gastroesophageal reflux disease without esophagitis    Class 3 severe obesity due to excess calories with serious comorbidity and body mass index (BMI) of 50.0 to 59.9 in adult (CMS/[facility])    Other orders  -     glipizide (GLUCOTROL) 10 MG tablet; Take 1 tablet by mouth 2 (Two) Times a Day Before Meals.  -     levothyroxine (SYNTHROID, LEVOTHROID) 75 MCG tablet; Take 1 tablet by mouth Daily.  -     omeprazole (priLOSEC) 40 MG capsule; Take 1 capsule by mouth Daily.  -     simvastatin (ZOCOR) 20 MG tablet; Take 1 tablet by mouth Every Night.        Follow heart healthy/low salt/diabetic diet  Avoid processed foods  Monitor blood pressure and BS as discussed  Exercise as tolerated -encouraged him to get outside and walk or do some light exercise in his yard  Take all medications as prescribed  See eye doctor annually or as discussed  Wear protective foot wear/no bare feet  Check feet regularly for calluses or  ulcers  Labs as noted  Continue omeprazole for underlying GERD symptoms  He continues to go to pain clinic every 2 months for his chronic back pain symptoms  Encouraged him to take Lasix only as needed for edema due to underlying chronic kidney disease, await labs to see kidney function since he has been taking Lasix daily  Continue CPAP for sleep apnea  Patient is considering bariatric surgery due to his obesity-encouraged him to follow a healthy diet and increase exercise as noted above      Return in about 3 months (around 8/28/2020) for Medicare Wellness.      Marilyn K. Vermeesch, MD      Please note that portions of this note were completed with a voice recognition program.  Efforts were made to edit dictation, but occasionally words are mistranscribed.

## 2020-05-29 LAB
ALBUMIN SERPL-MCNC: 4.7 G/DL (ref 3.5–5.2)
ALBUMIN/GLOB SERPL: 2 G/DL
ALP SERPL-CCNC: 89 U/L (ref 39–117)
ALT SERPL-CCNC: 19 U/L (ref 1–41)
AST SERPL-CCNC: 21 U/L (ref 1–40)
BILIRUB SERPL-MCNC: 0.6 MG/DL (ref 0.2–1.2)
BUN SERPL-MCNC: 25 MG/DL (ref 8–23)
BUN/CREAT SERPL: 13.9 (ref 7–25)
CALCIUM SERPL-MCNC: 9.7 MG/DL (ref 8.6–10.5)
CHLORIDE SERPL-SCNC: 93 MMOL/L (ref 98–107)
CO2 SERPL-SCNC: 34.4 MMOL/L (ref 22–29)
CREAT SERPL-MCNC: 1.8 MG/DL (ref 0.76–1.27)
GLOBULIN SER CALC-MCNC: 2.4 GM/DL
GLUCOSE SERPL-MCNC: 272 MG/DL (ref 65–99)
HBA1C MFR BLD: 10.19 % (ref 4.8–5.6)
POTASSIUM SERPL-SCNC: 5.1 MMOL/L (ref 3.5–5.2)
PROT SERPL-MCNC: 7.1 G/DL (ref 6–8.5)
SODIUM SERPL-SCNC: 137 MMOL/L (ref 136–145)

## 2020-07-06 ENCOUNTER — LAB (OUTPATIENT)
Dept: UROLOGY | Facility: CLINIC | Age: 70
End: 2020-07-06

## 2020-07-06 DIAGNOSIS — E29.1 HYPOGONADISM IN MALE: ICD-10-CM

## 2020-07-06 DIAGNOSIS — N40.0 BENIGN PROSTATIC HYPERPLASIA, UNSPECIFIED WHETHER LOWER URINARY TRACT SYMPTOMS PRESENT: Primary | ICD-10-CM

## 2020-07-06 DIAGNOSIS — N40.0 BENIGN PROSTATIC HYPERPLASIA, UNSPECIFIED WHETHER LOWER URINARY TRACT SYMPTOMS PRESENT: ICD-10-CM

## 2020-07-06 DIAGNOSIS — R79.89 LOW TESTOSTERONE: ICD-10-CM

## 2020-07-07 LAB
BASOPHILS # BLD AUTO: 0.1 X10E3/UL (ref 0–0.2)
BASOPHILS NFR BLD AUTO: 1 %
EOSINOPHIL # BLD AUTO: 0.1 X10E3/UL (ref 0–0.4)
EOSINOPHIL NFR BLD AUTO: 2 %
ERYTHROCYTE [DISTWIDTH] IN BLOOD BY AUTOMATED COUNT: 20.8 % (ref 11.6–15.4)
ESTRADIOL SERPL-MCNC: 8.7 PG/ML (ref 7.6–42.6)
HCT VFR BLD AUTO: 47.1 % (ref 37.5–51)
HGB BLD-MCNC: 13.2 G/DL (ref 13–17.7)
IMM GRANULOCYTES # BLD AUTO: 0 X10E3/UL (ref 0–0.1)
IMM GRANULOCYTES NFR BLD AUTO: 0 %
LYMPHOCYTES # BLD AUTO: 4 X10E3/UL (ref 0.7–3.1)
LYMPHOCYTES NFR BLD AUTO: 42 %
MCH RBC QN AUTO: 20.4 PG (ref 26.6–33)
MCHC RBC AUTO-ENTMCNC: 28 G/DL (ref 31.5–35.7)
MCV RBC AUTO: 73 FL (ref 79–97)
MONOCYTES # BLD AUTO: 1.1 X10E3/UL (ref 0.1–0.9)
MONOCYTES NFR BLD AUTO: 11 %
NEUTROPHILS # BLD AUTO: 4.2 X10E3/UL (ref 1.4–7)
NEUTROPHILS NFR BLD AUTO: 44 %
PLATELET # BLD AUTO: 286 X10E3/UL (ref 150–450)
PSA SERPL-MCNC: 0.8 NG/ML (ref 0–4)
RBC # BLD AUTO: 6.46 X10E6/UL (ref 4.14–5.8)
TESTOST SERPL-MCNC: 395 NG/DL (ref 264–916)
WBC # BLD AUTO: 9.6 X10E3/UL (ref 3.4–10.8)

## 2020-07-16 ENCOUNTER — OFFICE VISIT (OUTPATIENT)
Dept: UROLOGY | Facility: CLINIC | Age: 70
End: 2020-07-16

## 2020-07-16 VITALS — HEIGHT: 69 IN | BODY MASS INDEX: 46.65 KG/M2 | WEIGHT: 315 LBS

## 2020-07-16 DIAGNOSIS — E29.1 HYPOGONADISM IN MALE: Primary | ICD-10-CM

## 2020-07-16 DIAGNOSIS — E29.1 HYPOGONADISM MALE: ICD-10-CM

## 2020-07-16 DIAGNOSIS — E28.0 ESTRADIOL EXCESS: ICD-10-CM

## 2020-07-16 LAB
BILIRUB BLD-MCNC: NEGATIVE MG/DL
CLARITY, POC: CLEAR
COLOR UR: YELLOW
GLUCOSE UR STRIP-MCNC: NEGATIVE MG/DL
KETONES UR QL: NEGATIVE
LEUKOCYTE EST, POC: NEGATIVE
NITRITE UR-MCNC: NEGATIVE MG/ML
PH UR: 6 [PH] (ref 5–8)
PROT UR STRIP-MCNC: NEGATIVE MG/DL
RBC # UR STRIP: NEGATIVE /UL
SP GR UR: 1.02 (ref 1–1.03)
UROBILINOGEN UR QL: NORMAL

## 2020-07-16 PROCEDURE — 81003 URINALYSIS AUTO W/O SCOPE: CPT | Performed by: UROLOGY

## 2020-07-16 PROCEDURE — 99214 OFFICE O/P EST MOD 30 MIN: CPT | Performed by: UROLOGY

## 2020-07-16 RX ORDER — INSULIN LISPRO 100 [IU]/ML
INJECTION, SOLUTION INTRAVENOUS; SUBCUTANEOUS
COMMUNITY
Start: 2020-05-19 | End: 2020-12-01 | Stop reason: SDUPTHER

## 2020-07-16 RX ORDER — ANASTROZOLE 1 MG/1
1 TABLET ORAL 2 TIMES WEEKLY
Qty: 24 TABLET | Refills: 3 | Status: SHIPPED | OUTPATIENT
Start: 2020-07-16 | End: 2020-12-30

## 2020-07-16 RX ORDER — TESTOSTERONE CYPIONATE 200 MG/ML
400 INJECTION, SOLUTION INTRAMUSCULAR
Qty: 4 ML | Refills: 2 | Status: SHIPPED | OUTPATIENT
Start: 2020-07-16 | End: 2020-09-14 | Stop reason: SDUPTHER

## 2020-07-16 NOTE — PROGRESS NOTES
Chief Complaint  Hypogonadism (4 month follow up with labs.)        TARUN Yeboah is a 70 y.o. male who returns today for follow-up of hypogonadism and low testosterone level having achieved a great benefit from supplement and anxious to continue.  Unfortunately his favorite method of administration are the Testopel pellets and they have become nationally backordered.  He is informed about his options and chooses the self injection of double testosterone using 400 mg every 14 days.  He is a diabetic already giving himself an insulin shot.  He understands the need to monitor for toxicity therapy and comes by regularly for blood work.  Fortunately all parameters are currently nominal.    There were no vitals filed for this visit.    Past Medical History  Past Medical History:   Diagnosis Date   • Arthritis     shoulder, knee. multi joint    • Atrial fibrillation (CMS/HCC)    • Back pain    • Chronic kidney disease     stage III due to diabetes   • Diabetes mellitus (CMS/HCC)     several years; checks sugars at home-usually run 100-112   • Diabetic nephropathy (CMS/HCC)    • Disease of thyroid gland     hypothyroidism   • GERD (gastroesophageal reflux disease)    • Hypertension    • Low testosterone    • Sleep apnea     setting of 11   • Urinary tract infection     most recent diagnosis 8/29/17 in the ER (+3 bacteria)-started on Macrobid and rechecked by PCP on 9/5/17       Past Surgical History  Past Surgical History:   Procedure Laterality Date   • ANTERIOR CERVICAL FUSION     • APPENDECTOMY     • BACK SURGERY     • CARPAL TUNNEL RELEASE Bilateral    • CATARACT EXTRACTION     • CHOLECYSTECTOMY     • COLONOSCOPY  2013   • FINGER/THUMB ARTHROPLASTY Left     thumb replacement   • VT RECONSTR TOTAL SHOULDER IMPLANT Left 9/11/2017    Procedure: TOTAL SHOULDER ARTHROPLASTY LEFT;  Surgeon: William Ray MD;  Location: [facility];  Service: Orthopedics   • SHOULDER SURGERY     • TOTAL KNEE ARTHROPLASTY Left         Medications  has a current medication list which includes the following prescription(s): anastrozole, apixaban, aspirin, bumetanide, calcipotriene, digoxin, docusate sodium, fluorouracil, glipizide, glucosamine-chondroitin, glucose blood, glucose monitor, insulin glargine, insulin lispro, insulin lispro (1 unit dial), pen needles, isosorbide mononitrate, levothyroxine, metoprolol tartrate, morphine, multivital, fish oil, omeprazole, onetouch delica lancets 33g, ropivacaine hcl-nacl, simvastatin, spironolactone, telmisartan, tizanidine, and oxycodone-acetaminophen.      Allergies  Allergies   Allergen Reactions   • Duloxetine Itching   • Sulfa Antibiotics Other (See Comments)     He reports renal failure after a round of sulfa   • Sulfamethoxazole-Trimethoprim Other (See Comments)     Had to be hospitalized in ICU; renal impairment        Social History  Social History     Socioeconomic History   • Marital status:      Spouse name: Not on file   • Number of children: Not on file   • Years of education: Not on file   • Highest education level: Not on file   Occupational History     Employer: RETIRED   Tobacco Use   • Smoking status: Former Smoker     Packs/day: 3.00     Years: 34.00     Pack years: 102.00     Last attempt to quit:      Years since quittin.5   • Smokeless tobacco: Never Used   Substance and Sexual Activity   • Alcohol use: No   • Drug use: No   • Sexual activity: Yes     Partners: Female       Family History  He has no family history of bladder or kidney cancer  He has no family history of kidney stones      AUA Symptom Score:      Review of Systems  Review of Systems   Constitutional: Negative for activity change, appetite change, chills, fatigue, fever, unexpected weight gain and unexpected weight loss.   Respiratory: Negative for apnea, cough, chest tightness, shortness of breath, wheezing and stridor.    Cardiovascular: Negative for chest pain, palpitations and leg swelling.    Gastrointestinal: Negative for abdominal distention, abdominal pain, anal bleeding, blood in stool, constipation, diarrhea, nausea, rectal pain, vomiting, GERD and indigestion.   Genitourinary: Negative for decreased libido, decreased urine volume, difficulty urinating, discharge, dysuria, flank pain, frequency, genital sores, hematuria, nocturia, penile pain, erectile dysfunction, penile swelling, scrotal swelling, testicular pain, urgency and urinary incontinence.   Musculoskeletal: Negative for back pain and joint swelling.   Neurological: Negative for tremors, seizures, speech difficulty, weakness and numbness.   Psychiatric/Behavioral: Negative for agitation, decreased concentration, sleep disturbance, depressed mood and stress. The patient is not nervous/anxious.        Physical Exam  Physical Exam   Constitutional: He is oriented to person, place, and time. He appears well-developed and well-nourished.   HENT:   Head: Normocephalic and atraumatic.   Neck: Normal range of motion.   Pulmonary/Chest: Effort normal. No respiratory distress.   Abdominal: Soft. He exhibits no distension and no mass. There is no tenderness. No hernia.   Musculoskeletal: Normal range of motion.   Lymphadenopathy:     He has no cervical adenopathy.   Neurological: He is alert and oriented to person, place, and time.   Skin: Skin is warm and dry.   Psychiatric: He has a normal mood and affect. His behavior is normal.   Vitals reviewed.      Labs Recent and today in the office:  Results for orders placed or performed in visit on 07/16/20   POC Urinalysis Dipstick, Automated   Result Value Ref Range    Color Yellow Yellow, Straw, Dark Yellow, Elinor    Clarity, UA Clear Clear    Specific Gravity  1.020 1.005 - 1.030    pH, Urine 6.0 5.0 - 8.0    Leukocytes Negative Negative    Nitrite, UA Negative Negative    Protein, POC Negative Negative mg/dL    Glucose, UA Negative Negative, 1000 mg/dL (3+) mg/dL    Ketones, UA Negative Negative     Urobilinogen, UA Normal Normal    Bilirubin Negative Negative    Blood, UA Negative Negative         Assessment & Plan  Hypogonadism: He is enjoyed a good benefit from supplement and is anxious to continue but his preferred method are pellets and they are no longer available.  He is already giving himself daily insulin shots so he will be instructed in self administration of testosterone when he obtains the medicine and returns to the office.  Otherwise he will need to return in 3 months to monitor for toxicity of therapy.

## 2020-07-16 NOTE — PROGRESS NOTES
Patient is going to self inject testosterone at home, testosterone has been prescribed to the pharmacy. The proper technique was demonstrated and the patient feels comfortable giving himself and IM injection and so he will start these at home today.

## 2020-08-28 ENCOUNTER — OFFICE VISIT (OUTPATIENT)
Dept: INTERNAL MEDICINE | Facility: CLINIC | Age: 70
End: 2020-08-28

## 2020-08-28 VITALS
HEIGHT: 69 IN | HEART RATE: 84 BPM | DIASTOLIC BLOOD PRESSURE: 72 MMHG | SYSTOLIC BLOOD PRESSURE: 138 MMHG | OXYGEN SATURATION: 94 % | WEIGHT: 315 LBS | TEMPERATURE: 97.1 F | BODY MASS INDEX: 46.65 KG/M2

## 2020-08-28 DIAGNOSIS — N28.9 RENAL INSUFFICIENCY: ICD-10-CM

## 2020-08-28 DIAGNOSIS — G47.33 OBSTRUCTIVE SLEEP APNEA SYNDROME: ICD-10-CM

## 2020-08-28 DIAGNOSIS — E78.2 MIXED HYPERLIPIDEMIA: ICD-10-CM

## 2020-08-28 DIAGNOSIS — K21.9 GASTROESOPHAGEAL REFLUX DISEASE WITHOUT ESOPHAGITIS: ICD-10-CM

## 2020-08-28 DIAGNOSIS — Z00.00 ENCOUNTER FOR MEDICARE ANNUAL WELLNESS EXAM: Primary | ICD-10-CM

## 2020-08-28 DIAGNOSIS — E11.9 TYPE 2 DIABETES MELLITUS WITHOUT COMPLICATION, WITH LONG-TERM CURRENT USE OF INSULIN (HCC): ICD-10-CM

## 2020-08-28 DIAGNOSIS — E03.8 OTHER SPECIFIED HYPOTHYROIDISM: ICD-10-CM

## 2020-08-28 DIAGNOSIS — Z79.4 TYPE 2 DIABETES MELLITUS WITHOUT COMPLICATION, WITH LONG-TERM CURRENT USE OF INSULIN (HCC): ICD-10-CM

## 2020-08-28 DIAGNOSIS — I10 BENIGN ESSENTIAL HYPERTENSION: ICD-10-CM

## 2020-08-28 DIAGNOSIS — I48.91 ATRIAL FIBRILLATION, UNSPECIFIED TYPE (HCC): ICD-10-CM

## 2020-08-28 DIAGNOSIS — E66.01 CLASS 3 SEVERE OBESITY DUE TO EXCESS CALORIES WITH SERIOUS COMORBIDITY AND BODY MASS INDEX (BMI) OF 50.0 TO 59.9 IN ADULT (HCC): ICD-10-CM

## 2020-08-28 PROCEDURE — G0008 ADMIN INFLUENZA VIRUS VAC: HCPCS | Performed by: INTERNAL MEDICINE

## 2020-08-28 PROCEDURE — 90653 IIV ADJUVANT VACCINE IM: CPT | Performed by: INTERNAL MEDICINE

## 2020-08-28 PROCEDURE — G0439 PPPS, SUBSEQ VISIT: HCPCS | Performed by: INTERNAL MEDICINE

## 2020-08-28 NOTE — PROGRESS NOTES
The ABCs of the Annual Wellness Visit  Subsequent Medicare Wellness Visit    Chief Complaint   Patient presents with   • Medicare Wellness-subsequent       Subjective   History of Present Illness:  Freddie Yebaoh Jr. is a 70 y.o. male who presents for a Subsequent Medicare Wellness Visit.  PMH of HTN, HLD, A. fib, LUIS FERNANDO, GERD, morbid obesity, hypothyroid, DM, chronic back pain, DJD, renal insufficiency and BPH.  BP is well controlled today.  Weight is up 5 more pounds from 1 month ago.  Last A1c was 10.19 in May of this year.  Patient is supposed to be taking 120 units of long-acting insulin and 20 units of short acting with every meal.  He does not follow a healthy meal plan, eats late at night, eats takeout meals almost every day.  He does not exercise, but is very active outside working.  He uses his CPAP every night.  No recent GERD sxs.  He takes thyroid med in AM.     HEALTH RISK ASSESSMENT    Recent Hospitalizations:  No hospitalization(s) within the last year.    Current Medical Providers:  Patient Care Team:  Vermeesch, Marilyn K, MD as PCP - General    Smoking Status:  Social History     Tobacco Use   Smoking Status Former Smoker   • Packs/day: 3.00   • Years: 34.00   • Pack years: 102.00   • Last attempt to quit:    • Years since quittin.6   Smokeless Tobacco Never Used       Alcohol Consumption:  Social History     Substance and Sexual Activity   Alcohol Use No       Depression Screen:   PHQ-2/PHQ-9 Depression Screening 2020   Little interest or pleasure in doing things 0   Feeling down, depressed, or hopeless 0   Total Score 0       Fall Risk Screen:  STEADI Fall Risk Assessment was completed, and patient is at HIGH risk for falls. Assessment completed on:2020    Health Habits and Functional and Cognitive Screening:  Functional & Cognitive Status 2020   Do you have difficulty preparing food and eating? No   Do you have difficulty bathing yourself, getting dressed or grooming  yourself? No   Do you have difficulty using the toilet? No   Do you have difficulty moving around from place to place? Yes   Do you have trouble with steps or getting out of a bed or a chair? Yes   Current Diet Unhealthy Diet   Dental Exam Up to date   Eye Exam Up to date   Exercise (times per week) -   Current Exercise Activities Include -   Do you need help using the phone?  No   Are you deaf or do you have serious difficulty hearing?  Yes   Do you need help with transportation? No   Do you need help shopping? No   Do you need help preparing meals?  No   Do you need help with housework?  No   Do you need help with laundry? No   Do you need help taking your medications? Yes   Do you need help managing money? No   Do you ever drive or ride in a car without wearing a seat belt? Yes   Have you felt unusual stress, anger or loneliness in the last month? No   Who do you live with? Spouse   If you need help, do you have trouble finding someone available to you? No   Do you have difficulty concentrating, remembering or making decisions? Yes         Does the patient have evidence of cognitive impairment? No    Asprin use counseling:Taking ASA appropriately as indicated    Age-appropriate Screening Schedule:  Refer to the list below for future screening recommendations based on patient's age, sex and/or medical conditions. Orders for these recommended tests are listed in the plan section. The patient has been provided with a written plan.    Health Maintenance   Topic Date Due   • URINE MICROALBUMIN  05/22/2016   • ZOSTER VACCINE (2 of 2) 07/11/2017   • LIPID PANEL  05/24/2020   • DIABETIC EYE EXAM  08/27/2020   • INFLUENZA VACCINE  08/01/2020   • HEMOGLOBIN A1C  11/28/2020   • DIABETIC FOOT EXAM  05/15/2021   • TDAP/TD VACCINES (2 - Td) 10/09/2022   • COLONOSCOPY  11/08/2023          The following portions of the patient's history were reviewed and updated as appropriate: allergies, current medications, past family  history, past medical history, past social history, past surgical history and problem list.    Outpatient Medications Prior to Visit   Medication Sig Dispense Refill   • anastrozole (ARIMIDEX) 1 MG tablet Take 1 tablet by mouth 2 (Two) Times a Week. 24 tablet 3   • apixaban (ELIQUIS) 5 MG tablet tablet Take 1 tablet by mouth 2 (Two) Times a Day. Resume tomorrow 9/13/17 60 tablet    • aspirin 81 MG tablet Take 1 tablet by mouth daily.     • bumetanide (BUMEX) 1 MG tablet 1 mg 3 (Three) Times a Day.     • calcipotriene (DOVONEX) 0.005 % cream      • digoxin (LANOXIN) 125 MCG tablet Take 125 mcg by mouth Daily.     • docusate sodium 100 MG capsule Take 100 mg by mouth 2 (Two) Times a Day As Needed (constipation). 60 each 0   • fluorouracil (EFUDEX) 5 % cream      • glipizide (GLUCOTROL) 10 MG tablet Take 1 tablet by mouth 2 (Two) Times a Day Before Meals. 180 tablet 1   • glucosamine-chondroitin 500-400 MG capsule capsule Take 1 capsule by mouth 2 (Two) Times a Day.     • glucose blood test strip Use to test blood sugar level 3 times daily. DX: E11.9 300 each 3   • glucose monitor monitoring kit Use meter to check blood sugar levels twice daily. 1 each 0   • Insulin Glargine (LANTUS SOLOSTAR) 100 UNIT/ML injection pen Inject 120 units once daily. 12 pen 1   • Insulin Lispro (HUMALOG) 100 UNIT/ML solution pen-injector Inject 10 Units under the skin into the appropriate area as directed 3 (Three) Times a Day With Meals. 10 mL 5   • Insulin Lispro, 1 Unit Dial, (HUMALOG) 100 UNIT/ML solution pen-injector      • Insulin Pen Needle (PEN NEEDLES) 31G X 8 MM misc Use to inject insulin five times daily. DX: E11.9 200 each 12   • isosorbide mononitrate (IMDUR) 60 MG 24 hr tablet Take 60 mg by mouth Daily With Breakfast.     • levothyroxine (SYNTHROID, LEVOTHROID) 75 MCG tablet Take 1 tablet by mouth Daily. 90 tablet 1   • metoprolol tartrate (LOPRESSOR) 100 MG tablet Take 100 mg by mouth 2 (Two) Times a Day.     • Morphine  "(WANDY) 50 MG 24 hr capsule      • Multiple Vitamins-Minerals (MULTIVITAL) tablet Take 1 tablet by mouth Daily.     • Omega-3 Fatty Acids (FISH OIL) 1200 MG capsule capsule Take 1,200 mg by mouth 2 (Two) Times a Day With Meals. Take as directed     • omeprazole (priLOSEC) 40 MG capsule Take 1 capsule by mouth Daily. 90 capsule 3   • ONETOUCH DELICA LANCETS 33G misc Use to test blood sugar level 3 times daily. DX: E11.9 300 each 3   • oxyCODONE-acetaminophen (PERCOCET)  MG per tablet Take  tablets by mouth As Needed.     • Ropivacaine HCl-NaCl (NAROPIN) 0.2-0.9 % 12 mg/hr by Peripheral Nerve route Continuous.     • simvastatin (ZOCOR) 20 MG tablet Take 1 tablet by mouth Every Night. 90 tablet 1   • spironolactone (ALDACTONE) 25 MG tablet 25 mg Daily.     • Syringe/Needle, Disp, 22G X 1-1/2\" 3 ML misc Use to inject 2 mL Every 14 (Fourteen) Days. 25 each 3   • telmisartan (MICARDIS) 20 MG tablet      • Testosterone Cypionate (Depo-Testosterone) 200 MG/ML injection Inject 2 mL into the appropriate muscle as directed by prescriber Every 14 (Fourteen) Days. 4 mL 2   • tiZANidine (ZANAFLEX) 4 MG tablet Take 8 mg by mouth every night at bedtime.       No facility-administered medications prior to visit.        Patient Active Problem List   Diagnosis   • Asthma   • Atrial fibrillation (CMS/HCC)   • Back pain   • Benign essential hypertension   • Benign prostatic hyperplasia   • Chronic pain   • Gastroesophageal reflux disease   • Hyperlipidemia   • Hypothyroidism   • Low back pain   • Adiposity   • Obstructive sleep apnea syndrome   • Osteoarthritis   • Carotid bruit   • Cor pulmonale (CMS/HCC)   • Renal insufficiency   • Secondary pulmonary hypertension   • Type 2 diabetes mellitus without complication (CMS/HCC)   • Degeneration of cervical intervertebral disc   • Encounter for Medicare annual wellness exam   • Obesity due to excess calories with serious comorbidity       Advanced Care Planning:  ACP discussion " "was declined by the patient. Patient does not have an advance directive, declines further assistance.    Review of Systems   Constitutional: Negative.    HENT: Positive for hearing loss.    Eyes: Negative.    Respiratory: Negative.    Cardiovascular: Negative.    Gastrointestinal: Positive for diarrhea.   Endocrine: Negative.    Genitourinary:        Urinates about 4 times a night   Musculoskeletal: Positive for arthralgias and back pain.        Bilateral knee pain   Skin:        Skin lesions over forearms, has appt with derm   Allergic/Immunologic: Negative.    Neurological: Negative.    Hematological: Negative.    Psychiatric/Behavioral: Positive for sleep disturbance.       Compared to one year ago, the patient feels his physical health is the same.  Compared to one year ago, the patient feels his mental health is the same.    Reviewed chart for potential of high risk medication in the elderly: yes  Reviewed chart for potential of harmful drug interactions in the elderly:yes    Objective         Vitals:    08/28/20 1545   BP: 138/72   Pulse: 84   Temp: 97.1 °F (36.2 °C)   SpO2: 94%   Weight: (!) 165 kg (363 lb)   Height: 176.5 cm (69.49\")   PainSc:   8       Body mass index is 52.85 kg/m².  Discussed the patient's BMI with him. The BMI is above average; no BMI management plan is appropriate..    Physical Exam   Constitutional: He is oriented to person, place, and time. He appears well-developed and well-nourished. No distress.   Very pleasant man, he appears stated age, morbidly obese, wearing his mask and in no distress today   HENT:   Head: Normocephalic and atraumatic.   Right Ear: External ear normal.   Left Ear: External ear normal.   TMs normal, canals free of wax   Eyes: Pupils are equal, round, and reactive to light. Conjunctivae and EOM are normal. Right eye exhibits no discharge. Left eye exhibits no discharge.   Neck: Normal range of motion. Neck supple. No thyromegaly present.   Cardiovascular: Normal " rate, regular rhythm, normal heart sounds and intact distal pulses.   No murmur heard.  No carotid bruits  Ectopic beat on occasion   Pulmonary/Chest: Effort normal and breath sounds normal. No respiratory distress. He has no wheezes.   Abdominal: Soft. Bowel sounds are normal. He exhibits no distension. There is no tenderness.   Obesity limits exam   Musculoskeletal: Normal range of motion. He exhibits no edema.   Lymphadenopathy:     He has no cervical adenopathy.   Neurological: He is alert and oriented to person, place, and time. No cranial nerve deficit or sensory deficit. Coordination normal.   Skin: Skin is dry. No rash noted.   Feet with no callus or ulcer, sensation grossly intact  Brawny skin color changes over distal legs, skin is dry   Psychiatric: He has a normal mood and affect. His behavior is normal. Judgment and thought content normal.   Nursing note and vitals reviewed.            Assessment/Plan   Medicare Risks and Personalized Health Plan  CMS Preventative Services Quick Reference  Advance Directive Discussion  Cardiovascular risk  Diabetic Lab Screening   Hearing Problem  Immunizations Discussed/Encouraged (specific immunizations; Hepatitis A Vaccine/Series, Influenza and Shingrix )  Obesity/Overweight     The above risks/problems have been discussed with the patient.  Pertinent information has been shared with the patient in the After Visit Summary.  Follow up plans and orders are seen below in the Assessment/Plan Section.    Diagnoses and all orders for this visit:    1. Encounter for Medicare annual wellness exam (Primary)    2. Benign essential hypertension    3. Mixed hyperlipidemia    4. Atrial fibrillation, unspecified type (CMS/formerly [facility])    5. Obstructive sleep apnea syndrome    6. Gastroesophageal reflux disease without esophagitis    7. Class 3 severe obesity due to excess calories with serious comorbidity and body mass index (BMI) of 50.0 to 59.9 in adult (CMS/formerly [facility])    8. Other specified  hypothyroidism    9. Type 2 diabetes mellitus without complication, with long-term current use of insulin (CMS/[facility])    10. Renal insufficiency    Other orders  -     Fluad Quad 65+ yrs ([id number])    Follow heart healthy/low salt/diabetic diet-monitor portion sizes.  Encouraged patient to eat at home and avoid fast food  Avoid processed foods  Monitor blood pressure and blood sugars as discussed  Exercise as tolerated up to 30 minutes 5 days per week  Take all medications as prescribed  Labs due from May 28, 2020 orders  Continue CPAP for sleep apnea  Continue omeprazole, GERD well controlled  Continue current dose of levothyroxine, will adjust dose if needed based on lab results  Follow-up with cardiologist and renal doctor as scheduled  Follow-up with urologist for BPH and low testosterone  If A1c remains elevated above 9, will refer patient to endocrinologist due to poorly controlled diabetes and high doses of insulin  High-dose flu shot given today   Recommend hepatitis A and shingles vaccine at local pharmacy    follow Up:  Return in about 3 months (around 11/28/2020) for Next scheduled follow up.     An After Visit Summary and PPPS were given to the patient.

## 2020-09-02 DIAGNOSIS — Z79.4 TYPE 2 DIABETES MELLITUS WITHOUT COMPLICATION, WITH LONG-TERM CURRENT USE OF INSULIN (HCC): ICD-10-CM

## 2020-09-02 DIAGNOSIS — E11.9 TYPE 2 DIABETES MELLITUS WITHOUT COMPLICATION, WITH LONG-TERM CURRENT USE OF INSULIN (HCC): ICD-10-CM

## 2020-09-14 ENCOUNTER — TELEPHONE (OUTPATIENT)
Dept: UROLOGY | Facility: CLINIC | Age: 70
End: 2020-09-14

## 2020-09-14 DIAGNOSIS — E29.1 HYPOGONADISM IN MALE: ICD-10-CM

## 2020-09-14 RX ORDER — TESTOSTERONE CYPIONATE 200 MG/ML
400 INJECTION, SOLUTION INTRAMUSCULAR
Qty: 4 ML | Refills: 2 | Status: SHIPPED | OUTPATIENT
Start: 2020-09-14 | End: 2020-10-13 | Stop reason: SDUPTHER

## 2020-09-29 RX ORDER — INSULIN LISPRO 100 [IU]/ML
10 INJECTION, SOLUTION INTRAVENOUS; SUBCUTANEOUS 3 TIMES DAILY
Qty: 30 ML | Refills: 3 | Status: SHIPPED | OUTPATIENT
Start: 2020-09-29 | End: 2021-04-19 | Stop reason: SDUPTHER

## 2020-10-12 ENCOUNTER — TELEPHONE (OUTPATIENT)
Dept: UROLOGY | Facility: CLINIC | Age: 70
End: 2020-10-12

## 2020-10-13 ENCOUNTER — TELEPHONE (OUTPATIENT)
Dept: UROLOGY | Facility: CLINIC | Age: 70
End: 2020-10-13

## 2020-10-13 DIAGNOSIS — E29.1 HYPOGONADISM IN MALE: ICD-10-CM

## 2020-10-13 RX ORDER — TESTOSTERONE CYPIONATE 200 MG/ML
400 INJECTION, SOLUTION INTRAMUSCULAR
Qty: 4 ML | Refills: 2 | Status: SHIPPED | OUTPATIENT
Start: 2020-10-13 | End: 2021-03-15

## 2020-10-13 RX ORDER — TESTOSTERONE CYPIONATE 200 MG/ML
400 INJECTION, SOLUTION INTRAMUSCULAR
Qty: 4 ML | Refills: 2 | Status: SHIPPED | OUTPATIENT
Start: 2020-10-13 | End: 2020-10-13 | Stop reason: SDUPTHER

## 2020-10-14 ENCOUNTER — TRANSCRIBE ORDERS (OUTPATIENT)
Dept: ADMINISTRATIVE | Facility: HOSPITAL | Age: 70
End: 2020-10-14

## 2020-10-14 DIAGNOSIS — J98.4 RESTRICTIVE LUNG DISEASE: Primary | ICD-10-CM

## 2020-10-23 ENCOUNTER — HOSPITAL ENCOUNTER (OUTPATIENT)
Dept: CT IMAGING | Facility: HOSPITAL | Age: 70
Discharge: HOME | End: 2020-10-23
Admitting: INTERNAL MEDICINE

## 2020-10-23 DIAGNOSIS — J98.4 RESTRICTIVE LUNG DISEASE: ICD-10-CM

## 2020-10-23 PROCEDURE — 71250 CT THORAX DX C-: CPT

## 2020-10-26 LAB
ALBUMIN SERPL-MCNC: 4.4 G/DL (ref 3.5–5.2)
ALBUMIN/GLOB SERPL: 1.9 G/DL
ALP SERPL-CCNC: 90 U/L (ref 39–117)
ALT SERPL-CCNC: 12 U/L (ref 1–41)
AST SERPL-CCNC: 21 U/L (ref 1–40)
BASOPHILS # BLD MANUAL: 0.1 10*3/MM3 (ref 0–0.2)
BASOPHILS NFR BLD MANUAL: 1 % (ref 0–1.5)
BILIRUB SERPL-MCNC: 1.2 MG/DL (ref 0–1.2)
BUN SERPL-MCNC: 17 MG/DL (ref 8–23)
BUN/CREAT SERPL: 9.3 (ref 7–25)
CALCIUM SERPL-MCNC: 9.2 MG/DL (ref 8.6–10.5)
CHLORIDE SERPL-SCNC: 98 MMOL/L (ref 98–107)
CHOLEST SERPL-MCNC: 82 MG/DL (ref 0–200)
CHOLEST/HDLC SERPL: 2.93 {RATIO}
CO2 SERPL-SCNC: 31.8 MMOL/L (ref 22–29)
CREAT SERPL-MCNC: 1.83 MG/DL (ref 0.76–1.27)
DIFFERENTIAL COMMENT: ABNORMAL
EOSINOPHIL # BLD MANUAL: 0.29 10*3/MM3 (ref 0–0.4)
EOSINOPHIL NFR BLD MANUAL: 2.9 % (ref 0.3–6.2)
ERYTHROCYTE [DISTWIDTH] IN BLOOD BY AUTOMATED COUNT: 20.8 % (ref 12.3–15.4)
GLOBULIN SER CALC-MCNC: 2.3 GM/DL
GLUCOSE SERPL-MCNC: 75 MG/DL (ref 65–99)
HBA1C MFR BLD: 7.7 % (ref 4.8–5.6)
HCT VFR BLD AUTO: 45.8 % (ref 37.5–51)
HDLC SERPL-MCNC: 28 MG/DL (ref 40–60)
HGB BLD-MCNC: 12.1 G/DL (ref 13–17.7)
LDLC SERPL CALC-MCNC: 39 MG/DL (ref 0–100)
LYMPHOCYTES # BLD MANUAL: 3.69 10*3/MM3 (ref 0.7–3.1)
LYMPHOCYTES NFR BLD MANUAL: 36.9 % (ref 19.6–45.3)
MCH RBC QN AUTO: 17.9 PG (ref 26.6–33)
MCHC RBC AUTO-ENTMCNC: 26.4 G/DL (ref 31.5–35.7)
MCV RBC AUTO: 67.8 FL (ref 79–97)
MONOCYTES # BLD MANUAL: 0.78 10*3/MM3 (ref 0.1–0.9)
MONOCYTES NFR BLD MANUAL: 7.8 % (ref 5–12)
NEUTROPHILS # BLD MANUAL: 5.14 10*3/MM3 (ref 1.7–7)
NEUTROPHILS NFR BLD MANUAL: 51.5 % (ref 42.7–76)
NRBC BLD AUTO-RTO: 0.4 /100 WBC (ref 0–0.2)
PLATELET # BLD AUTO: 324 10*3/MM3 (ref 140–450)
PLATELET BLD QL SMEAR: ABNORMAL
POTASSIUM SERPL-SCNC: 5 MMOL/L (ref 3.5–5.2)
PROT SERPL-MCNC: 6.7 G/DL (ref 6–8.5)
RBC # BLD AUTO: 6.76 10*6/MM3 (ref 4.14–5.8)
RBC MORPH BLD: ABNORMAL
SODIUM SERPL-SCNC: 141 MMOL/L (ref 136–145)
T4 FREE SERPL-MCNC: 1.58 NG/DL (ref 0.93–1.7)
TRIGL SERPL-MCNC: 65 MG/DL (ref 0–150)
TSH SERPL DL<=0.005 MIU/L-ACNC: 3.07 UIU/ML (ref 0.27–4.2)
VLDLC SERPL CALC-MCNC: 15 MG/DL (ref 5–40)
WBC # BLD AUTO: 9.99 10*3/MM3 (ref 3.4–10.8)

## 2020-10-27 ENCOUNTER — OFFICE VISIT (OUTPATIENT)
Dept: UROLOGY | Facility: CLINIC | Age: 70
End: 2020-10-27

## 2020-10-27 DIAGNOSIS — E29.1 HYPOGONADISM IN MALE: Primary | ICD-10-CM

## 2020-10-27 DIAGNOSIS — E28.0 ESTRADIOL EXCESS: ICD-10-CM

## 2020-10-27 LAB
BILIRUB BLD-MCNC: NEGATIVE MG/DL
CLARITY, POC: CLEAR
COLOR UR: YELLOW
GLUCOSE UR STRIP-MCNC: NEGATIVE MG/DL
KETONES UR QL: NEGATIVE
LEUKOCYTE EST, POC: NEGATIVE
NITRITE UR-MCNC: NEGATIVE MG/ML
PH UR: 6.5 [PH] (ref 5–8)
PROT UR STRIP-MCNC: ABNORMAL MG/DL
RBC # UR STRIP: NEGATIVE /UL
SP GR UR: 1.01 (ref 1–1.03)
UROBILINOGEN UR QL: NORMAL

## 2020-10-27 PROCEDURE — 99214 OFFICE O/P EST MOD 30 MIN: CPT | Performed by: UROLOGY

## 2020-10-27 PROCEDURE — 81003 URINALYSIS AUTO W/O SCOPE: CPT | Performed by: UROLOGY

## 2020-10-27 NOTE — PROGRESS NOTES
Chief Complaint  Hypogonadism        TARUN Yeboah is a 70 y.o. male who returns today for follow-up of hypogonadism having achieved a significant benefit from supplement and anxious to continue.  He has been receiving 400 mg every 14 days since the Testopel pellets have been on backorder.  This is his preferred method of supplement.  Fortunately they have recently become available again.  He states he is already purchased an injection he is going to take tomorrow and then will return in 2 weeks for the pellets.  He is also taking a blood thinner that will need to be held for 5 days prior to the implant.    There were no vitals filed for this visit.    Past Medical History  Past Medical History:   Diagnosis Date   • Arthritis     shoulder, knee. multi joint    • Atrial fibrillation (CMS/HCC)    • Back pain    • Chronic kidney disease     stage III due to diabetes   • Diabetes mellitus (CMS/HCC)     several years; checks sugars at home-usually run 100-112   • Diabetic nephropathy (CMS/HCC)    • Disease of thyroid gland     hypothyroidism   • GERD (gastroesophageal reflux disease)    • Hypertension    • Low testosterone    • Sleep apnea     setting of 11   • Urinary tract infection     most recent diagnosis 8/29/17 in the ER (+3 bacteria)-started on Macrobid and rechecked by PCP on 9/5/17       Past Surgical History  Past Surgical History:   Procedure Laterality Date   • ANTERIOR CERVICAL FUSION     • APPENDECTOMY     • BACK SURGERY     • CARPAL TUNNEL RELEASE Bilateral    • CATARACT EXTRACTION     • CHOLECYSTECTOMY     • COLONOSCOPY  2013   • FINGER/THUMB ARTHROPLASTY Left     thumb replacement   • LA RECONSTR TOTAL SHOULDER IMPLANT Left 9/11/2017    Procedure: TOTAL SHOULDER ARTHROPLASTY LEFT;  Surgeon: William Ray MD;  Location: [facility];  Service: Orthopedics   • SHOULDER SURGERY     • TOTAL KNEE ARTHROPLASTY Left        Medications  has a current medication list which includes the following prescription(s):  anastrozole, apixaban, aspirin, bumetanide, calcipotriene, digoxin, docusate sodium, fluorouracil, glipizide, glucosamine-chondroitin, glucose blood, glucose monitor, insulin glargine, insulin lispro (1 unit dial), insulin lispro (1 unit dial), pen needles, isosorbide mononitrate, levothyroxine, metoprolol tartrate, morphine, multivital, fish oil, omeprazole, onetouch delica lancets 33g, oxycodone-acetaminophen, ropivacaine hcl-nacl, simvastatin, spironolactone, syringe/needle (disp), telmisartan, testosterone cypionate, and tizanidine.      Allergies  Allergies   Allergen Reactions   • Duloxetine Itching   • Sulfa Antibiotics Other (See Comments)     He reports renal failure after a round of sulfa   • Sulfamethoxazole-Trimethoprim Other (See Comments)     Had to be hospitalized in ICU; renal impairment        Social History  Social History     Socioeconomic History   • Marital status:      Spouse name: Not on file   • Number of children: Not on file   • Years of education: Not on file   • Highest education level: Not on file   Occupational History     Employer: RETIRED   Tobacco Use   • Smoking status: Former Smoker     Packs/day: 3.00     Years: 34.00     Pack years: 102.00     Quit date:      Years since quittin.8   • Smokeless tobacco: Never Used   Substance and Sexual Activity   • Alcohol use: No   • Drug use: No   • Sexual activity: Yes     Partners: Female       Family History  He has no family history of bladder or kidney cancer  He has no family history of kidney stones      AUA Symptom Score:      Review of Systems  Review of Systems   Constitutional: Negative for activity change, appetite change, chills, fatigue, fever, unexpected weight gain and unexpected weight loss.   Respiratory: Negative for apnea, cough, chest tightness, shortness of breath, wheezing and stridor.    Cardiovascular: Negative for chest pain, palpitations and leg swelling.   Gastrointestinal: Negative for abdominal  distention, abdominal pain, anal bleeding, blood in stool, constipation, diarrhea, nausea, rectal pain, vomiting, GERD and indigestion.   Genitourinary: Negative for decreased libido, decreased urine volume, difficulty urinating, discharge, dysuria, flank pain, frequency, genital sores, hematuria, nocturia, penile pain, erectile dysfunction, penile swelling, scrotal swelling, testicular pain, urgency and urinary incontinence.   Musculoskeletal: Negative for back pain and joint swelling.   Neurological: Negative for tremors, seizures, speech difficulty, weakness and numbness.   Psychiatric/Behavioral: Negative for agitation, decreased concentration, sleep disturbance, depressed mood and stress. The patient is not nervous/anxious.        Physical Exam  Physical Exam  Vitals signs reviewed.   Constitutional:       Appearance: He is well-developed.   HENT:      Head: Normocephalic and atraumatic.   Neck:      Musculoskeletal: Normal range of motion.   Pulmonary:      Effort: Pulmonary effort is normal. No respiratory distress.   Abdominal:      General: There is no distension.      Palpations: Abdomen is soft. There is no mass.      Tenderness: There is no abdominal tenderness.      Hernia: No hernia is present.   Musculoskeletal: Normal range of motion.   Lymphadenopathy:      Cervical: No cervical adenopathy.   Skin:     General: Skin is warm and dry.   Neurological:      Mental Status: He is alert and oriented to person, place, and time.   Psychiatric:         Behavior: Behavior normal.         Labs Recent and today in the office:  Results for orders placed or performed in visit on 10/27/20   POC Urinalysis Dipstick, Automated    Specimen: Urine   Result Value Ref Range    Color Yellow Yellow, Straw, Dark Yellow, Elinor    Clarity, UA Clear Clear    Specific Gravity  1.015 1.005 - 1.030    pH, Urine 6.5 5.0 - 8.0    Leukocytes Negative Negative    Nitrite, UA Negative Negative    Protein, POC 2+ (A) Negative mg/dL     Glucose, UA Negative Negative, 1000 mg/dL (3+) mg/dL    Ketones, UA Negative Negative    Urobilinogen, UA Normal Normal    Bilirubin Negative Negative    Blood, UA Negative Negative         Assessment & Plan  Hypogonadism/estradiol excess: Patient will return in 2 weeks for Testopel pellet insertion.  He recently had a CBC but he needs additional blood work today.

## 2020-10-28 LAB
ESTRADIOL SERPL-MCNC: 28.4 PG/ML (ref 7.6–42.6)
TESTOST SERPL-MCNC: >1500 NG/DL (ref 264–916)

## 2020-11-11 ENCOUNTER — PROCEDURE VISIT (OUTPATIENT)
Dept: UROLOGY | Facility: CLINIC | Age: 70
End: 2020-11-11

## 2020-11-11 DIAGNOSIS — E29.1 HYPOGONADISM IN MALE: Primary | ICD-10-CM

## 2020-11-11 NOTE — PROGRESS NOTES
Chief Complaint  Testopel (12 pellets)        TARUN Yeboah is a 70 y.o. male who returns today for treatment of his hypogonadism requesting insertion of Testopel pellets.    There were no vitals filed for this visit.    Past Medical History  Past Medical History:   Diagnosis Date   • Arthritis     shoulder, knee. multi joint    • Atrial fibrillation (CMS/HCC)    • Back pain    • Chronic kidney disease     stage III due to diabetes   • Diabetes mellitus (CMS/HCC)     several years; checks sugars at home-usually run 100-112   • Diabetic nephropathy (CMS/HCC)    • Disease of thyroid gland     hypothyroidism   • GERD (gastroesophageal reflux disease)    • Hypertension    • Low testosterone    • Sleep apnea     setting of 11   • Urinary tract infection     most recent diagnosis 8/29/17 in the ER (+3 bacteria)-started on Macrobid and rechecked by PCP on 9/5/17       Past Surgical History  Past Surgical History:   Procedure Laterality Date   • ANTERIOR CERVICAL FUSION     • APPENDECTOMY     • BACK SURGERY     • CARPAL TUNNEL RELEASE Bilateral    • CATARACT EXTRACTION     • CHOLECYSTECTOMY     • COLONOSCOPY  2013   • FINGER/THUMB ARTHROPLASTY Left     thumb replacement   • CT RECONSTR TOTAL SHOULDER IMPLANT Left 9/11/2017    Procedure: TOTAL SHOULDER ARTHROPLASTY LEFT;  Surgeon: William Ray MD;  [facility]  Service: Orthopedics   • SHOULDER SURGERY     • TOTAL KNEE ARTHROPLASTY Left        Medications  has a current medication list which includes the following prescription(s): anastrozole, apixaban, aspirin, bumetanide, calcipotriene, digoxin, docusate sodium, fluorouracil, glipizide, glucosamine-chondroitin, glucose blood, glucose monitor, insulin glargine, insulin lispro (1 unit dial), insulin lispro (1 unit dial), pen needles, isosorbide mononitrate, levothyroxine, metoprolol tartrate, morphine, multivital, fish oil, omeprazole, onetouch delica lancets 33g, oxycodone-acetaminophen, ropivacaine hcl-nacl,  simvastatin, spironolactone, syringe/needle (disp), telmisartan, testosterone cypionate, and tizanidine.      Allergies  Allergies   Allergen Reactions   • Duloxetine Itching   • Sulfa Antibiotics Other (See Comments)     He reports renal failure after a round of sulfa   • Sulfamethoxazole-Trimethoprim Other (See Comments)     Had to be hospitalized in ICU; renal impairment        Social History  Social History     Socioeconomic History   • Marital status:      Spouse name: Not on file   • Number of children: Not on file   • Years of education: Not on file   • Highest education level: Not on file   Occupational History     Employer: RETIRED   Tobacco Use   • Smoking status: Former Smoker     Packs/day: 3.00     Years: 34.00     Pack years: 102.00     Quit date:      Years since quittin.8   • Smokeless tobacco: Never Used   Substance and Sexual Activity   • Alcohol use: No   • Drug use: No   • Sexual activity: Yes     Partners: Female       Family History  He has no family history of bladder or kidney cancer  He has no family history of kidney stones      AUA Symptom Score:      Review of Systems  Review of Systems    Physical Exam  Physical Exam    Labs Recent and today in the office:  Results for orders placed or performed in visit on 10/27/20   Testosterone    Specimen: Blood   Result Value Ref Range    Testosterone, Total >1500 (H) 264 - 916 ng/dL   Estradiol    Specimen: Blood   Result Value Ref Range    Estradiol 28.4 7.6 - 42.6 pg/mL   POC Urinalysis Dipstick, Automated    Specimen: Urine   Result Value Ref Range    Color Yellow Yellow, Straw, Dark Yellow, Elinor    Clarity, UA Clear Clear    Specific Gravity  1.015 1.005 - 1.030    pH, Urine 6.5 5.0 - 8.0    Leukocytes Negative Negative    Nitrite, UA Negative Negative    Protein, POC 2+ (A) Negative mg/dL    Glucose, UA Negative Negative, 1000 mg/dL (3+) mg/dL    Ketones, UA Negative Negative    Urobilinogen, UA Normal Normal    Bilirubin  Negative Negative    Blood, UA Negative Negative     Testopel Subcutaneous hormone pellet implantation:    The patient was placed on the exam table in the supine position.  The upper outer quadrant of the hip was identified for insertion.  The area was then prepped with Betadine and injected with 7ml of Lidocaine 2% with Epinephrine to anesthetize superficially and then distally along the trocar tract.  A 3mm incision was made using #11 blade scalpel.  The trocar with a sharp-ended stylet was inserted into the subcutaneous tissue in line with the femur.  The sharp stylet was withdrawn and the Testopel pellets were placed into the well of the trocar.  Testopel was advanced into the tissue using blunt-ended stylet.  A total of 12 pellets were inserted. For the J-code of  the NDC # for the pellets is 08159-853-24. The trocar was removed and the incision was closed using Steri-strips.  The wound area was cleansed to remove the Betadine and was covered with Steri-strips with an outer Band-aid.  Two subcutaneous tract were developed.  Careful inspection of the area was done.            Assessment & Plan    The patient was informed of the post-procedure instructions.  He will return in 1 month to monitor testosterone levels and then again at 4 months to determine scheduling of the next insertion.

## 2020-11-18 RX ORDER — GLIPIZIDE 10 MG/1
TABLET ORAL
Qty: 180 TABLET | Refills: 1 | Status: SHIPPED | OUTPATIENT
Start: 2020-11-18 | End: 2021-04-19 | Stop reason: SDUPTHER

## 2020-12-01 ENCOUNTER — OFFICE VISIT (OUTPATIENT)
Dept: INTERNAL MEDICINE | Facility: CLINIC | Age: 70
End: 2020-12-01

## 2020-12-01 VITALS
HEIGHT: 69 IN | SYSTOLIC BLOOD PRESSURE: 142 MMHG | OXYGEN SATURATION: 95 % | WEIGHT: 315 LBS | HEART RATE: 86 BPM | TEMPERATURE: 97.1 F | DIASTOLIC BLOOD PRESSURE: 82 MMHG | BODY MASS INDEX: 46.65 KG/M2

## 2020-12-01 DIAGNOSIS — K21.9 GASTROESOPHAGEAL REFLUX DISEASE WITHOUT ESOPHAGITIS: ICD-10-CM

## 2020-12-01 DIAGNOSIS — I48.91 ATRIAL FIBRILLATION, UNSPECIFIED TYPE (HCC): ICD-10-CM

## 2020-12-01 DIAGNOSIS — Z79.4 TYPE 2 DIABETES MELLITUS WITHOUT COMPLICATION, WITH LONG-TERM CURRENT USE OF INSULIN (HCC): ICD-10-CM

## 2020-12-01 DIAGNOSIS — I10 BENIGN ESSENTIAL HYPERTENSION: Primary | ICD-10-CM

## 2020-12-01 DIAGNOSIS — E66.01 CLASS 3 SEVERE OBESITY DUE TO EXCESS CALORIES WITH SERIOUS COMORBIDITY AND BODY MASS INDEX (BMI) OF 50.0 TO 59.9 IN ADULT (HCC): ICD-10-CM

## 2020-12-01 DIAGNOSIS — E11.9 TYPE 2 DIABETES MELLITUS WITHOUT COMPLICATION, WITH LONG-TERM CURRENT USE OF INSULIN (HCC): ICD-10-CM

## 2020-12-01 DIAGNOSIS — E03.8 OTHER SPECIFIED HYPOTHYROIDISM: ICD-10-CM

## 2020-12-01 PROCEDURE — 99214 OFFICE O/P EST MOD 30 MIN: CPT | Performed by: INTERNAL MEDICINE

## 2020-12-01 NOTE — PROGRESS NOTES
Chief Complaint   Patient presents with   • Follow-up     for HTN, GERD, HLD, Hypothyroidism, and DM. Pt would like painful knot on left thumb looked at.      Subjective   Freddie Yeboah Jr. is a 70 y.o. male.     Here today for follow up of HTN, HLD, DM, CAD, Afib, LUIS FERNANDO, GERD, hypothyroid, LBP/DJD, BPH.   HTN/HLD/CAD/Afib- his BP is elevated today.  His states BP has been running better at home. No CP, palpitations. Occasional edema.     DM-   A1C 7.7 in October 2020.  BS has been running less than 130 in AM.  His lantus is at 120 u daily with 20 u short acting insulin with each meal.   He has some NTB of feet.  Last eye exam was a yr ago with DR Kauffman.  He sees the foot doctor every 3 mo       LUIS FERNANDO- he wears his CPAP every night. He awakens 3-4 times a night to urinate, usually before 10 PM several times.    GERD- he does take omeprazole daily as directed, states his GERD is well controlled.    Hypothyroid- takes thyroid med daily on empty stomach, denies any problems with trouble swallowing, change in voice or constipation  LBP/DJD- he sees pain clinic every 2 mo for his LBP, he says his back pain is doing well at this time.  He is having some right knee pain.  BPH- he in on testosterone pellets per Dr Zelaya.  He states he felt better on the testosterone shots.    HCM- colonoscopy done 2018.  Pneumovax is UTD. He did not have Hep a or shingles vaccine  He complains of a small nodule on left thumb that has been tender and is getting larger.  This has been present for the past several weeks.       The following portions of the patient's history were reviewed and updated as appropriate: allergies, current medications, past family history, past medical history, past social history, past surgical history and problem list.    Review of Systems   Constitutional: Negative for activity change, appetite change and unexpected weight change.   Eyes: Negative for visual disturbance.   Respiratory: Negative for shortness of  "breath.    Cardiovascular: Positive for leg swelling. Negative for chest pain and palpitations.   Gastrointestinal: Negative for abdominal pain.   Genitourinary: Positive for frequency.   Skin:        Skin nodule on left thumb   Neurological: Negative for numbness and headaches.   Psychiatric/Behavioral: Negative for dysphoric mood and sleep disturbance. The patient is not nervous/anxious.        Objective   /82   Pulse 86   Temp 97.1 °F (36.2 °C)   Ht 176.5 cm (69.49\")   Wt (!) 163 kg (360 lb)   SpO2 95%   BMI 52.42 kg/m²   Body mass index is 52.42 kg/m².  Physical Exam  Vitals signs and nursing note reviewed.   Constitutional:       General: He is not in acute distress.     Appearance: Normal appearance. He is well-developed. He is obese.      Comments: Very kind and pleasant man, appears his age and in no distress today   HENT:      Head: Normocephalic and atraumatic.      Right Ear: External ear normal.      Left Ear: External ear normal.   Eyes:      General:         Right eye: No discharge.         Left eye: No discharge.      Extraocular Movements: Extraocular movements intact.      Conjunctiva/sclera: Conjunctivae normal.      Pupils: Pupils are equal, round, and reactive to light.   Neck:      Musculoskeletal: Normal range of motion and neck supple.      Thyroid: No thyromegaly.   Cardiovascular:      Rate and Rhythm: Normal rate and regular rhythm.      Pulses: Normal pulses.      Heart sounds: Normal heart sounds. No murmur.      Comments: No carotid bruits  Pulmonary:      Effort: Pulmonary effort is normal. No respiratory distress.      Breath sounds: Normal breath sounds. No wheezing.   Abdominal:      General: Bowel sounds are normal. There is no distension.      Palpations: Abdomen is soft.      Tenderness: There is no abdominal tenderness. There is no guarding or rebound.      Comments: Obesity limits exam, umbilical hernia noted   Musculoskeletal: Normal range of motion.      Right " lower leg: No edema.      Left lower leg: No edema.   Lymphadenopathy:      Cervical: No cervical adenopathy.   Skin:     Comments: Feet are dry over plantar surface, no callus or ulcer, sensation intact to light touch bilaterally   Neurological:      General: No focal deficit present.      Mental Status: He is alert and oriented to person, place, and time. Mental status is at baseline.      Cranial Nerves: No cranial nerve deficit.      Motor: No weakness.      Coordination: Coordination normal.      Gait: Gait normal.   Psychiatric:         Mood and Affect: Mood normal.         Behavior: Behavior normal.         Thought Content: Thought content normal.         Judgment: Judgment normal.         Assessment/Plan   Freddie Yeboah Jr. is here today and the following problems have been addressed:      Diagnoses and all orders for this visit:    1. Benign essential hypertension (Primary)    2. Atrial fibrillation, unspecified type (CMS/[facility])    3. Type 2 diabetes mellitus without complication, with long-term current use of insulin (CMS/[facility])    4. Other specified hypothyroidism    5. Gastroesophageal reflux disease without esophagitis    6. Class 3 severe obesity due to excess calories with serious comorbidity and body mass index (BMI) of 50.0 to 59.9 in adult (CMS/[facility])      Labs done approximately 8 weeks ago, reviewed today  Follow heart healthy/low salt/diabetic diet-patient states he has been trying harder to eat a healthy diet-encouraged him to work on decreasing portion sizes  Monitor blood pressure and BS as discussed  Exercise as tolerated   Take all medications as prescribed  See eye doctor annually or as discussed  Wear protective foot wear/no bare feet  Check feet regularly for calluses or ulcers  Continue current dose of levothyroxine  Continue omeprazole, GERD is well controlled  Follow-up with Dr. Zelaya regarding low testosterone, he is considering changing to testosterone injections rather than  pellets  Follow-up with pain clinic regarding chronic back pain, he states his pain is currently stable and he is doing well  He recently saw Dr. Rodriguez for follow-up of coronary artery disease and atrial fibrillation, no medication changes were made per patient        Return in about 4 months (around 4/1/2021) for Next scheduled follow up.      Marilyn K. Vermeesch, MD      Please note that portions of this note were completed with a voice recognition program.  Efforts were made to edit dictation, but occasionally words are mistranscribed.

## 2020-12-08 RX ORDER — PEN NEEDLE, DIABETIC 31 GX5/16"
NEEDLE, DISPOSABLE MISCELLANEOUS
Qty: 200 EACH | Refills: 11 | Status: SHIPPED | OUTPATIENT
Start: 2020-12-08 | End: 2021-08-30 | Stop reason: SDUPTHER

## 2020-12-27 DIAGNOSIS — E28.0 ESTRADIOL EXCESS: ICD-10-CM

## 2020-12-30 RX ORDER — ANASTROZOLE 1 MG/1
TABLET ORAL
Qty: 24 TABLET | Refills: 3 | Status: SHIPPED | OUTPATIENT
Start: 2020-12-30 | End: 2021-11-29

## 2021-01-19 DIAGNOSIS — Z79.4 TYPE 2 DIABETES MELLITUS WITHOUT COMPLICATION, WITH LONG-TERM CURRENT USE OF INSULIN (HCC): ICD-10-CM

## 2021-01-19 DIAGNOSIS — E11.9 TYPE 2 DIABETES MELLITUS WITHOUT COMPLICATION, WITH LONG-TERM CURRENT USE OF INSULIN (HCC): ICD-10-CM

## 2021-01-19 RX ORDER — INSULIN GLARGINE 100 [IU]/ML
INJECTION, SOLUTION SUBCUTANEOUS
Qty: 30 ML | Refills: 5 | Status: SHIPPED | OUTPATIENT
Start: 2021-01-19 | End: 2021-04-19 | Stop reason: SDUPTHER

## 2021-02-22 ENCOUNTER — TELEPHONE (OUTPATIENT)
Dept: INTERNAL MEDICINE | Facility: CLINIC | Age: 71
End: 2021-02-22

## 2021-02-22 NOTE — TELEPHONE ENCOUNTER
PATIENTS WIFE  WOULD LIKE A CALLBACK SHE STATES THAT DOCTOR VERMEESCH WANTED THE PATIENT  TO MAKE ANOTHER APPOINTMENT BUT SHE DOES NOT REMEMBER THE TIMEFRAME THE DOCTOR WANTED TO SEE HIM  IN.       CALL [phone]

## 2021-03-04 ENCOUNTER — LAB (OUTPATIENT)
Dept: UROLOGY | Facility: CLINIC | Age: 71
End: 2021-03-04

## 2021-03-04 DIAGNOSIS — E29.1 HYPOGONADISM IN MALE: Primary | ICD-10-CM

## 2021-03-04 DIAGNOSIS — Z12.5 SCREENING PSA (PROSTATE SPECIFIC ANTIGEN): ICD-10-CM

## 2021-03-05 LAB
BASOPHILS # BLD AUTO: ABNORMAL 10*3/UL
BASOPHILS # BLD MANUAL: 0.18 10*3/MM3 (ref 0–0.2)
BASOPHILS NFR BLD MANUAL: 2.1 % (ref 0–1.5)
DIFFERENTIAL COMMENT: ABNORMAL
EOSINOPHIL # BLD AUTO: ABNORMAL 10*3/UL
EOSINOPHIL # BLD MANUAL: 0.09 10*3/MM3 (ref 0–0.4)
EOSINOPHIL NFR BLD AUTO: ABNORMAL %
EOSINOPHIL NFR BLD MANUAL: 1 % (ref 0.3–6.2)
ERYTHROCYTE [DISTWIDTH] IN BLOOD BY AUTOMATED COUNT: 21.6 % (ref 12.3–15.4)
ESTRADIOL SERPL-MCNC: <5 PG/ML (ref 7.6–42.6)
HCT VFR BLD AUTO: 47.3 % (ref 37.5–51)
HGB BLD-MCNC: 13.6 G/DL (ref 13–17.7)
LYMPHOCYTES # BLD AUTO: ABNORMAL 10*3/UL
LYMPHOCYTES # BLD MANUAL: 3.36 10*3/MM3 (ref 0.7–3.1)
LYMPHOCYTES NFR BLD AUTO: ABNORMAL %
LYMPHOCYTES NFR BLD MANUAL: 38.5 % (ref 19.6–45.3)
MCH RBC QN AUTO: 20.5 PG (ref 26.6–33)
MCHC RBC AUTO-ENTMCNC: 28.8 G/DL (ref 31.5–35.7)
MCV RBC AUTO: 71.1 FL (ref 79–97)
MONOCYTES # BLD MANUAL: 1.01 10*3/MM3 (ref 0.1–0.9)
MONOCYTES NFR BLD AUTO: ABNORMAL %
MONOCYTES NFR BLD MANUAL: 11.5 % (ref 5–12)
NEUTROPHILS # BLD MANUAL: 4.1 10*3/MM3 (ref 1.7–7)
NEUTROPHILS NFR BLD AUTO: ABNORMAL %
NEUTROPHILS NFR BLD MANUAL: 46.9 % (ref 42.7–76)
PLATELET # BLD AUTO: 261 10*3/MM3 (ref 140–450)
PLATELET BLD QL SMEAR: ABNORMAL
PSA SERPL-MCNC: 0.96 NG/ML (ref 0–4)
RBC # BLD AUTO: 6.65 10*6/MM3 (ref 4.14–5.8)
RBC MORPH BLD: ABNORMAL
TESTOST SERPL-MCNC: 438 NG/DL (ref 264–916)
WBC # BLD AUTO: 8.74 10*3/MM3 (ref 3.4–10.8)

## 2021-03-15 ENCOUNTER — OFFICE VISIT (OUTPATIENT)
Dept: UROLOGY | Facility: CLINIC | Age: 71
End: 2021-03-15

## 2021-03-15 VITALS
WEIGHT: 315 LBS | TEMPERATURE: 98.7 F | RESPIRATION RATE: 18 BRPM | HEART RATE: 88 BPM | HEIGHT: 69 IN | BODY MASS INDEX: 46.65 KG/M2 | OXYGEN SATURATION: 95 %

## 2021-03-15 DIAGNOSIS — N40.0 BENIGN NON-NODULAR PROSTATIC HYPERPLASIA WITHOUT LOWER URINARY TRACT SYMPTOMS: ICD-10-CM

## 2021-03-15 DIAGNOSIS — E28.0 ESTRADIOL EXCESS: ICD-10-CM

## 2021-03-15 DIAGNOSIS — E29.1 HYPOGONADISM IN MALE: Primary | ICD-10-CM

## 2021-03-15 LAB
BILIRUB BLD-MCNC: NEGATIVE MG/DL
CLARITY, POC: CLEAR
COLOR UR: YELLOW
GLUCOSE UR STRIP-MCNC: NEGATIVE MG/DL
KETONES UR QL: NEGATIVE
LEUKOCYTE EST, POC: NEGATIVE
NITRITE UR-MCNC: NEGATIVE MG/ML
PH UR: 7 [PH] (ref 5–8)
PROT UR STRIP-MCNC: NEGATIVE MG/DL
RBC # UR STRIP: NEGATIVE /UL
SP GR UR: 1.01 (ref 1–1.03)
UROBILINOGEN UR QL: NORMAL

## 2021-03-15 PROCEDURE — 81003 URINALYSIS AUTO W/O SCOPE: CPT | Performed by: UROLOGY

## 2021-03-15 PROCEDURE — 99214 OFFICE O/P EST MOD 30 MIN: CPT | Performed by: UROLOGY

## 2021-03-15 RX ORDER — TESTOSTERONE CYPIONATE 200 MG/ML
400 INJECTION, SOLUTION INTRAMUSCULAR
Qty: 4 ML | Refills: 5 | Status: SHIPPED | OUTPATIENT
Start: 2021-03-15 | End: 2021-11-29 | Stop reason: SDUPTHER

## 2021-03-15 RX ORDER — SYRINGE W-NEEDLE,DISPOSAB,3 ML 25GX5/8"
2 SYRINGE, EMPTY DISPOSABLE MISCELLANEOUS
Qty: 50 EACH | Refills: 3 | Status: SHIPPED | OUTPATIENT
Start: 2021-03-15 | End: 2023-01-03

## 2021-03-15 NOTE — PROGRESS NOTES
Chief Complaint  Hypogonadism (4 month follow up with labs.)        TARUN Yeboah is a 71 y.o. male who returns today for follow-up of hypogonadism and a long history of using supplemental testosterone.  He has an excellent benefit and is anxious to continue.  Most recently he has been having Testopel pellets inserted although he is having a fair amount of discomfort from the injection site and request that we go back to his self injected doses at home.  He generally takes 400 mg every 3 to 4 weeks to be adequately replaced.  He understands the need to monitor for toxicity of therapy and came by recently for blood work.  All parameters are nominal on Arimidex although his estrogen level is too low and I suggested reducing this to 1 mg weekly.    Vitals:    03/15/21 1337   Pulse: 88   Resp: 18   Temp: 98.7 °F (37.1 °C)   SpO2: 95%       Past Medical History  Past Medical History:   Diagnosis Date   • Arthritis     shoulder, knee. multi joint    • Atrial fibrillation (CMS/HCC)    • Back pain    • Chronic kidney disease     stage III due to diabetes   • Diabetes mellitus (CMS/HCC)     several years; checks sugars at home-usually run 100-112   • Diabetic nephropathy (CMS/HCC)    • Disease of thyroid gland     hypothyroidism   • GERD (gastroesophageal reflux disease)    • Hypertension    • Low testosterone    • Sleep apnea     setting of 11   • Urinary tract infection     most recent diagnosis 8/29/17 in the ER (+3 bacteria)-started on Macrobid and rechecked by PCP on 9/5/17       Past Surgical History  Past Surgical History:   Procedure Laterality Date   • ANTERIOR CERVICAL FUSION     • APPENDECTOMY     • BACK SURGERY     • CARPAL TUNNEL RELEASE Bilateral    • CATARACT EXTRACTION     • CHOLECYSTECTOMY     • COLONOSCOPY  2013   • FINGER/THUMB ARTHROPLASTY Left     thumb replacement   • GA RECONSTR TOTAL SHOULDER IMPLANT Left 9/11/2017    Procedure: TOTAL SHOULDER ARTHROPLASTY LEFT;  Surgeon: William Ray MD;  Location:   [facility];  Service: Orthopedics   • SHOULDER SURGERY     • TOTAL KNEE ARTHROPLASTY Left        Medications  has a current medication list which includes the following prescription(s): anastrozole, apixaban, aspirin, bumetanide, calcipotriene, digoxin, docusate sodium, fluorouracil, glipizide, glucosamine-chondroitin, glucose blood, glucose monitor, lantus solostar, insulin lispro (1 unit dial), b-d ultrafine iii short pen, isosorbide mononitrate, levothyroxine, metoprolol tartrate, morphine, morphine sulfate er, multivital, fish oil, omeprazole, onetouch delica lancets 33g, oxycodone-acetaminophen, ropivacaine hcl-nacl, simvastatin, spironolactone, syringe/needle (disp), telmisartan, tizanidine, and testosterone cypionate.      Allergies  Allergies   Allergen Reactions   • Duloxetine Itching   • Sulfa Antibiotics Other (See Comments)     He reports renal failure after a round of sulfa   • Sulfamethoxazole-Trimethoprim Other (See Comments)     Had to be hospitalized in ICU; renal impairment        Social History  Social History     Socioeconomic History   • Marital status:      Spouse name: Not on file   • Number of children: Not on file   • Years of education: Not on file   • Highest education level: Not on file   Tobacco Use   • Smoking status: Former Smoker     Packs/day: 3.00     Years: 34.00     Pack years: 102.00     Quit date:      Years since quittin.2   • Smokeless tobacco: Never Used   Substance and Sexual Activity   • Alcohol use: No   • Drug use: No   • Sexual activity: Yes     Partners: Female       Family History  He has no family history of bladder or kidney cancer  He has no family history of kidney stones      AUA Symptom Score:      Review of Systems  Review of Systems   Constitutional: Negative for activity change, appetite change, chills, fatigue, fever, unexpected weight gain and unexpected weight loss.   Respiratory: Negative for apnea, cough, chest tightness, shortness of  breath, wheezing and stridor.    Cardiovascular: Negative for chest pain, palpitations and leg swelling.   Gastrointestinal: Negative for abdominal distention, abdominal pain, anal bleeding, blood in stool, constipation, diarrhea, nausea, rectal pain, vomiting, GERD and indigestion.   Genitourinary: Negative for decreased libido, decreased urine volume, difficulty urinating, discharge, dysuria, flank pain, frequency, genital sores, hematuria, nocturia, penile pain, erectile dysfunction, penile swelling, scrotal swelling, testicular pain, urgency and urinary incontinence.   Musculoskeletal: Negative for back pain and joint swelling.   Neurological: Negative for tremors, seizures, speech difficulty, weakness and numbness.   Psychiatric/Behavioral: Negative for agitation, decreased concentration, sleep disturbance, depressed mood and stress. The patient is not nervous/anxious.        Physical Exam  Physical Exam  Vitals reviewed.   Constitutional:       Appearance: He is well-developed.   HENT:      Head: Normocephalic and atraumatic.   Pulmonary:      Effort: Pulmonary effort is normal. No respiratory distress.   Abdominal:      General: There is no distension.      Palpations: Abdomen is soft. There is no mass.      Tenderness: There is no abdominal tenderness.      Hernia: No hernia is present.   Musculoskeletal:         General: Normal range of motion.      Cervical back: Normal range of motion.   Lymphadenopathy:      Cervical: No cervical adenopathy.   Skin:     General: Skin is warm and dry.   Neurological:      Mental Status: He is alert and oriented to person, place, and time.   Psychiatric:         Behavior: Behavior normal.         Labs Recent and today in the office:  Results for orders placed or performed in visit on 03/15/21   POC Urinalysis Dipstick, Automated    Specimen: Urine   Result Value Ref Range    Color Yellow Yellow, Straw, Dark Yellow, Elinor    Clarity, UA Clear Clear    Specific Universal   1.015 1.005 - 1.030    pH, Urine 7.0 5.0 - 8.0    Leukocytes Negative Negative    Nitrite, UA Negative Negative    Protein, POC Negative Negative mg/dL    Glucose, UA Negative Negative, 1000 mg/dL (3+) mg/dL    Ketones, UA Negative Negative    Urobilinogen, UA Normal Normal    Bilirubin Negative Negative    Blood, UA Negative Negative         Assessment & Plan  Hypogonadism/estradiol excess: Patient will resume injecting 400 mg of testosterone every 3 to 4 weeks and reduce his anastrozole to 1 mg weekly.  He will need to return in 6 months for ALBERTO PSA and follow-up blood work.

## 2021-04-19 ENCOUNTER — OFFICE VISIT (OUTPATIENT)
Dept: INTERNAL MEDICINE | Facility: CLINIC | Age: 71
End: 2021-04-19

## 2021-04-19 VITALS
OXYGEN SATURATION: 93 % | HEIGHT: 69 IN | BODY MASS INDEX: 46.65 KG/M2 | TEMPERATURE: 97 F | HEART RATE: 56 BPM | SYSTOLIC BLOOD PRESSURE: 138 MMHG | DIASTOLIC BLOOD PRESSURE: 64 MMHG | WEIGHT: 315 LBS

## 2021-04-19 DIAGNOSIS — E78.2 MIXED HYPERLIPIDEMIA: ICD-10-CM

## 2021-04-19 DIAGNOSIS — Z79.4 TYPE 2 DIABETES MELLITUS WITHOUT COMPLICATION, WITH LONG-TERM CURRENT USE OF INSULIN (HCC): ICD-10-CM

## 2021-04-19 DIAGNOSIS — E03.8 OTHER SPECIFIED HYPOTHYROIDISM: ICD-10-CM

## 2021-04-19 DIAGNOSIS — E11.9 TYPE 2 DIABETES MELLITUS WITHOUT COMPLICATION, WITH LONG-TERM CURRENT USE OF INSULIN (HCC): ICD-10-CM

## 2021-04-19 DIAGNOSIS — K21.9 GASTROESOPHAGEAL REFLUX DISEASE WITHOUT ESOPHAGITIS: ICD-10-CM

## 2021-04-19 DIAGNOSIS — I48.91 ATRIAL FIBRILLATION, UNSPECIFIED TYPE (HCC): ICD-10-CM

## 2021-04-19 DIAGNOSIS — I10 BENIGN ESSENTIAL HYPERTENSION: Primary | ICD-10-CM

## 2021-04-19 PROCEDURE — 99214 OFFICE O/P EST MOD 30 MIN: CPT | Performed by: INTERNAL MEDICINE

## 2021-04-19 RX ORDER — SIMVASTATIN 20 MG
20 TABLET ORAL NIGHTLY
Qty: 90 TABLET | Refills: 1 | Status: SHIPPED | OUTPATIENT
Start: 2021-04-19 | End: 2021-08-30 | Stop reason: SDUPTHER

## 2021-04-19 RX ORDER — INSULIN LISPRO 100 [IU]/ML
10 INJECTION, SOLUTION INTRAVENOUS; SUBCUTANEOUS 3 TIMES DAILY
Qty: 30 ML | Refills: 3 | Status: SHIPPED | OUTPATIENT
Start: 2021-04-19 | End: 2021-04-20

## 2021-04-19 RX ORDER — LEVOTHYROXINE SODIUM 0.07 MG/1
75 TABLET ORAL DAILY
Qty: 90 TABLET | Refills: 1 | Status: SHIPPED | OUTPATIENT
Start: 2021-04-19 | End: 2021-08-30 | Stop reason: SDUPTHER

## 2021-04-19 RX ORDER — GLIPIZIDE 10 MG/1
10 TABLET ORAL
Qty: 180 TABLET | Refills: 1 | Status: SHIPPED | OUTPATIENT
Start: 2021-04-19 | End: 2021-08-30 | Stop reason: SDUPTHER

## 2021-04-19 RX ORDER — OMEPRAZOLE 40 MG/1
40 CAPSULE, DELAYED RELEASE ORAL DAILY
Qty: 90 CAPSULE | Refills: 3 | Status: SHIPPED | OUTPATIENT
Start: 2021-04-19 | End: 2022-04-18 | Stop reason: SDUPTHER

## 2021-04-19 RX ORDER — INSULIN GLARGINE 100 [IU]/ML
INJECTION, SOLUTION SUBCUTANEOUS
Qty: 30 ML | Refills: 5 | Status: SHIPPED | OUTPATIENT
Start: 2021-04-19 | End: 2021-08-30 | Stop reason: SDUPTHER

## 2021-04-19 NOTE — PROGRESS NOTES
Chief Complaint   Patient presents with   • Follow-up     for HTN, HLD, GERD, hypothyroidism, and DM.     Subjective   Freddie Yeboah Jr. is a 71 y.o. male.     Here today for follow up of HTN, HLD, DM, CAD, Afib, LUIS FERNANDO, GERD, hypothyroid, LBP/DJD, BPH.   HTN/HLD/CAD/Afib- his BP is controlled today. No CP, palpitations. Occasional edema.     DM-   A1C 7.7 in October 2020.  BS has been running less than 150-250 in AM.  His lantus is at 120 u daily with 20 u short acting insulin with each meal.   He has some NTB of feet.  Last eye exam was about a yr ago with Dr Kauffman.  He sees the foot doctor every 3 mo.      LUIS FERNANDO- he wears his CPAP every night. He awakens 3-4 times a night to urinate, usually before 10 PM several times.    GERD- he does take omeprazole daily as directed, states his GERD is well controlled.    Hypothyroid- takes thyroid med daily on empty stomach, denies any problems with trouble swallowing, change in voice or constipation  LBP/DJD- he sees pain clinic every 2 mo for his LBP, he says his back pain is doing well at this time.    BPH- he in on testosterone shots Dr Zelaya.     HCM- colonoscopy done 2018.  Pneumovax is UTD. He did not have Hep a or shingles vaccine.  He did have COVID vaccine.        The following portions of the patient's history were reviewed and updated as appropriate: allergies, current medications, past family history, past medical history, past social history, past surgical history and problem list.    Review of Systems   Constitutional: Negative for activity change, appetite change and unexpected weight change.   Eyes: Negative for visual disturbance.   Respiratory: Negative for shortness of breath.    Cardiovascular: Negative for chest pain, palpitations and leg swelling.   Gastrointestinal: Negative for abdominal pain.   Genitourinary: Positive for frequency.   Musculoskeletal: Positive for back pain.   Neurological: Positive for numbness. Negative for headaches.        Numbness,  "tingling and burning of feet   Psychiatric/Behavioral: Negative for dysphoric mood and sleep disturbance. The patient is not nervous/anxious.        Objective   /64   Pulse 56   Temp 97 °F (36.1 °C)   Ht 176.5 cm (69.49\")   Wt (!) 161 kg (354 lb)   SpO2 93%   BMI 51.54 kg/m²   Body mass index is 51.54 kg/m².  Physical Exam  Vitals and nursing note reviewed.   Constitutional:       General: He is not in acute distress.     Appearance: Normal appearance. He is well-developed. He is obese. He is not ill-appearing.      Comments: Kind and pleasant man, appears his age, no distress today   HENT:      Head: Normocephalic and atraumatic.      Right Ear: External ear normal.      Left Ear: External ear normal.   Eyes:      General:         Right eye: No discharge.         Left eye: No discharge.      Extraocular Movements: Extraocular movements intact.      Conjunctiva/sclera: Conjunctivae normal.      Pupils: Pupils are equal, round, and reactive to light.   Neck:      Thyroid: No thyromegaly.   Cardiovascular:      Rate and Rhythm: Normal rate. Rhythm irregular.      Pulses: Normal pulses.      Heart sounds: Normal heart sounds. No murmur heard.        Comments: No carotid bruits  Irregularly irregular  Pulmonary:      Effort: Pulmonary effort is normal. No respiratory distress.      Breath sounds: Normal breath sounds. No wheezing.   Abdominal:      General: Bowel sounds are normal. There is no distension.      Palpations: Abdomen is soft.      Tenderness: There is no abdominal tenderness.      Comments: Obesity limits exam   Musculoskeletal:         General: Normal range of motion.      Cervical back: Normal range of motion and neck supple.      Right lower leg: No edema.      Left lower leg: No edema.   Lymphadenopathy:      Cervical: No cervical adenopathy.   Skin:     General: Skin is warm.      Findings: No rash.      Comments: Feet with no callus or ulcer, decreased sensation to light touch bilaterally "   Neurological:      General: No focal deficit present.      Mental Status: He is alert and oriented to person, place, and time. Mental status is at baseline.      Cranial Nerves: No cranial nerve deficit.      Sensory: Sensory deficit present.      Motor: No weakness.      Coordination: Coordination normal.      Gait: Gait normal.   Psychiatric:         Mood and Affect: Mood normal.         Behavior: Behavior normal.         Thought Content: Thought content normal.         Judgment: Judgment normal.         Assessment/Plan   Freddie Yeboah Jr. is here today and the following problems have been addressed:      Diagnoses and all orders for this visit:    1. Benign essential hypertension (Primary)    2. Type 2 diabetes mellitus without complication, with long-term current use of insulin (CMS/[facility])  -     Insulin Glargine (Lantus SoloStar) 100 UNIT/ML injection pen; INJECT 120 UNITS ONCE DAILY  Dispense: 30 mL; Refill: 5  -     Comprehensive Metabolic Panel  -     Hemoglobin A1c    3. Atrial fibrillation, unspecified type (CMS/[facility])    4. Mixed hyperlipidemia    5. Other specified hypothyroidism    6. Gastroesophageal reflux disease without esophagitis    Other orders  -     levothyroxine (SYNTHROID, LEVOTHROID) 75 MCG tablet; Take 1 tablet by mouth Daily.  Dispense: 90 tablet; Refill: 1  -     simvastatin (ZOCOR) 20 MG tablet; Take 1 tablet by mouth Every Night.  Dispense: 90 tablet; Refill: 1  -     omeprazole (priLOSEC) 40 MG capsule; Take 1 capsule by mouth Daily.  Dispense: 90 capsule; Refill: 3  -     Insulin Lispro, 1 Unit Dial, (HUMALOG) 100 UNIT/ML solution pen-injector; Inject 10 Units under the skin into the appropriate area as directed 3 (Three) Times a Day.  Dispense: 30 mL; Refill: 3  -     glipizide (GLUCOTROL) 10 MG tablet; Take 1 tablet by mouth 2 (Two) Times a Day Before Meals.  Dispense: 180 tablet; Refill: 1        Follow heart healthy/low salt/diabetic diet  Avoid processed foods  Monitor blood  pressure and BS as discussed  Exercise as tolerated   Take all medications as prescribed  See eye doctor annually or as discussed  Wear protective foot wear/no bare feet  Check feet regularly for calluses or ulcers  Labs as noted  A. fib rate is controlled with digoxin and metoprolol, continue Eliquis and aspirin per cardiology  Continue Zocor for hyperlipidemia  Blood sugars have been elevated lately, encourage diet compliance, no insulin adjustment until I see A1c  Continue to go to pain management for chronic back pain issues  Recently seen by urology, remains on testosterone injections and requires Arimidex due to elevated estrogen levels from testosterone use      Return in about 4 months (around 8/19/2021) for Medicare Wellness.      Marilyn K. Vermeesch, MD      Please note that portions of this note were completed with a voice recognition program.  Efforts were made to edit dictation, but occasionally words are mistranscribed.

## 2021-04-20 LAB
ALBUMIN SERPL-MCNC: 4.2 G/DL (ref 3.5–5.2)
ALBUMIN/GLOB SERPL: 1.6 G/DL
ALP SERPL-CCNC: 89 U/L (ref 39–117)
ALT SERPL-CCNC: 14 U/L (ref 1–41)
AST SERPL-CCNC: 16 U/L (ref 1–40)
BILIRUB SERPL-MCNC: 0.9 MG/DL (ref 0–1.2)
BUN SERPL-MCNC: 23 MG/DL (ref 8–23)
BUN/CREAT SERPL: 12 (ref 7–25)
CALCIUM SERPL-MCNC: 9.4 MG/DL (ref 8.6–10.5)
CHLORIDE SERPL-SCNC: 97 MMOL/L (ref 98–107)
CO2 SERPL-SCNC: 31.7 MMOL/L (ref 22–29)
CREAT SERPL-MCNC: 1.91 MG/DL (ref 0.76–1.27)
GLOBULIN SER CALC-MCNC: 2.6 GM/DL
GLUCOSE SERPL-MCNC: 127 MG/DL (ref 65–99)
HBA1C MFR BLD: 8.5 % (ref 4.8–5.6)
POTASSIUM SERPL-SCNC: 5.5 MMOL/L (ref 3.5–5.2)
PROT SERPL-MCNC: 6.8 G/DL (ref 6–8.5)
SODIUM SERPL-SCNC: 137 MMOL/L (ref 136–145)

## 2021-04-20 RX ORDER — INSULIN LISPRO 100 [IU]/ML
25 INJECTION, SOLUTION INTRAVENOUS; SUBCUTANEOUS 3 TIMES DAILY
Qty: 30 ML | Refills: 3
Start: 2021-04-20 | End: 2022-09-20

## 2021-04-20 NOTE — PROGRESS NOTES
Please call patient and tell him that A1c has increased to 8.5 suggesting average blood sugars of 225.  Kidney function is at baseline with creatinine of 1.9.  His potassium level is slightly elevated due to chronic kidney disease.  It is important that he maintain good hydration with primarily water and avoid any sports drinks as these have potassium.  I would like him to increase his short acting insulin at mealtime to 25 units.  If he is eating a significant amount of carbohydrate such as bread, pasta, potatoes or rice he may further increase insulin to 30 units with that meal.

## 2021-06-08 ENCOUNTER — TRANSCRIBE ORDERS (OUTPATIENT)
Dept: LAB | Facility: HOSPITAL | Age: 71
End: 2021-06-08

## 2021-06-08 DIAGNOSIS — N18.31 CHRONIC KIDNEY DISEASE (CKD) STAGE G3A/A1, MODERATELY DECREASED GLOMERULAR FILTRATION RATE (GFR) BETWEEN 45-59 ML/MIN/1.73 SQUARE METER AND ALBUMINURIA CREATININE RATIO LESS THAN 30 MG/G (CMS/H* (HCC): Primary | ICD-10-CM

## 2021-06-09 ENCOUNTER — LAB (OUTPATIENT)
Dept: LAB | Facility: HOSPITAL | Age: 71
End: 2021-06-09

## 2021-06-09 DIAGNOSIS — N18.31 CHRONIC KIDNEY DISEASE (CKD) STAGE G3A/A1, MODERATELY DECREASED GLOMERULAR FILTRATION RATE (GFR) BETWEEN 45-59 ML/MIN/1.73 SQUARE METER AND ALBUMINURIA CREATININE RATIO LESS THAN 30 MG/G (CMS/H* (HCC): ICD-10-CM

## 2021-06-09 LAB
ALBUMIN SERPL-MCNC: 4.4 G/DL (ref 3.5–5.2)
ANION GAP SERPL CALCULATED.3IONS-SCNC: 11.4 MMOL/L (ref 5–15)
BUN SERPL-MCNC: 30 MG/DL (ref 8–23)
BUN/CREAT SERPL: 16.1 (ref 7–25)
CALCIUM SPEC-SCNC: 9.1 MG/DL (ref 8.6–10.5)
CHLORIDE SERPL-SCNC: 98 MMOL/L (ref 98–107)
CO2 SERPL-SCNC: 30.6 MMOL/L (ref 22–29)
CREAT SERPL-MCNC: 1.86 MG/DL (ref 0.76–1.27)
GFR SERPL CREATININE-BSD FRML MDRD: 36 ML/MIN/1.73
GLUCOSE SERPL-MCNC: 57 MG/DL (ref 65–99)
PHOSPHATE SERPL-MCNC: 3.6 MG/DL (ref 2.5–4.5)
POTASSIUM SERPL-SCNC: 4.2 MMOL/L (ref 3.5–5.2)
SODIUM SERPL-SCNC: 140 MMOL/L (ref 136–145)
URATE SERPL-MCNC: 11.4 MG/DL (ref 3.4–7)

## 2021-06-09 PROCEDURE — 80069 RENAL FUNCTION PANEL: CPT

## 2021-06-09 PROCEDURE — 84550 ASSAY OF BLOOD/URIC ACID: CPT

## 2021-06-09 PROCEDURE — 36415 COLL VENOUS BLD VENIPUNCTURE: CPT

## 2021-08-30 ENCOUNTER — OFFICE VISIT (OUTPATIENT)
Dept: INTERNAL MEDICINE | Facility: CLINIC | Age: 71
End: 2021-08-30

## 2021-08-30 VITALS
OXYGEN SATURATION: 94 % | DIASTOLIC BLOOD PRESSURE: 80 MMHG | HEIGHT: 69 IN | WEIGHT: 315 LBS | SYSTOLIC BLOOD PRESSURE: 132 MMHG | TEMPERATURE: 97 F | HEART RATE: 90 BPM | BODY MASS INDEX: 46.65 KG/M2

## 2021-08-30 DIAGNOSIS — G47.33 OBSTRUCTIVE SLEEP APNEA SYNDROME: ICD-10-CM

## 2021-08-30 DIAGNOSIS — Z11.59 NEED FOR HEPATITIS C SCREENING TEST: ICD-10-CM

## 2021-08-30 DIAGNOSIS — E78.2 MIXED HYPERLIPIDEMIA: ICD-10-CM

## 2021-08-30 DIAGNOSIS — E03.8 OTHER SPECIFIED HYPOTHYROIDISM: ICD-10-CM

## 2021-08-30 DIAGNOSIS — I48.91 ATRIAL FIBRILLATION, UNSPECIFIED TYPE (HCC): ICD-10-CM

## 2021-08-30 DIAGNOSIS — Z00.00 ENCOUNTER FOR MEDICARE ANNUAL WELLNESS EXAM: Primary | ICD-10-CM

## 2021-08-30 DIAGNOSIS — E66.01 CLASS 3 SEVERE OBESITY DUE TO EXCESS CALORIES WITH SERIOUS COMORBIDITY AND BODY MASS INDEX (BMI) OF 50.0 TO 59.9 IN ADULT (HCC): ICD-10-CM

## 2021-08-30 DIAGNOSIS — K21.9 GASTROESOPHAGEAL REFLUX DISEASE WITHOUT ESOPHAGITIS: ICD-10-CM

## 2021-08-30 DIAGNOSIS — Z79.4 TYPE 2 DIABETES MELLITUS WITHOUT COMPLICATION, WITH LONG-TERM CURRENT USE OF INSULIN (HCC): ICD-10-CM

## 2021-08-30 DIAGNOSIS — I10 BENIGN ESSENTIAL HYPERTENSION: ICD-10-CM

## 2021-08-30 DIAGNOSIS — E11.9 TYPE 2 DIABETES MELLITUS WITHOUT COMPLICATION, WITH LONG-TERM CURRENT USE OF INSULIN (HCC): ICD-10-CM

## 2021-08-30 PROCEDURE — G0439 PPPS, SUBSEQ VISIT: HCPCS | Performed by: INTERNAL MEDICINE

## 2021-08-30 RX ORDER — SIMVASTATIN 20 MG
20 TABLET ORAL NIGHTLY
Qty: 90 TABLET | Refills: 1 | Status: SHIPPED | OUTPATIENT
Start: 2021-08-30 | End: 2022-04-18 | Stop reason: SDUPTHER

## 2021-08-30 RX ORDER — INSULIN LISPRO 100 [IU]/ML
25 INJECTION, SOLUTION INTRAVENOUS; SUBCUTANEOUS 3 TIMES DAILY
Qty: 30 ML | Refills: 3 | Status: CANCELLED
Start: 2021-08-30

## 2021-08-30 RX ORDER — LEVOTHYROXINE SODIUM 0.07 MG/1
75 TABLET ORAL DAILY
Qty: 90 TABLET | Refills: 1 | Status: SHIPPED | OUTPATIENT
Start: 2021-08-30 | End: 2022-04-18 | Stop reason: SDUPTHER

## 2021-08-30 RX ORDER — GLIPIZIDE 10 MG/1
10 TABLET ORAL
Qty: 180 TABLET | Refills: 1 | Status: SHIPPED | OUTPATIENT
Start: 2021-08-30 | End: 2021-11-29

## 2021-08-30 RX ORDER — PEN NEEDLE, DIABETIC 31 GX5/16"
NEEDLE, DISPOSABLE MISCELLANEOUS
Qty: 200 EACH | Refills: 11 | Status: SHIPPED | OUTPATIENT
Start: 2021-08-30 | End: 2022-12-12

## 2021-08-30 RX ORDER — INSULIN GLARGINE 100 [IU]/ML
INJECTION, SOLUTION SUBCUTANEOUS
Qty: 30 ML | Refills: 5 | Status: SHIPPED | OUTPATIENT
Start: 2021-08-30 | End: 2022-09-06

## 2021-08-30 NOTE — PROGRESS NOTES
The ABCs of the Annual Wellness Visit  Subsequent Medicare Wellness Visit    Chief Complaint   Patient presents with   • Medicare Wellness-subsequent       Subjective   History of Present Illness:  Freddie Yeboah Jr. is a 71 y.o. male who presents for a Subsequent Medicare Wellness Visit.  PMH of HTN, HLD, atrial fibrillation, carotid bruit, hypothyroid, type 2 diabetes, morbid obesity, GERD, BPH, chronic renal disease, chronic low back pain, DJD, asthma, sleep apnea and secondary pulmonary hypertension.  BP and heart rate are well controlled today.  A1c 8.5 approximately 4 months ago.  Uric acid level elevated at 11.4 approximately 2 months ago.  He saw Dr Rodriguez over a week ago and he made some changes.  He had an ECHO done at that time.  He had been having some swelling in legs that came up quickly and he had some ulcers on legs.  He was not wearing compression socks or wearing CPAP.  He is eating junk food. He is taking lantus 120 u at night and lispro 22 u with each meal.  He is compliant with all meds.  His BP has been well controlled.  No GERD sxs.  He has been wearing CPAP again lately, he is sleeping better. He keeps legs elevated, wears socks most of the time.      HEALTH RISK ASSESSMENT    Recent Hospitalizations:  No hospitalization(s) within the last year.    Current Medical Providers:  Patient Care Team:  Vermeesch, Marilyn K, MD as PCP - General (Internal Medicine & Pediatrics)    Smoking Status:  Social History     Tobacco Use   Smoking Status Former Smoker   • Packs/day: 3.00   • Years: 34.00   • Pack years: 102.00   • Quit date:    • Years since quittin.6   Smokeless Tobacco Never Used       Alcohol Consumption:  Social History     Substance and Sexual Activity   Alcohol Use No       Depression Screen:   PHQ-2/PHQ-9 Depression Screening 2021   Little interest or pleasure in doing things 0   Feeling down, depressed, or hopeless 1   Total Score 1       Fall Risk Screen:  STEADI Fall Risk  Assessment was completed, and patient is at LOW risk for falls.Assessment completed on:8/30/2021    Health Habits and Functional and Cognitive Screening:  Functional & Cognitive Status 8/30/2021   Do you have difficulty preparing food and eating? No   Do you have difficulty bathing yourself, getting dressed or grooming yourself? No   Do you have difficulty using the toilet? No   Do you have difficulty moving around from place to place? Yes   Do you have trouble with steps or getting out of a bed or a chair? Yes   Current Diet Unhealthy Diet   Dental Exam Not up to date   Eye Exam Up to date   Exercise (times per week) 0 times per week   Current Exercises Include No Regular Exercise   Current Exercise Activities Include -   Do you need help using the phone?  No   Are you deaf or do you have serious difficulty hearing?  Yes   Do you need help with transportation? No   Do you need help shopping? No   Do you need help preparing meals?  No   Do you need help with housework?  No   Do you need help with laundry? No   Do you need help taking your medications? Yes   Do you need help managing money? No   Do you ever drive or ride in a car without wearing a seat belt? Yes   Have you felt unusual stress, anger or loneliness in the last month? Yes   Who do you live with? Spouse   If you need help, do you have trouble finding someone available to you? No   Have you been bothered in the last four weeks by sexual problems? No   Do you have difficulty concentrating, remembering or making decisions? Yes         Does the patient have evidence of cognitive impairment? No    Asprin use counseling:Taking ASA appropriately as indicated    Age-appropriate Screening Schedule:  Refer to the list below for future screening recommendations based on patient's age, sex and/or medical conditions. Orders for these recommended tests are listed in the plan section. The patient has been provided with a written plan.    Health Maintenance   Topic Date  Due   • URINE MICROALBUMIN  05/22/2016   • ZOSTER VACCINE (2 of 2) 07/11/2017   • DIABETIC EYE EXAM  08/16/2019   • INFLUENZA VACCINE  10/01/2021   • HEMOGLOBIN A1C  10/19/2021   • LIPID PANEL  10/23/2021   • DIABETIC FOOT EXAM  02/23/2022   • TDAP/TD VACCINES (2 - Td or Tdap) 10/09/2022          The following portions of the patient's history were reviewed and updated as appropriate: allergies, current medications, past family history, past medical history, past social history, past surgical history and problem list.    Outpatient Medications Prior to Visit   Medication Sig Dispense Refill   • anastrozole (ARIMIDEX) 1 MG tablet TAKE ONE TABLET BY MOUTH TWO TIMES PER WEEK ON SUNDAY AND THURSDAY 24 tablet 3   • apixaban (ELIQUIS) 5 MG tablet tablet Take 1 tablet by mouth 2 (Two) Times a Day. Resume tomorrow 9/13/17 60 tablet    • aspirin 81 MG tablet Take 1 tablet by mouth daily.     • bumetanide (BUMEX) 1 MG tablet 1 mg 2 (Two) Times a Day.     • calcipotriene (DOVONEX) 0.005 % cream      • digoxin (LANOXIN) 125 MCG tablet Take 125 mcg by mouth Daily.     • docusate sodium 100 MG capsule Take 100 mg by mouth 2 (Two) Times a Day As Needed (constipation). 60 each 0   • fluorouracil (EFUDEX) 5 % cream      • glucosamine-chondroitin 500-400 MG capsule capsule Take 1 capsule by mouth 2 (Two) Times a Day.     • glucose blood test strip Use to test blood sugar level 3 times daily. DX: E11.9 300 each 3   • glucose monitor monitoring kit Use meter to check blood sugar levels twice daily. 1 each 0   • Insulin Lispro, 1 Unit Dial, (HUMALOG) 100 UNIT/ML solution pen-injector Inject 25 Units under the skin into the appropriate area as directed 3 (Three) Times a Day. 30 mL 3   • isosorbide mononitrate (IMDUR) 60 MG 24 hr tablet Take 60 mg by mouth Daily With Breakfast.     • metoprolol tartrate (LOPRESSOR) 100 MG tablet Take 100 mg by mouth 2 (Two) Times a Day.     • Morphine (WANDY) 50 MG 24 hr capsule      • Morphine Sulfate  "ER 60 MG tablet extended-release 12 hour Every 12 (Twelve) Hours.     • Multiple Vitamins-Minerals (MULTIVITAL) tablet Take 1 tablet by mouth Daily.     • Omega-3 Fatty Acids (FISH OIL) 1200 MG capsule capsule Take 1,200 mg by mouth 2 (Two) Times a Day With Meals. Take as directed     • omeprazole (priLOSEC) 40 MG capsule Take 1 capsule by mouth Daily. 90 capsule 3   • ONETOUCH DELICA LANCETS 33G misc Use to test blood sugar level 3 times daily. DX: E11.9 300 each 3   • oxyCODONE-acetaminophen (PERCOCET)  MG per tablet Take  tablets by mouth As Needed.     • Ropivacaine HCl-NaCl (NAROPIN) 0.2-0.9 % 12 mg/hr by Peripheral Nerve route Continuous.     • Syringe 20G X 1\" 3 ML misc 2 mL Every 21 (Twenty-One) Days. 50 each 3   • telmisartan (MICARDIS) 20 MG tablet      • Testosterone Cypionate (Depo-Testosterone) 200 MG/ML injection Inject 2 mL into the appropriate muscle as directed by prescriber Every 21 (Twenty-One) Days. 4 mL 5   • tiZANidine (ZANAFLEX) 4 MG tablet Take 8 mg by mouth every night at bedtime.     • glipizide (GLUCOTROL) 10 MG tablet Take 1 tablet by mouth 2 (Two) Times a Day Before Meals. 180 tablet 1   • Insulin Glargine (Lantus SoloStar) 100 UNIT/ML injection pen INJECT 120 UNITS ONCE DAILY 30 mL 5   • Insulin Pen Needle (B-D ULTRAFINE III SHORT PEN) 31G X 8 MM misc USE TO INJECT INSULIN FIVE TIMES A  each 11   • levothyroxine (SYNTHROID, LEVOTHROID) 75 MCG tablet Take 1 tablet by mouth Daily. 90 tablet 1   • simvastatin (ZOCOR) 20 MG tablet Take 1 tablet by mouth Every Night. 90 tablet 1   • spironolactone (ALDACTONE) 25 MG tablet 25 mg Daily.       No facility-administered medications prior to visit.       Patient Active Problem List   Diagnosis   • Asthma   • Atrial fibrillation (CMS/HCC)   • Back pain   • Benign essential hypertension   • Benign prostatic hyperplasia   • Chronic pain   • Gastroesophageal reflux disease   • Hyperlipidemia   • Hypothyroidism   • Low back pain   • " "Adiposity   • Obstructive sleep apnea syndrome   • Osteoarthritis   • Carotid bruit   • Cor pulmonale (CMS/HCC)   • Renal insufficiency   • Secondary pulmonary hypertension   • Type 2 diabetes mellitus without complication (CMS/HCC)   • Degeneration of cervical intervertebral disc   • Encounter for Medicare annual wellness exam   • Obesity due to excess calories with serious comorbidity   • Need for hepatitis C screening test       Advanced Care Planning:  ACP discussion was declined by the patient. Patient does not have an advance directive, declines further assistance.    Review of Systems   Constitutional: Negative.    HENT: Negative.    Eyes: Negative.    Respiratory: Negative.    Cardiovascular: Positive for chest pain, palpitations and leg swelling.   Gastrointestinal: Negative.    Endocrine: Negative.    Genitourinary: Negative.    Musculoskeletal: Positive for arthralgias, back pain and neck pain.   Skin: Negative.    Allergic/Immunologic: Positive for environmental allergies.   Neurological: Negative.    Hematological: Bruises/bleeds easily.   Psychiatric/Behavioral: Positive for dysphoric mood.       Compared to one year ago, the patient feels his physical health is worse.  Compared to one year ago, the patient feels his mental health is the same.    Reviewed chart for potential of high risk medication in the elderly: yes  Reviewed chart for potential of harmful drug interactions in the elderly:yes    Objective         Vitals:    08/30/21 1303   BP: 132/80   Pulse: 90   Temp: 97 °F (36.1 °C)   SpO2: 94%   Weight: (!) 165 kg (364 lb)   Height: 176.5 cm (69.49\")   PainSc: 0-No pain       Body mass index is 53 kg/m².  Discussed the patient's BMI with him. The BMI is above average; BMI management plan is completed.    Physical Exam  Vitals and nursing note reviewed.   Constitutional:       General: He is not in acute distress.     Appearance: Normal appearance. He is well-developed. He is obese. He is not " ill-appearing.      Comments: Kind and pleasant man, appears stated age and in no distress today, morbidly obese   HENT:      Head: Normocephalic and atraumatic.      Right Ear: Tympanic membrane, ear canal and external ear normal.      Left Ear: Tympanic membrane, ear canal and external ear normal.   Eyes:      General:         Right eye: No discharge.         Left eye: No discharge.      Extraocular Movements: Extraocular movements intact.      Conjunctiva/sclera: Conjunctivae normal.      Pupils: Pupils are equal, round, and reactive to light.   Neck:      Thyroid: No thyromegaly.      Vascular: No carotid bruit.      Comments: No thyromegaly or mass  Cardiovascular:      Rate and Rhythm: Normal rate. Rhythm irregular.      Pulses: Normal pulses.      Heart sounds: Normal heart sounds. No murmur heard.        Comments: Irregularly irregular with distant heart sounds  Pulmonary:      Effort: Pulmonary effort is normal. No respiratory distress.      Breath sounds: No wheezing.      Comments: Decreased breath sounds due to body habitus  Abdominal:      General: Bowel sounds are normal. There is no distension.      Palpations: Abdomen is soft.      Tenderness: There is no abdominal tenderness.      Comments: Obesity limits exam  Lower abdomen with several areas of ecchymosis due to insulin use   Musculoskeletal:         General: Normal range of motion.      Cervical back: Normal range of motion and neck supple.      Right lower leg: No edema.      Left lower leg: Edema present.      Comments: +1 edema in distal lower left extremity   Lymphadenopathy:      Cervical: No cervical adenopathy.   Skin:     General: Skin is warm and dry.      Findings: No rash.      Comments: Feet with no callus or ulcer, sensation grossly intact to light touch bilaterally  Distal legs are very dry, left calf with 2 areas of scabbing due to recent ulcers, no surrounding redness, left leg with few areas of blistering and weeping due to  recent edema   Neurological:      General: No focal deficit present.      Mental Status: He is alert and oriented to person, place, and time. Mental status is at baseline.      Cranial Nerves: No cranial nerve deficit.      Motor: No weakness.      Coordination: Coordination normal.      Gait: Gait normal.   Psychiatric:         Mood and Affect: Mood normal.         Behavior: Behavior normal.         Thought Content: Thought content normal.         Judgment: Judgment normal.               Assessment/Plan   Medicare Risks and Personalized Health Plan  CMS Preventative Services Quick Reference  Advance Directive Discussion  Cardiovascular risk  Chronic Pain   Diabetic Lab Screening   Immunizations Discussed/Encouraged (specific immunizations; Shingrix )  Obesity/Overweight     The above risks/problems have been discussed with the patient.  Pertinent information has been shared with the patient in the After Visit Summary.  Follow up plans and orders are seen below in the Assessment/Plan Section.    Diagnoses and all orders for this visit:    1. Encounter for Medicare annual wellness exam (Primary)  -     CBC & Differential  -     Comprehensive Metabolic Panel  -     Hemoglobin A1c  -     Hepatitis C Antibody  -     Lipid Panel With / Chol / HDL Ratio  -     T4, Free  -     TSH  Patient declines information on advance directives today  Encourage shingles vaccine at local pharmacy    2. Type 2 diabetes mellitus without complication, with long-term current use of insulin (CMS/[facility])  -     Insulin Glargine (Lantus SoloStar) 100 UNIT/ML injection pen; INJECT 120 UNITS ONCE DAILY  Dispense: 30 mL; Refill: 5  -     POCT microalbumin  -     Comprehensive Metabolic Panel  -     Hemoglobin A1c  Follow diabetic diet  Monitor blood sugars as discussed  See eye doctor annually or as discussed  Wear protective foot wear/no bare feet  Check feet regularly for calluses or ulcers  Discussed risk of poorly controlled diabetes and  long-term complications  Exercise as tolerated up to 30 minutes 5 days a week  Take all medications as prescribed    3. Benign essential hypertension  -     CBC & Differential  -     Comprehensive Metabolic Panel  Follow heart healthy/low salt diet  Avoid processed foods  Monitor blood pressure as discussed  Exercise as tolerated up to 30 minutes 5 days per week  Take all medications as prescribed    4. Atrial fibrillation, unspecified type (CMS/[facility])  Heart rate well controlled with use of metoprolol and digoxin, continue Eliquis also  Recently seen by cardiologist with medications changed per patient, less edema and patient is feeling better  Encouraged him to wear compression stockings regularly and follow directions per cardiology    5. Mixed hyperlipidemia  -     Comprehensive Metabolic Panel  -     Lipid Panel With / Chol / HDL Ratio  Continue simvastatin every night  Follow low-fat/low-cholesterol diet    6. Other specified hypothyroidism  -     T4, Free  -     TSH  Continue current dose of levothyroxine, will adjust dose if needed based on labs    7. Gastroesophageal reflux disease without esophagitis  -     CBC & Differential  No current GERD symptoms with use of omeprazole    8. Obstructive sleep apnea syndrome  Encourage compliance with use of CPAP every night    9. Need for hepatitis C screening test  -     Hepatitis C Antibody    10. Class 3 severe obesity due to excess calories with serious comorbidity and body mass index (BMI) of 50.0 to 59.9 in adult (CMS/[facility])  Patient's Body mass index is 53 kg/m². indicating that he is morbidly obese (BMI > 40 or > 35 with obesity - related health condition). Obesity-related health conditions include the following: obstructive sleep apnea, hypertension, coronary heart disease, diabetes mellitus, dyslipidemias, GERD and osteoarthritis. Obesity is worsening. BMI is is above average; BMI management plan is completed. We discussed portion control, increasing exercise  and management of depression/anxiety/stress to control compensatory eating.  We again discussed option of bariatric referral, however patient declines offer    Other orders  -     glipizide (GLUCOTROL) 10 MG tablet; Take 1 tablet by mouth 2 (Two) Times a Day Before Meals.  Dispense: 180 tablet; Refill: 1  -     Cancel: Insulin Lispro, 1 Unit Dial, (HUMALOG) 100 UNIT/ML solution pen-injector; Inject 25 Units under the skin into the appropriate area as directed 3 (Three) Times a Day.  Dispense: 30 mL; Refill: 3  -     Insulin Pen Needle (B-D ULTRAFINE III SHORT PEN) 31G X 8 MM misc; USE TO INJECT INSULIN FIVE TIMES A DAY  Dispense: 200 each; Refill: 11  -     levothyroxine (SYNTHROID, LEVOTHROID) 75 MCG tablet; Take 1 tablet by mouth Daily.  Dispense: 90 tablet; Refill: 1  -     simvastatin (ZOCOR) 20 MG tablet; Take 1 tablet by mouth Every Night.  Dispense: 90 tablet; Refill: 1      Follow Up:  Return in about 6 months (around 2/28/2022) for Next scheduled follow up.     An After Visit Summary and PPPS were given to the patient.

## 2021-11-02 DIAGNOSIS — N40.0 BENIGN NON-NODULAR PROSTATIC HYPERPLASIA WITHOUT LOWER URINARY TRACT SYMPTOMS: ICD-10-CM

## 2021-11-02 DIAGNOSIS — E28.0 ESTRADIOL EXCESS: ICD-10-CM

## 2021-11-02 DIAGNOSIS — E29.1 HYPOGONADISM IN MALE: ICD-10-CM

## 2021-11-02 RX ORDER — SYRINGE W-NEEDLE,DISPOSAB,3 ML 25GX5/8"
2 SYRINGE, EMPTY DISPOSABLE MISCELLANEOUS
Qty: 50 EACH | Refills: 3 | OUTPATIENT
Start: 2021-11-02

## 2021-11-02 RX ORDER — TESTOSTERONE CYPIONATE 200 MG/ML
400 INJECTION, SOLUTION INTRAMUSCULAR
Qty: 4 ML | Refills: 5 | OUTPATIENT
Start: 2021-11-02

## 2021-11-02 NOTE — TELEPHONE ENCOUNTER
I am sorry but I require labs and a visit for discussion prior to writing controlled substances, especially for patients I have not met before.  This needs to be an office policy that is understood by everyone going forward please.

## 2021-11-02 NOTE — TELEPHONE ENCOUNTER
This patient is a previous patient of Dr Zelaya, he is going to be out of his testosterone injection on Friday and will need it that day. Advised patient's wife that he would needs labs prior and I scheduled him a follow up with you on November 18th. Patient's wife is wondering if the refill can be sent before the appointment. Please advise. Thank you!

## 2021-11-04 ENCOUNTER — TELEPHONE (OUTPATIENT)
Dept: UROLOGY | Facility: CLINIC | Age: 71
End: 2021-11-04

## 2021-11-24 ENCOUNTER — LAB (OUTPATIENT)
Dept: LAB | Facility: HOSPITAL | Age: 71
End: 2021-11-24

## 2021-11-24 LAB
ALBUMIN SERPL-MCNC: 4.9 G/DL (ref 3.5–5.2)
ALBUMIN/GLOB SERPL: 1.5 G/DL
ALP SERPL-CCNC: 119 U/L (ref 39–117)
ALT SERPL W P-5'-P-CCNC: 15 U/L (ref 1–41)
ANION GAP SERPL CALCULATED.3IONS-SCNC: 10.1 MMOL/L (ref 5–15)
AST SERPL-CCNC: 32 U/L (ref 1–40)
BASOPHILS # BLD AUTO: 0.05 10*3/MM3 (ref 0–0.2)
BASOPHILS NFR BLD AUTO: 0.5 % (ref 0–1.5)
BILIRUB SERPL-MCNC: 1.2 MG/DL (ref 0–1.2)
BUN SERPL-MCNC: 45 MG/DL (ref 8–23)
BUN/CREAT SERPL: 19.2 (ref 7–25)
CALCIUM SPEC-SCNC: 10.2 MG/DL (ref 8.6–10.5)
CHLORIDE SERPL-SCNC: 89 MMOL/L (ref 98–107)
CO2 SERPL-SCNC: 38.9 MMOL/L (ref 22–29)
CREAT SERPL-MCNC: 2.34 MG/DL (ref 0.76–1.27)
DEPRECATED RDW RBC AUTO: 51.8 FL (ref 37–54)
EOSINOPHIL # BLD AUTO: 0.32 10*3/MM3 (ref 0–0.4)
EOSINOPHIL NFR BLD AUTO: 3.3 % (ref 0.3–6.2)
ERYTHROCYTE [DISTWIDTH] IN BLOOD BY AUTOMATED COUNT: 22.1 % (ref 12.3–15.4)
GFR SERPL CREATININE-BSD FRML MDRD: 28 ML/MIN/1.73
GLOBULIN UR ELPH-MCNC: 3.3 GM/DL
GLUCOSE SERPL-MCNC: 85 MG/DL (ref 65–99)
HCT VFR BLD AUTO: 43.6 % (ref 37.5–51)
HGB BLD-MCNC: 12.1 G/DL (ref 13–17.7)
LYMPHOCYTES # BLD AUTO: 4.04 10*3/MM3 (ref 0.7–3.1)
LYMPHOCYTES NFR BLD AUTO: 41.5 % (ref 19.6–45.3)
MCH RBC QN AUTO: 19.4 PG (ref 26.6–33)
MCHC RBC AUTO-ENTMCNC: 27.8 G/DL (ref 31.5–35.7)
MCV RBC AUTO: 69.9 FL (ref 79–97)
MONOCYTES # BLD AUTO: 1.44 10*3/MM3 (ref 0.1–0.9)
MONOCYTES NFR BLD AUTO: 14.8 % (ref 5–12)
NEUTROPHILS NFR BLD AUTO: 3.86 10*3/MM3 (ref 1.7–7)
NEUTROPHILS NFR BLD AUTO: 39.6 % (ref 42.7–76)
PLATELET # BLD AUTO: 246 10*3/MM3 (ref 140–450)
PMV BLD AUTO: 9.2 FL (ref 6–12)
POTASSIUM SERPL-SCNC: 3.9 MMOL/L (ref 3.5–5.2)
PROT SERPL-MCNC: 8.2 G/DL (ref 6–8.5)
RBC # BLD AUTO: 6.24 10*6/MM3 (ref 4.14–5.8)
SODIUM SERPL-SCNC: 138 MMOL/L (ref 136–145)
WBC NRBC COR # BLD: 9.74 10*3/MM3 (ref 3.4–10.8)

## 2021-11-24 PROCEDURE — 85025 COMPLETE CBC W/AUTO DIFF WBC: CPT | Performed by: INTERNAL MEDICINE

## 2021-11-24 PROCEDURE — 83036 HEMOGLOBIN GLYCOSYLATED A1C: CPT | Performed by: INTERNAL MEDICINE

## 2021-11-24 PROCEDURE — 84439 ASSAY OF FREE THYROXINE: CPT | Performed by: INTERNAL MEDICINE

## 2021-11-24 PROCEDURE — 84443 ASSAY THYROID STIM HORMONE: CPT | Performed by: INTERNAL MEDICINE

## 2021-11-24 PROCEDURE — 86803 HEPATITIS C AB TEST: CPT | Performed by: INTERNAL MEDICINE

## 2021-11-24 PROCEDURE — 80053 COMPREHEN METABOLIC PANEL: CPT | Performed by: INTERNAL MEDICINE

## 2021-11-24 PROCEDURE — 36415 COLL VENOUS BLD VENIPUNCTURE: CPT | Performed by: INTERNAL MEDICINE

## 2021-11-24 PROCEDURE — 80061 LIPID PANEL: CPT | Performed by: INTERNAL MEDICINE

## 2021-11-25 LAB
CHOLEST SERPL-MCNC: 105 MG/DL (ref 0–200)
HBA1C MFR BLD: 6 % (ref 4.8–5.6)
HCV AB SER DONR QL: NORMAL
HDLC SERPL QL: 3.75
HDLC SERPL-MCNC: 28 MG/DL (ref 40–60)
LDLC SERPL CALC-MCNC: 58 MG/DL (ref 0–100)
T4 FREE SERPL-MCNC: 1.49 NG/DL (ref 0.93–1.7)
TRIGL SERPL-MCNC: 96 MG/DL (ref 0–150)
TSH SERPL DL<=0.05 MIU/L-ACNC: 5.05 UIU/ML (ref 0.27–4.2)
VLDLC SERPL-MCNC: 19 MG/DL (ref 5–40)

## 2021-11-29 ENCOUNTER — LAB (OUTPATIENT)
Dept: LAB | Facility: HOSPITAL | Age: 71
End: 2021-11-29

## 2021-11-29 ENCOUNTER — OFFICE VISIT (OUTPATIENT)
Dept: UROLOGY | Facility: CLINIC | Age: 71
End: 2021-11-29

## 2021-11-29 VITALS
TEMPERATURE: 98.3 F | HEIGHT: 70 IN | BODY MASS INDEX: 45.1 KG/M2 | WEIGHT: 315 LBS | RESPIRATION RATE: 18 BRPM | OXYGEN SATURATION: 96 % | HEART RATE: 89 BPM

## 2021-11-29 DIAGNOSIS — E29.1 HYPOGONADISM IN MALE: ICD-10-CM

## 2021-11-29 DIAGNOSIS — E28.0 ESTRADIOL EXCESS: ICD-10-CM

## 2021-11-29 DIAGNOSIS — E29.1 HYPOGONADISM IN MALE: Primary | ICD-10-CM

## 2021-11-29 LAB — TESTOST SERPL-MCNC: 335 NG/DL (ref 193–740)

## 2021-11-29 PROCEDURE — 84403 ASSAY OF TOTAL TESTOSTERONE: CPT

## 2021-11-29 PROCEDURE — 99214 OFFICE O/P EST MOD 30 MIN: CPT | Performed by: UROLOGY

## 2021-11-29 PROCEDURE — 36415 COLL VENOUS BLD VENIPUNCTURE: CPT

## 2021-11-29 RX ORDER — BUMETANIDE 2 MG/1
TABLET ORAL
COMMUNITY
Start: 2021-11-23

## 2021-11-29 RX ORDER — ANASTROZOLE 1 MG/1
1 TABLET ORAL WEEKLY
Qty: 4 TABLET | Refills: 11 | Status: SHIPPED | OUTPATIENT
Start: 2021-11-29 | End: 2022-12-22 | Stop reason: SDUPTHER

## 2021-11-29 RX ORDER — TELMISARTAN 20 MG/1
1 TABLET ORAL DAILY
COMMUNITY
Start: 2021-07-05

## 2021-11-29 RX ORDER — DIGOXIN 125 MCG
1 TABLET ORAL DAILY
COMMUNITY
Start: 2021-06-02 | End: 2021-12-13

## 2021-11-29 RX ORDER — TESTOSTERONE CYPIONATE 200 MG/ML
400 INJECTION, SOLUTION INTRAMUSCULAR
Qty: 4 ML | Refills: 5 | Status: SHIPPED | OUTPATIENT
Start: 2021-11-29 | End: 2022-06-01

## 2021-11-29 RX ORDER — LANCETS 33 GAUGE
EACH MISCELLANEOUS
Qty: 300 EACH | Refills: 3 | Status: SHIPPED | OUTPATIENT
Start: 2021-11-29

## 2021-11-29 RX ORDER — METOLAZONE 2.5 MG/1
TABLET ORAL
COMMUNITY
Start: 2021-11-23

## 2021-11-29 RX ORDER — ANASTROZOLE 1 MG/1
1 TABLET ORAL WEEKLY
Qty: 4 TABLET | Refills: 11 | Status: SHIPPED | OUTPATIENT
Start: 2021-11-29 | End: 2021-12-13 | Stop reason: SDUPTHER

## 2021-11-29 NOTE — TELEPHONE ENCOUNTER
Caller: Alia Yeboah    Relationship: Emergency Contact    Best call back number: [phone]    Requested Prescriptions:   Requested Prescriptions     Pending Prescriptions Disp Refills   • OneTouch Delica Lancets 33G misc 300 each 3     Sig: Use to test blood sugar level 3 times daily. DX: E11.9   • glucose blood test strip 300 each 3     Sig: Use to test blood sugar level 3 times daily. DX: E11.9        Pharmacy where request should be sent: [facility] [phone] [facility] [phone] FX     Additional details provided by patient:     Does the patient have less than a 3 day supply:  [] Yes  [x] No    Hi Joseph, PCT   11/29/21 08:53 EST

## 2021-12-02 ENCOUNTER — TELEPHONE (OUTPATIENT)
Dept: INTERNAL MEDICINE | Facility: CLINIC | Age: 71
End: 2021-12-02

## 2021-12-02 NOTE — TELEPHONE ENCOUNTER
Attempted contacting wife no answer, left VM that medication was increased based on recent labs but I hadn't been able to call yet. Requesting she call back to go over labs.    HUB TO SHARE: Thyroid function tests reveal normal free T4 and slight elevation of TSH but no need for adjustment of medications at this time due to normal free T4.  A1c has improved and is down to 6 suggesting average blood sugars of 125.  His kidney function however has significantly worsened with BUN and creatinine of 45 and 2.34 respectively.  Dr. Vermeesch made some changes in his medication due to worsening kidney function. We are discontinuing Amaryl or glimepiride as this medication is excreted through both liver and kidney.  She's started a new diabetic medication called Tradjenta 5 mg once daily every morning.  In addition she decreased his Eliquis dose to 2.5 mg a.m. and p.m., this is renal dosing of this medication.  It is important that he follow-up with his kidney doctor, we do not see a recent visit in chart.  He is more anemic, most likely due to worsening kidney function.  In addition his carbon dioxide level is high, has he been compliant with his CPAP every night?  This also may be elevated due to worsening kidney function.  Please tell him to maintain good hydration with primarily water.  Please tell him to monitor blood sugar with change in medication and bring me blood sugar readings on next visit in a few weeks.

## 2021-12-02 NOTE — TELEPHONE ENCOUNTER
Caller:LISHA CROWE  Relationship:     Best call back number: [phone]    What medications are you currently taking:   Current Outpatient Medications on File Prior to Visit   Medication Sig Dispense Refill   • anastrozole (Arimidex) 1 MG tablet Take 1 tablet by mouth 1 (One) Time Per Week. 4 tablet 11   • anastrozole (ARIMIDEX) 1 MG tablet Take 1 tablet by mouth 1 (One) Time Per Week. 4 tablet 11   • apixaban (ELIQUIS) 2.5 MG tablet tablet Take 1 tablet by mouth Every 12 (Twelve) Hours. Resume tomorrow 9/13/17 180 tablet 3   • apixaban (Eliquis) 5 MG tablet tablet Take 1 tablet by mouth Daily.     • aspirin 81 MG tablet Take 1 tablet by mouth daily.     • bumetanide (BUMEX) 1 MG tablet 1 mg 2 (Two) Times a Day.     • bumetanide (BUMEX) 2 MG tablet      • calcipotriene (DOVONEX) 0.005 % cream      • digoxin (LANOXIN) 125 MCG tablet Take 125 mcg by mouth Daily.     • digoxin (LANOXIN) 125 MCG tablet Take 1 tablet by mouth Daily.     • docusate sodium 100 MG capsule Take 100 mg by mouth 2 (Two) Times a Day As Needed (constipation). 60 each 0   • fluorouracil (EFUDEX) 5 % cream      • glucosamine-chondroitin 500-400 MG capsule capsule Take 1 capsule by mouth 2 (Two) Times a Day.     • glucose blood test strip Use to test blood sugar level 3 times daily. DX: E11.9 300 each 3   • glucose monitor monitoring kit Use meter to check blood sugar levels twice daily. 1 each 0   • Insulin Glargine (Lantus SoloStar) 100 UNIT/ML injection pen INJECT 120 UNITS ONCE DAILY 30 mL 5   • Insulin Lispro, 1 Unit Dial, (HUMALOG) 100 UNIT/ML solution pen-injector Inject 25 Units under the skin into the appropriate area as directed 3 (Three) Times a Day. 30 mL 3   • Insulin Pen Needle (B-D ULTRAFINE III SHORT PEN) 31G X 8 MM misc USE TO INJECT INSULIN FIVE TIMES A  each 11   • isosorbide mononitrate (IMDUR) 60 MG 24 hr tablet Take 60 mg by mouth Daily With Breakfast.     • levothyroxine (SYNTHROID, LEVOTHROID) 75 MCG tablet  "Take 1 tablet by mouth Daily. 90 tablet 1   • linagliptin (TRADJENTA) 5 MG tablet tablet Take 1 tablet by mouth Daily. 90 tablet 1   • metOLazone (ZAROXOLYN) 2.5 MG tablet      • metoprolol tartrate (LOPRESSOR) 100 MG tablet Take 100 mg by mouth 2 (Two) Times a Day.     • Morphine (WANDY) 50 MG 24 hr capsule      • Morphine Sulfate ER 60 MG tablet extended-release 12 hour Every 12 (Twelve) Hours.     • Morphine Sulfate ER 60 MG tablet extended-release 12 hour Take 1 tablet by mouth Every 12 (Twelve) Hours.     • Multiple Vitamins-Minerals (MULTIVITAL) tablet Take 1 tablet by mouth Daily.     • Needle, Disp, 18G X 1-1/2\" misc Use for drawing up the medication 50 each 3   • Needle, Disp, 23G X 1-1/2\" misc 1 Device 1 (One) Time Per Week. 30 each 11   • Omega-3 Fatty Acids (FISH OIL) 1200 MG capsule capsule Take 1,200 mg by mouth 2 (Two) Times a Day With Meals. Take as directed     • omeprazole (priLOSEC) 40 MG capsule Take 1 capsule by mouth Daily. 90 capsule 3   • OneTouch Delica Lancets 33G misc Use to test blood sugar level 3 times daily. DX: E11.9 300 each 3   • oxyCODONE-acetaminophen (PERCOCET)  MG per tablet Take  tablets by mouth As Needed.     • Ropivacaine HCl-NaCl (NAROPIN) 0.2-0.9 % 12 mg/hr by Peripheral Nerve route Continuous.     • simvastatin (ZOCOR) 20 MG tablet Take 1 tablet by mouth Every Night. 90 tablet 1   • Syringe 20G X 1\" 3 ML misc 2 mL Every 21 (Twenty-One) Days. 50 each 3   • Syringe, Disposable, 3 ML misc 1 syringe 1 (One) Time Per Week. 100 each 11   • telmisartan (MICARDIS) 20 MG tablet      • telmisartan (MICARDIS) 20 MG tablet Take 1 tablet by mouth Daily.     • Testosterone Cypionate (Depo-Testosterone) 200 MG/ML injection Inject 2 mL into the appropriate muscle as directed by prescriber Every 21 (Twenty-One) Days. 4 mL 5   • tiZANidine (ZANAFLEX) 4 MG tablet Take 8 mg by mouth every night at bedtime.       No current facility-administered medications on file prior to " visit.          When did you start taking these medications: NA    Which medication are you concerned about: linagliptin (TRADJENTA) 5 MG tablet tablet    Who prescribed you this medication: DR VERMEESCH    What are your concerns: WIFE STATES SHE IS UNSURE WHY THE PATIENT WAS PRESCRIBED THIS BECAUSE HIS BLOOD SUGAR HAS BEEN FINE    How long have you had these concerns: NA

## 2021-12-03 NOTE — TELEPHONE ENCOUNTER
Attempted contacting wife no answer.     HUB TO SHARE: Thyroid function tests reveal normal free T4 and slight elevation of TSH but no need for adjustment of medications at this time due to normal free T4.  A1c has improved and is down to 6 suggesting average blood sugars of 125.  His kidney function however has significantly worsened with BUN and creatinine of 45 and 2.34 respectively.  Dr. Vermeesch made some changes in his medication due to worsening kidney function. We are discontinuing Amaryl or glimepiride as this medication is excreted through both liver and kidney.  She's started a new diabetic medication called Tradjenta 5 mg once daily every morning.  In addition she decreased his Eliquis dose to 2.5 mg a.m. and p.m., this is renal dosing of this medication.  It is important that he follow-up with his kidney doctor, we do not see a recent visit in chart.  He is more anemic, most likely due to worsening kidney function.  In addition his carbon dioxide level is high, has he been compliant with his CPAP every night?  This also may be elevated due to worsening kidney function.  Please tell him to maintain good hydration with primarily water.  Please tell him to monitor blood sugar with change in medication and bring me blood sugar readings on next visit in a few weeks.

## 2021-12-13 ENCOUNTER — OFFICE VISIT (OUTPATIENT)
Dept: INTERNAL MEDICINE | Facility: CLINIC | Age: 71
End: 2021-12-13

## 2021-12-13 VITALS
OXYGEN SATURATION: 90 % | HEART RATE: 90 BPM | SYSTOLIC BLOOD PRESSURE: 134 MMHG | BODY MASS INDEX: 45.1 KG/M2 | HEIGHT: 70 IN | TEMPERATURE: 97.1 F | WEIGHT: 315 LBS | DIASTOLIC BLOOD PRESSURE: 64 MMHG

## 2021-12-13 DIAGNOSIS — K21.9 GASTROESOPHAGEAL REFLUX DISEASE WITHOUT ESOPHAGITIS: ICD-10-CM

## 2021-12-13 DIAGNOSIS — E03.8 OTHER SPECIFIED HYPOTHYROIDISM: ICD-10-CM

## 2021-12-13 DIAGNOSIS — I48.91 ATRIAL FIBRILLATION, UNSPECIFIED TYPE (HCC): ICD-10-CM

## 2021-12-13 DIAGNOSIS — Z79.4 TYPE 2 DIABETES MELLITUS WITHOUT COMPLICATION, WITH LONG-TERM CURRENT USE OF INSULIN (HCC): ICD-10-CM

## 2021-12-13 DIAGNOSIS — E78.2 MIXED HYPERLIPIDEMIA: ICD-10-CM

## 2021-12-13 DIAGNOSIS — E11.9 TYPE 2 DIABETES MELLITUS WITHOUT COMPLICATION, WITH LONG-TERM CURRENT USE OF INSULIN (HCC): ICD-10-CM

## 2021-12-13 DIAGNOSIS — N28.9 RENAL INSUFFICIENCY: ICD-10-CM

## 2021-12-13 DIAGNOSIS — G47.33 OBSTRUCTIVE SLEEP APNEA SYNDROME: ICD-10-CM

## 2021-12-13 DIAGNOSIS — I10 BENIGN ESSENTIAL HYPERTENSION: Primary | ICD-10-CM

## 2021-12-13 PROBLEM — Z11.59 NEED FOR HEPATITIS C SCREENING TEST: Status: RESOLVED | Noted: 2021-08-30 | Resolved: 2021-12-13

## 2021-12-13 PROCEDURE — 99214 OFFICE O/P EST MOD 30 MIN: CPT | Performed by: INTERNAL MEDICINE

## 2021-12-13 RX ORDER — TADALAFIL 10 MG/1
10 TABLET ORAL DAILY
Qty: 30 TABLET | Refills: 0 | Status: SHIPPED | OUTPATIENT
Start: 2021-12-13 | End: 2022-01-04 | Stop reason: SDUPTHER

## 2021-12-13 NOTE — PROGRESS NOTES
Chief Complaint   Patient presents with   • Follow-up     for HTN, GERD, HLD, hypothyroidism, and DM.      Subjective   Freddie Yeboah Jr. is a 71 y.o. male.     Here today for follow up of HTN, HLD, DM, CAD, Afib, LUIS FERNANDO, GERD, hypothyroid, LBP/DJD, BPH.   HTN/HLD/CAD/Afib- his BP is controlled today. No CP, palpitations. No edema at this time.  He is using oxygen at 2 liters.      DM with CKD-   A1C 6.0 three weeks ago and CR increased to 2.34.  He was admitted to [facility] last mo due to CHF, was diuresed and that is when Cr increased.  BS has been running less than 120 in AM.  His lantus is at 120 u daily with 20 u short acting insulin with each meal.   He has some NTB of feet.  Last eye exam was about 3 weeks ago with Dr Kauffman.  He sees the foot doctor every 3 mo.      LUIS FERNANDO- he wears his CPAP every night. He awakens many times a night to urinate, not as much during the day.  He has urgency   GERD- he does take omeprazole daily as directed, states his GERD is well controlled.    Hypothyroid- takes thyroid med daily on empty stomach, denies any problems with trouble swallowing, change in voice or constipation  LBP/DJD- he sees pain clinic every 2 mo for his LBP, he says his back pain is doing well at this time.    BPH- he in on testosterone shots per urologist.     HCM- colonoscopy done 2018.  Pneumovax is UTD. He did not have Hep a or shingles vaccine.  He did have COVID and flu vaccines.        The following portions of the patient's history were reviewed and updated as appropriate: allergies, current medications, past family history, past medical history, past social history, past surgical history and problem list.    Review of Systems   Constitutional: Negative for activity change, appetite change and unexpected weight change.   Eyes: Negative for visual disturbance.   Respiratory: Negative for shortness of breath.    Cardiovascular: Negative for chest pain, palpitations and leg swelling.  "  Gastrointestinal: Negative for abdominal pain.   Genitourinary: Positive for frequency and urgency. Negative for hematuria.   Musculoskeletal: Negative for back pain.   Neurological: Positive for numbness. Negative for headaches.   Psychiatric/Behavioral: Negative for dysphoric mood and sleep disturbance.       Objective   /64   Pulse 90   Temp 97.1 °F (36.2 °C)   Ht 176.9 cm (69.64\")   Wt (!) 148 kg (326 lb)   SpO2 90%   BMI 47.26 kg/m²   Body mass index is 47.26 kg/m².  Physical Exam  Vitals and nursing note reviewed.   Constitutional:       General: He is not in acute distress.     Appearance: Normal appearance. He is well-developed. He is obese. He is not ill-appearing.      Comments: Kind and pleasant man, appears stated age and in no distress today   HENT:      Head: Normocephalic and atraumatic.      Right Ear: External ear normal.      Left Ear: External ear normal.   Eyes:      General:         Right eye: No discharge.         Left eye: No discharge.      Extraocular Movements: Extraocular movements intact.      Conjunctiva/sclera: Conjunctivae normal.      Pupils: Pupils are equal, round, and reactive to light.   Neck:      Thyroid: No thyromegaly.      Vascular: No carotid bruit.      Comments: No thyromegaly or mass  Cardiovascular:      Rate and Rhythm: Normal rate. Rhythm irregular.      Pulses: Normal pulses.      Heart sounds: Normal heart sounds. No murmur heard.       Comments: Irregularly irregular  Pulmonary:      Effort: Pulmonary effort is normal. No respiratory distress.      Breath sounds: Normal breath sounds. No wheezing.   Abdominal:      General: Bowel sounds are normal. There is no distension.      Palpations: Abdomen is soft.      Tenderness: There is no abdominal tenderness.      Comments: Obesity limits exam   Musculoskeletal:         General: Normal range of motion.      Cervical back: Normal range of motion and neck supple.      Right lower leg: No edema.      Left " lower leg: No edema.   Lymphadenopathy:      Cervical: No cervical adenopathy.   Skin:     General: Skin is warm.      Findings: No rash.      Comments: Distal lower extremities with brawny skin color changes   Neurological:      General: No focal deficit present.      Mental Status: He is alert and oriented to person, place, and time. Mental status is at baseline.      Cranial Nerves: No cranial nerve deficit.      Motor: No weakness.      Coordination: Coordination normal.      Gait: Gait normal.   Psychiatric:         Mood and Affect: Mood normal.         Behavior: Behavior normal.         Thought Content: Thought content normal.         Judgment: Judgment normal.         Assessment/Plan   Freddie Yeboah JrGisela is here today and the following problems have been addressed:      Diagnoses and all orders for this visit:    1. Benign essential hypertension (Primary)    2. Atrial fibrillation, unspecified type (HCC)    3. Mixed hyperlipidemia    4. Other specified hypothyroidism    5. Type 2 diabetes mellitus without complication, with long-term current use of insulin (HCC)    6. Gastroesophageal reflux disease without esophagitis    7. Renal insufficiency    8. Obstructive sleep apnea syndrome    Other orders  -     tadalafil (Cialis) 10 MG tablet; Take 1 tablet by mouth Daily.  Dispense: 30 tablet; Refill: 0      Labs from 2 weeks ago reviewed with patient today  Follow heart healthy/low salt/diabetic diet  Avoid processed foods  Monitor blood pressure and BS as discussed  Exercise as tolerated   Take all medications as prescribed  See eye doctor annually or as discussed  Wear protective foot wear/no bare feet  Check feet regularly for calluses or ulcers  Recent A1c well controlled at 6, however kidney function worsened after recent hospital stay.  Amaryl was discontinued and patient is now on Tradjenta  I also adjusted Eliquis to renal dosing at 2.5 mg twice daily due to creatinine of 2.34  Patient is now seeing a new  nephrologist and has follow-up in a few weeks  Suspect worsening renal function is due to aggressive diuresis after recent hospital stay at [facility] due to CHF  GERD is well controlled with omeprazole, may need to consider lower dose due to renal insufficiency  Continue CPAP for sleep apnea  Continue Zocor for hyperlipidemia  Patient states new nephrologist has placed him on metolazone daily, may need to decrease dose to every other day or only twice weekly if creatinine remains elevated      Return in about 4 months (around 4/13/2022) for Next scheduled follow up.      Marilyn K. Vermeesch, MD      Please note that portions of this note were completed with a voice recognition program.  Efforts were made to edit dictation, but occasionally words are mistranscribed.

## 2022-01-04 RX ORDER — TADALAFIL 10 MG/1
10 TABLET ORAL DAILY
Qty: 30 TABLET | Refills: 0 | Status: SHIPPED | OUTPATIENT
Start: 2022-01-04

## 2022-01-04 NOTE — TELEPHONE ENCOUNTER
Caller: Freddie Yeboah Jr.    Relationship: Self    Best call back number: [phone]    Requested Prescriptions:   Requested Prescriptions     Pending Prescriptions Disp Refills   • tadalafil (Cialis) 10 MG tablet 30 tablet 0     Sig: Take 1 tablet by mouth Daily.        Pharmacy where request should be sent: [facility] [phone] [facility] [phone] FX     Additional details provided by patient: THE  PATIENT STATS THAT HE HAS THREE DAYS LEFT     Does the patient have less than a 3 day supply:  [] Yes  [x] No    Angel Fischer Rep   01/04/22 10:28 EST

## 2022-01-10 ENCOUNTER — TELEPHONE (OUTPATIENT)
Dept: INTERNAL MEDICINE | Facility: CLINIC | Age: 72
End: 2022-01-10

## 2022-01-10 NOTE — TELEPHONE ENCOUNTER
Provider: VERMEESCH    Caller: LISHA CROWE     Phone Number: [phone]    Reason for Call: PATIENT'S SPOUSE IS ASKING FOR HOME HEALTH FOR THE PATIENT BECAUSE HE CANNOT WALK.  PATIENT IS AT THE ER AND WAS TOLD TO CALL PCP FOR ASSISTANCE

## 2022-01-10 NOTE — TELEPHONE ENCOUNTER
Left wife VM requesting return call as we need more information to place HH referral.     HUB TO SHARE: we need to know what ER he was at so we can get records for referral and also need to know why he's unable to walk.

## 2022-01-11 NOTE — TELEPHONE ENCOUNTER
Left wife a second VM requesting return call as we need more information to place HH referral.      HUB TO SHARE: we need to know what ER he was at so we can get records for referral and also need to know why he's unable to walk.

## 2022-01-13 NOTE — TELEPHONE ENCOUNTER
PATIENT STATED HE HAD GONE TO [facility] ER.  PATIENT STATED THEY WERE NOT HELPFUL AND THEY WERE RUDE.    PATIENT STATED A WEEK AGO HE HURT HIS WRIST AND HAD A HAND BRACE PUT ON IT DUE TO ARTHRITIS THEN NEXT DAY WOKE UP AND ONE OF HIS FEET HURT.  THE FOLLOWING DAY BOTH FEET STARTED HURTING AND HE COULD NOT PUT ANY WEIGHT ON HIS FEET.    PATIENT STATED HE DOES NOT KNOW WHY.  PATIENT STATED IT TOOK HIM 35 MINS TO WALK 10 FT FROM HIS CHAIR TO THE BATHROOM, LAST 2 DAYS HAS BEEN ABLE TO USE A WALKER.    PATIENT REQUESTS    CALL BACK:  [phone]

## 2022-01-13 NOTE — TELEPHONE ENCOUNTER
If you could get him in with me tomorrow that would be great.  It certainly sounds like he has had a gout flareup.  I cannot place order for home health without knowing what is wrong with him.  I do not want to treat him inappropriately with wrong diagnosis.  Thank you

## 2022-01-13 NOTE — TELEPHONE ENCOUNTER
Do you think a referral to  is appropriate or does he need to make an appointment with you for evaluation?

## 2022-01-14 RX ORDER — COLCHICINE 0.6 MG/1
0.6 TABLET ORAL 2 TIMES DAILY
Qty: 6 TABLET | Refills: 1 | Status: SHIPPED | OUTPATIENT
Start: 2022-01-14

## 2022-01-14 RX ORDER — METHYLPREDNISOLONE 4 MG/1
TABLET ORAL
Qty: 1 EACH | Refills: 0 | Status: SHIPPED | OUTPATIENT
Start: 2022-01-14 | End: 2023-01-03

## 2022-01-14 NOTE — TELEPHONE ENCOUNTER
Please call patient for me.  I suspect that he is having a bout of severe gout based on uric acid level over 11 approximately 6 months ago.  It is very important that he drink water only at this time.  I have sent in colchicine 0.6 mg to take twice daily for no more than 3 days in a row.  In addition he has been sent in a Medrol Dosepak to take as directed.  I would like someone to go pick this up at his pharmacy immediately.  He is to eat lunch and take his breakfast and lunch dose of Medrol as soon as he picks this up and eats lunch.  Then he is to follow directions on Medrol Dosepak and take dinner dose this evening and evening dose with a snack.  This medication is taken with meals 3 times daily and then with a snack at night.  It tapers down over 6 days.  Colchicine is taken twice daily with meals only for a few days and usually gout resolves.  Please tell him to do his best to avoid all red meats as well as fish at this time.  It would be best for him to eat primarily fruits and vegetables and drink plenty of water.  Please make him an appointment with me for early next week.

## 2022-01-14 NOTE — TELEPHONE ENCOUNTER
I was able to schedule Pt at 4:15 today, when I called to make sure Pt could make it to appointment he stated there is no way without coming by ambulance. I asked if he could do a video visit, he said no as he does not have a smart phone, tablet, laptop, or computer with camera. Everything from [facility] is in Pt's chart.   Pt states he's having bilateral ankle pain that radiates into his great toe. Pt denies any swelling or redness. States he's been wearing compression socks and this has helped a little bit.

## 2022-01-18 ENCOUNTER — OFFICE VISIT (OUTPATIENT)
Dept: INTERNAL MEDICINE | Facility: CLINIC | Age: 72
End: 2022-01-18

## 2022-01-18 VITALS
DIASTOLIC BLOOD PRESSURE: 72 MMHG | HEIGHT: 70 IN | TEMPERATURE: 97.8 F | BODY MASS INDEX: 43.95 KG/M2 | WEIGHT: 307 LBS | HEART RATE: 83 BPM | SYSTOLIC BLOOD PRESSURE: 132 MMHG | OXYGEN SATURATION: 98 %

## 2022-01-18 DIAGNOSIS — M10.371 ACUTE GOUT DUE TO RENAL IMPAIRMENT INVOLVING RIGHT FOOT: Primary | ICD-10-CM

## 2022-01-18 DIAGNOSIS — I27.81 COR PULMONALE: ICD-10-CM

## 2022-01-18 PROCEDURE — 99214 OFFICE O/P EST MOD 30 MIN: CPT | Performed by: INTERNAL MEDICINE

## 2022-01-18 RX ORDER — ALLOPURINOL 100 MG/1
100 TABLET ORAL DAILY
Qty: 90 TABLET | Refills: 1 | Status: SHIPPED | OUTPATIENT
Start: 2022-01-18

## 2022-01-18 NOTE — PROGRESS NOTES
Chief Complaint   Patient presents with   • Follow-up     Pt went to [facility] ER on 1/10 due to bilateral foot pain.  Pt is requesting order for portable O2.     Subjective   Freddie Yeboah Jr. is a 71 y.o. male.     Patient is here today with complaints of bilateral foot and ankle pain.  Patient was taken by ambulance to [facility] emergency room on January 10 due to bilateral foot and ankle pain.  X-rays of both feet and ankle were taken at that time and were negative other than osteoarthritis changes.  No labs were obtained at that time.  Patient states they did nothing for his pain.  Patient's wife contacted our clinic while he was still in the emergency room asking us to provide home health because he could not ambulate.  I explained that we could not do home health without seeing patient.  He states he could not come in last week because he was in too much pain and would need to come in by ambulance.  Patient at that time stated that his foot and ankle pain were radiating to great toes bilaterally. He had significant swelling and redness in right great toe.     He had known history of gout with elevated uric acid level of 11.4 in June last year.  It was my impression that he most likely was suffering from severe gout therefore he was treated with a Medrol Dosepak and colchicine 0.6 mg twice daily for no longer than 3 days.  He is here today for follow-up appointment.  He is feeling much better right now.  He has no pain with sitting still.  He has minimal pain with walking.  All done with 3 days of colchicine and almost done with medrol david.      He is requesting portable oxygen.  He has known history of pulmonary hypertension with cor pulmonale and sleep apnea.  He uses [employer] company for his oxygen but they will not provide portable oxygen.  He has a company that will sell him portable oxygen.            The following portions of the patient's history were reviewed and  "updated as appropriate: allergies, current medications, past family history, past medical history, past social history, past surgical history and problem list.    Review of Systems   Constitutional: Positive for activity change.   Cardiovascular: Positive for leg swelling.   Musculoskeletal: Positive for arthralgias and gait problem.       Objective   /72   Pulse 83   Temp 97.8 °F (36.6 °C)   Ht 176.9 cm (69.64\")   Wt (!) 139 kg (307 lb)   SpO2 98%   BMI 44.51 kg/m²   Body mass index is 44.51 kg/m².  Physical Exam  Vitals and nursing note reviewed.   Constitutional:       General: He is not in acute distress.     Appearance: Normal appearance. He is obese. He is not ill-appearing.      Comments: Kind and pleasant man, appears his age and in no distress today   HENT:      Head: Normocephalic and atraumatic.      Right Ear: External ear normal.      Left Ear: External ear normal.   Eyes:      General:         Right eye: No discharge.         Left eye: No discharge.      Extraocular Movements: Extraocular movements intact.   Pulmonary:      Effort: Pulmonary effort is normal. No respiratory distress.   Musculoskeletal:      Right lower leg: Edema present.      Left lower leg: Edema present.      Comments: +1 edema in distal lower extremities, no redness or warmth of feet at this time, full range of motion in ankles and feet with no pain   Skin:     General: Skin is warm and dry.      Findings: No rash.   Neurological:      General: No focal deficit present.      Mental Status: He is alert and oriented to person, place, and time. Mental status is at baseline.      Cranial Nerves: No cranial nerve deficit.      Gait: Gait abnormal.      Comments: Patient is currently in rolling chair, due to foot pain, could not ambulate distance from parking lot to waiting room   Psychiatric:         Mood and Affect: Mood normal.         Behavior: Behavior normal.         Thought Content: Thought content normal.         " Judgment: Judgment normal.         Assessment/Plan   Freddie Yeboah Jr. is here today and the following problems have been addressed:      Diagnoses and all orders for this visit:    1. Acute gout due to renal impairment involving right foot (Primary)  -     Uric Acid  -     Basic Metabolic Panel    2. Cor pulmonale (HCC)    Other orders  -     allopurinol (Zyloprim) 100 MG tablet; Take 1 tablet by mouth Daily.  Dispense: 90 tablet; Refill: 1    Labs as noted  Start allopurinol 100 mg once daily-explained to patient and wife when he has gout flare he must discontinue allopurinol.  He has a refill of colchicine to use 0.6 mg twice daily for 2 to 3 days if he has a gout flare.  He is to take colchicine for a flare, discontinue allopurinol during flare and then resume allopurinol after flare is resolved  Encouraged patient to look up diet of foods to avoid when a person has gout  Encouraged increased water intake to avoid dehydration  Follow-up with renal doctor as scheduled  Patient is requesting portable oxygen as he cannot fly with oxygen tank.  He has contacted me with name of company that we will provide him with portable oxygen.  We will contact company and they will send me order for oxygen and I will sign for this under diagnosis of pulmonary hypertension with cor pulmonale    Return to clinic as needed or in April as scheduled      Please note that portions of this note were completed with a voice recognition program.  Efforts were made to edit dictation, but occasionally words are mistranscribed.

## 2022-01-19 LAB
BUN SERPL-MCNC: 50 MG/DL (ref 8–23)
BUN/CREAT SERPL: 28.2 (ref 7–25)
CALCIUM SERPL-MCNC: 9.9 MG/DL (ref 8.6–10.5)
CHLORIDE SERPL-SCNC: 93 MMOL/L (ref 98–107)
CO2 SERPL-SCNC: 38.1 MMOL/L (ref 22–29)
CREAT SERPL-MCNC: 1.77 MG/DL (ref 0.76–1.27)
GLUCOSE SERPL-MCNC: 91 MG/DL (ref 65–99)
POTASSIUM SERPL-SCNC: 4.1 MMOL/L (ref 3.5–5.2)
SODIUM SERPL-SCNC: 139 MMOL/L (ref 136–145)
URATE SERPL-MCNC: 13.2 MG/DL (ref 3.4–7)

## 2022-01-19 NOTE — PROGRESS NOTES
Bite block placed.   Please call patient with lab results.  Kidney function is at usual baseline with creatinine of 1.77.  His BUN is elevated suggesting he is dehydrated, however this number can also be elevated if he has a GI bleed.  He states he has been drinking plenty of fluids, therefore I am worried about his stomach.  Please tell him the colchicine and Medrol pack that I gave him can cause some gastritis issues.  It is important that he be compliant with his omeprazole on an empty stomach with water only 1/2-hour before all other medications and food.  His uric acid level is very high, currently 13.2, it was 11.4 approximately 7 months ago.  I am certain that he had a gout flare.  Please tell him to take allopurinol as directed.  His uric acid levels may be high due to his underlying chronic kidney disease.  Please tell him to do his best to follow diet for gout.  Maintain good hydration with primarily water.  We will repeat his uric acid level on next office visit in April.

## 2022-01-21 ENCOUNTER — TELEPHONE (OUTPATIENT)
Dept: INTERNAL MEDICINE | Facility: CLINIC | Age: 72
End: 2022-01-21

## 2022-01-21 NOTE — TELEPHONE ENCOUNTER
Attempted contacting Pt to discuss results, no answer.    HUB TO SHARE: Kidney function is at usual baseline with creatinine of 1.77.  His BUN is elevated suggesting he is dehydrated, however this number can also be elevated if he has a GI bleed.  He stated he has been drinking plenty of fluids, therefore Dr. Vermeesch is worried about his stomach.  Please tell him the colchicine and Medrol pack that she gave him can cause some gastritis issues.  It is important that he be compliant with his omeprazole on an empty stomach with water only 1/2-hour before all other medications and food.  His uric acid level is very high, currently 13.2, it was 11.4 approximately 7 months ago. Dr. Vermeesch is certain that he had a gout flare.  Please tell him to take allopurinol as directed.  His uric acid levels may be high due to his underlying chronic kidney disease.  Please tell him to do his best to follow diet for gout.  Maintain good hydration with primarily water.  We will repeat his uric acid level on next office visit in April.

## 2022-04-18 ENCOUNTER — OFFICE VISIT (OUTPATIENT)
Dept: INTERNAL MEDICINE | Facility: CLINIC | Age: 72
End: 2022-04-18

## 2022-04-18 VITALS
OXYGEN SATURATION: 95 % | BODY MASS INDEX: 45.1 KG/M2 | HEART RATE: 74 BPM | HEIGHT: 70 IN | SYSTOLIC BLOOD PRESSURE: 128 MMHG | TEMPERATURE: 97 F | WEIGHT: 315 LBS | DIASTOLIC BLOOD PRESSURE: 70 MMHG

## 2022-04-18 DIAGNOSIS — E78.2 MIXED HYPERLIPIDEMIA: ICD-10-CM

## 2022-04-18 DIAGNOSIS — E11.9 TYPE 2 DIABETES MELLITUS WITHOUT COMPLICATION, WITH LONG-TERM CURRENT USE OF INSULIN: Primary | ICD-10-CM

## 2022-04-18 DIAGNOSIS — K21.9 GASTROESOPHAGEAL REFLUX DISEASE WITHOUT ESOPHAGITIS: ICD-10-CM

## 2022-04-18 DIAGNOSIS — Z79.4 TYPE 2 DIABETES MELLITUS WITHOUT COMPLICATION, WITH LONG-TERM CURRENT USE OF INSULIN: Primary | ICD-10-CM

## 2022-04-18 DIAGNOSIS — I48.91 ATRIAL FIBRILLATION, UNSPECIFIED TYPE: ICD-10-CM

## 2022-04-18 DIAGNOSIS — E03.8 OTHER SPECIFIED HYPOTHYROIDISM: ICD-10-CM

## 2022-04-18 DIAGNOSIS — I10 BENIGN ESSENTIAL HYPERTENSION: ICD-10-CM

## 2022-04-18 PROCEDURE — 99214 OFFICE O/P EST MOD 30 MIN: CPT | Performed by: INTERNAL MEDICINE

## 2022-04-18 RX ORDER — LEVOTHYROXINE SODIUM 0.07 MG/1
75 TABLET ORAL DAILY
Qty: 90 TABLET | Refills: 1 | Status: SHIPPED | OUTPATIENT
Start: 2022-04-18 | End: 2022-11-02

## 2022-04-18 RX ORDER — OMEPRAZOLE 40 MG/1
40 CAPSULE, DELAYED RELEASE ORAL DAILY
Qty: 90 CAPSULE | Refills: 3 | Status: SHIPPED | OUTPATIENT
Start: 2022-04-18

## 2022-04-18 RX ORDER — SIMVASTATIN 20 MG
20 TABLET ORAL NIGHTLY
Qty: 90 TABLET | Refills: 1 | Status: SHIPPED | OUTPATIENT
Start: 2022-04-18 | End: 2022-10-17

## 2022-04-18 NOTE — PROGRESS NOTES
Chief Complaint   Patient presents with   • Follow-up     For HTN, GERD, HLD, hypothyroidism, and DM.      Subjective   Freddie Yeboah Jr. is a 72 y.o. male.     Here today for follow up of HTN, HLD, DM, CAD, Afib, LUIS FERNANDO, GERD, hypothyroid, LBP/DJD, BPH.   HTN/HLD/CAD/Afib- his BP is controlled today. No CP, palpitations. No edema at this time.  He is using oxygen at 2 liters.      DM with CKD-   A1C 6.0 four mo ago and CR increased to 2.34.  BS has been running less than 130 in AM.  His lantus is at 120 u daily with 20 u short acting insulin with each meal.   He has some NTB of feet.  Last eye exam was about 5 ago with Dr Kauffman.  He sees the foot doctor every 3 mo.      LUIS FERNANDO- he wears his CPAP every night. He awakens abut 2 times a night to urinate, not as much during the day.  He has urgency   GERD- he does take omeprazole daily as directed, states his GERD is well controlled.    Hypothyroid- takes thyroid med daily on empty stomach, denies any problems with trouble swallowing, change in voice or constipation  LBP/DJD/gout- he sees pain clinic every 2 mo for his LBP, he says his back pain is doing well at this time.  No recent changes in meds. No recent gout flare with use of allopurinol.    BPH- he in on testosterone shots per urologist.     HCM- colonoscopy done 2018.  Pneumovax is UTD. He did not have Hep a or shingles vaccine.  He did have COVID and flu vaccines.        The following portions of the patient's history were reviewed and updated as appropriate: allergies, current medications, past family history, past medical history, past social history, past surgical history and problem list.    Review of Systems   Constitutional: Negative for activity change, appetite change and fatigue.   HENT: Negative for trouble swallowing.    Eyes: Negative for visual disturbance.   Respiratory: Negative for shortness of breath.    Cardiovascular: Negative for chest pain, palpitations and leg swelling.   Endocrine: Negative  "for polydipsia and polyuria.   Genitourinary: Positive for urgency. Negative for frequency.   Musculoskeletal: Positive for back pain. Negative for gait problem.   Skin: Negative for wound.   Neurological: Positive for numbness.   Psychiatric/Behavioral: Negative for dysphoric mood and sleep disturbance. The patient is not nervous/anxious.        Objective   /70   Pulse 74   Temp 97 °F (36.1 °C)   Ht 177.6 cm (69.94\")   Wt (!) 150 kg (330 lb)   SpO2 95%   BMI 47.43 kg/m²   Body mass index is 47.43 kg/m².  Physical Exam  Vitals and nursing note reviewed.   Constitutional:       General: He is not in acute distress.     Appearance: Normal appearance. He is well-developed. He is obese. He is not ill-appearing.      Comments: Kind and pleasant man, appears stated age and in no distress today   HENT:      Head: Normocephalic and atraumatic.      Right Ear: External ear normal.      Left Ear: External ear normal.   Eyes:      General:         Right eye: No discharge.         Left eye: No discharge.      Extraocular Movements: Extraocular movements intact.      Conjunctiva/sclera: Conjunctivae normal.   Neck:      Thyroid: No thyromegaly.      Vascular: No carotid bruit.      Comments: No thyromegaly or mass  Cardiovascular:      Rate and Rhythm: Normal rate and regular rhythm.      Pulses: Normal pulses.      Heart sounds: Normal heart sounds. No murmur heard.  Pulmonary:      Effort: Pulmonary effort is normal. No respiratory distress.      Breath sounds: Normal breath sounds. No wheezing.   Abdominal:      General: Bowel sounds are normal. There is no distension.      Palpations: Abdomen is soft.      Tenderness: There is no abdominal tenderness.      Comments: Obesity limits exam   Musculoskeletal:         General: Normal range of motion.      Cervical back: Normal range of motion and neck supple.      Right lower leg: No edema.      Left lower leg: No edema.   Lymphadenopathy:      Cervical: No cervical " adenopathy.   Skin:     General: Skin is warm.      Findings: No rash.   Neurological:      General: No focal deficit present.      Mental Status: He is alert and oriented to person, place, and time. Mental status is at baseline.      Cranial Nerves: No cranial nerve deficit.      Motor: No weakness.      Coordination: Coordination normal.      Gait: Gait normal.   Psychiatric:         Mood and Affect: Mood normal.         Behavior: Behavior normal.         Thought Content: Thought content normal.         Judgment: Judgment normal.         Assessment/Plan   Freddie Yeboah JrGisela is here today and the following problems have been addressed:      Diagnoses and all orders for this visit:    1. Type 2 diabetes mellitus without complication, with long-term current use of insulin ([facility]) (Primary)  -     POCT microalbumin  -     Comprehensive Metabolic Panel  -     Hemoglobin A1c    2. Atrial fibrillation, unspecified type ([facility])    3. Benign essential hypertension    4. Mixed hyperlipidemia    5. Other specified hypothyroidism    6. Gastroesophageal reflux disease without esophagitis    Other orders  -     linagliptin (TRADJENTA) 5 MG tablet tablet; Take 1 tablet by mouth Daily.  Dispense: 30 tablet; Refill: 5  -     omeprazole (priLOSEC) 40 MG capsule; Take 1 capsule by mouth Daily.  Dispense: 90 capsule; Refill: 3  -     levothyroxine (SYNTHROID, LEVOTHROID) 75 MCG tablet; Take 1 tablet by mouth Daily.  Dispense: 90 tablet; Refill: 1  -     simvastatin (ZOCOR) 20 MG tablet; Take 1 tablet by mouth Every Night.  Dispense: 90 tablet; Refill: 1      Labs as noted today  Follow heart healthy/low salt/diabetic diet-decrease portion sizes to help with weight management  Avoid processed foods  Monitor blood pressure and BS as discussed  Exercise as tolerated  Take all medications as prescribed  See eye doctor annually   Wear protective foot wear/no bare feet  Check feet regularly for calluses or ulcers  Continue simvastatin for  hyperlipidemia  Follow-up with nephrologist for chronic renal insufficiency  Follow-up with cardiologist due to A. fib  See pain clinic due to chronic back pain issues  GERD is well controlled with omeprazole  Continue current dose of levothyroxine, labs due in 4 months  No recent gout flare with use of allopurinol  Continue oxygen and CPAP for underlying sleep apnea and pulmonary hypertension issues    Return in about 4 months (around 8/18/2022) for Medicare Wellness.      Marilyn K. Vermeesch, MD      Please note that portions of this note were completed with a voice recognition program.  Efforts were made to edit dictation, but occasionally words are mistranscribed.

## 2022-04-19 LAB
ALBUMIN SERPL-MCNC: 4.4 G/DL (ref 3.5–5.2)
ALBUMIN/GLOB SERPL: 1.6 G/DL
ALP SERPL-CCNC: 85 U/L (ref 39–117)
ALT SERPL-CCNC: 9 U/L (ref 1–41)
AST SERPL-CCNC: 13 U/L (ref 1–40)
BILIRUB SERPL-MCNC: 1 MG/DL (ref 0–1.2)
BUN SERPL-MCNC: 32 MG/DL (ref 8–23)
BUN/CREAT SERPL: 14 (ref 7–25)
CALCIUM SERPL-MCNC: 9 MG/DL (ref 8.6–10.5)
CHLORIDE SERPL-SCNC: 97 MMOL/L (ref 98–107)
CO2 SERPL-SCNC: 33.1 MMOL/L (ref 22–29)
CREAT SERPL-MCNC: 2.28 MG/DL (ref 0.76–1.27)
EGFRCR SERPLBLD CKD-EPI 2021: 29.7 ML/MIN/1.73
GLOBULIN SER CALC-MCNC: 2.8 GM/DL
GLUCOSE SERPL-MCNC: 66 MG/DL (ref 65–99)
HBA1C MFR BLD: 6.3 % (ref 4.8–5.6)
POTASSIUM SERPL-SCNC: 4 MMOL/L (ref 3.5–5.2)
PROT SERPL-MCNC: 7.2 G/DL (ref 6–8.5)
SODIUM SERPL-SCNC: 142 MMOL/L (ref 136–145)

## 2022-04-26 ENCOUNTER — TELEPHONE (OUTPATIENT)
Dept: INTERNAL MEDICINE | Facility: CLINIC | Age: 72
End: 2022-04-26

## 2022-04-26 NOTE — TELEPHONE ENCOUNTER
Caller: TONO    Relationship: REPRESENTATIVE WITH [employer]    Best call back number: [phone]    What was the call regarding: CALL ED TO GET A THE MEDICATION LIST. THEY RECEIVED THE PAPERWORK THAT THEY REQUESTED BUT IT DIDN'T HAVE THE MEDICATION LIST    Do you require a callback: YES

## 2022-05-04 ENCOUNTER — OFFICE VISIT (OUTPATIENT)
Dept: INTERNAL MEDICINE | Facility: CLINIC | Age: 72
End: 2022-05-04

## 2022-05-04 ENCOUNTER — TELEPHONE (OUTPATIENT)
Dept: INTERNAL MEDICINE | Facility: CLINIC | Age: 72
End: 2022-05-04

## 2022-05-04 VITALS
TEMPERATURE: 97.7 F | SYSTOLIC BLOOD PRESSURE: 120 MMHG | DIASTOLIC BLOOD PRESSURE: 60 MMHG | OXYGEN SATURATION: 92 % | HEART RATE: 73 BPM

## 2022-05-04 DIAGNOSIS — J10.1 INFLUENZA A: Primary | ICD-10-CM

## 2022-05-04 LAB
EXPIRATION DATE: ABNORMAL
FLUAV AG UPPER RESP QL IA.RAPID: DETECTED
FLUBV AG UPPER RESP QL IA.RAPID: NOT DETECTED
INTERNAL CONTROL: ABNORMAL
Lab: ABNORMAL
SARS-COV-2 AG UPPER RESP QL IA.RAPID: NOT DETECTED

## 2022-05-04 PROCEDURE — 87428 SARSCOV & INF VIR A&B AG IA: CPT | Performed by: INTERNAL MEDICINE

## 2022-05-04 PROCEDURE — 99213 OFFICE O/P EST LOW 20 MIN: CPT | Performed by: INTERNAL MEDICINE

## 2022-05-04 RX ORDER — ONDANSETRON 4 MG/1
4 TABLET, FILM COATED ORAL EVERY 8 HOURS PRN
Qty: 15 TABLET | Refills: 0 | Status: SHIPPED | OUTPATIENT
Start: 2022-05-04

## 2022-05-04 NOTE — PROGRESS NOTES
Chief Complaint   Patient presents with   • Abstract     Pt states he's had fever, diarrhea, vomiting, dizziness, sore throat, congestion, and cough X 2 days. States wife and grandchild had flu. Requesting MARCIA Lynch   Freddie Yeboah Jr. is a 72 y.o. male.     Exposed to flu by wife and grandchild.     Influenza  This is a new problem. The current episode started in the past 7 days. The problem has been waxing and waning. Associated symptoms include anorexia, arthralgias, chills, congestion, coughing, a fever, headaches, myalgias, nausea, a sore throat and vomiting. Associated symptoms comments: Diarrhea  Temp up to 102 last night. Nothing aggravates the symptoms. He has tried acetaminophen for the symptoms. The treatment provided mild relief.        The following portions of the patient's history were reviewed and updated as appropriate: allergies, current medications, past family history, past medical history, past social history, past surgical history and problem list.    Review of Systems   Constitutional: Positive for chills and fever.   HENT: Positive for congestion and sore throat.    Respiratory: Positive for cough.    Gastrointestinal: Positive for anorexia, nausea and vomiting.   Musculoskeletal: Positive for arthralgias and myalgias.   Neurological: Positive for headaches.       Objective   /60   Pulse 73   Temp 97.7 °F (36.5 °C)   SpO2 92%   There is no height or weight on file to calculate BMI.  Physical Exam  Vitals and nursing note reviewed.   Constitutional:       Appearance: Normal appearance. He is obese. He is ill-appearing.      Comments: Pleasant man, he appears sick with nausea and vomiting today   HENT:      Head: Normocephalic and atraumatic.      Right Ear: External ear normal.      Left Ear: External ear normal.      Nose: No rhinorrhea.   Eyes:      General:         Right eye: No discharge.         Left eye: No discharge.      Extraocular Movements: Extraocular  movements intact.   Cardiovascular:      Rate and Rhythm: Normal rate. Rhythm irregular.      Heart sounds: Normal heart sounds.   Pulmonary:      Effort: Pulmonary effort is normal. No respiratory distress.      Comments: Diminished breath sounds throughout all lung fields  Abdominal:      Palpations: Abdomen is soft. There is no mass.      Tenderness: There is no abdominal tenderness.      Comments: Hypoactive bowel sounds   Musculoskeletal:      Right lower leg: Edema present.      Left lower leg: Edema present.      Comments: Trace edema at ankles   Skin:     Findings: No rash.   Neurological:      General: No focal deficit present.      Mental Status: He is alert and oriented to person, place, and time. Mental status is at baseline.   Psychiatric:         Mood and Affect: Mood normal.         Behavior: Behavior normal.         Thought Content: Thought content normal.         Judgment: Judgment normal.         Assessment/Plan   Freddie Yeboah  is here today and the following problems have been addressed:      Diagnoses and all orders for this visit:    1. Influenza A (Primary)  -     POCT SARS-CoV-2 Antigen HERIBERTO + Flu    Other orders  -     ondansetron (Zofran) 4 MG tablet; Take 1 tablet by mouth Every 8 (Eight) Hours As Needed for Nausea or Vomiting.  Dispense: 15 tablet; Refill: 0  -     Baloxavir Marboxil,80 MG Dose, (XOFLUZA) 2 x 40 MG tablet therapy pack; Take 2 tablets by mouth Daily.  Dispense: 2 each; Refill: 0    COVID-negative, influenza a positive, influenza B negative  Patient provided with Xofluza 80 mg to be taken once today  Also provided Zofran 4 mg to be taken every 4-6 hours as needed for nausea and vomiting  Increase fluids and rest  Avoid public exposure until all symptoms are resolved    Return to clinic as needed if symptoms worsen or persist    Please note that portions of this note were completed with a voice recognition program.  Efforts were made to edit dictation, but occasionally  words are mistranscribed.

## 2022-05-04 NOTE — TELEPHONE ENCOUNTER
Caller: Alia Yeboah    Relationship: Emergency Contact    Best call back number: [phone]    What medication are you requesting: XOFLOUZA    What are your current symptoms: COUGH, SNEEZING, FEVER    How long have you been experiencing symptoms: A FEW DAYS     Have you had these symptoms before:    [x] Yes  [] No    Have you been treated for these symptoms before:   [x] Yes  [] No    If a prescription is needed, what is your preferred pharmacy and phone number: [facility] [place] [address] [address] [facility] [phone] [facility] [phone]      Additional notes: ROSALINA'S GRANDSON AND WIFE HAVE BOTH HAD THE FLU AND BELIEVES THAT HE DOES TOO.

## 2022-05-24 ENCOUNTER — LAB (OUTPATIENT)
Dept: UROLOGY | Facility: CLINIC | Age: 72
End: 2022-05-24

## 2022-06-01 DIAGNOSIS — E29.1 HYPOGONADISM IN MALE: ICD-10-CM

## 2022-06-01 RX ORDER — TESTOSTERONE CYPIONATE 200 MG/ML
INJECTION, SOLUTION INTRAMUSCULAR
Qty: 2 ML | Refills: 0 | Status: SHIPPED | OUTPATIENT
Start: 2022-06-01 | End: 2022-06-20 | Stop reason: SDUPTHER

## 2022-06-02 ENCOUNTER — OFFICE VISIT (OUTPATIENT)
Dept: UROLOGY | Facility: CLINIC | Age: 72
End: 2022-06-02

## 2022-06-02 VITALS
HEART RATE: 78 BPM | DIASTOLIC BLOOD PRESSURE: 56 MMHG | WEIGHT: 289 LBS | HEIGHT: 70 IN | BODY MASS INDEX: 41.37 KG/M2 | OXYGEN SATURATION: 97 % | TEMPERATURE: 97.1 F | SYSTOLIC BLOOD PRESSURE: 85 MMHG

## 2022-06-02 DIAGNOSIS — E29.1 HYPOGONADISM IN MALE: Primary | ICD-10-CM

## 2022-06-02 PROCEDURE — 99214 OFFICE O/P EST MOD 30 MIN: CPT | Performed by: PHYSICIAN ASSISTANT

## 2022-06-02 NOTE — PROGRESS NOTES
Chief Complaint   Patient presents with   • Follow-up     6 month with labs        HPI  Mr. Yeboah is a 72 y.o. male with history of hypogonadism on HRT who presents for follow up.     At this visit, feels pleased with his testosterone regimen.  He did prefer being on Testopel and is curious if they are available again.    Past Medical History:   Diagnosis Date   • Arthritis     shoulder, knee. multi joint    • Atrial fibrillation (HCC)    • Back pain    • Chronic kidney disease     stage III due to diabetes   • Diabetes mellitus (HCC)     several years; checks sugars at home-usually run 100-112   • Diabetic nephropathy (HCC)    • Disease of thyroid gland     hypothyroidism   • GERD (gastroesophageal reflux disease)    • Hypertension    • Low testosterone    • Sleep apnea     setting of 11   • Urinary tract infection     most recent diagnosis 8/29/17 in the ER (+3 bacteria)-started on Macrobid and rechecked by PCP on 9/5/17       Past Surgical History:   Procedure Laterality Date   • ANTERIOR CERVICAL FUSION     • APPENDECTOMY     • BACK SURGERY     • CARPAL TUNNEL RELEASE Bilateral    • CATARACT EXTRACTION     • CHOLECYSTECTOMY     • COLONOSCOPY  2013   • FINGER/THUMB ARTHROPLASTY Left     thumb replacement   • OR RECONSTR TOTAL SHOULDER IMPLANT Left 9/11/2017    Procedure: TOTAL SHOULDER ARTHROPLASTY LEFT;  Surgeon: William Ray MD;  Location: [facility];  Service: Orthopedics   • SHOULDER SURGERY     • TOTAL KNEE ARTHROPLASTY Left          Current Outpatient Medications:   •  allopurinol (Zyloprim) 100 MG tablet, Take 1 tablet by mouth Daily., Disp: 90 tablet, Rfl: 1  •  anastrozole (Arimidex) 1 MG tablet, Take 1 tablet by mouth 1 (One) Time Per Week., Disp: 4 tablet, Rfl: 11  •  apixaban (ELIQUIS) 2.5 MG tablet tablet, Take 1 tablet by mouth Every 12 (Twelve) Hours. Resume tomorrow 9/13/17, Disp: 180 tablet, Rfl: 3  •  aspirin 81 MG tablet, Take 1 tablet by mouth daily., Disp: , Rfl:   •  Baloxavir Marboxil,80  MG Dose, (XOFLUZA) 2 x 40 MG tablet therapy pack, Take 2 tablets by mouth Daily., Disp: 2 each, Rfl: 0  •  bumetanide (BUMEX) 2 MG tablet, , Disp: , Rfl:   •  calcipotriene (DOVONEX) 0.005 % cream, , Disp: , Rfl:   •  colchicine 0.6 MG tablet, Take 1 tablet by mouth 2 (Two) Times a Day., Disp: 6 tablet, Rfl: 1  •  docusate sodium 100 MG capsule, Take 100 mg by mouth 2 (Two) Times a Day As Needed (constipation)., Disp: 60 each, Rfl: 0  •  fluorouracil (EFUDEX) 5 % cream, , Disp: , Rfl:   •  glucosamine-chondroitin 500-400 MG capsule capsule, Take 1 capsule by mouth 2 (Two) Times a Day., Disp: , Rfl:   •  glucose blood test strip, Use to test blood sugar level 3 times daily. DX: E11.9, Disp: 300 each, Rfl: 3  •  glucose monitor monitoring kit, Use meter to check blood sugar levels twice daily., Disp: 1 each, Rfl: 0  •  Insulin Glargine (Lantus SoloStar) 100 UNIT/ML injection pen, INJECT 120 UNITS ONCE DAILY, Disp: 30 mL, Rfl: 5  •  Insulin Lispro, 1 Unit Dial, (HUMALOG) 100 UNIT/ML solution pen-injector, Inject 25 Units under the skin into the appropriate area as directed 3 (Three) Times a Day., Disp: 30 mL, Rfl: 3  •  Insulin Pen Needle (B-D ULTRAFINE III SHORT PEN) 31G X 8 MM misc, USE TO INJECT INSULIN FIVE TIMES A DAY, Disp: 200 each, Rfl: 11  •  isosorbide mononitrate (IMDUR) 60 MG 24 hr tablet, Take 60 mg by mouth Daily With Breakfast., Disp: , Rfl:   •  levothyroxine (SYNTHROID, LEVOTHROID) 75 MCG tablet, Take 1 tablet by mouth Daily., Disp: 90 tablet, Rfl: 1  •  linagliptin (TRADJENTA) 5 MG tablet tablet, Take 1 tablet by mouth Daily., Disp: 30 tablet, Rfl: 5  •  methylPREDNISolone (MEDROL) 4 MG dose pack, Take as directed on package instructions., Disp: 1 each, Rfl: 0  •  metOLazone (ZAROXOLYN) 2.5 MG tablet, , Disp: , Rfl:   •  metoprolol tartrate (LOPRESSOR) 100 MG tablet, Take 100 mg by mouth 2 (Two) Times a Day., Disp: , Rfl:   •  Morphine Sulfate ER 60 MG tablet extended-release 12 hour, Take 1 tablet by  "mouth Every 12 (Twelve) Hours., Disp: , Rfl:   •  Multiple Vitamins-Minerals (MULTIVITAL) tablet, Take 1 tablet by mouth Daily., Disp: , Rfl:   •  Needle, Disp, 18G X 1-1/2\" misc, Use for drawing up the medication, Disp: 50 each, Rfl: 3  •  Needle, Disp, 23G X 1-1/2\" misc, 1 Device 1 (One) Time Per Week., Disp: 30 each, Rfl: 11  •  Omega-3 Fatty Acids (FISH OIL) 1200 MG capsule capsule, Take 1,200 mg by mouth 2 (Two) Times a Day With Meals. Take as directed, Disp: , Rfl:   •  omeprazole (priLOSEC) 40 MG capsule, Take 1 capsule by mouth Daily., Disp: 90 capsule, Rfl: 3  •  ondansetron (Zofran) 4 MG tablet, Take 1 tablet by mouth Every 8 (Eight) Hours As Needed for Nausea or Vomiting., Disp: 15 tablet, Rfl: 0  •  OneTouch Delica Lancets 33G misc, Use to test blood sugar level 3 times daily. DX: E11.9, Disp: 300 each, Rfl: 3  •  oxyCODONE-acetaminophen (PERCOCET)  MG per tablet, Take  tablets by mouth As Needed., Disp: , Rfl:   •  Ropivacaine HCl-NaCl (NAROPIN) 0.2-0.9 %, 12 mg/hr by Peripheral Nerve route Continuous., Disp: , Rfl:   •  simvastatin (ZOCOR) 20 MG tablet, Take 1 tablet by mouth Every Night., Disp: 90 tablet, Rfl: 1  •  Syringe 20G X 1\" 3 ML misc, 2 mL Every 21 (Twenty-One) Days., Disp: 50 each, Rfl: 3  •  Syringe, Disposable, 3 ML misc, 1 syringe 1 (One) Time Per Week., Disp: 100 each, Rfl: 11  •  tadalafil (Cialis) 10 MG tablet, Take 1 tablet by mouth Daily., Disp: 30 tablet, Rfl: 0  •  telmisartan (MICARDIS) 20 MG tablet, Take 1 tablet by mouth Daily., Disp: , Rfl:   •  Testosterone Cypionate (DEPOTESTOTERONE CYPIONATE) 200 MG/ML injection, INJECT 2 ML INTRAMUSCULARLY AS DIRECTED BY PRESCRIBER EVERY 21 DAYS, Disp: 2 mL, Rfl: 0  •  tiZANidine (ZANAFLEX) 4 MG tablet, Take 8 mg by mouth every night at bedtime., Disp: , Rfl:      Physical Exam  Visit Vitals  BP (!) 85/56   Pulse 78   Temp 97.1 °F (36.2 °C)   Ht 177.6 cm (69.94\")   Wt 131 kg (289 lb)   SpO2 97%   BMI 41.54 kg/m² "       Labs  Brief Urine Lab Results     None          Lab Results   Component Value Date    GLUCOSE 66 04/18/2022    CALCIUM 9.0 04/18/2022     04/18/2022    K 4.0 04/18/2022    CO2 33.1 (H) 04/18/2022    CL 97 (L) 04/18/2022    BUN 32 (H) 04/18/2022    CREATININE 2.28 (H) 04/18/2022    EGFRIFAFRI 46 (L) 01/18/2022    EGFRIFNONA 38 (L) 01/18/2022    BCR 14.0 04/18/2022    ANIONGAP 10.1 11/24/2021       Lab Results   Component Value Date    WBC 9.74 11/24/2021    HGB 14.1 05/24/2022    HCT 46.1 05/24/2022    MCV 69.9 (L) 11/24/2021     11/24/2021            Lab Results   Component Value Date    PSA 0.961 03/04/2021    PSA 0.8 07/06/2020    PSA 0.864 02/24/2020       Lab Results   Component Value Date    TESTOSTERONE 335.00 11/29/2021          Assessment  72 y.o. male with history of hypogonadism on HRT.  He previously was well controlled on Testopel, but is currently been on TC every 21 days due to a shortage of Testopel.  His testosterone was recently quite high and was obtained shortly after an injection.  Fortunately his hematocrit was well within normal limits at 46.    Plan  1.  Continue HRT with  mg every 21 days  2.  Obtain total T and hematocrit prior to follow-up  3.  Plan for Testopel prior to the patient leaving for his snowbird trip to [place] in October

## 2022-06-09 ENCOUNTER — TELEPHONE (OUTPATIENT)
Dept: UROLOGY | Facility: CLINIC | Age: 72
End: 2022-06-09

## 2022-06-09 DIAGNOSIS — E29.1 HYPOGONADISM IN MALE: Primary | ICD-10-CM

## 2022-06-09 RX ORDER — BLOOD PRESSURE TEST KIT
KIT MISCELLANEOUS
Qty: 100 EACH | Refills: 11 | Status: SHIPPED | OUTPATIENT
Start: 2022-06-09

## 2022-06-09 NOTE — TELEPHONE ENCOUNTER
pt is needing needles called to [facility] pharmacy..He said he has his Medication but not syringes

## 2022-06-20 ENCOUNTER — TELEPHONE (OUTPATIENT)
Dept: INTERNAL MEDICINE | Facility: CLINIC | Age: 72
End: 2022-06-20

## 2022-06-20 DIAGNOSIS — E29.1 HYPOGONADISM IN MALE: Primary | ICD-10-CM

## 2022-06-20 RX ORDER — TESTOSTERONE CYPIONATE 200 MG/ML
400 INJECTION, SOLUTION INTRAMUSCULAR
Qty: 2 ML | Refills: 0 | Status: SHIPPED | OUTPATIENT
Start: 2022-06-20 | End: 2022-07-18

## 2022-06-20 NOTE — TELEPHONE ENCOUNTER
Spoke with patient, states he was contacted by Dr Rodriguez's office asking if his hemoglobin was WNL since he was asking for an iron pill.  Patient states he did not ask for this med.  Per chart last H and H was WNL.

## 2022-06-20 NOTE — TELEPHONE ENCOUNTER
Pt is due 06/22, pt is compliant with testo policy, confirmed [facility]

## 2022-06-20 NOTE — TELEPHONE ENCOUNTER
Caller: Freddie Yeboah Jr.    Relationship: Self    Best call back number:[phone]    What medication are you requesting: IRON MEDICATION    If a prescription is needed, what is your preferred pharmacy and phone number:  [facility] [address] [place] [place] - [address] AT [facility] - [phone] [facility] [phone]   [phone]    Additional notes: PATIENT WANTS TO KNOW IF WE CHECKED HIS LABS LAST TIME FOR IRON LEVELS.

## 2022-06-23 ENCOUNTER — TELEPHONE (OUTPATIENT)
Dept: INTERNAL MEDICINE | Facility: CLINIC | Age: 72
End: 2022-06-23

## 2022-07-16 DIAGNOSIS — E29.1 HYPOGONADISM IN MALE: ICD-10-CM

## 2022-07-18 RX ORDER — TESTOSTERONE CYPIONATE 200 MG/ML
INJECTION, SOLUTION INTRAMUSCULAR
Qty: 2 ML | Refills: 0 | Status: SHIPPED | OUTPATIENT
Start: 2022-07-18 | End: 2022-08-15

## 2022-08-13 DIAGNOSIS — E29.1 HYPOGONADISM IN MALE: ICD-10-CM

## 2022-08-15 RX ORDER — TESTOSTERONE CYPIONATE 200 MG/ML
INJECTION, SOLUTION INTRAMUSCULAR
Qty: 2 ML | Refills: 0 | Status: SHIPPED | OUTPATIENT
Start: 2022-08-15 | End: 2022-09-06

## 2022-08-22 ENCOUNTER — LAB (OUTPATIENT)
Dept: LAB | Facility: HOSPITAL | Age: 72
End: 2022-08-22

## 2022-08-22 DIAGNOSIS — E29.1 HYPOGONADISM IN MALE: ICD-10-CM

## 2022-08-22 LAB
HCT VFR BLD AUTO: 43.2 % (ref 37.5–51)
HGB BLD-MCNC: 12.8 G/DL (ref 13–17.7)

## 2022-08-22 PROCEDURE — 36415 COLL VENOUS BLD VENIPUNCTURE: CPT

## 2022-08-22 PROCEDURE — 84403 ASSAY OF TOTAL TESTOSTERONE: CPT

## 2022-08-22 PROCEDURE — 85014 HEMATOCRIT: CPT

## 2022-08-22 PROCEDURE — 85018 HEMOGLOBIN: CPT

## 2022-08-23 LAB — TESTOST SERPL-MCNC: >1500 NG/DL (ref 193–740)

## 2022-09-01 ENCOUNTER — OFFICE VISIT (OUTPATIENT)
Dept: INTERNAL MEDICINE | Facility: CLINIC | Age: 72
End: 2022-09-01

## 2022-09-01 VITALS
HEIGHT: 70 IN | BODY MASS INDEX: 45.1 KG/M2 | WEIGHT: 315 LBS | HEART RATE: 87 BPM | SYSTOLIC BLOOD PRESSURE: 134 MMHG | DIASTOLIC BLOOD PRESSURE: 80 MMHG | TEMPERATURE: 97.7 F | OXYGEN SATURATION: 90 %

## 2022-09-01 DIAGNOSIS — R82.90 CLOUDY URINE: Primary | ICD-10-CM

## 2022-09-01 DIAGNOSIS — E03.8 OTHER SPECIFIED HYPOTHYROIDISM: ICD-10-CM

## 2022-09-01 DIAGNOSIS — Z79.4 TYPE 2 DIABETES MELLITUS WITHOUT COMPLICATION, WITH LONG-TERM CURRENT USE OF INSULIN: ICD-10-CM

## 2022-09-01 DIAGNOSIS — E11.9 TYPE 2 DIABETES MELLITUS WITHOUT COMPLICATION, WITH LONG-TERM CURRENT USE OF INSULIN: ICD-10-CM

## 2022-09-01 DIAGNOSIS — Z00.00 ENCOUNTER FOR MEDICARE ANNUAL WELLNESS EXAM: ICD-10-CM

## 2022-09-01 PROBLEM — J10.1 INFLUENZA A: Status: RESOLVED | Noted: 2022-05-04 | Resolved: 2022-09-01

## 2022-09-01 LAB
COMMENTS: ABNORMAL
EXPIRATION DATE: 0
Lab: 0
POC CREATININE URINE: 200
POC MICROALBUMIN URINE: 80

## 2022-09-01 PROCEDURE — 1159F MED LIST DOCD IN RCRD: CPT | Performed by: INTERNAL MEDICINE

## 2022-09-01 PROCEDURE — 1125F AMNT PAIN NOTED PAIN PRSNT: CPT | Performed by: INTERNAL MEDICINE

## 2022-09-01 PROCEDURE — G0439 PPPS, SUBSEQ VISIT: HCPCS | Performed by: INTERNAL MEDICINE

## 2022-09-01 PROCEDURE — 1170F FXNL STATUS ASSESSED: CPT | Performed by: INTERNAL MEDICINE

## 2022-09-01 PROCEDURE — 82044 UR ALBUMIN SEMIQUANTITATIVE: CPT | Performed by: INTERNAL MEDICINE

## 2022-09-01 PROCEDURE — 81003 URINALYSIS AUTO W/O SCOPE: CPT | Performed by: INTERNAL MEDICINE

## 2022-09-01 NOTE — PROGRESS NOTES
The ABCs of the Annual Wellness Visit  Subsequent Medicare Wellness Visit    Chief Complaint   Patient presents with   • Medicare Wellness-subsequent      Subjective    History of Present Illness:  Freddie Yeboah Jr. is a 72 y.o. male who presents for a Subsequent Medicare Wellness Visit.  PMH of Afib, HTN, HLD, DM, hypothyroid, obesity, GERD, BPH, CRI, LBP, DJD, gout, asthma, LUIS FERNANDO. BP and HR are well controlled today, his home reads are about the same.  His BS is running 100-130.  He has a lot of GERD sxs, he denies eating late at night or spiced foods.  He is drinking a lot of diet mountain dew.   He has chronic knee and neck pain, he continues to go to pain clinic for this.  He uses his CPAP regularly.       The following portions of the patient's history were reviewed and   updated as appropriate: allergies, current medications, past family history, past medical history, past social history, past surgical history and problem list.    Compared to one year ago, the patient feels his physical   health is better.    Compared to one year ago, the patient feels his mental   health is the same.    Recent Hospitalizations:  He was not admitted to the hospital during the last year.       Current Medical Providers:  Patient Care Team:  Vermeesch, Marilyn K, MD as PCP - General (Internal Medicine & Pediatrics)    Outpatient Medications Prior to Visit   Medication Sig Dispense Refill   • Alcohol Swabs pads Clean area prior to injection 100 each 11   • allopurinol (Zyloprim) 100 MG tablet Take 1 tablet by mouth Daily. 90 tablet 1   • anastrozole (Arimidex) 1 MG tablet Take 1 tablet by mouth 1 (One) Time Per Week. 4 tablet 11   • apixaban (ELIQUIS) 2.5 MG tablet tablet Take 1 tablet by mouth Every 12 (Twelve) Hours. Resume tomorrow 9/13/17 180 tablet 3   • aspirin 81 MG tablet Take 1 tablet by mouth daily.     • bumetanide (BUMEX) 2 MG tablet      • calcipotriene (DOVONEX) 0.005 % cream      • colchicine 0.6 MG tablet Take 1  "tablet by mouth 2 (Two) Times a Day. 6 tablet 1   • docusate sodium 100 MG capsule Take 100 mg by mouth 2 (Two) Times a Day As Needed (constipation). 60 each 0   • fluorouracil (EFUDEX) 5 % cream      • glucosamine-chondroitin 500-400 MG capsule capsule Take 1 capsule by mouth 2 (Two) Times a Day.     • glucose blood test strip Use to test blood sugar level 3 times daily. DX: E11.9 300 each 3   • glucose monitor monitoring kit Use meter to check blood sugar levels twice daily. 1 each 0   • Insulin Glargine (Lantus SoloStar) 100 UNIT/ML injection pen INJECT 120 UNITS ONCE DAILY 30 mL 5   • Insulin Lispro, 1 Unit Dial, (HUMALOG) 100 UNIT/ML solution pen-injector Inject 25 Units under the skin into the appropriate area as directed 3 (Three) Times a Day. 30 mL 3   • Insulin Pen Needle (B-D ULTRAFINE III SHORT PEN) 31G X 8 MM misc USE TO INJECT INSULIN FIVE TIMES A  each 11   • isosorbide mononitrate (IMDUR) 60 MG 24 hr tablet Take 60 mg by mouth Daily With Breakfast.     • levothyroxine (SYNTHROID, LEVOTHROID) 75 MCG tablet Take 1 tablet by mouth Daily. 90 tablet 1   • linagliptin (TRADJENTA) 5 MG tablet tablet Take 1 tablet by mouth Daily. 30 tablet 5   • methylPREDNISolone (MEDROL) 4 MG dose pack Take as directed on package instructions. 1 each 0   • metOLazone (ZAROXOLYN) 2.5 MG tablet      • metoprolol tartrate (LOPRESSOR) 100 MG tablet Take 100 mg by mouth 2 (Two) Times a Day.     • Morphine Sulfate ER 60 MG tablet extended-release 12 hour Take 1 tablet by mouth Every 12 (Twelve) Hours.     • Multiple Vitamins-Minerals (MULTIVITAL) tablet Take 1 tablet by mouth Daily.     • Needle, Disp, 18G X 1-1/2\" misc Use for drawing up the medication 50 each 3   • Needle, Disp, 18G X 1-1/2\" misc Use for drawing up the medication 50 each 3   • Needle, Disp, 23G X 1-1/2\" misc 1 Device 1 (One) Time Per Week. 30 each 11   • Needle, Disp, 23G X 1-1/2\" misc 1 Device 1 (One) Time Per Week. 30 each 11   • Omega-3 Fatty Acids " "(FISH OIL) 1200 MG capsule capsule Take 1,200 mg by mouth 2 (Two) Times a Day With Meals. Take as directed     • omeprazole (priLOSEC) 40 MG capsule Take 1 capsule by mouth Daily. 90 capsule 3   • ondansetron (Zofran) 4 MG tablet Take 1 tablet by mouth Every 8 (Eight) Hours As Needed for Nausea or Vomiting. 15 tablet 0   • OneTouch Delica Lancets 33G misc Use to test blood sugar level 3 times daily. DX: E11.9 300 each 3   • oxyCODONE-acetaminophen (PERCOCET)  MG per tablet Take  tablets by mouth As Needed.     • Ropivacaine HCl-NaCl (NAROPIN) 0.2-0.9 % 12 mg/hr by Peripheral Nerve route Continuous.     • simvastatin (ZOCOR) 20 MG tablet Take 1 tablet by mouth Every Night. 90 tablet 1   • Syringe 20G X 1\" 3 ML misc 2 mL Every 21 (Twenty-One) Days. 50 each 3   • Syringe, Disposable, 3 ML misc 1 syringe 1 (One) Time Per Week. 100 each 11   • Syringe, Disposable, 3 ML misc 1 syringe 1 (One) Time Per Week. 100 each 11   • tadalafil (Cialis) 10 MG tablet Take 1 tablet by mouth Daily. 30 tablet 0   • telmisartan (MICARDIS) 20 MG tablet Take 1 tablet by mouth Daily.     • Testosterone Cypionate (DEPOTESTOTERONE CYPIONATE) 200 MG/ML injection INJECT 2 MLS INTRAMUSCULARLY AS DIRECTED EVERY 21 DAYS 2 mL 0   • tiZANidine (ZANAFLEX) 4 MG tablet Take 8 mg by mouth every night at bedtime.       No facility-administered medications prior to visit.       Opioid medication/s are on active medication list.  and I have evaluated his active treatment plan and pain score trends (see table).  Vitals:    09/01/22 1519   PainSc:   8     I have reviewed the chart for potential of high risk medication and harmful drug interactions in the elderly.            Aspirin is on active medication list. Aspirin use is indicated based on review of current medical condition/s. Pros and cons of this therapy have been discussed today. Benefits of this medication outweigh potential harm.  Patient has been encouraged to continue taking this " "medication.  .      Patient Active Problem List   Diagnosis   • Asthma   • Atrial fibrillation (HCC)   • Back pain   • Benign essential hypertension   • Benign prostatic hyperplasia   • Chronic pain   • Gastroesophageal reflux disease   • Hyperlipidemia   • Hypothyroidism   • Low back pain   • Adiposity   • Obstructive sleep apnea syndrome   • Osteoarthritis   • Carotid bruit   • Cor pulmonale (HCC)   • Renal insufficiency   • Secondary pulmonary hypertension   • Type 2 diabetes mellitus without complication (HCC)   • Degeneration of cervical intervertebral disc   • Encounter for Medicare annual wellness exam   • Obesity due to excess calories with serious comorbidity   • Acute gout due to renal impairment involving right foot   • Influenza A     Advance Care Planning  Advance Directive is not on file.  ACP discussion was declined by the patient. Patient does not have an advance directive, declines further assistance.    Review of Systems   Constitutional: Negative.    HENT: Positive for hearing loss.    Eyes: Negative.    Respiratory: Negative.    Cardiovascular: Negative.    Gastrointestinal:        Some GERD sxs   Endocrine: Negative.    Genitourinary:        Urinates 4 times a night   Musculoskeletal: Positive for arthralgias and back pain.   Skin: Negative.    Allergic/Immunologic: Negative.    Neurological: Negative.    Hematological: Negative.    Psychiatric/Behavioral: Negative.         Objective    Vitals:    09/01/22 1519   BP: 134/80   Pulse: 87   Temp: 97.7 °F (36.5 °C)   SpO2: 90%   Weight: (!) 147 kg (324 lb)   Height: 177.6 cm (69.94\")   PainSc:   8     Estimated body mass index is 46.57 kg/m² as calculated from the following:    Height as of this encounter: 177.6 cm (69.94\").    Weight as of this encounter: 147 kg (324 lb).    Class 3 Severe Obesity (BMI >=40). Obesity-related health conditions include the following: obstructive sleep apnea, hypertension, coronary heart disease, diabetes mellitus, " dyslipidemias, GERD and osteoarthritis. Obesity is unchanged. BMI is is above average; BMI management plan is completed. We discussed portion control and increasing exercise.      Does the patient have evidence of cognitive impairment? No    Physical Exam  Vitals and nursing note reviewed.   Constitutional:       General: He is not in acute distress.     Appearance: Normal appearance. He is well-developed. He is obese. He is not ill-appearing.      Comments: Kind and pleasant man, appears stated age and in no distress today   HENT:      Head: Normocephalic and atraumatic.      Right Ear: Tympanic membrane, ear canal and external ear normal.      Left Ear: Tympanic membrane, ear canal and external ear normal.      Nose: No congestion.      Mouth/Throat:      Pharynx: No posterior oropharyngeal erythema.   Eyes:      General:         Right eye: No discharge.         Left eye: No discharge.      Extraocular Movements: Extraocular movements intact.      Conjunctiva/sclera: Conjunctivae normal.      Pupils: Pupils are equal, round, and reactive to light.   Neck:      Thyroid: No thyromegaly.      Vascular: No carotid bruit.      Comments: No thyromegaly or mass  Cardiovascular:      Rate and Rhythm: Normal rate and regular rhythm.      Pulses: Normal pulses.           Dorsalis pedis pulses are 2+ on the right side and 2+ on the left side.        Posterior tibial pulses are 2+ on the right side and 2+ on the left side.      Heart sounds: Normal heart sounds. No murmur heard.  Pulmonary:      Effort: Pulmonary effort is normal. No respiratory distress.      Breath sounds: Normal breath sounds. No wheezing.   Abdominal:      General: Bowel sounds are normal. There is no distension.      Palpations: Abdomen is soft.      Tenderness: There is no abdominal tenderness.      Comments: Obesity limits exam   Musculoskeletal:         General: Normal range of motion.      Cervical back: Normal range of motion and neck supple.       Right lower leg: No edema.      Left lower leg: No edema.      Right foot: No bunion, Charcot foot or foot drop.      Left foot: No bunion, Charcot foot or foot drop.   Feet:      Right foot:      Protective Sensation: 8 sites tested. 8 sites sensed.      Skin integrity: Skin integrity normal.      Left foot:      Protective Sensation: 8 sites tested. 8 sites sensed.      Skin integrity: Skin integrity normal.   Lymphadenopathy:      Cervical: No cervical adenopathy.   Skin:     General: Skin is warm.      Findings: No rash.      Comments: Feet with no callus or ulcer, sensation grossly intact to light touch bilaterally   Neurological:      General: No focal deficit present.      Mental Status: He is alert and oriented to person, place, and time. Mental status is at baseline.      Cranial Nerves: No cranial nerve deficit.      Motor: No weakness.      Coordination: Coordination normal.      Gait: Gait normal.   Psychiatric:         Mood and Affect: Mood normal.         Behavior: Behavior normal.         Thought Content: Thought content normal.         Judgment: Judgment normal.                 HEALTH RISK ASSESSMENT    Smoking Status:  Social History     Tobacco Use   Smoking Status Former Smoker   • Packs/day: 3.00   • Years: 34.00   • Pack years: 102.00   • Quit date:    • Years since quittin.6   Smokeless Tobacco Never Used     Alcohol Consumption:  Social History     Substance and Sexual Activity   Alcohol Use No     Fall Risk Screen:    STEADI Fall Risk Assessment was completed, and patient is at MODERATE risk for falls. Assessment completed on:2022    Depression Screening:  PHQ-2/PHQ-9 Depression Screening 2022   Retired PHQ-9 Total Score -   Retired Total Score -   Little Interest or Pleasure in Doing Things 0-->not at all   Feeling Down, Depressed or Hopeless 0-->not at all   PHQ-9: Brief Depression Severity Measure Score 0       Health Habits and Functional and Cognitive  Screening:  Functional & Cognitive Status 9/1/2022   Do you have difficulty preparing food and eating? No   Do you have difficulty bathing yourself, getting dressed or grooming yourself? No   Do you have difficulty using the toilet? No   Do you have difficulty moving around from place to place? Yes   Do you have trouble with steps or getting out of a bed or a chair? Yes   Current Diet Unhealthy Diet   Dental Exam Not up to date   Eye Exam Up to date   Exercise (times per week) 0 times per week   Current Exercises Include No Regular Exercise   Current Exercise Activities Include -   Do you need help using the phone?  No   Are you deaf or do you have serious difficulty hearing?  Yes   Do you need help with transportation? No   Do you need help shopping? No   Do you need help preparing meals?  No   Do you need help with housework?  No   Do you need help with laundry? No   Do you need help taking your medications? Yes   Do you need help managing money? No   Do you ever drive or ride in a car without wearing a seat belt? Yes   Have you felt unusual stress, anger or loneliness in the last month? No   Who do you live with? Spouse   If you need help, do you have trouble finding someone available to you? No   Have you been bothered in the last four weeks by sexual problems? No   Do you have difficulty concentrating, remembering or making decisions? Yes       Age-appropriate Screening Schedule:  Refer to the list below for future screening recommendations based on patient's age, sex and/or medical conditions. Orders for these recommended tests are listed in the plan section. The patient has been provided with a written plan.    Health Maintenance   Topic Date Due   • URINE MICROALBUMIN  05/22/2016   • ZOSTER VACCINE (2 of 2) 07/11/2017   • INFLUENZA VACCINE  10/01/2022   • TDAP/TD VACCINES (2 - Td or Tdap) 10/09/2022   • HEMOGLOBIN A1C  10/18/2022   • LIPID PANEL  11/24/2022   • DIABETIC EYE EXAM  11/30/2022   • DIABETIC FOOT  EXAM  03/29/2023              Assessment & Plan   CMS Preventative Services Quick Reference  Risk Factors Identified During Encounter  Cardiovascular Disease  Chronic Pain   Inactivity/Sedentary  Obesity/Overweight   The above risks/problems have been discussed with the patient.  Follow up actions/plans if indicated are seen below in the Assessment/Plan Section.  Pertinent information has been shared with the patient in the After Visit Summary.    Diagnoses and all orders for this visit:    1. Encounter for Medicare annual wellness exam  -     CBC & Differential  -     Comprehensive Metabolic Panel  -     Hemoglobin A1c  -     Lipid Panel With / Chol / HDL Ratio  -     T4, Free  -     TSH    2. Type 2 diabetes mellitus without complication, with long-term current use of insulin (HCC)  -     POCT microalbumin  -     Comprehensive Metabolic Panel  -     Hemoglobin A1c    3. Other specified hypothyroidism  -     T4, Free  -     TSH    Follow heart healthy/low salt/low-cholesterol/diabetic diet  Avoid processed foods  Monitor blood pressure and blood sugar as discussed  Exercise as tolerated up to 30 minutes 5 days per week  Take all medications as prescribed  Monitor feet for callus and ulcer, avoid bare feet-follow-up with Dr. Marquis every 3 months as scheduled  See eye doctor annually  Continue current dose of levothyroxine, will adjust dose if needed based on labs  Continue simvastatin for hyperlipidemia  Continue aspirin and Eliquis due to underlying atrial fibrillation  Follow-up with cardiologist due to chronic A. fib and CAD  Follow-up with pain clinic due to chronic back pain issues  Patient still has intermittent GERD symptoms despite omeprazole, avoid late night meals and spicy foods  Recommend fourth COVID booster when subvariant vaccine is available in a few months  Encourage shingles vaccine, patient states this is an expensive vaccine for him  He declines AAA screening  Labs as noted  Follow-up with urology  in regard to low testosterone level     Follow Up:   Return in about 4 months (around 1/1/2023) for Next scheduled follow up.     An After Visit Summary and PPPS were made available to the patient.

## 2022-09-06 DIAGNOSIS — Z79.4 TYPE 2 DIABETES MELLITUS WITHOUT COMPLICATION, WITH LONG-TERM CURRENT USE OF INSULIN: ICD-10-CM

## 2022-09-06 DIAGNOSIS — E11.9 TYPE 2 DIABETES MELLITUS WITHOUT COMPLICATION, WITH LONG-TERM CURRENT USE OF INSULIN: ICD-10-CM

## 2022-09-06 DIAGNOSIS — E29.1 HYPOGONADISM IN MALE: ICD-10-CM

## 2022-09-06 RX ORDER — INSULIN GLARGINE 100 [IU]/ML
INJECTION, SOLUTION SUBCUTANEOUS
Qty: 30 ML | Refills: 5 | Status: SHIPPED | OUTPATIENT
Start: 2022-09-06 | End: 2023-02-06

## 2022-09-06 RX ORDER — TESTOSTERONE CYPIONATE 200 MG/ML
INJECTION, SOLUTION INTRAMUSCULAR
Qty: 2 ML | Refills: 0 | Status: SHIPPED | OUTPATIENT
Start: 2022-09-06 | End: 2022-09-20

## 2022-09-19 DIAGNOSIS — E29.1 HYPOGONADISM IN MALE: ICD-10-CM

## 2022-09-20 RX ORDER — INSULIN LISPRO 100 [IU]/ML
INJECTION, SOLUTION INTRAVENOUS; SUBCUTANEOUS
Qty: 30 ML | Refills: 3 | Status: SHIPPED | OUTPATIENT
Start: 2022-09-20

## 2022-09-20 RX ORDER — TESTOSTERONE CYPIONATE 200 MG/ML
INJECTION, SOLUTION INTRAMUSCULAR
Qty: 2 ML | Refills: 0 | Status: SHIPPED | OUTPATIENT
Start: 2022-09-20 | End: 2022-09-22

## 2022-09-22 ENCOUNTER — PROCEDURE VISIT (OUTPATIENT)
Dept: UROLOGY | Facility: CLINIC | Age: 72
End: 2022-09-22

## 2022-09-22 DIAGNOSIS — E29.1 HYPOGONADISM IN MALE: Primary | ICD-10-CM

## 2022-09-22 PROCEDURE — 99214 OFFICE O/P EST MOD 30 MIN: CPT | Performed by: UROLOGY

## 2022-09-22 RX ORDER — TESTOSTERONE CYPIONATE 100 MG/ML
100 INJECTION, SOLUTION INTRAMUSCULAR WEEKLY
Qty: 10 ML | Refills: 2 | Status: SHIPPED | OUTPATIENT
Start: 2022-09-22 | End: 2022-09-27 | Stop reason: SDUPTHER

## 2022-09-22 NOTE — PROGRESS NOTES
No chief complaint on file.  Hypogonadism    HPI  Ms. Yeboah is a 72 y.o. male with history below in assessment, who presents for follow up.     At this visit patient was supposed to have Testopel today but he forgot to stop his Eliquis.    Past Medical History:   Diagnosis Date   • Arthritis     shoulder, knee. multi joint    • Atrial fibrillation (HCC)    • Back pain    • Chronic kidney disease     stage III due to diabetes   • Diabetes mellitus (HCC)     several years; checks sugars at home-usually run 100-112   • Diabetic nephropathy (HCC)    • Disease of thyroid gland     hypothyroidism   • GERD (gastroesophageal reflux disease)    • Hypertension    • Low testosterone    • Sleep apnea     setting of 11   • Urinary tract infection     most recent diagnosis 8/29/17 in the ER (+3 bacteria)-started on Macrobid and rechecked by PCP on 9/5/17       Past Surgical History:   Procedure Laterality Date   • ANTERIOR CERVICAL FUSION     • APPENDECTOMY     • BACK SURGERY     • CARPAL TUNNEL RELEASE Bilateral    • CATARACT EXTRACTION     • CHOLECYSTECTOMY     • COLONOSCOPY  2013   • FINGER/THUMB ARTHROPLASTY Left     thumb replacement   • MA RECONSTR TOTAL SHOULDER IMPLANT Left 9/11/2017    Procedure: TOTAL SHOULDER ARTHROPLASTY LEFT;  Surgeon: William Ray MD;  Location: [facility];  Service: Orthopedics   • SHOULDER SURGERY     • TOTAL KNEE ARTHROPLASTY Left          Current Outpatient Medications:   •  Alcohol Swabs pads, Clean area prior to injection, Disp: 100 each, Rfl: 11  •  allopurinol (Zyloprim) 100 MG tablet, Take 1 tablet by mouth Daily., Disp: 90 tablet, Rfl: 1  •  anastrozole (Arimidex) 1 MG tablet, Take 1 tablet by mouth 1 (One) Time Per Week., Disp: 4 tablet, Rfl: 11  •  apixaban (ELIQUIS) 2.5 MG tablet tablet, Take 1 tablet by mouth Every 12 (Twelve) Hours. Resume tomorrow 9/13/17, Disp: 180 tablet, Rfl: 3  •  aspirin 81 MG tablet, Take 1 tablet by mouth daily., Disp: , Rfl:   •  bumetanide (BUMEX) 2 MG  tablet, , Disp: , Rfl:   •  calcipotriene (DOVONEX) 0.005 % cream, , Disp: , Rfl:   •  colchicine 0.6 MG tablet, Take 1 tablet by mouth 2 (Two) Times a Day., Disp: 6 tablet, Rfl: 1  •  docusate sodium 100 MG capsule, Take 100 mg by mouth 2 (Two) Times a Day As Needed (constipation)., Disp: 60 each, Rfl: 0  •  fluorouracil (EFUDEX) 5 % cream, , Disp: , Rfl:   •  glucosamine-chondroitin 500-400 MG capsule capsule, Take 1 capsule by mouth 2 (Two) Times a Day., Disp: , Rfl:   •  glucose blood test strip, Use to test blood sugar level 3 times daily. DX: E11.9, Disp: 300 each, Rfl: 3  •  glucose monitor monitoring kit, Use meter to check blood sugar levels twice daily., Disp: 1 each, Rfl: 0  •  Insulin Glargine (Lantus SoloStar) 100 UNIT/ML injection pen, INJECT 120 UNITS UNDER THE SKIN DAILY, Disp: 30 mL, Rfl: 5  •  Insulin Lispro, 1 Unit Dial, (HUMALOG) 100 UNIT/ML solution pen-injector, INJECT 10 UNITS UNDER THE SKIN THREE TIMES A DAY AS DIRECTED, Disp: 30 mL, Rfl: 3  •  Insulin Pen Needle (B-D ULTRAFINE III SHORT PEN) 31G X 8 MM misc, USE TO INJECT INSULIN FIVE TIMES A DAY, Disp: 200 each, Rfl: 11  •  isosorbide mononitrate (IMDUR) 60 MG 24 hr tablet, Take 60 mg by mouth Daily With Breakfast., Disp: , Rfl:   •  levothyroxine (SYNTHROID, LEVOTHROID) 75 MCG tablet, Take 1 tablet by mouth Daily., Disp: 90 tablet, Rfl: 1  •  linagliptin (TRADJENTA) 5 MG tablet tablet, Take 1 tablet by mouth Daily., Disp: 30 tablet, Rfl: 5  •  methylPREDNISolone (MEDROL) 4 MG dose pack, Take as directed on package instructions., Disp: 1 each, Rfl: 0  •  metOLazone (ZAROXOLYN) 2.5 MG tablet, , Disp: , Rfl:   •  metoprolol tartrate (LOPRESSOR) 100 MG tablet, Take 100 mg by mouth 2 (Two) Times a Day., Disp: , Rfl:   •  Morphine Sulfate ER 60 MG tablet extended-release 12 hour, Take 1 tablet by mouth Every 12 (Twelve) Hours., Disp: , Rfl:   •  Multiple Vitamins-Minerals (MULTIVITAL) tablet, Take 1 tablet by mouth Daily., Disp: , Rfl:   •   "Needle, Disp, 18G X 1-1/2\" misc, Use for drawing up the medication, Disp: 50 each, Rfl: 3  •  Needle, Disp, 18G X 1-1/2\" misc, Use for drawing up the medication, Disp: 50 each, Rfl: 3  •  Needle, Disp, 23G X 1-1/2\" misc, 1 Device 1 (One) Time Per Week., Disp: 30 each, Rfl: 11  •  Needle, Disp, 23G X 1-1/2\" misc, 1 Device 1 (One) Time Per Week., Disp: 30 each, Rfl: 11  •  Omega-3 Fatty Acids (FISH OIL) 1200 MG capsule capsule, Take 1,200 mg by mouth 2 (Two) Times a Day With Meals. Take as directed, Disp: , Rfl:   •  omeprazole (priLOSEC) 40 MG capsule, Take 1 capsule by mouth Daily., Disp: 90 capsule, Rfl: 3  •  ondansetron (Zofran) 4 MG tablet, Take 1 tablet by mouth Every 8 (Eight) Hours As Needed for Nausea or Vomiting., Disp: 15 tablet, Rfl: 0  •  OneTouch Delica Lancets 33G misc, Use to test blood sugar level 3 times daily. DX: E11.9, Disp: 300 each, Rfl: 3  •  oxyCODONE-acetaminophen (PERCOCET)  MG per tablet, Take  tablets by mouth As Needed., Disp: , Rfl:   •  Ropivacaine HCl-NaCl (NAROPIN) 0.2-0.9 %, 12 mg/hr by Peripheral Nerve route Continuous., Disp: , Rfl:   •  simvastatin (ZOCOR) 20 MG tablet, Take 1 tablet by mouth Every Night., Disp: 90 tablet, Rfl: 1  •  Syringe 20G X 1\" 3 ML misc, 2 mL Every 21 (Twenty-One) Days., Disp: 50 each, Rfl: 3  •  Syringe, Disposable, 3 ML misc, 1 syringe 1 (One) Time Per Week., Disp: 100 each, Rfl: 11  •  Syringe, Disposable, 3 ML misc, 1 syringe 1 (One) Time Per Week., Disp: 100 each, Rfl: 11  •  tadalafil (Cialis) 10 MG tablet, Take 1 tablet by mouth Daily., Disp: 30 tablet, Rfl: 0  •  telmisartan (MICARDIS) 20 MG tablet, Take 1 tablet by mouth Daily., Disp: , Rfl:   •  Testosterone Cypionate (DEPOTESTOTERONE CYPIONATE) 200 MG/ML injection, INJECT 2MLS INTRAMUSCULARLY EVERY 21 DAYS, Disp: 2 mL, Rfl: 0  •  tiZANidine (ZANAFLEX) 4 MG tablet, Take 8 mg by mouth every night at bedtime., Disp: , Rfl:      Physical Exam  There were no vitals taken for this " visit.    Labs  Brief Urine Lab Results  (Last result in the past 365 days)      Color   Clarity   Blood   Leuk Est   Nitrite   Protein   CREAT   Urine HCG        09/01/22 1611             200               Lab Results   Component Value Date    GLUCOSE 66 04/18/2022    CALCIUM 9.0 04/18/2022     04/18/2022    K 4.0 04/18/2022    CO2 33.1 (H) 04/18/2022    CL 97 (L) 04/18/2022    BUN 32 (H) 04/18/2022    CREATININE 2.28 (H) 04/18/2022    EGFRIFAFRI 46 (L) 01/18/2022    EGFRIFNONA 38 (L) 01/18/2022    BCR 14.0 04/18/2022    ANIONGAP 10.1 11/24/2021       Lab Results   Component Value Date    WBC 9.74 11/24/2021    HGB 12.8 (L) 08/22/2022    HCT 43.2 08/22/2022    MCV 69.9 (L) 11/24/2021     11/24/2021            Lab Results   Component Value Date    PSA 0.961 03/04/2021    PSA 0.8 07/06/2020    PSA 0.864 02/24/2020       Lab Results   Component Value Date    TESTOSTEROTT >1500 (H) 05/24/2022        Radiographic Studies  No Images in the past 120 days found..        I have reviewed above labs and imaging.     Assessment  72 y.o. male with hypogonadism, previously on testosterone cypionate 400 mg every 3 weeks with massive spikes in his testosterone, followed by yoyo effect and lows.  He was scheduled to have Testopel, but he cannot remember to stop his blood thinner, and is overall unsure of what medicines he takes.    Plan  1. Restart cypionate, though we will switch it to 100mg weekly to avoid massive highs and lows and yoyo effects.  2.  Continue Arimidex weekly to prevent aromatization  3.  Follow-up in 3 months with complete battery of labs.  Get labs done the day before an injection.

## 2022-09-26 ENCOUNTER — TELEPHONE (OUTPATIENT)
Dept: UROLOGY | Facility: CLINIC | Age: 72
End: 2022-09-26

## 2022-09-26 NOTE — TELEPHONE ENCOUNTER
RECEIVED A CALL FROM PT'S WIFE. . SHE CALLED BECAUSE THE PRESCRIPTION Testosterone Cypionate (DEPOTESTOTERONE CYPIONATE) 200 MG/ML injection IS ON BACK ORDER AT [facility] WHERE THE PRESCRIPTION WAS SENT. SHE [facility] TO VERIFY IF THEY HAVE THE PRESCRIPTION IN STOCK AND THEY DO BUT IT IS THE 10 ML VIAL. [facility] [phone]

## 2022-09-27 DIAGNOSIS — E29.1 HYPOGONADISM IN MALE: Primary | ICD-10-CM

## 2022-09-27 DIAGNOSIS — E29.1 HYPOGONADISM IN MALE: ICD-10-CM

## 2022-09-27 RX ORDER — TESTOSTERONE CYPIONATE 200 MG/ML
100 INJECTION, SOLUTION INTRAMUSCULAR WEEKLY
Qty: 2 ML | Refills: 0 | Status: SHIPPED | OUTPATIENT
Start: 2022-09-27 | End: 2022-10-11 | Stop reason: SDUPTHER

## 2022-09-27 RX ORDER — TESTOSTERONE CYPIONATE 100 MG/ML
100 INJECTION, SOLUTION INTRAMUSCULAR WEEKLY
Qty: 4 ML | Refills: 0 | Status: SHIPPED | OUTPATIENT
Start: 2022-09-27 | End: 2022-09-27 | Stop reason: SDUPTHER

## 2022-10-10 ENCOUNTER — TELEPHONE (OUTPATIENT)
Dept: UROLOGY | Facility: CLINIC | Age: 72
End: 2022-10-10

## 2022-10-10 NOTE — TELEPHONE ENCOUNTER
Caller: LISHA CROWE    Relationship: SPOUSE     Best call back number: [phone]    What is the best time to reach you: ANYTIME     INCOMING CALL FROM PT SPOUSE, PT WAS TOLD TO CALL OFFICE WHENEVER HE ONLY HAD ONE TESTOSTERONE SHOT LEFT.

## 2022-10-11 DIAGNOSIS — E29.1 HYPOGONADISM IN MALE: ICD-10-CM

## 2022-10-11 RX ORDER — TESTOSTERONE CYPIONATE 200 MG/ML
100 INJECTION, SOLUTION INTRAMUSCULAR WEEKLY
Qty: 2 ML | Refills: 0 | Status: SHIPPED | OUTPATIENT
Start: 2022-10-11 | End: 2022-11-15

## 2022-10-17 RX ORDER — SIMVASTATIN 20 MG
TABLET ORAL
Qty: 90 TABLET | Refills: 1 | Status: SHIPPED | OUTPATIENT
Start: 2022-10-17

## 2022-10-25 ENCOUNTER — TELEPHONE (OUTPATIENT)
Dept: INTERNAL MEDICINE | Facility: CLINIC | Age: 72
End: 2022-10-25

## 2022-10-25 DIAGNOSIS — G47.33 OBSTRUCTIVE SLEEP APNEA SYNDROME: Primary | ICD-10-CM

## 2022-10-25 NOTE — TELEPHONE ENCOUNTER
Caller: Alia Yeboah    Relationship: Self    Best call back number: [phone]    What is the medical concern/diagnosis: SLEEP APNEA    What specialty or service is being requested: SLEEP SPECIALIST    What is the provider, practice or medical service name: DR SIMON    What is the office location: [place]    What is the office phone number: [phone]    Any additional details: PATIENT'S PREVIOUS DOCTOR RETIRED. THEY WOULD LIKE A REFERRAL TO GET SET UP WITH A NEW SLEEP SPECIALIST

## 2022-11-02 RX ORDER — LEVOTHYROXINE SODIUM 0.07 MG/1
TABLET ORAL
Qty: 90 TABLET | Refills: 1 | Status: SHIPPED | OUTPATIENT
Start: 2022-11-02

## 2022-11-14 DIAGNOSIS — E29.1 HYPOGONADISM IN MALE: ICD-10-CM

## 2022-11-15 RX ORDER — TESTOSTERONE CYPIONATE 200 MG/ML
INJECTION, SOLUTION INTRAMUSCULAR
Qty: 2 ML | Refills: 0 | Status: SHIPPED | OUTPATIENT
Start: 2022-11-15 | End: 2022-12-22 | Stop reason: SDUPTHER

## 2022-12-01 ENCOUNTER — OFFICE VISIT (OUTPATIENT)
Dept: PULMONOLOGY | Facility: CLINIC | Age: 72
End: 2022-12-01

## 2022-12-01 VITALS
HEIGHT: 70 IN | HEART RATE: 94 BPM | BODY MASS INDEX: 45.1 KG/M2 | OXYGEN SATURATION: 93 % | DIASTOLIC BLOOD PRESSURE: 70 MMHG | RESPIRATION RATE: 18 BRPM | WEIGHT: 315 LBS | SYSTOLIC BLOOD PRESSURE: 120 MMHG

## 2022-12-01 DIAGNOSIS — G47.33 OBSTRUCTIVE SLEEP APNEA: Primary | ICD-10-CM

## 2022-12-01 DIAGNOSIS — R06.02 SHORTNESS OF BREATH: ICD-10-CM

## 2022-12-01 DIAGNOSIS — G47.19 EXCESSIVE DAYTIME SLEEPINESS: ICD-10-CM

## 2022-12-01 DIAGNOSIS — R09.02 HYPOXIA: ICD-10-CM

## 2022-12-01 DIAGNOSIS — Z87.891 PERSONAL HISTORY OF NICOTINE DEPENDENCE: ICD-10-CM

## 2022-12-01 DIAGNOSIS — E66.01 MORBID OBESITY, UNSPECIFIED OBESITY TYPE: ICD-10-CM

## 2022-12-01 DIAGNOSIS — E66.2 OBESITY HYPOVENTILATION SYNDROME: ICD-10-CM

## 2022-12-01 DIAGNOSIS — R06.83 SNORING: ICD-10-CM

## 2022-12-01 PROCEDURE — 99204 OFFICE O/P NEW MOD 45 MIN: CPT | Performed by: INTERNAL MEDICINE

## 2022-12-01 NOTE — PROGRESS NOTES
CONSULT NOTE    Requested by:   Vermeesch, Marilyn K, MD Vermeesch, Marilyn K, MD      Chief Complaint   Patient presents with   • Shortness of Breath   • Consult       Subjective:  Freddie Yeboah Jr. is a 72 y.o. male.     History of Present Illness   Patient comes in today for consultation because of sleep apnea.  The patient says that  he was diagnosed with sleep apnea 10-12 years ago.  It appears that he has been on a PAP device since then.    Patient doesn't report any issues with the device. Patient says that the compliance with the use of the equipment is good. Apart from occasional night, when he feels that the PAP device doesn't function properly, he says that his symptoms of fatigue & daytime sleepiness have been helped greatly with the use of PAP, as prescribed.      Patient reports continued improvement with the use of the PAP device.     Patient is not aware of snoring through the device.     Patient says that he is having issues with his current mask and it occasionally leaks bad enough for him not to be able to use the device for most of the night.    Patient's sleep schedule was reviewed.     Patient is under management for CHF, hypertension & diabetes.      Patient says that the compliance with the use of the equipment is good.     Smoked 2 PPD for about 30 years, quit 20 years ago.    He does have occasional shortness of breath with exertion.     The following portions of the patient's history were reviewed and updated as appropriate: allergies, current medications, past family history, past medical history, past social history and past surgical history.    Review of Systems   HENT: Negative for sinus pressure, sneezing and sore throat.    Respiratory: Positive for shortness of breath (some). Negative for cough, chest tightness and wheezing.    All other systems reviewed and are negative.      Past Medical History:   Diagnosis Date   • Arthritis     shoulder, knee. multi joint    • Atrial  "fibrillation (HCC)    • Back pain    • Chronic kidney disease     stage III due to diabetes   • Diabetes mellitus (HCC)     several years; checks sugars at home-usually run 100-112   • Diabetic nephropathy (HCC)    • Disease of thyroid gland     hypothyroidism   • GERD (gastroesophageal reflux disease)    • Hypertension    • Low testosterone    • Sleep apnea     setting of 11   • Urinary tract infection     most recent diagnosis 17 in the ER (+3 bacteria)-started on Macrobid and rechecked by PCP on 17       Social History     Tobacco Use   • Smoking status: Former     Packs/day: 3.00     Years: 34.00     Pack years: 102.00     Types: Cigarettes     Quit date:      Years since quittin.9   • Smokeless tobacco: Never   Substance Use Topics   • Alcohol use: No         Objective:  Visit Vitals  /70   Pulse 94   Resp 18   Ht 177.6 cm (69.92\")   Wt (!) 157 kg (346 lb)   SpO2 93% Comment: on RA at rest   BMI 49.76 kg/m²   87% on RA at rest.  94% on 2 LPM at rest.     Physical Exam  Vitals reviewed.   Constitutional:       Appearance: He is well-developed.   HENT:      Head: Atraumatic.      Mouth/Throat:      Comments: Oropharynx was crowded.  Eyes:      Pupils: Pupils are equal, round, and reactive to light.   Neck:      Thyroid: No thyromegaly.      Vascular: No JVD.      Trachea: No tracheal deviation.      Comments: Increased neck circumference noted.  Cardiovascular:      Rate and Rhythm: Normal rate and regular rhythm.   Pulmonary:      Effort: Pulmonary effort is normal. No respiratory distress.      Breath sounds: Normal breath sounds.   Musculoskeletal:      Right lower leg: No edema.      Left lower leg: No edema.      Comments: Gait was slow.  2+ edema noted.    Skin:     General: Skin is warm and dry.   Neurological:      Mental Status: He is alert and oriented to person, place, and time.   Psychiatric:         Mood and Affect: Mood normal.         Behavior: Behavior normal. "           Assessment/Plan:  Diagnoses and all orders for this visit:    1. Obstructive sleep apnea (Primary)  -     BIPAP / CPAP Adjustment  -     BIPAP / CPAP Adjustment    2. Snoring  -     BIPAP / CPAP Adjustment  -     BIPAP / CPAP Adjustment    3. Excessive daytime sleepiness  -     BIPAP / CPAP Adjustment  -     BIPAP / CPAP Adjustment    4. Morbid obesity, unspecified obesity type (HCC)    5. Hypoxia    6. Obesity hypoventilation syndrome (HCC)  -     Pulmonary Function Test; Future    7. Personal history of nicotine dependence    8. Shortness of breath  -     Pulmonary Function Test; Future        Return in about 6 months (around 6/1/2023) for Recheck, PFT F/U, For [facility] ([place]).    DISCUSSION(if any):  Last chest x-ray was reviewed personally and the results were shared with the patient.  Images reviewed personally.   Results for orders placed in visit on 09/05/17    XR Chest PA & Lateral    Narrative  EXAMINATION: XR CHEST PA AND LATERAL- 09/05/2017    INDICATION: pre op    COMPARISON: NONE    FINDINGS: PA and lateral views of the chest reveal cardiac and  mediastinal silhouettes within normal limits. Lung fields are grossly  clear. No focal parenchymal opacification present. No pleural effusion  or pneumothorax. The bony structures are unremarkable. Pulmonary  vascularity is within normal limits.    Impression  No acute cardiopulmonary disease.    D:  09/05/2017  E:  09/05/2017    This report was finalized on 9/6/2017 4:21 PM by Dr. Blanka Looney MD.    Last CT scan was reviewed in great detail with the patient. Images reviewed personally.   Results for orders placed during the hospital encounter of 10/23/20    CT Chest Without Contrast    Narrative  PROCEDURE: CT CHEST WO CONTRAST-    HISTORY: Restrictive lung disease    COMPARISON: 02/15/2016.    PROCEDURE:  Multiple axial CT images were obtained from the thoracic  inlet through the upper abdomen without the use of contrast.    FINDINGS:  Soft  tissue windows reveal no axillary, hilar or mediastinal adenopathy.  Heart size is normal. The aorta is normal in caliber. There are no  pleural or pericardial effusions. Lung windows demonstrate no suspicious  infiltrate or nodules . There are scattered bilateral calcified  granulomas. The visualized upper abdomen is unremarkable. Bone windows  reveal no acute osseous abnormalities.    Impression  No acute process.    919.21 mGy.cm      This study was performed with techniques to keep radiation doses as low  as reasonably achievable (ALARA). Individualized dose reduction  techniques using automated exposure control or adjustment of mA and/or  kV according to the patient size were employed.    This report was finalized on 10/23/2020 12:40 PM by Jennifer Antunez M.D..      I have also reviewed his primary care provider's last note that mentions obstructive sleep apnea, obesity and hypothyroidism.     I also reviewed Dr. Kay's last note (June 2022).  It mentioned moderate obstructive sleep apnea with somewhat poor CPAP compliance.  It also mentioned morbid obesity and systolic dysfunction.  It also listed history of chronic atrial fibrillation.  Further review shows that AutoPap was adjusted to a pressure of 12/20.    ===========================  ===========================    PFTs were ordered for follow up visit, given his strong history of smoking.     Laboratory workup also showed   Lab Results   Component Value Date    HGB 12.8 (L) 08/22/2022    HGB 14.1 05/24/2022    HGB 12.1 (L) 11/24/2021   ,   Lab Results   Component Value Date    HCT 43.2 08/22/2022    HCT 46.1 05/24/2022    HCT 43.6 11/24/2021       Lab Results   Component Value Date    EOSABS 0.32 11/24/2021    EOSABS CANCELED 03/04/2021    EOSABS 1.0 03/04/2021    EOSABS 0.09 03/04/2021    &, Laboratory workup also showed   Lab Results   Component Value Date    CO2 33.1 (H) 04/18/2022     ===========================  ===========================    We  will try to obtain his last sleep study results from the performing facility, if not already scanned in our system.     Since he is having occasional issues with CPAP and the mask, and his CO2 in the serum is significantly elevated, we will switch him to BiPAP.      Patient was advised to continue using PAP for at least 4 hours every night.    It appears that some of his issues are secondary to the mask.  The patient was provided with a prescription for new mask and supplies.    The patient appears to have issues with getting used to the mask at night.  I recommended that he uses the mask for 15-30 minutes every day, to get used to it.    Patient was advised to call this office with any issues.    The patient was advised to continue using oxygen 24/7.    His hypoxemia appears to be secondary to obesity hypoventilation syndrome although given his strong smoking history, we will wait for PFTs to rule out significant COPD.    Dictated utilizing Dragon dictation.    This document was electronically signed by Valerie Hu MD on 12/01/22 at 11:57 EST

## 2022-12-12 RX ORDER — PEN NEEDLE, DIABETIC 31 GX5/16"
NEEDLE, DISPOSABLE MISCELLANEOUS
Qty: 200 EACH | Refills: 11 | Status: SHIPPED | OUTPATIENT
Start: 2022-12-12

## 2022-12-19 ENCOUNTER — LAB (OUTPATIENT)
Dept: LAB | Facility: HOSPITAL | Age: 72
End: 2022-12-19

## 2022-12-19 LAB
ALBUMIN SERPL-MCNC: 4.4 G/DL (ref 3.5–5.2)
ALBUMIN/GLOB SERPL: 1.4 G/DL
ALP SERPL-CCNC: 96 U/L (ref 39–117)
ALT SERPL W P-5'-P-CCNC: 13 U/L (ref 1–41)
ANION GAP SERPL CALCULATED.3IONS-SCNC: 12 MMOL/L (ref 5–15)
ANISOCYTOSIS BLD QL: ABNORMAL
AST SERPL-CCNC: 18 U/L (ref 1–40)
BILIRUB SERPL-MCNC: 1 MG/DL (ref 0–1.2)
BUN SERPL-MCNC: 56 MG/DL (ref 8–23)
BUN/CREAT SERPL: 22 (ref 7–25)
BURR CELLS BLD QL SMEAR: ABNORMAL
CALCIUM SPEC-SCNC: 9.5 MG/DL (ref 8.6–10.5)
CHLORIDE SERPL-SCNC: 92 MMOL/L (ref 98–107)
CHOLEST SERPL-MCNC: 97 MG/DL (ref 0–200)
CO2 SERPL-SCNC: 36 MMOL/L (ref 22–29)
CREAT SERPL-MCNC: 2.54 MG/DL (ref 0.76–1.27)
DEPRECATED RDW RBC AUTO: 53.9 FL (ref 37–54)
EGFRCR SERPLBLD CKD-EPI 2021: 26.1 ML/MIN/1.73
ELLIPTOCYTES BLD QL SMEAR: ABNORMAL
EOSINOPHIL # BLD MANUAL: 0.08 10*3/MM3 (ref 0–0.4)
EOSINOPHIL NFR BLD MANUAL: 1 % (ref 0.3–6.2)
ERYTHROCYTE [DISTWIDTH] IN BLOOD BY AUTOMATED COUNT: 21.5 % (ref 12.3–15.4)
GLOBULIN UR ELPH-MCNC: 3.2 GM/DL
GLUCOSE SERPL-MCNC: 125 MG/DL (ref 65–99)
HBA1C MFR BLD: 6.6 % (ref 4.8–5.6)
HCT VFR BLD AUTO: 44.7 % (ref 37.5–51)
HDLC SERPL QL: 2.94
HDLC SERPL-MCNC: 33 MG/DL (ref 40–60)
HGB BLD-MCNC: 12.6 G/DL (ref 13–17.7)
LDLC SERPL CALC-MCNC: 50 MG/DL (ref 0–100)
LYMPHOCYTES # BLD MANUAL: 3.34 10*3/MM3 (ref 0.7–3.1)
LYMPHOCYTES NFR BLD MANUAL: 17.7 % (ref 5–12)
MCH RBC QN AUTO: 20.6 PG (ref 26.6–33)
MCHC RBC AUTO-ENTMCNC: 28.2 G/DL (ref 31.5–35.7)
MCV RBC AUTO: 72.9 FL (ref 79–97)
MICROCYTES BLD QL: ABNORMAL
MONOCYTES # BLD: 1.42 10*3/MM3 (ref 0.1–0.9)
NEUTROPHILS # BLD AUTO: 3.18 10*3/MM3 (ref 1.7–7)
NEUTROPHILS NFR BLD MANUAL: 39.6 % (ref 42.7–76)
OVALOCYTES BLD QL SMEAR: ABNORMAL
PLAT MORPH BLD: NORMAL
PLATELET # BLD AUTO: 201 10*3/MM3 (ref 140–450)
PMV BLD AUTO: 9.8 FL (ref 6–12)
POIKILOCYTOSIS BLD QL SMEAR: ABNORMAL
POTASSIUM SERPL-SCNC: 3.7 MMOL/L (ref 3.5–5.2)
PROT SERPL-MCNC: 7.6 G/DL (ref 6–8.5)
RBC # BLD AUTO: 6.13 10*6/MM3 (ref 4.14–5.8)
SODIUM SERPL-SCNC: 140 MMOL/L (ref 136–145)
T4 FREE SERPL-MCNC: 1.49 NG/DL (ref 0.93–1.7)
TRIGL SERPL-MCNC: 64 MG/DL (ref 0–150)
TSH SERPL DL<=0.05 MIU/L-ACNC: 4.46 UIU/ML (ref 0.27–4.2)
VARIANT LYMPHS NFR BLD MANUAL: 41.7 % (ref 19.6–45.3)
VLDLC SERPL-MCNC: 14 MG/DL (ref 5–40)
WBC MORPH BLD: NORMAL
WBC NRBC COR # BLD: 8.02 10*3/MM3 (ref 3.4–10.8)

## 2022-12-19 PROCEDURE — 80061 LIPID PANEL: CPT | Performed by: INTERNAL MEDICINE

## 2022-12-19 PROCEDURE — 84270 ASSAY OF SEX HORMONE GLOBUL: CPT | Performed by: UROLOGY

## 2022-12-19 PROCEDURE — 80053 COMPREHEN METABOLIC PANEL: CPT | Performed by: INTERNAL MEDICINE

## 2022-12-19 PROCEDURE — 36415 COLL VENOUS BLD VENIPUNCTURE: CPT | Performed by: INTERNAL MEDICINE

## 2022-12-19 PROCEDURE — 84443 ASSAY THYROID STIM HORMONE: CPT | Performed by: INTERNAL MEDICINE

## 2022-12-19 PROCEDURE — 84439 ASSAY OF FREE THYROXINE: CPT | Performed by: INTERNAL MEDICINE

## 2022-12-19 PROCEDURE — 84402 ASSAY OF FREE TESTOSTERONE: CPT | Performed by: UROLOGY

## 2022-12-19 PROCEDURE — 83036 HEMOGLOBIN GLYCOSYLATED A1C: CPT | Performed by: INTERNAL MEDICINE

## 2022-12-19 PROCEDURE — 85007 BL SMEAR W/DIFF WBC COUNT: CPT | Performed by: INTERNAL MEDICINE

## 2022-12-19 PROCEDURE — 85025 COMPLETE CBC W/AUTO DIFF WBC: CPT | Performed by: INTERNAL MEDICINE

## 2022-12-19 PROCEDURE — 84403 ASSAY OF TOTAL TESTOSTERONE: CPT | Performed by: UROLOGY

## 2022-12-22 ENCOUNTER — OFFICE VISIT (OUTPATIENT)
Dept: UROLOGY | Facility: CLINIC | Age: 72
End: 2022-12-22

## 2022-12-22 VITALS
WEIGHT: 315 LBS | OXYGEN SATURATION: 92 % | SYSTOLIC BLOOD PRESSURE: 118 MMHG | BODY MASS INDEX: 45.1 KG/M2 | HEIGHT: 70 IN | DIASTOLIC BLOOD PRESSURE: 70 MMHG | HEART RATE: 81 BPM

## 2022-12-22 DIAGNOSIS — E29.1 HYPOGONADISM IN MALE: ICD-10-CM

## 2022-12-22 PROCEDURE — 99213 OFFICE O/P EST LOW 20 MIN: CPT | Performed by: NURSE PRACTITIONER

## 2022-12-22 RX ORDER — ANASTROZOLE 1 MG/1
1 TABLET ORAL WEEKLY
Qty: 4 TABLET | Refills: 11 | Status: SHIPPED | OUTPATIENT
Start: 2022-12-22

## 2022-12-22 RX ORDER — TESTOSTERONE CYPIONATE 200 MG/ML
100 INJECTION, SOLUTION INTRAMUSCULAR WEEKLY
Qty: 2 ML | Refills: 0 | Status: SHIPPED | OUTPATIENT
Start: 2022-12-22 | End: 2023-01-23

## 2022-12-22 RX ORDER — FEBUXOSTAT 40 MG/1
TABLET, FILM COATED ORAL
COMMUNITY
Start: 2022-12-02

## 2022-12-22 NOTE — PROGRESS NOTES
Office Visit Established Male Patient     Patient Name: Freddie Yeboah Jr.  : 1950   MRN: [id number]     Chief Complaint:   Chief Complaint   Patient presents with   • Hypothyroidism     3 month follow-up / hypogonadism        History of Present Illness: Mr. Freddie Yeboah Jr. is a 72 y.o. male who presents today for follow up with hypogonadism.  He initially like Testopel but was unable to remember to stop his Eliquis to have inserted so he has been using testosterone 100 mg weekly.  He reports he feels that this helps him for a few days but then he starts feeling fatigue.  Here to discuss his lab work and refill.  Patient reports he is getting ready to go to [place] for a period of time and would like to get his refills prior to this      Subjective      Review of System:   Constitutional: No fevers or chills      Past Medical History:   Past Medical History:   Diagnosis Date   • Arthritis     shoulder, knee. multi joint    • Atrial fibrillation (HCC)    • Back pain    • Chronic kidney disease     stage III due to diabetes   • Diabetes mellitus (HCC)     several years; checks sugars at home-usually run 100-112   • Diabetic nephropathy (HCC)    • Disease of thyroid gland     hypothyroidism   • GERD (gastroesophageal reflux disease)    • Hypertension    • Low testosterone    • Sleep apnea     setting of 11   • Urinary tract infection     most recent diagnosis 17 in the ER (+3 bacteria)-started on Macrobid and rechecked by PCP on 17       Past Surgical History:   Past Surgical History:   Procedure Laterality Date   • ANTERIOR CERVICAL FUSION     • APPENDECTOMY     • BACK SURGERY     • CARPAL TUNNEL RELEASE Bilateral    • CATARACT EXTRACTION     • CHOLECYSTECTOMY     • COLONOSCOPY     • FINGER/THUMB ARTHROPLASTY Left     thumb replacement   • VT RECONSTR TOTAL SHOULDER IMPLANT Left 2017    Procedure: TOTAL SHOULDER ARTHROPLASTY LEFT;  Surgeon: William Ray MD;  Location: [facility];   Service: Orthopedics   • SHOULDER SURGERY     • TOTAL KNEE ARTHROPLASTY Left        Family History:   Family History   Problem Relation Age of Onset   • Heart attack Mother    • Diabetes Mother    • Heart attack Father    • Diabetes Sister    • Thyroid disease Sister    • COPD Brother    • Stroke Brother    • Cancer Brother        Social History:   Social History     Socioeconomic History   • Marital status:    Tobacco Use   • Smoking status: Former     Packs/day: 3.00     Years: 34.00     Pack years: 102.00     Types: Cigarettes     Quit date:      Years since quittin.9   • Smokeless tobacco: Never   Substance and Sexual Activity   • Alcohol use: No   • Drug use: No   • Sexual activity: Yes     Partners: Female       Medications:     Current Outpatient Medications:   •  Alcohol Swabs pads, Clean area prior to injection, Disp: 100 each, Rfl: 11  •  allopurinol (Zyloprim) 100 MG tablet, Take 1 tablet by mouth Daily., Disp: 90 tablet, Rfl: 1  •  anastrozole (Arimidex) 1 MG tablet, Take 1 tablet by mouth 1 (One) Time Per Week., Disp: 4 tablet, Rfl: 11  •  apixaban (ELIQUIS) 2.5 MG tablet tablet, Take 1 tablet by mouth Every 12 (Twelve) Hours. Resume tomorrow 17, Disp: 180 tablet, Rfl: 3  •  aspirin 81 MG tablet, Take 1 tablet by mouth daily., Disp: , Rfl:   •  bumetanide (BUMEX) 2 MG tablet, , Disp: , Rfl:   •  calcipotriene (DOVONEX) 0.005 % cream, , Disp: , Rfl:   •  colchicine 0.6 MG tablet, Take 1 tablet by mouth 2 (Two) Times a Day., Disp: 6 tablet, Rfl: 1  •  docusate sodium 100 MG capsule, Take 100 mg by mouth 2 (Two) Times a Day As Needed (constipation)., Disp: 60 each, Rfl: 0  •  febuxostat (ULORIC) 40 MG tablet, , Disp: , Rfl:   •  fluorouracil (EFUDEX) 5 % cream, , Disp: , Rfl:   •  glucosamine-chondroitin 500-400 MG capsule capsule, Take 1 capsule by mouth 2 (Two) Times a Day., Disp: , Rfl:   •  glucose blood test strip, Use to test blood sugar level 3 times daily. DX: E11.9, Disp: 300  "each, Rfl: 3  •  glucose monitor monitoring kit, Use meter to check blood sugar levels twice daily., Disp: 1 each, Rfl: 0  •  Insulin Glargine (Lantus SoloStar) 100 UNIT/ML injection pen, INJECT 120 UNITS UNDER THE SKIN DAILY, Disp: 30 mL, Rfl: 5  •  Insulin Lispro, 1 Unit Dial, (HUMALOG) 100 UNIT/ML solution pen-injector, INJECT 10 UNITS UNDER THE SKIN THREE TIMES A DAY AS DIRECTED, Disp: 30 mL, Rfl: 3  •  Insulin Pen Needle ([employer] Pen Needles 31G) 31G X 8 MM misc, USE TO INJECT INSULIN FIVE TIMES A DAY, Disp: 200 each, Rfl: 11  •  isosorbide mononitrate (IMDUR) 60 MG 24 hr tablet, Take 60 mg by mouth Daily With Breakfast., Disp: , Rfl:   •  levothyroxine (SYNTHROID, LEVOTHROID) 75 MCG tablet, TAKE ONE TABLET BY MOUTH DAILY, Disp: 90 tablet, Rfl: 1  •  linagliptin (Tradjenta) 5 MG tablet tablet, Take 1 tablet by mouth Daily. MUST COMPLETE LABS FOR FURTHER REFILLS., Disp: 30 tablet, Rfl: 0  •  metOLazone (ZAROXOLYN) 2.5 MG tablet, , Disp: , Rfl:   •  metoprolol tartrate (LOPRESSOR) 100 MG tablet, Take 100 mg by mouth 2 (Two) Times a Day., Disp: , Rfl:   •  Morphine Sulfate ER 60 MG tablet extended-release 12 hour, Take 1 tablet by mouth Every 12 (Twelve) Hours., Disp: , Rfl:   •  Multiple Vitamins-Minerals (MULTIVITAL) tablet, Take 1 tablet by mouth Daily., Disp: , Rfl:   •  Needle, Disp, 18G X 1-1/2\" misc, Use for drawing up the medication, Disp: 50 each, Rfl: 3  •  Needle, Disp, 18G X 1-1/2\" misc, Use for drawing up the medication, Disp: 50 each, Rfl: 3  •  Needle, Disp, 23G X 1-1/2\" misc, 1 Device 1 (One) Time Per Week., Disp: 30 each, Rfl: 11  •  Omega-3 Fatty Acids (FISH OIL) 1200 MG capsule capsule, Take 1,200 mg by mouth 2 (Two) Times a Day With Meals. Take as directed, Disp: , Rfl:   •  omeprazole (priLOSEC) 40 MG capsule, Take 1 capsule by mouth Daily., Disp: 90 capsule, Rfl: 3  •  ondansetron (Zofran) 4 MG tablet, Take 1 tablet by mouth Every 8 (Eight) Hours As Needed for Nausea or Vomiting., Disp: 15 " "tablet, Rfl: 0  •  OneTouch Delica Lancets 33G misc, Use to test blood sugar level 3 times daily. DX: E11.9, Disp: 300 each, Rfl: 3  •  oxyCODONE-acetaminophen (PERCOCET)  MG per tablet, Take  tablets by mouth As Needed., Disp: , Rfl:   •  Ropivacaine HCl-NaCl (NAROPIN) 0.2-0.9 %, 12 mg/hr by Peripheral Nerve route Continuous., Disp: , Rfl:   •  simvastatin (ZOCOR) 20 MG tablet, TAKE ONE TABLET BY MOUTH ONCE NIGHTLY, Disp: 90 tablet, Rfl: 1  •  Syringe 20G X 1\" 3 ML misc, 2 mL Every 21 (Twenty-One) Days., Disp: 50 each, Rfl: 3  •  Syringe, Disposable, 3 ML misc, 1 syringe 1 (One) Time Per Week., Disp: 100 each, Rfl: 11  •  Syringe, Disposable, 3 ML misc, 1 syringe 1 (One) Time Per Week., Disp: 100 each, Rfl: 11  •  tadalafil (Cialis) 10 MG tablet, Take 1 tablet by mouth Daily., Disp: 30 tablet, Rfl: 0  •  telmisartan (MICARDIS) 20 MG tablet, Take 1 tablet by mouth Daily., Disp: , Rfl:   •  Testosterone Cypionate (DEPOTESTOTERONE CYPIONATE) 200 MG/ML injection, Inject 0.5 mL into the appropriate muscle as directed by prescriber 1 (One) Time Per Week. Please call for a refill with last injection, Disp: 2 mL, Rfl: 0  •  tiZANidine (ZANAFLEX) 4 MG tablet, Take 8 mg by mouth every night at bedtime., Disp: , Rfl:   •  methylPREDNISolone (MEDROL) 4 MG dose pack, Take as directed on package instructions., Disp: 1 each, Rfl: 0    Allergies:   Allergies   Allergen Reactions   • Duloxetine Itching and Unknown - High Severity   • Sulfamethoxazole-Trimethoprim Other (See Comments)     Had to be hospitalized in ICU; renal impairment    • Sulfa Antibiotics Other (See Comments) and Unknown - Low Severity     He reports renal failure after a round of sulfa  He reports renal failure after a round of sulfa       Objective     Physical Exam:   Vital Signs:   Vitals:    12/22/22 1107   BP: 118/70   Pulse: 81  Comment: with 2L of O2   SpO2: 92%  Comment: with 2L of O2   Weight: (!) 157 kg (346 lb)   Height: 177.8 cm (70\") " "    Body mass index is 49.65 kg/m².     Physical Exam  Constitutional: NAD, WDWN.   Neurological: A + O x 3.   Psychiatric:  Normal mood and affect      Labs  Brief Urine Lab Results  (Last result in the past 365 days)      Color   Clarity   Blood   Leuk Est   Nitrite   Protein   CREAT   Urine HCG        09/01/22 1611             200               Lab Results   Component Value Date    GLUCOSE 125 (H) 12/19/2022    CALCIUM 9.5 12/19/2022     12/19/2022    K 3.7 12/19/2022    CO2 36.0 (H) 12/19/2022    CL 92 (L) 12/19/2022    BUN 56 (H) 12/19/2022    CREATININE 2.54 (H) 12/19/2022    EGFRIFAFRI 46 (L) 01/18/2022    EGFRIFNONA 38 (L) 01/18/2022    BCR 22.0 12/19/2022    ANIONGAP 12.0 12/19/2022       Lab Results   Component Value Date    WBC 8.02 12/19/2022    HGB 12.6 (L) 12/19/2022    HCT 44.7 12/19/2022    MCV 72.9 (L) 12/19/2022     12/19/2022          Assessment / Plan      Assessment/Plan:   Diagnoses and all orders for this visit:    1. Hypogonadism in male  -     Needle, Disp, 18G X 1-1/2\" misc; Use for drawing up the medication  Dispense: 50 each; Refill: 3  -     Needle, Disp, 23G X 1-1/2\" misc; 1 Device 1 (One) Time Per Week.  Dispense: 30 each; Refill: 11  -     Syringe, Disposable, 3 ML misc; 1 syringe 1 (One) Time Per Week.  Dispense: 100 each; Refill: 11  -     Testosterone Cypionate (DEPOTESTOTERONE CYPIONATE) 200 MG/ML injection; Inject 0.5 mL into the appropriate muscle as directed by prescriber 1 (One) Time Per Week. Please call for a refill with last injection  Dispense: 2 mL; Refill: 0  -     anastrozole (Arimidex) 1 MG tablet; Take 1 tablet by mouth 1 (One) Time Per Week.  Dispense: 4 tablet; Refill: 11    72-year-old male with morbid obesity, type 2 diabetes and hypothyroidism here for follow-up of hypogonadism.  We discussed recent lab work testosterone is within normal limits and hematocrit is normal.  Hemoglobin is mildly low and CBC shows anemia I recommend patient follow-up with " PCP for this.  I recommend opposed to increasing testosterone therapy fatigue could be related to the anemia patient needs to get this managed and we can reevaluate testosterone therapy in 6 months.    1. Continue testosterone cypionate 100 mg weekly  2. Repeat labs 6 months TT and H&H    Follow Up:   Return in about 6 months (around 6/22/2023) for Follow up SHIRA Foley,NP-C  [facility]

## 2022-12-28 NOTE — PROGRESS NOTES
Chief Complaint   Patient presents with   • Low Testosterone        HPI  Mr. Yeboah is a 71 y.o. male with history of hypogonadism who presents for follow up.     At this visit, the patient states his testosterone regimen is satisfactorily treating his symptoms. He states he is on 400mg IM every 21 weeks, injected by his wife. He is also on weekly anastrozole. He denies mood swings, extreme fatigue, hot flashes, chest swelling, or leg swelling. He denies history of polycythemia and had labs drawn at PCP 5 days ago. He denies other acute complaints.    Past Medical History:   Diagnosis Date   • Arthritis     shoulder, knee. multi joint    • Atrial fibrillation (HCC)    • Back pain    • Chronic kidney disease     stage III due to diabetes   • Diabetes mellitus (HCC)     several years; checks sugars at home-usually run 100-112   • Diabetic nephropathy (HCC)    • Disease of thyroid gland     hypothyroidism   • GERD (gastroesophageal reflux disease)    • Hypertension    • Low testosterone    • Sleep apnea     setting of 11   • Urinary tract infection     most recent diagnosis 8/29/17 in the ER (+3 bacteria)-started on Macrobid and rechecked by PCP on 9/5/17       Past Surgical History:   Procedure Laterality Date   • ANTERIOR CERVICAL FUSION     • APPENDECTOMY     • BACK SURGERY     • CARPAL TUNNEL RELEASE Bilateral    • CATARACT EXTRACTION     • CHOLECYSTECTOMY     • COLONOSCOPY  2013   • FINGER/THUMB ARTHROPLASTY Left     thumb replacement   • OK RECONSTR TOTAL SHOULDER IMPLANT Left 9/11/2017    Procedure: TOTAL SHOULDER ARTHROPLASTY LEFT;  Surgeon: William Ray MD;  Location: [facility];  Service: Orthopedics   • SHOULDER SURGERY     • TOTAL KNEE ARTHROPLASTY Left          Current Outpatient Medications:   •  apixaban (Eliquis) 5 MG tablet tablet, Take 1 tablet by mouth Daily., Disp: , Rfl:   •  digoxin (LANOXIN) 125 MCG tablet, Take 1 tablet by mouth Daily., Disp: , Rfl:   •  telmisartan (MICARDIS) 20 MG tablet,  Take 1 tablet by mouth Daily., Disp: , Rfl:   •  anastrozole (ARIMIDEX) 1 MG tablet, TAKE ONE TABLET BY MOUTH TWO TIMES PER WEEK ON SUNDAY AND THURSDAY, Disp: 24 tablet, Rfl: 3  •  anastrozole (Arimidex) 1 MG tablet, Take 1 tablet by mouth 1 (One) Time Per Week., Disp: 4 tablet, Rfl: 11  •  apixaban (ELIQUIS) 2.5 MG tablet tablet, Take 1 tablet by mouth Every 12 (Twelve) Hours. Resume tomorrow 9/13/17, Disp: 180 tablet, Rfl: 3  •  aspirin 81 MG tablet, Take 1 tablet by mouth daily., Disp: , Rfl:   •  bumetanide (BUMEX) 1 MG tablet, 1 mg 2 (Two) Times a Day., Disp: , Rfl:   •  bumetanide (BUMEX) 2 MG tablet, , Disp: , Rfl:   •  calcipotriene (DOVONEX) 0.005 % cream, , Disp: , Rfl:   •  digoxin (LANOXIN) 125 MCG tablet, Take 125 mcg by mouth Daily., Disp: , Rfl:   •  docusate sodium 100 MG capsule, Take 100 mg by mouth 2 (Two) Times a Day As Needed (constipation)., Disp: 60 each, Rfl: 0  •  fluorouracil (EFUDEX) 5 % cream, , Disp: , Rfl:   •  glucosamine-chondroitin 500-400 MG capsule capsule, Take 1 capsule by mouth 2 (Two) Times a Day., Disp: , Rfl:   •  glucose blood test strip, Use to test blood sugar level 3 times daily. DX: E11.9, Disp: 300 each, Rfl: 3  •  glucose monitor monitoring kit, Use meter to check blood sugar levels twice daily., Disp: 1 each, Rfl: 0  •  Insulin Glargine (Lantus SoloStar) 100 UNIT/ML injection pen, INJECT 120 UNITS ONCE DAILY, Disp: 30 mL, Rfl: 5  •  Insulin Lispro, 1 Unit Dial, (HUMALOG) 100 UNIT/ML solution pen-injector, Inject 25 Units under the skin into the appropriate area as directed 3 (Three) Times a Day., Disp: 30 mL, Rfl: 3  •  Insulin Pen Needle (B-D ULTRAFINE III SHORT PEN) 31G X 8 MM misc, USE TO INJECT INSULIN FIVE TIMES A DAY, Disp: 200 each, Rfl: 11  •  isosorbide mononitrate (IMDUR) 60 MG 24 hr tablet, Take 60 mg by mouth Daily With Breakfast., Disp: , Rfl:   •  levothyroxine (SYNTHROID, LEVOTHROID) 75 MCG tablet, Take 1 tablet by mouth Daily., Disp: 90 tablet, Rfl:  "1  •  linagliptin (TRADJENTA) 5 MG tablet tablet, Take 1 tablet by mouth Daily., Disp: 90 tablet, Rfl: 1  •  metOLazone (ZAROXOLYN) 2.5 MG tablet, , Disp: , Rfl:   •  metoprolol tartrate (LOPRESSOR) 100 MG tablet, Take 100 mg by mouth 2 (Two) Times a Day., Disp: , Rfl:   •  Morphine (WANDY) 50 MG 24 hr capsule, , Disp: , Rfl:   •  Morphine Sulfate ER 60 MG tablet extended-release 12 hour, Every 12 (Twelve) Hours., Disp: , Rfl:   •  Morphine Sulfate ER 60 MG tablet extended-release 12 hour, Take 1 tablet by mouth Every 12 (Twelve) Hours., Disp: , Rfl:   •  Multiple Vitamins-Minerals (MULTIVITAL) tablet, Take 1 tablet by mouth Daily., Disp: , Rfl:   •  Needle, Disp, 18G X 1-1/2\" misc, Use for drawing up the medication, Disp: 50 each, Rfl: 3  •  Needle, Disp, 23G X 1-1/2\" misc, 1 Device 1 (One) Time Per Week., Disp: 30 each, Rfl: 11  •  Omega-3 Fatty Acids (FISH OIL) 1200 MG capsule capsule, Take 1,200 mg by mouth 2 (Two) Times a Day With Meals. Take as directed, Disp: , Rfl:   •  omeprazole (priLOSEC) 40 MG capsule, Take 1 capsule by mouth Daily., Disp: 90 capsule, Rfl: 3  •  OneTouch Delica Lancets 33G misc, Use to test blood sugar level 3 times daily. DX: E11.9, Disp: 300 each, Rfl: 3  •  oxyCODONE-acetaminophen (PERCOCET)  MG per tablet, Take  tablets by mouth As Needed., Disp: , Rfl:   •  Ropivacaine HCl-NaCl (NAROPIN) 0.2-0.9 %, 12 mg/hr by Peripheral Nerve route Continuous., Disp: , Rfl:   •  simvastatin (ZOCOR) 20 MG tablet, Take 1 tablet by mouth Every Night., Disp: 90 tablet, Rfl: 1  •  Syringe 20G X 1\" 3 ML misc, 2 mL Every 21 (Twenty-One) Days., Disp: 50 each, Rfl: 3  •  Syringe, Disposable, 3 ML misc, 1 syringe 1 (One) Time Per Week., Disp: 100 each, Rfl: 11  •  telmisartan (MICARDIS) 20 MG tablet, , Disp: , Rfl:   •  Testosterone Cypionate (Depo-Testosterone) 200 MG/ML injection, Inject 2 mL into the appropriate muscle as directed by prescriber Every 21 (Twenty-One) Days., Disp: 4 mL, Rfl: " "5  •  tiZANidine (ZANAFLEX) 4 MG tablet, Take 8 mg by mouth every night at bedtime., Disp: , Rfl:      Physical Exam  Visit Vitals  Pulse 89   Temp 98.3 °F (36.8 °C)   Resp 18   Ht 176.9 cm (69.64\")   Wt (!) 165 kg (364 lb)   SpO2 96%   BMI 52.77 kg/m²       Labs  Brief Urine Lab Results  (Last result in the past 365 days)      Color   Clarity   Blood   Leuk Est   Nitrite   Protein   CREAT   Urine HCG        03/15/21 1338 Yellow   Clear   Negative   Negative   Negative   Negative                 Lab Results   Component Value Date    GLUCOSE 85 11/24/2021    CALCIUM 10.2 11/24/2021     11/24/2021    K 3.9 11/24/2021    CO2 38.9 (H) 11/24/2021    CL 89 (L) 11/24/2021    BUN 45 (H) 11/24/2021    CREATININE 2.34 (H) 11/24/2021    EGFRIFAFRI 42 (L) 04/19/2021    EGFRIFNONA 28 (L) 11/24/2021    BCR 19.2 11/24/2021    ANIONGAP 10.1 11/24/2021       Lab Results   Component Value Date    WBC 9.74 11/24/2021    HGB 12.1 (L) 11/24/2021    HCT 43.6 11/24/2021    MCV 69.9 (L) 11/24/2021     11/24/2021            Lab Results   Component Value Date    PSA 0.961 03/04/2021    PSA 0.8 07/06/2020    PSA 0.864 02/24/2020       Lab Results   Component Value Date    TESTOSTERONE 185.47 05/10/2017            Assessment  71 y.o. male with history of hypogonadism, previously treated with 400mg testosterone cypionate q21 days and anastrozole 1mg qwk. PCP labs are reviewed and HCT within normal limits, not concerning for polycythemia. CMP revealing worsening CKD with increased creatinine and BUN. Encouraged the patient to discuss with his PCP during upcoming appointment on 12/13 and drink plenty of fluids.     Plan  1. Hypogonadism    - obtain testosterone levels today   - plan to continue testosterone as above and anastrozole   2. Follow up in 6 months     " 2

## 2023-01-01 ENCOUNTER — APPOINTMENT (OUTPATIENT)
Dept: GENERAL RADIOLOGY | Facility: HOSPITAL | Age: 73
End: 2023-01-01
Payer: MEDICARE

## 2023-01-01 ENCOUNTER — HOSPITAL ENCOUNTER (EMERGENCY)
Facility: HOSPITAL | Age: 73
Discharge: HOME | End: 2023-01-01
Attending: EMERGENCY MEDICINE | Admitting: EMERGENCY MEDICINE
Payer: MEDICARE

## 2023-01-01 ENCOUNTER — APPOINTMENT (OUTPATIENT)
Dept: ULTRASOUND IMAGING | Facility: HOSPITAL | Age: 73
End: 2023-01-01
Payer: MEDICARE

## 2023-01-01 VITALS
DIASTOLIC BLOOD PRESSURE: 78 MMHG | WEIGHT: 315 LBS | OXYGEN SATURATION: 97 % | BODY MASS INDEX: 45.1 KG/M2 | SYSTOLIC BLOOD PRESSURE: 119 MMHG | RESPIRATION RATE: 18 BRPM | HEART RATE: 81 BPM | HEIGHT: 70 IN | TEMPERATURE: 98.2 F

## 2023-01-01 DIAGNOSIS — R06.00 DYSPNEA, UNSPECIFIED TYPE: Primary | ICD-10-CM

## 2023-01-01 DIAGNOSIS — L03.116 CELLULITIS OF LEFT LOWER EXTREMITY: ICD-10-CM

## 2023-01-01 LAB
ALBUMIN SERPL-MCNC: 4.4 G/DL (ref 3.5–5.2)
ALBUMIN/GLOB SERPL: 1.2 G/DL
ALP SERPL-CCNC: 107 U/L (ref 39–117)
ALT SERPL W P-5'-P-CCNC: 14 U/L (ref 1–41)
ANION GAP SERPL CALCULATED.3IONS-SCNC: 10 MMOL/L (ref 5–15)
ANISOCYTOSIS BLD QL: NORMAL
AST SERPL-CCNC: 20 U/L (ref 1–40)
BASOPHILS # BLD AUTO: 0.03 10*3/MM3 (ref 0–0.2)
BASOPHILS NFR BLD AUTO: 0.3 % (ref 0–1.5)
BILIRUB SERPL-MCNC: 0.7 MG/DL (ref 0–1.2)
BUN SERPL-MCNC: 54 MG/DL (ref 8–23)
BUN/CREAT SERPL: 20.6 (ref 7–25)
CALCIUM SPEC-SCNC: 9.3 MG/DL (ref 8.6–10.5)
CHLORIDE SERPL-SCNC: 95 MMOL/L (ref 98–107)
CO2 SERPL-SCNC: 36 MMOL/L (ref 22–29)
CREAT SERPL-MCNC: 2.62 MG/DL (ref 0.76–1.27)
DEPRECATED RDW RBC AUTO: 58.4 FL (ref 37–54)
EGFRCR SERPLBLD CKD-EPI 2021: 25.2 ML/MIN/1.73
EOSINOPHIL # BLD AUTO: 0.15 10*3/MM3 (ref 0–0.4)
EOSINOPHIL NFR BLD AUTO: 1.7 % (ref 0.3–6.2)
ERYTHROCYTE [DISTWIDTH] IN BLOOD BY AUTOMATED COUNT: 23.5 % (ref 12.3–15.4)
FLUAV RNA RESP QL NAA+PROBE: NOT DETECTED
FLUBV RNA RESP QL NAA+PROBE: NOT DETECTED
GLOBULIN UR ELPH-MCNC: 3.8 GM/DL
GLUCOSE SERPL-MCNC: 189 MG/DL (ref 65–99)
HCT VFR BLD AUTO: 44.7 % (ref 37.5–51)
HGB BLD-MCNC: 12.9 G/DL (ref 13–17.7)
HOLD SPECIMEN: NORMAL
HOLD SPECIMEN: NORMAL
IMM GRANULOCYTES # BLD AUTO: 0.03 10*3/MM3 (ref 0–0.05)
IMM GRANULOCYTES NFR BLD AUTO: 0.3 % (ref 0–0.5)
LYMPHOCYTES # BLD AUTO: 3.39 10*3/MM3 (ref 0.7–3.1)
LYMPHOCYTES NFR BLD AUTO: 38.6 % (ref 19.6–45.3)
MCH RBC QN AUTO: 21.1 PG (ref 26.6–33)
MCHC RBC AUTO-ENTMCNC: 28.9 G/DL (ref 31.5–35.7)
MCV RBC AUTO: 73 FL (ref 79–97)
MICROCYTES BLD QL: NORMAL
MONOCYTES # BLD AUTO: 0.96 10*3/MM3 (ref 0.1–0.9)
MONOCYTES NFR BLD AUTO: 10.9 % (ref 5–12)
NEUTROPHILS NFR BLD AUTO: 4.22 10*3/MM3 (ref 1.7–7)
NEUTROPHILS NFR BLD AUTO: 48.2 % (ref 42.7–76)
NRBC BLD AUTO-RTO: 0 /100 WBC (ref 0–0.2)
NT-PROBNP SERPL-MCNC: 1235 PG/ML (ref 0–900)
PLATELET # BLD AUTO: 216 10*3/MM3 (ref 140–450)
PMV BLD AUTO: 10 FL (ref 6–12)
POTASSIUM SERPL-SCNC: 3.3 MMOL/L (ref 3.5–5.2)
PROT SERPL-MCNC: 8.2 G/DL (ref 6–8.5)
RBC # BLD AUTO: 6.12 10*6/MM3 (ref 4.14–5.8)
SARS-COV-2 RNA RESP QL NAA+PROBE: NOT DETECTED
SMALL PLATELETS BLD QL SMEAR: ADEQUATE
SODIUM SERPL-SCNC: 141 MMOL/L (ref 136–145)
TROPONIN T SERPL-MCNC: 0.01 NG/ML (ref 0–0.03)
WBC MORPH BLD: NORMAL
WBC NRBC COR # BLD: 8.78 10*3/MM3 (ref 3.4–10.8)
WHOLE BLOOD HOLD COAG: NORMAL
WHOLE BLOOD HOLD SPECIMEN: NORMAL

## 2023-01-01 PROCEDURE — 85025 COMPLETE CBC W/AUTO DIFF WBC: CPT | Performed by: EMERGENCY MEDICINE

## 2023-01-01 PROCEDURE — 80053 COMPREHEN METABOLIC PANEL: CPT | Performed by: EMERGENCY MEDICINE

## 2023-01-01 PROCEDURE — 84484 ASSAY OF TROPONIN QUANT: CPT | Performed by: EMERGENCY MEDICINE

## 2023-01-01 PROCEDURE — 99284 EMERGENCY DEPT VISIT MOD MDM: CPT

## 2023-01-01 PROCEDURE — 93971 EXTREMITY STUDY: CPT

## 2023-01-01 PROCEDURE — 71045 X-RAY EXAM CHEST 1 VIEW: CPT

## 2023-01-01 PROCEDURE — 93005 ELECTROCARDIOGRAM TRACING: CPT | Performed by: EMERGENCY MEDICINE

## 2023-01-01 PROCEDURE — 83880 ASSAY OF NATRIURETIC PEPTIDE: CPT | Performed by: EMERGENCY MEDICINE

## 2023-01-01 PROCEDURE — 87636 SARSCOV2 & INF A&B AMP PRB: CPT | Performed by: EMERGENCY MEDICINE

## 2023-01-01 PROCEDURE — 85007 BL SMEAR W/DIFF WBC COUNT: CPT | Performed by: EMERGENCY MEDICINE

## 2023-01-01 RX ORDER — SODIUM CHLORIDE 0.9 % (FLUSH) 0.9 %
10 SYRINGE (ML) INJECTION AS NEEDED
Status: DISCONTINUED | OUTPATIENT
Start: 2023-01-01 | End: 2023-01-02 | Stop reason: HOSPADM

## 2023-01-01 RX ORDER — CEPHALEXIN 500 MG/1
500 CAPSULE ORAL 3 TIMES DAILY
Qty: 21 CAPSULE | Refills: 0 | Status: SHIPPED | OUTPATIENT
Start: 2023-01-01 | End: 2023-01-08

## 2023-01-02 NOTE — ED PROVIDER NOTES
Subjective   History of Present Illness  72-year-old male presents to the ED with multiple complaints.  Patient's initial complaint is leg swelling/fluid retention.  Patient has a history of chronic edema to his lower extremities notes that he has had some increasing swelling over the last 2 days.  He also notes that yesterday he felt a little short of breath, dizzy and had some chest discomfort/tightness.  He is not currently feeling short of breath not currently having chest pain or chest tightness.  Does have a history of A. fib.  Patient denies cough.  No wheeze.  No nausea vomiting diarrhea abdominal pain.  No lightheadedness or dizziness.  No other complaints at this time.        Review of Systems   Constitutional: Negative for chills, diaphoresis, fatigue and fever.   Respiratory: Positive for chest tightness and shortness of breath. Negative for wheezing and stridor.    Cardiovascular: Positive for chest pain and leg swelling. Negative for palpitations.   Skin: Positive for wound.   Neurological: Positive for dizziness.   All other systems reviewed and are negative.      Past Medical History:   Diagnosis Date   • Arthritis     shoulder, knee. multi joint    • Atrial fibrillation (HCC)    • Back pain    • CHF (congestive heart failure) (HCC)    • Chronic kidney disease     stage III due to diabetes   • Diabetes mellitus (HCC)     several years; checks sugars at home-usually run 100-112   • Diabetic nephropathy ([facility])    • Disease of thyroid gland     hypothyroidism   • GERD (gastroesophageal reflux disease)    • Hypertension    • Low testosterone    • Sleep apnea     setting of 11   • Urinary tract infection     most recent diagnosis 8/29/17 in the ER (+3 bacteria)-started on Macrobid and rechecked by PCP on 9/5/17       Allergies   Allergen Reactions   • Duloxetine Itching and Unknown - High Severity   • Sulfamethoxazole-Trimethoprim Other (See Comments)     Had to be hospitalized in ICU; renal impairment     • Sulfa Antibiotics Other (See Comments) and Unknown - Low Severity     He reports renal failure after a round of sulfa  He reports renal failure after a round of sulfa       Past Surgical History:   Procedure Laterality Date   • ANTERIOR CERVICAL FUSION     • APPENDECTOMY     • BACK SURGERY     • CARPAL TUNNEL RELEASE Bilateral    • CATARACT EXTRACTION     • CHOLECYSTECTOMY     • COLONOSCOPY     • FINGER/THUMB ARTHROPLASTY Left     thumb replacement   • CT ARTHROPLASTY GLENOHUMERAL JOINT TOTAL SHOULDER Left 2017    Procedure: TOTAL SHOULDER ARTHROPLASTY LEFT;  Surgeon: William Ray MD;  Location: [facility];  Service: Orthopedics   • SHOULDER SURGERY     • TOTAL KNEE ARTHROPLASTY Left        Family History   Problem Relation Age of Onset   • Heart attack Mother    • Diabetes Mother    • Heart attack Father    • Diabetes Sister    • Thyroid disease Sister    • COPD Brother    • Stroke Brother    • Cancer Brother        Social History     Socioeconomic History   • Marital status:    Tobacco Use   • Smoking status: Former     Packs/day: 3.00     Years: 34.00     Pack years: 102.00     Types: Cigarettes     Quit date:      Years since quittin.0   • Smokeless tobacco: Never   Substance and Sexual Activity   • Alcohol use: No   • Drug use: No   • Sexual activity: Yes     Partners: Female           Objective   Physical Exam  Vitals and nursing note reviewed.   Constitutional:       General: He is not in acute distress.     Appearance: He is obese. He is not diaphoretic.   HENT:      Head: Normocephalic and atraumatic.      Nose: Nose normal.   Eyes:      Conjunctiva/sclera: Conjunctivae normal.      Pupils: Pupils are equal, round, and reactive to light.   Cardiovascular:      Rate and Rhythm: Normal rate. Rhythm irregular.      Comments: A. fib on monitor, rate controlled  Pulmonary:      Effort: Pulmonary effort is normal. No respiratory distress.      Breath sounds: Normal breath sounds.    Abdominal:      General: There is no distension.      Palpations: Abdomen is soft.      Tenderness: There is no abdominal tenderness.   Musculoskeletal:         General: No deformity.      Right lower leg: Edema present.      Left lower leg: Edema present.      Comments: Pitting edema bilateral lower extremities, chronic edema and skin changes to the bilateral lower extremities, left lower extremity is more edematous than the right with some circumdifferential erythema and warmth to the distal third of the lower leg   Neurological:      Mental Status: He is alert and oriented to person, place, and time.      Cranial Nerves: No cranial nerve deficit.      Coordination: Coordination normal.         Procedures           ED Course                                           MDM  Initial EKG interpreted by me.  Atrial fibrillation.  Rate of 88.  Right ventricular hypertrophy.  No obvious ST or T wave changes.  Abnormal EKG    PULSE OXIMETRY INTERPRETATION  Patient had a pulse ox of 96% on room air. This is a normal pulse oximetry reading.    Chief complaint: Shortness of breath, dizzy, chest pain, leg swelling, other    Initial impression of presenting illness: Nontoxic, atrial fibrillation versus congestive heart failure versus other    Differential diagnosis: Atrial fibrillation, congestive heart failure, fluid overload, ACS, NSTEMI, COPD exacerbation, DVT of left lower extremity, cellulitis left lower extremity, other    Patient arrives hemodynamically stable stable, afebrile, without respiratory distress with initial vitals interpreted by myself.  Pertinent initial vitals include normal pulse ox, minimally hypertensive    Pertinent features from physical exam: Irregular heart rate, A. fib on the monitor,    Initial diagnostic plan basic labs, EKG, troponin, chest x-ray    Results from initial plan were reviewed and interpreted by myself and include initial EKG shows A. fib, history of A. fib, chest x-ray negative for  acute process, does show cardiomegaly, troponin negative, slightly elevated BNP, chronic kidney disease noted appears to be at baseline.  Ultrasound negative for DVT.  Pulse ox appropriate on home O2 requirements      Diagnostic information from other sources includes review of records    Interventions in the ED included antibiotics given, monitored    Reevaluation resting comfortably, not feeling short of breath or any chest pain at this time.  Heart rate of 81, A. fib rate controlled, patient unsure if he is always in A. fib, this could be contributing slightly to his mild dyspnea and chest discomfort.  No signs of ACS    Results/clinical rationale were discussed with patient and wife at bedside    Consultations and discussions of results with other physicians: None    Disposition plan: Discharge home with antibiotics for cellulitis of left lower extremity, continued diuresis of home Bumex, 2 mg twice daily, elevation of lower extremities and follow-up with his cardiologist.  Patient is agreeable to this plan.      Final diagnoses:   Dyspnea, unspecified type   Cellulitis of left lower extremity       ED Disposition  ED Disposition     ED Disposition   Discharge    Condition   Stable    Comment   --             Vermeesch, Marilyn K, MD  [address] [place] [address] [address]  [facility] [address] [phone]               Medication List      New Prescriptions    cephalexin 500 MG capsule  Commonly known as: KEFLEX  Take 1 capsule by mouth 3 (Three) Times a Day for 7 days.           Where to Get Your Medications      These medications were sent to [facility] [id number] - [place], [place] - [address] AT [facility] - [phone]  - [phone]   [address] [facility], [facility] [address]    Phone: [phone]   · cephalexin 500 MG capsule          Damon Valdez, DO  01/02/23 0119

## 2023-01-03 ENCOUNTER — OFFICE VISIT (OUTPATIENT)
Dept: INTERNAL MEDICINE | Facility: CLINIC | Age: 73
End: 2023-01-03
Payer: MEDICARE

## 2023-01-03 VITALS
SYSTOLIC BLOOD PRESSURE: 124 MMHG | OXYGEN SATURATION: 90 % | HEIGHT: 70 IN | WEIGHT: 315 LBS | HEART RATE: 89 BPM | TEMPERATURE: 98.9 F | DIASTOLIC BLOOD PRESSURE: 76 MMHG | BODY MASS INDEX: 45.1 KG/M2

## 2023-01-03 DIAGNOSIS — I48.91 ATRIAL FIBRILLATION, UNSPECIFIED TYPE: ICD-10-CM

## 2023-01-03 DIAGNOSIS — Z79.4 TYPE 2 DIABETES MELLITUS WITHOUT COMPLICATION, WITH LONG-TERM CURRENT USE OF INSULIN: ICD-10-CM

## 2023-01-03 DIAGNOSIS — E78.2 MIXED HYPERLIPIDEMIA: ICD-10-CM

## 2023-01-03 DIAGNOSIS — I10 BENIGN ESSENTIAL HYPERTENSION: Primary | ICD-10-CM

## 2023-01-03 DIAGNOSIS — E11.9 TYPE 2 DIABETES MELLITUS WITHOUT COMPLICATION, WITH LONG-TERM CURRENT USE OF INSULIN: ICD-10-CM

## 2023-01-03 DIAGNOSIS — L03.116 CELLULITIS OF LEFT LOWER EXTREMITY: ICD-10-CM

## 2023-01-03 DIAGNOSIS — E03.8 OTHER SPECIFIED HYPOTHYROIDISM: ICD-10-CM

## 2023-01-03 DIAGNOSIS — K21.9 GASTROESOPHAGEAL REFLUX DISEASE WITHOUT ESOPHAGITIS: ICD-10-CM

## 2023-01-03 DIAGNOSIS — G47.33 OBSTRUCTIVE SLEEP APNEA SYNDROME: ICD-10-CM

## 2023-01-03 PROCEDURE — 1160F RVW MEDS BY RX/DR IN RCRD: CPT | Performed by: INTERNAL MEDICINE

## 2023-01-03 PROCEDURE — 99214 OFFICE O/P EST MOD 30 MIN: CPT | Performed by: INTERNAL MEDICINE

## 2023-01-03 PROCEDURE — 1159F MED LIST DOCD IN RCRD: CPT | Performed by: INTERNAL MEDICINE

## 2023-01-03 NOTE — PROGRESS NOTES
Chief Complaint   Patient presents with   • Follow-up     For HTN, GERD, HLD, hypothyroidism, and DM.     Subjective   Freddie Yeboah Jr. is a 72 y.o. male.     History of Present Illness  Here today for follow up of HTN, HLD, DM, CAD, Afib, LUIS FERNANDO, GERD, hypothyroid, LBP/DJD, BPH.   HTN/HLD/CAD/Afib- his BP and heart rate are well controlled today. No CP, palpitations. No edema at this time.  He is using oxygen at 2 liters.  A few days ago he had some weakness and SOA.  He was seen in ER and all labs were OK.  XR negative for CHF.  US leg negative for DVT.    DM with CKD-   A1C 6.6 two weeks ago and CR increased to 2.62.  BS has been running less than 135 in AM.  His lantus is at 120 u daily with 20 u short acting insulin with each meal.   He has some NTB of feet.  Last eye exam was about 5 mo ago with Dr Kauffman.  He sees the foot doctor every 3 mo, will be seeing Dr. Marquis later today. LUIS FERNANDO- he wears his CPAP every night. He awakens abut 2 times a night to urinate, not as much during the day.  He has chronic urgency symptoms.  GERD- he does take omeprazole daily as directed, states his GERD is well controlled.    Hypothyroid- takes thyroid med daily on empty stomach, denies any problems with trouble swallowing, change in voice, depression or constipation  LBP/DJD/gout- he sees pain clinic every 2 mo for his chronic LBP issues, he says his back pain is doing well at this time.  No recent changes in meds. No recent gout flare with use of allopurinol.    BPH- he in on testosterone shot once a week.      HCM- colonoscopy done 2018.  Pneumovax is UTD. He did not have Hep A vaccine.  Has had 1 of 2 Shingrix vaccines.  He did have COVID and flu vaccines.   Patient was seen in the emergency room 2 days ago with complaints of shortness of breath and edema.  He was diagnosed with cellulitis and started on antibiotics.  He is currently on Keflex 500 mg 3 times daily for 7 days.  He is chronically on Bumex 2 mg daily for symptoms  of chronic edema.  BNP was slightly elevated at 1235 in emergency room.       The following portions of the patient's history were reviewed and updated as appropriate: allergies, current medications, past family history, past medical history, past social history, past surgical history and problem list.    Review of Systems   Constitutional: Negative for activity change, appetite change, fever and unexpected weight change.   HENT: Negative for trouble swallowing and voice change.    Eyes: Negative for visual disturbance.   Respiratory: Positive for shortness of breath.    Cardiovascular: Positive for leg swelling. Negative for chest pain and palpitations.   Gastrointestinal: Negative for abdominal pain and constipation.   Genitourinary: Positive for urgency. Negative for frequency.        Urinates 2 or 3 times at night   Musculoskeletal: Negative for back pain.   Skin: Positive for color change and wound.   Neurological: Positive for numbness. Negative for headaches.   Psychiatric/Behavioral: Negative for dysphoric mood and sleep disturbance. The patient is not nervous/anxious.        Objective   /76   Pulse 89   Temp 98.9 °F (37.2 °C)   Ht 176.5 cm (69.5\")   Wt (!) 156 kg (345 lb)   SpO2 90%   BMI 50.22 kg/m²   Body mass index is 50.22 kg/m².  Physical Exam  Vitals and nursing note reviewed.   Constitutional:       General: He is not in acute distress.     Appearance: Normal appearance. He is well-developed. He is obese. He is not ill-appearing.      Comments: Kind and pleasant man, appears stated age and in NAD today, on 2 L oxygen per nasal cannula   HENT:      Head: Normocephalic and atraumatic.      Right Ear: External ear normal.      Left Ear: External ear normal.   Eyes:      General:         Right eye: No discharge.         Left eye: No discharge.      Extraocular Movements: Extraocular movements intact.      Conjunctiva/sclera: Conjunctivae normal.   Neck:      Thyroid: No thyromegaly.       Vascular: No carotid bruit.      Comments: No thyromegaly or mass  Cardiovascular:      Rate and Rhythm: Normal rate. Rhythm irregular.      Pulses: Normal pulses.      Heart sounds: Normal heart sounds. No murmur heard.     Comments: Irregularly irregular distant heart sounds  Pulmonary:      Effort: Pulmonary effort is normal. No respiratory distress.      Breath sounds: Normal breath sounds. No wheezing.   Abdominal:      General: Bowel sounds are normal. There is no distension.      Palpations: Abdomen is soft.      Tenderness: There is no abdominal tenderness.      Comments: Obesity limits exam   Musculoskeletal:         General: Normal range of motion.      Cervical back: Normal range of motion and neck supple.      Right lower leg: Edema present.      Left lower leg: Edema present.      Comments: +1 edema in lower extremities at ankle bilateral   Lymphadenopathy:      Cervical: No cervical adenopathy.   Skin:     General: Skin is warm.      Findings: Erythema present. No rash.      Comments: Chronic brawny changes at distal lower extremities bilaterally, left lower extremity distal shin with 2 abrasions that are well scabbed, there is surrounding erythema and small blister at lateral abrasion with clear serous drainage, abrasion is V-shaped and approximately 2 cm in size, area is tender to palpation   Neurological:      General: No focal deficit present.      Mental Status: He is alert and oriented to person, place, and time. Mental status is at baseline.      Cranial Nerves: No cranial nerve deficit.      Motor: No weakness.      Coordination: Coordination normal.      Gait: Gait normal.   Psychiatric:         Mood and Affect: Mood normal.         Behavior: Behavior normal.         Thought Content: Thought content normal.         Judgment: Judgment normal.         Assessment & Plan   Freddie Obinna Rodríguez is here today and the following problems have been addressed:      Diagnoses and all orders for this  visit:    1. Benign essential hypertension (Primary)    2. Mixed hyperlipidemia    3. Other specified hypothyroidism    4. Type 2 diabetes mellitus without complication, with long-term current use of insulin (HCC)    5. Gastroesophageal reflux disease without esophagitis    6. Obstructive sleep apnea syndrome    7. Atrial fibrillation, unspecified type (HCC)    8. Cellulitis of left lower extremity        Follow heart healthy/low salt/diabetic diet  Avoid processed foods  Monitor blood pressure and BS as discussed  Exercise as tolerated up to 150 minutes per week  Take all medications as prescribed  See eye doctor annually or as discussed  Wear protective foot wear/no bare feet  Check feet regularly for calluses or ulcers  Labs from ER reviewed with patient today  Continue current dose of levothyroxine, last TFTs in acceptable range  Most recent A1c 6.6 a few weeks ago, good control of diabetes on current medications and insulin  Continue omeprazole, GERD well controlled  Patient compliant with CPAP every night  BP and heart rate well controlled, continue Eliquis.  No sign of significant CHF on exam today, continue Zaroxolyn and Bumex at current dose  No recent gout flare with use of Uloric and allopurinol  Follow-up with pain clinic in regard to chronic back pain and arthritis issues  Encouraged him to finish Keflex for left lower extremity cellulitis      Return in about 4 months (around 5/3/2023) for Next scheduled follow up.      Marilyn K. Vermeesch, MD      Please note that portions of this note were completed with a voice recognition program.  Efforts were made to edit dictation, but occasionally words are mistranscribed.

## 2023-01-07 ENCOUNTER — TELEPHONE (OUTPATIENT)
Dept: INTERNAL MEDICINE | Facility: CLINIC | Age: 73
End: 2023-01-07
Payer: MEDICARE

## 2023-01-07 NOTE — TELEPHONE ENCOUNTER
Rx Refill Note  Requested Prescriptions     Pending Prescriptions Disp Refills   • linagliptin (Tradjenta) 5 MG tablet tablet 30 tablet 0     Sig: Take 1 tablet by mouth Daily. MUST COMPLETE LABS FOR FURTHER REFILLS.      Last office visit with prescribing clinician: 1/3/2023   Last telemedicine visit with prescribing clinician: 5/4/2023   Next office visit with prescribing clinician: Visit date not found                         Would you like a call back once the refill request has been completed: [] Yes [] No    If the office needs to give you a call back, can they leave a voicemail: [] Yes [] No    Ragini Hawk MA  01/07/23, 09:47 EST

## 2023-01-07 NOTE — TELEPHONE ENCOUNTER
Incoming Refill Request      Medication requested (name and dose): Tradjenta 5mg     Pharmacy where [facility] should be sent: [facility]     Additional details provided by patient: PATIENT IS OUT AND WILL BE GOING OUT OF TOWN Monday MORNING AND NEEDS A REFILL BEFORE THEN    Best call back number: [phone]    Does the patient have less than a 3 day supply:  [x] Yes  [] No    Angel Morin Rep  01/07/23, 09:22 EST

## 2023-01-23 DIAGNOSIS — E29.1 HYPOGONADISM IN MALE: ICD-10-CM

## 2023-01-23 RX ORDER — TESTOSTERONE CYPIONATE 200 MG/ML
INJECTION, SOLUTION INTRAMUSCULAR
Qty: 2 ML | Refills: 0 | Status: SHIPPED | OUTPATIENT
Start: 2023-01-23 | End: 2023-02-20 | Stop reason: SDUPTHER

## 2023-01-31 ENCOUNTER — TELEPHONE (OUTPATIENT)
Dept: UROLOGY | Facility: CLINIC | Age: 73
End: 2023-01-31
Payer: MEDICARE

## 2023-02-04 DIAGNOSIS — Z79.4 TYPE 2 DIABETES MELLITUS WITHOUT COMPLICATION, WITH LONG-TERM CURRENT USE OF INSULIN: ICD-10-CM

## 2023-02-04 DIAGNOSIS — E11.9 TYPE 2 DIABETES MELLITUS WITHOUT COMPLICATION, WITH LONG-TERM CURRENT USE OF INSULIN: ICD-10-CM

## 2023-02-06 RX ORDER — INSULIN GLARGINE 100 [IU]/ML
INJECTION, SOLUTION SUBCUTANEOUS
Qty: 30 ML | Refills: 5 | Status: SHIPPED | OUTPATIENT
Start: 2023-02-06

## 2023-02-06 NOTE — TELEPHONE ENCOUNTER
Caller: Alia Yeboah    Relationship: Emergency Contact    Best call back number: [phone]    What was the call regarding: WIFE STATES THAT PATIENT NEEDS HIS MEDICATION LANTUS SOLOSTAR ASAP. PLEASE ADVISE    Do you require a callback: YES

## 2023-02-20 DIAGNOSIS — E29.1 HYPOGONADISM IN MALE: ICD-10-CM

## 2023-02-20 RX ORDER — TESTOSTERONE CYPIONATE 200 MG/ML
100 INJECTION, SOLUTION INTRAMUSCULAR WEEKLY
Qty: 2 ML | Refills: 0 | Status: SHIPPED | OUTPATIENT
Start: 2023-02-20 | End: 2023-03-27 | Stop reason: SDUPTHER

## 2023-03-27 DIAGNOSIS — R79.89 LOW TESTOSTERONE LEVEL IN MALE: ICD-10-CM

## 2023-03-27 DIAGNOSIS — E29.1 HYPOGONADISM IN MALE: Primary | ICD-10-CM

## 2023-03-27 RX ORDER — TESTOSTERONE CYPIONATE 200 MG/ML
100 INJECTION, SOLUTION INTRAMUSCULAR WEEKLY
Qty: 2 ML | Refills: 0 | Status: SHIPPED | OUTPATIENT
Start: 2023-03-27

## 2023-04-07 RX ORDER — SIMVASTATIN 20 MG
TABLET ORAL
Qty: 90 TABLET | Refills: 1 | Status: SHIPPED | OUTPATIENT
Start: 2023-04-07

## 2023-04-24 DIAGNOSIS — E29.1 HYPOGONADISM IN MALE: ICD-10-CM

## 2023-04-24 DIAGNOSIS — R79.89 LOW TESTOSTERONE LEVEL IN MALE: ICD-10-CM

## 2023-04-24 RX ORDER — TESTOSTERONE CYPIONATE 200 MG/ML
INJECTION, SOLUTION INTRAMUSCULAR
Qty: 2 ML | Refills: 0 | Status: SHIPPED | OUTPATIENT
Start: 2023-04-24

## 2023-05-04 ENCOUNTER — OFFICE VISIT (OUTPATIENT)
Dept: INTERNAL MEDICINE | Facility: CLINIC | Age: 73
End: 2023-05-04
Payer: MEDICARE

## 2023-05-04 VITALS
DIASTOLIC BLOOD PRESSURE: 69 MMHG | HEIGHT: 70 IN | BODY MASS INDEX: 45.1 KG/M2 | SYSTOLIC BLOOD PRESSURE: 109 MMHG | TEMPERATURE: 98 F | OXYGEN SATURATION: 92 % | WEIGHT: 315 LBS | HEART RATE: 98 BPM

## 2023-05-04 DIAGNOSIS — N18.32 STAGE 3B CHRONIC KIDNEY DISEASE: ICD-10-CM

## 2023-05-04 DIAGNOSIS — I48.91 ATRIAL FIBRILLATION, UNSPECIFIED TYPE: Primary | ICD-10-CM

## 2023-05-04 DIAGNOSIS — R60.0 BILATERAL LEG EDEMA: ICD-10-CM

## 2023-05-04 DIAGNOSIS — Z79.4 TYPE 2 DIABETES MELLITUS WITHOUT COMPLICATION, WITH LONG-TERM CURRENT USE OF INSULIN: ICD-10-CM

## 2023-05-04 DIAGNOSIS — E11.9 TYPE 2 DIABETES MELLITUS WITHOUT COMPLICATION, WITH LONG-TERM CURRENT USE OF INSULIN: ICD-10-CM

## 2023-05-04 PROBLEM — Z00.00 ENCOUNTER FOR MEDICARE ANNUAL WELLNESS EXAM: Status: RESOLVED | Noted: 2018-07-31 | Resolved: 2023-05-04

## 2023-05-04 NOTE — PROGRESS NOTES
"Subjective  Freddie Yeboah Jr. is a 73 y.o. male    HPI coming in for follow-up patient with a history of severe lower extremity edema has stage IIIb chronic kidney disease.  He is on diuretic therapy and has a follow-up with his nephrologist in 2 days has chronic A-fib on anticoagulant therapy.  He is concerned about his leg swelling.  He is compliant with his CPAP use and denies daytime somnolence he is reasonably compliant with his dietary restrictions    The following portions of the patient's history were reviewed and updated as appropriate: allergies, current medications, past family history, past medical history, past social history, past surgical history, and problem list.     Review of Systems   Constitutional: Positive for fatigue. Negative for activity change, appetite change and fever.   HENT: Negative for congestion, ear discharge, ear pain and trouble swallowing.    Eyes: Negative for photophobia and visual disturbance.   Respiratory: Negative for cough and shortness of breath.    Cardiovascular: Negative for chest pain and palpitations.   Gastrointestinal: Negative for abdominal distention, abdominal pain, constipation, diarrhea, nausea and vomiting.   Endocrine: Negative.    Genitourinary: Negative for dysuria, hematuria and urgency.   Musculoskeletal: Positive for arthralgias. Negative for back pain, joint swelling and myalgias.   Skin: Negative for color change and rash.   Allergic/Immunologic: Negative.    Neurological: Negative for dizziness, weakness, light-headedness and headaches.   Hematological: Negative for adenopathy. Does not bruise/bleed easily.   Psychiatric/Behavioral: Positive for sleep disturbance. Negative for agitation, confusion and dysphoric mood. The patient is not nervous/anxious.        Visit Vitals  /69   Pulse 98   Temp 98 °F (36.7 °C)   Ht 176.5 cm (69.5\")   Wt (!) 173 kg (381 lb)   SpO2 92% Comment: with 2L of O2   BMI 55.46 kg/m²       Objective  Physical " Exam  Constitutional:       General: He is not in acute distress.     Appearance: He is well-developed.   HENT:      Nose: Nose normal.   Eyes:      General: No scleral icterus.     Conjunctiva/sclera: Conjunctivae normal.   Neck:      Thyroid: No thyromegaly.      Trachea: No tracheal deviation.   Cardiovascular:      Rate and Rhythm: Normal rate and regular rhythm.      Heart sounds: No murmur heard.    No friction rub.   Pulmonary:      Effort: No respiratory distress.      Breath sounds: No wheezing or rales.   Abdominal:      General: There is no distension.      Palpations: Abdomen is soft. There is no mass.      Tenderness: There is no abdominal tenderness. There is no guarding.   Musculoskeletal:         General: Deformity present. Normal range of motion.      Right lower leg: Edema present.      Left lower leg: Edema present.   Lymphadenopathy:      Cervical: No cervical adenopathy.   Skin:     General: Skin is warm and dry.      Findings: No erythema or rash.   Neurological:      Mental Status: He is alert and oriented to person, place, and time.      Cranial Nerves: No cranial nerve deficit.      Coordination: Coordination normal.      Deep Tendon Reflexes: Reflexes are normal and symmetric.   Psychiatric:         Behavior: Behavior normal.         Thought Content: Thought content normal.         Judgment: Judgment normal.         Diagnoses and all orders for this visit:    Atrial fibrillation, unspecified type stable rate control okay on anticoagulant therapy dose adjusted for his renal function    Type 2 diabetes mellitus without complication, with long-term current use of insulin continue with the current diet and insulin regimen A1c shows reasonably good control    Bilateral leg edema check labs.  Diuretic dosing to be adjusted by his nephrologist in 2 days discussed leg elevation and compression stockings as needed  -     CBC (No Diff)  -     TSH Rfx On Abnormal To Free T4    Stage 3b chronic kidney  disease check labs.  Rule out electrolyte imbalance  -     Comprehensive Metabolic Panel  -     Iron

## 2023-05-05 LAB
ALBUMIN SERPL-MCNC: 4.5 G/DL (ref 3.5–5.2)
ALBUMIN/GLOB SERPL: 1.7 G/DL
ALP SERPL-CCNC: 91 U/L (ref 39–117)
ALT SERPL-CCNC: 10 U/L (ref 1–41)
AST SERPL-CCNC: 14 U/L (ref 1–40)
BILIRUB SERPL-MCNC: 1.2 MG/DL (ref 0–1.2)
BUN SERPL-MCNC: 63 MG/DL (ref 8–23)
BUN/CREAT SERPL: 20.1 (ref 7–25)
CALCIUM SERPL-MCNC: 9.6 MG/DL (ref 8.6–10.5)
CHLORIDE SERPL-SCNC: 94 MMOL/L (ref 98–107)
CO2 SERPL-SCNC: 36.7 MMOL/L (ref 22–29)
CREAT SERPL-MCNC: 3.13 MG/DL (ref 0.76–1.27)
EGFRCR SERPLBLD CKD-EPI 2021: 20.2 ML/MIN/1.73
ERYTHROCYTE [DISTWIDTH] IN BLOOD BY AUTOMATED COUNT: 19.7 % (ref 12.3–15.4)
GLOBULIN SER CALC-MCNC: 2.6 GM/DL
GLUCOSE SERPL-MCNC: 60 MG/DL (ref 65–99)
HCT VFR BLD AUTO: 41.1 % (ref 37.5–51)
HGB BLD-MCNC: 11.6 G/DL (ref 13–17.7)
IRON SERPL-MCNC: 20 MCG/DL (ref 59–158)
MCH RBC QN AUTO: 20.4 PG (ref 26.6–33)
MCHC RBC AUTO-ENTMCNC: 28.2 G/DL (ref 31.5–35.7)
MCV RBC AUTO: 72.4 FL (ref 79–97)
PLATELET # BLD AUTO: 227 10*3/MM3 (ref 140–450)
POTASSIUM SERPL-SCNC: 3.8 MMOL/L (ref 3.5–5.2)
PROT SERPL-MCNC: 7.1 G/DL (ref 6–8.5)
RBC # BLD AUTO: 5.68 10*6/MM3 (ref 4.14–5.8)
SODIUM SERPL-SCNC: 141 MMOL/L (ref 136–145)
T4 FREE SERPL-MCNC: 1.61 NG/DL (ref 0.93–1.7)
TSH SERPL DL<=0.005 MIU/L-ACNC: 6.02 UIU/ML (ref 0.27–4.2)
WBC # BLD AUTO: 9.84 10*3/MM3 (ref 3.4–10.8)

## 2023-05-10 RX ORDER — OMEPRAZOLE 40 MG/1
40 CAPSULE, DELAYED RELEASE ORAL DAILY
Qty: 90 CAPSULE | Refills: 3 | Status: SHIPPED | OUTPATIENT
Start: 2023-05-10

## 2023-05-10 RX ORDER — LEVOTHYROXINE SODIUM 0.07 MG/1
75 TABLET ORAL DAILY
Qty: 90 TABLET | Refills: 3 | Status: SHIPPED | OUTPATIENT
Start: 2023-05-10

## 2023-05-10 NOTE — TELEPHONE ENCOUNTER
Rx Refill Note  Requested Prescriptions     Pending Prescriptions Disp Refills    levothyroxine (SYNTHROID, LEVOTHROID) 75 MCG tablet 90 tablet 3     Sig: Take 1 tablet by mouth Daily.    omeprazole (priLOSEC) 40 MG capsule 90 capsule 3     Sig: Take 1 capsule by mouth Daily.      Last office visit with prescribing clinician: 5/4/2023   Last telemedicine visit with prescribing clinician: 5/4/2023   Next office visit with prescribing clinician: 8/4/2023                         Would you like a call back once the refill request has been completed: [] Yes [] No    If the office needs to give you a call back, can they leave a voicemail: [] Yes [] No    Angel Mcrae Rep  05/10/23, 09:54 EDT     negative

## 2023-05-10 NOTE — TELEPHONE ENCOUNTER
Caller: Alia Yeboah    Relationship: Emergency Contact    Best call back number: [phone]    Requested Prescriptions:   Requested Prescriptions     Pending Prescriptions Disp Refills   • levothyroxine (SYNTHROID, LEVOTHROID) 75 MCG tablet 90 tablet 1     Sig: Take 1 tablet by mouth Daily.   • omeprazole (priLOSEC) 40 MG capsule 90 capsule 3     Sig: Take 1 capsule by mouth Daily.        Pharmacy where request should be sent: [facility] [id number] [address] [facility] AT [facility] [phone] [facility] [phone]      Last office visit with prescribing clinician: 5/4/2023   Last telemedicine visit with prescribing clinician: 5/4/2023   Next office visit with prescribing clinician: 8/4/2023     Additional details provided by patient: 1-2 PILLS LEFT.      Does the patient have less than a 3 day supply:  [x] Yes  [] No    Would you like a call back once the refill request has been completed: [] Yes [x] No    If the office needs to give you a call back, can they leave a voicemail: [] Yes [x] No    Domenica Marin   05/10/23 08:11 EDT

## 2023-05-24 ENCOUNTER — TELEPHONE (OUTPATIENT)
Dept: UROLOGY | Facility: CLINIC | Age: 73
End: 2023-05-24

## 2023-05-24 DIAGNOSIS — R79.89 LOW TESTOSTERONE LEVEL IN MALE: ICD-10-CM

## 2023-05-24 DIAGNOSIS — E29.1 HYPOGONADISM IN MALE: ICD-10-CM

## 2023-05-24 RX ORDER — TESTOSTERONE CYPIONATE 200 MG/ML
100 INJECTION, SOLUTION INTRAMUSCULAR
Qty: 2 ML | Refills: 0 | Status: SHIPPED | OUTPATIENT
Start: 2023-05-24

## 2023-05-24 NOTE — TELEPHONE ENCOUNTER
Caller: LISHA CROWE    Relationship: WIFE    Requested Prescriptions: TESTOSTERONE.  Requested Prescriptions      No prescriptions requested or ordered in this encounter        Pharmacy where request should be sent: [facility] [address] AT [facility]    Last office visit with prescribing clinician: 6/2/2022   Last telemedicine visit with prescribing clinician: Visit date not found   Next office visit with prescribing clinician: Visit date not found     Additional details provided by patient: PT WIFE SAID PT NEEDS HIS TESTOSTERONE RIGHT NOW. PT WIFE ALSO SAID THE PHARMACY SENT REQUEST Sunday AND HAS NOT HEARD ANYTHING BACK.      Does the patient have less than a 3 day supply:  [x] Yes  [] No    05/24/23 11:22 EDT

## 2023-06-01 ENCOUNTER — OFFICE VISIT (OUTPATIENT)
Dept: PULMONOLOGY | Facility: CLINIC | Age: 73
End: 2023-06-01

## 2023-06-01 VITALS
HEIGHT: 70 IN | RESPIRATION RATE: 18 BRPM | SYSTOLIC BLOOD PRESSURE: 108 MMHG | TEMPERATURE: 96.8 F | HEART RATE: 80 BPM | BODY MASS INDEX: 45.1 KG/M2 | WEIGHT: 315 LBS | DIASTOLIC BLOOD PRESSURE: 62 MMHG | OXYGEN SATURATION: 94 %

## 2023-06-01 DIAGNOSIS — E66.01 MORBID OBESITY, UNSPECIFIED OBESITY TYPE: ICD-10-CM

## 2023-06-01 DIAGNOSIS — G47.33 OBSTRUCTIVE SLEEP APNEA: Primary | ICD-10-CM

## 2023-06-01 DIAGNOSIS — Z87.891 PERSONAL HISTORY OF NICOTINE DEPENDENCE: ICD-10-CM

## 2023-06-01 DIAGNOSIS — R06.02 SHORTNESS OF BREATH: ICD-10-CM

## 2023-06-01 DIAGNOSIS — E66.2 OBESITY HYPOVENTILATION SYNDROME: ICD-10-CM

## 2023-06-01 DIAGNOSIS — R09.02 HYPOXIA: ICD-10-CM

## 2023-06-01 RX ORDER — MORPHINE SULFATE 15 MG/1
15 TABLET ORAL 2 TIMES DAILY
COMMUNITY

## 2023-06-01 RX ORDER — SPIRONOLACTONE 25 MG/1
TABLET ORAL
COMMUNITY
Start: 2023-05-18

## 2023-06-05 ENCOUNTER — OFFICE VISIT (OUTPATIENT)
Dept: INTERNAL MEDICINE | Facility: CLINIC | Age: 73
End: 2023-06-05
Payer: MEDICARE

## 2023-06-05 VITALS
SYSTOLIC BLOOD PRESSURE: 124 MMHG | HEART RATE: 76 BPM | WEIGHT: 315 LBS | BODY MASS INDEX: 46.65 KG/M2 | DIASTOLIC BLOOD PRESSURE: 72 MMHG | HEIGHT: 69 IN | TEMPERATURE: 97.3 F | OXYGEN SATURATION: 95 %

## 2023-06-05 DIAGNOSIS — I48.0 PAROXYSMAL ATRIAL FIBRILLATION: ICD-10-CM

## 2023-06-05 DIAGNOSIS — N18.32 STAGE 3B CHRONIC KIDNEY DISEASE: Primary | ICD-10-CM

## 2023-06-05 DIAGNOSIS — I27.20 PULMONARY HTN: ICD-10-CM

## 2023-06-05 NOTE — PROGRESS NOTES
"Subjective  Freddie Yeboah Jr. is a 73 y.o. male    HPI coming in for follow-up recent hospital discharge after elective admission for significant fluid overload.  Was given IV diuretic therapy with about 25 pound resulting weight loss from diuresis.  Feels significantly better now nephrology and cardiology have adjusted his diuretic dosing.  He has chronic kidney disease and pulmonary hypertension.  He is compliant with CPAP use    The following portions of the patient's history were reviewed and updated as appropriate: allergies, current medications, past family history, past medical history, past social history, past surgical history, and problem list.     Review of Systems   Constitutional:  Positive for fatigue. Negative for activity change, appetite change and fever.   HENT:  Negative for congestion, ear discharge, ear pain and trouble swallowing.    Eyes:  Negative for photophobia and visual disturbance.   Respiratory:  Positive for shortness of breath. Negative for cough.    Cardiovascular:  Negative for chest pain and palpitations.   Gastrointestinal:  Negative for abdominal distention, abdominal pain, constipation, diarrhea, nausea and vomiting.   Endocrine: Negative.    Genitourinary:  Negative for dysuria, hematuria and urgency.   Musculoskeletal:  Positive for arthralgias and gait problem. Negative for back pain, joint swelling and myalgias.   Skin:  Negative for color change and rash.   Allergic/Immunologic: Negative.    Neurological:  Negative for dizziness, weakness, light-headedness and headaches.   Hematological:  Negative for adenopathy. Does not bruise/bleed easily.   Psychiatric/Behavioral:  Positive for sleep disturbance. Negative for agitation, confusion and dysphoric mood. The patient is not nervous/anxious.      Visit Vitals  /72   Pulse 76   Temp 97.3 °F (36.3 °C)   Ht 175.3 cm (69\")   Wt (!) 161 kg (355 lb)   SpO2 95% Comment: parient 0n 2L   BMI 52.42 kg/m²       Objective  Physical " Exam  Constitutional:       General: He is not in acute distress.     Appearance: He is well-developed.   HENT:      Nose: Nose normal.   Eyes:      General: No scleral icterus.     Conjunctiva/sclera: Conjunctivae normal.   Neck:      Thyroid: No thyromegaly.      Trachea: No tracheal deviation.   Cardiovascular:      Rate and Rhythm: Normal rate and regular rhythm.      Heart sounds: No murmur heard.    No friction rub.   Pulmonary:      Effort: No respiratory distress.      Breath sounds: No wheezing or rales.   Abdominal:      General: There is no distension.      Palpations: Abdomen is soft. There is no mass.      Tenderness: There is no abdominal tenderness. There is no guarding.   Musculoskeletal:         General: No deformity. Normal range of motion.      Right lower leg: Edema present.      Left lower leg: Edema present.   Lymphadenopathy:      Cervical: No cervical adenopathy.   Skin:     General: Skin is warm and dry.      Findings: No erythema or rash.   Neurological:      Mental Status: He is alert and oriented to person, place, and time.      Cranial Nerves: No cranial nerve deficit.      Coordination: Coordination normal.      Deep Tendon Reflexes: Reflexes are normal and symmetric.   Psychiatric:         Behavior: Behavior normal.         Thought Content: Thought content normal.         Judgment: Judgment normal.       Diagnoses and all orders for this visit:    Stage 3b chronic kidney disease on high-dose diuretic therapy with magnesium and potassium supplement.  As follow-up appointment with nephrology again in a week where he is likely to get renal panel checked again    Pulmonary HTN following up with cardiology continue with BiPAP use.  On my Cardis, Aldactone, beta-blockers.  Cialis 10 mg daily    Paroxysmal atrial fibrillation stable rate control okay on Eliquis dose adjusted for CKD    Other orders  -     linagliptin (Tradjenta) 5 MG tablet tablet; Take 1 tablet by mouth Daily.

## 2023-06-08 ENCOUNTER — LAB (OUTPATIENT)
Dept: LAB | Facility: HOSPITAL | Age: 73
End: 2023-06-08
Payer: MEDICARE

## 2023-06-08 DIAGNOSIS — E29.1 HYPOGONADISM IN MALE: ICD-10-CM

## 2023-06-08 LAB
ESTRADIOL SERPL HS-MCNC: 22.7 PG/ML
HCT VFR BLD AUTO: 42.2 % (ref 37.5–51)
HGB BLD-MCNC: 12.3 G/DL (ref 13–17.7)
TESTOST SERPL-MCNC: 661 NG/DL (ref 193–740)

## 2023-06-08 PROCEDURE — 84403 ASSAY OF TOTAL TESTOSTERONE: CPT

## 2023-06-08 PROCEDURE — 82670 ASSAY OF TOTAL ESTRADIOL: CPT

## 2023-06-08 PROCEDURE — 85018 HEMOGLOBIN: CPT

## 2023-06-08 PROCEDURE — 36415 COLL VENOUS BLD VENIPUNCTURE: CPT

## 2023-06-08 PROCEDURE — 85014 HEMATOCRIT: CPT

## 2023-06-13 ENCOUNTER — TELEPHONE (OUTPATIENT)
Dept: UROLOGY | Facility: CLINIC | Age: 73
End: 2023-06-13

## 2023-06-13 DIAGNOSIS — R79.89 LOW TESTOSTERONE LEVEL IN MALE: ICD-10-CM

## 2023-06-13 DIAGNOSIS — E29.1 HYPOGONADISM IN MALE: ICD-10-CM

## 2023-06-13 RX ORDER — TESTOSTERONE CYPIONATE 200 MG/ML
100 INJECTION, SOLUTION INTRAMUSCULAR
Qty: 2 ML | Refills: 0 | Status: SHIPPED | OUTPATIENT
Start: 2023-06-13

## 2023-06-13 NOTE — TELEPHONE ENCOUNTER
Caller: Alia Yeboah     Relationship: SELF    Best call back number: [phone]    Requested Prescriptions:       Testosterone Cypionate (DEPOTESTOTERONE CYPIONATE) 200 MG/ML injection   Requested Prescriptions      No prescriptions requested or ordered in this encounter        Pharmacy where request should be sent:      [facility] [id number] [place], [place] - [address] AT [facility] [phone] [facility] [phone] FX       Last office visit with prescribing clinician: 12/22/2022   Last telemedicine visit with prescribing clinician: Visit date not found   Next office visit with prescribing clinician: 6/23/2023       Does the patient have less than a 3 day supply:  [x] Yes  [] No    Would you like a call back once the refill request has been completed: [x] Yes [] No    If the office needs to give you a call back, can they leave a voicemail: [x] Yes [] No    Angel Lay   06/13/23 09:13 EDT

## 2023-06-16 ENCOUNTER — TELEPHONE (OUTPATIENT)
Dept: UROLOGY | Facility: CLINIC | Age: 73
End: 2023-06-16
Payer: MEDICARE

## 2023-06-16 NOTE — TELEPHONE ENCOUNTER
Provider: DR LOVELL  Caller: LIHSA CROWE  Relationship to Patient: SPOUSE    Phone Number: [phone]  Reason for Call: PT WENT TO SEE HIS NEPHROLOGIST- DR WYLIE TODAY. THEY WOULD LIKE HIS MOST RECENT LABS FAXED TO THEM. THEIR PHONE [phone] AND THEIR FAX [phone].    ANY QUESTIONS, PLEASE GIVE PT A CALL ON THEIR HOME PHONE.

## 2023-08-04 ENCOUNTER — OFFICE VISIT (OUTPATIENT)
Dept: INTERNAL MEDICINE | Facility: CLINIC | Age: 73
End: 2023-08-04
Payer: MEDICARE

## 2023-08-04 VITALS
OXYGEN SATURATION: 91 % | BODY MASS INDEX: 46.65 KG/M2 | WEIGHT: 315 LBS | HEIGHT: 69 IN | DIASTOLIC BLOOD PRESSURE: 72 MMHG | TEMPERATURE: 98.2 F | HEART RATE: 72 BPM | SYSTOLIC BLOOD PRESSURE: 112 MMHG

## 2023-08-04 DIAGNOSIS — E03.9 ACQUIRED HYPOTHYROIDISM: Primary | ICD-10-CM

## 2023-08-04 DIAGNOSIS — I48.0 PAROXYSMAL ATRIAL FIBRILLATION: ICD-10-CM

## 2023-08-04 DIAGNOSIS — Z79.4 TYPE 2 DIABETES MELLITUS WITHOUT COMPLICATION, WITH LONG-TERM CURRENT USE OF INSULIN: ICD-10-CM

## 2023-08-04 DIAGNOSIS — E11.9 TYPE 2 DIABETES MELLITUS WITHOUT COMPLICATION, WITH LONG-TERM CURRENT USE OF INSULIN: ICD-10-CM

## 2023-08-04 DIAGNOSIS — I10 BENIGN ESSENTIAL HYPERTENSION: ICD-10-CM

## 2023-08-04 RX ORDER — INSULIN GLARGINE 100 [IU]/ML
INJECTION, SOLUTION SUBCUTANEOUS
Qty: 30 ML | Refills: 5 | Status: SHIPPED | OUTPATIENT
Start: 2023-08-04

## 2023-08-04 RX ORDER — TADALAFIL 20 MG/1
20 TABLET ORAL DAILY PRN
Qty: 20 TABLET | Refills: 2 | Status: SHIPPED | OUTPATIENT
Start: 2023-08-04

## 2023-08-14 DIAGNOSIS — Z79.4 TYPE 2 DIABETES MELLITUS WITHOUT COMPLICATION, WITH LONG-TERM CURRENT USE OF INSULIN: Primary | ICD-10-CM

## 2023-08-14 DIAGNOSIS — E11.9 TYPE 2 DIABETES MELLITUS WITHOUT COMPLICATION, WITH LONG-TERM CURRENT USE OF INSULIN: Primary | ICD-10-CM

## 2023-08-14 NOTE — TELEPHONE ENCOUNTER
PHONE CALL FROM PATIENT.  [facility] DOES NOT HAVE MEDICATION.  IT LOOKS LIKE IT DID NOT GO THROUGH PROPERLY.  PATIENT OUT OF MEDICATION.

## 2023-08-14 NOTE — TELEPHONE ENCOUNTER
Caller: Rosita Yeboahh    Relationship: Emergency Contact    Best call back number: [phone]     Requested Prescriptions:   Requested Prescriptions     Pending Prescriptions Disp Refills    linagliptin (Tradjenta) 5 MG tablet tablet 90 tablet 1     Sig: Take 1 tablet by mouth Daily.        Pharmacy where request should be sent: [facility] [id number] [address] AT [facility] [phone] [facility] [phone] FX     Last office visit with prescribing clinician: 8/4/2023   Last telemedicine visit with prescribing clinician: Visit date not found   Next office visit with prescribing clinician: 10/5/2023     Additional details provided by patient: WIFE STATES SHE HAS BEEN TRYING TO GET REFILL FOR A WEEK NOW.  PATIENT OUT OF MEDICATION    Does the patient have less than a 3 day supply:  [x] Yes  [] No    Would you like a call back once the refill request has been completed: [] Yes [x] No    If the office needs to give you a call back, can they leave a voicemail: [] Yes [x] No    Domenica Marin   08/14/23 11:41 EDT

## 2023-08-14 NOTE — TELEPHONE ENCOUNTER
Previous script was set to no print-changed to normal and re-sent to pharmacy. S/w patient and notified him. He was thankful for the call back.

## 2023-08-15 DIAGNOSIS — R79.89 LOW TESTOSTERONE LEVEL IN MALE: ICD-10-CM

## 2023-08-15 DIAGNOSIS — E29.1 HYPOGONADISM IN MALE: ICD-10-CM

## 2023-08-15 RX ORDER — TESTOSTERONE CYPIONATE 200 MG/ML
INJECTION, SOLUTION INTRAMUSCULAR
Qty: 2 ML | Refills: 0 | Status: SHIPPED | OUTPATIENT
Start: 2023-08-15

## 2023-09-06 ENCOUNTER — TELEPHONE (OUTPATIENT)
Dept: UROLOGY | Facility: CLINIC | Age: 73
End: 2023-09-06

## 2023-09-06 NOTE — TELEPHONE ENCOUNTER
Caller: ObinnaAlia    Relationship: Emergency Contact    Best call back number: [phone]    Requested Prescriptions: TESTOSTERONE CYPIONATE 200 MG/ML           SYRINGE, DISPOSABLE, 3 ML AND NEEDLES  Requested Prescriptions      No prescriptions requested or ordered in this encounter        Pharmacy where request should be sent:  [facility]    Last office visit with prescribing clinician: 6/23/2023   Last telemedicine visit with prescribing clinician: Visit date not found   Next office visit with prescribing clinician: 12/22/2023         Does the patient have less than a 3 day supply:  [] Yes  [x] No    Would you like a call back once the refill request has been completed: [] Yes [x] No    If the office needs to give you a call back, can they leave a voicemail: [] Yes [x] No    Angel Cross Rep   09/06/23 13:50 EDT

## 2023-09-07 DIAGNOSIS — R79.89 LOW TESTOSTERONE LEVEL IN MALE: ICD-10-CM

## 2023-09-07 DIAGNOSIS — E29.1 HYPOGONADISM IN MALE: ICD-10-CM

## 2023-09-07 RX ORDER — TESTOSTERONE CYPIONATE 200 MG/ML
100 INJECTION, SOLUTION INTRAMUSCULAR WEEKLY
Qty: 2 ML | Refills: 0 | Status: SHIPPED | OUTPATIENT
Start: 2023-09-07

## 2023-10-03 NOTE — TELEPHONE ENCOUNTER
Caller: Alia Yeboah    Relationship: Emergency Contact    Best call back number: [phone]     Requested Prescriptions:   Requested Prescriptions     Pending Prescriptions Disp Refills    simvastatin (ZOCOR) 20 MG tablet 90 tablet 1     Sig: Take 1 tablet by mouth Every Night.    spironolactone (ALDACTONE) 25 MG tablet          Pharmacy where request should be sent: [facility] [id number] [address] AT [facility] [phone] [facility] [phone]      Last office visit with prescribing clinician: 8/4/2023   Last telemedicine visit with prescribing clinician: Visit date not found   Next office visit with prescribing clinician: 10/5/2023     Additional details provided by patient: PATIENT HAS 1 DAY LEFT OF THESE MEDICATIONS.    Does the patient have less than a 3 day supply:  [x] Yes  [] No    Would you like a call back once the refill request has been completed: [x] Yes [] No    If the office needs to give you a call back, can they leave a voicemail: [x] Yes [] No    Angel Mccain Rep   10/03/23 09:13 EDT

## 2023-10-04 RX ORDER — SIMVASTATIN 20 MG
20 TABLET ORAL NIGHTLY
Qty: 90 TABLET | Refills: 3 | Status: SHIPPED | OUTPATIENT
Start: 2023-10-04

## 2023-10-04 NOTE — TELEPHONE ENCOUNTER
Caller: Alia Yeboah    Relationship to patient: Emergency Contact    Best call back number: [phone]     Patient is needing: ALIA, THE PATIENT'S WIFE, CALLED BACK TO NOTIFY US THAT THEY DON'T ACTUALLY NEED THE SPIRONOLACTONE REFILLED BY DR HUDSON AND TO CANCEL THAT REQUEST    PLEASE ADVISE IF THERE ARE ANY QUESTIONS/CONCERNS

## 2023-10-05 ENCOUNTER — OFFICE VISIT (OUTPATIENT)
Dept: INTERNAL MEDICINE | Facility: CLINIC | Age: 73
End: 2023-10-05
Payer: MEDICARE

## 2023-10-05 VITALS
OXYGEN SATURATION: 97 % | BODY MASS INDEX: 46.65 KG/M2 | DIASTOLIC BLOOD PRESSURE: 67 MMHG | HEART RATE: 85 BPM | HEIGHT: 69 IN | TEMPERATURE: 98 F | SYSTOLIC BLOOD PRESSURE: 104 MMHG | WEIGHT: 315 LBS

## 2023-10-05 DIAGNOSIS — E11.9 TYPE 2 DIABETES MELLITUS WITHOUT COMPLICATION, WITHOUT LONG-TERM CURRENT USE OF INSULIN: ICD-10-CM

## 2023-10-05 DIAGNOSIS — G47.33 OBSTRUCTIVE SLEEP APNEA SYNDROME: ICD-10-CM

## 2023-10-05 DIAGNOSIS — Z23 FLU VACCINE NEED: Primary | ICD-10-CM

## 2023-10-05 DIAGNOSIS — I48.0 PAROXYSMAL ATRIAL FIBRILLATION: ICD-10-CM

## 2023-10-05 PROBLEM — M10.371 ACUTE GOUT DUE TO RENAL IMPAIRMENT INVOLVING RIGHT FOOT: Status: RESOLVED | Noted: 2022-01-18 | Resolved: 2023-10-05

## 2023-10-05 PROBLEM — L03.116 CELLULITIS OF LEFT LOWER EXTREMITY: Status: RESOLVED | Noted: 2023-01-03 | Resolved: 2023-10-05

## 2023-10-05 LAB
EXPIRATION DATE: NORMAL
HBA1C MFR BLD: 8 %
Lab: NORMAL

## 2023-10-05 RX ORDER — SPIRONOLACTONE 25 MG/1
25 TABLET ORAL DAILY
Qty: 90 TABLET | Refills: 3 | Status: SHIPPED | OUTPATIENT
Start: 2023-10-05

## 2023-10-05 NOTE — PROGRESS NOTES
The ABCs of the Annual Wellness Visit  Subsequent Medicare Wellness Visit    Subjective      Freddie Yeboah Jr. is a 73 y.o. male who presents for a Subsequent Medicare Wellness Visit.    Review of Systems   Constitutional: Negative.  Negative for activity change, appetite change, fatigue and fever.   HENT:  Negative for congestion, ear discharge, ear pain and trouble swallowing.    Eyes:  Negative for photophobia and visual disturbance.   Respiratory:  Negative for cough and shortness of breath.    Cardiovascular:  Negative for chest pain and palpitations.   Gastrointestinal:  Negative for abdominal distention, abdominal pain, constipation, diarrhea, nausea and vomiting.   Endocrine: Negative.    Genitourinary:  Negative for dysuria, hematuria and urgency.   Musculoskeletal:  Positive for arthralgias and gait problem. Negative for back pain, joint swelling and myalgias.   Skin:  Negative for color change and rash.   Allergic/Immunologic: Negative.    Neurological:  Negative for dizziness, weakness, light-headedness and headaches.   Hematological:  Negative for adenopathy. Does not bruise/bleed easily.   Psychiatric/Behavioral:  Negative for agitation, confusion and dysphoric mood. The patient is not nervous/anxious.       The following portions of the patient's history were reviewed and   updated as appropriate: allergies, current medications, past family history, past medical history, past social history, past surgical history, and problem list.    Compared to one year ago, the patient feels his physical   health is better.    Compared to one year ago, the patient feels his mental   health is the same.    Recent Hospitalizations:  He was not admitted to the hospital during the last year.       Current Medical Providers:  Patient Care Team:  Damián Centeno MD as PCP - General (Internal Medicine)    Outpatient Medications Prior to Visit   Medication Sig Dispense Refill    Alcohol Swabs pads Clean area prior to  "injection 100 each 11    anastrozole (Arimidex) 1 MG tablet Take 1 tablet by mouth 1 (One) Time Per Week. 4 tablet 11    aspirin 81 MG tablet Take 1 tablet by mouth Daily.      bumetanide (BUMEX) 2 MG tablet       calcipotriene (DOVONEX) 0.005 % cream       colchicine 0.6 MG tablet Take 1 tablet by mouth 2 (Two) Times a Day. 6 tablet 1    febuxostat (ULORIC) 40 MG tablet       glucose blood test strip Use to test blood sugar level 3 times daily. DX: E11.9 300 each 3    glucose monitor monitoring kit Use meter to check blood sugar levels twice daily. 1 each 0    Insulin Glargine (Lantus SoloStar) 100 UNIT/ML injection pen Inject 100 units under the skin daily. 30 mL 5    Insulin Lispro, 1 Unit Dial, (HUMALOG) 100 UNIT/ML solution pen-injector INJECT 10 UNITS UNDER THE SKIN THREE TIMES A DAY AS DIRECTED 30 mL 3    Insulin Pen Needle (Wealthfrontoger Pen Needles 31G) 31G X 8 MM misc USE TO INJECT INSULIN FIVE TIMES A  each 11    levothyroxine (SYNTHROID, LEVOTHROID) 75 MCG tablet Take 1 tablet by mouth Daily. 90 tablet 3    linagliptin (Tradjenta) 5 MG tablet tablet Take 1 tablet by mouth Daily. 90 tablet 0    metoprolol tartrate (LOPRESSOR) 100 MG tablet Take 1 tablet by mouth 2 (Two) Times a Day.      Morphine (MSIR) 15 MG tablet Take 1 tablet by mouth 2 (Two) Times a Day. 3 tabs AM and 3 tabs PM      Needle, Disp, 18G X 1-1/2\" misc Use for drawing up the medication 50 each 3    Needle, Disp, 23G X 1-1/2\" misc 1 Device 1 (One) Time Per Week. 30 each 11    omeprazole (priLOSEC) 40 MG capsule Take 1 capsule by mouth Daily. 90 capsule 3    OneTouch Delica Lancets 33G misc Use to test blood sugar level 3 times daily. DX: E11.9 300 each 3    oxyCODONE-acetaminophen (PERCOCET)  MG per tablet Take  tablets by mouth As Needed. 3 times a day      simvastatin (ZOCOR) 20 MG tablet Take 1 tablet by mouth Every Night. 90 tablet 3    spironolactone (ALDACTONE) 25 MG tablet Take 1 tablet by mouth Daily. 90 tablet 3    " Syringe, Disposable, 3 ML misc 1 syringe 1 (One) Time Per Week. 100 each 11    Syringe, Disposable, 3 ML misc 1 syringe 1 (One) Time Per Week. 100 each 11    tadalafil (Cialis) 20 MG tablet Take 1 tablet by mouth Daily As Needed for Erectile Dysfunction. 20 tablet 2    telmisartan (MICARDIS) 20 MG tablet Take 1 tablet by mouth Daily.      Testosterone Cypionate (DEPOTESTOTERONE CYPIONATE) 200 MG/ML injection Inject 0.5 mL into the appropriate muscle as directed by prescriber 1 (One) Time Per Week. 2 mL 0    tiotropium bromide-olodaterol (STIOLTO RESPIMAT) 2.5-2.5 MCG/ACT aerosol solution inhaler Inhale 2 puffs Daily. 4 g 5    apixaban (ELIQUIS) 2.5 MG tablet tablet Take 1 tablet by mouth Every 12 (Twelve) Hours. Resume tomorrow 9/13/17 180 tablet 3     No facility-administered medications prior to visit.       Opioid medication/s are on active medication list.  and I have evaluated his active treatment plan and pain score trends (see table).  There were no vitals filed for this visit.  I have reviewed the chart for potential of high risk medication and harmful drug interactions in the elderly.          Aspirin is on active medication list. Aspirin use is indicated based on review of current medical condition/s. Pros and cons of this therapy have been discussed today. Benefits of this medication outweigh potential harm.  Patient has been encouraged to continue taking this medication.  .      Patient Active Problem List   Diagnosis    Asthma    Atrial fibrillation    Benign essential hypertension    Benign prostatic hyperplasia    Gastroesophageal reflux disease    Hyperlipidemia    Acquired hypothyroidism    Obstructive sleep apnea syndrome    Carotid bruit    Cor pulmonale    Pulmonary HTN    Type 2 diabetes mellitus without complication    Degeneration of cervical intervertebral disc    Obesity due to excess calories with serious comorbidity     Advance Care Planning  Advance Directive is not on file.  ACP  "discussion was held with the patient during this visit. Patient does not have an advance directive, information provided.     Objective    Vitals:    10/05/23 1554   BP: 104/67   Pulse: 85   Temp: 98 °F (36.7 °C)   SpO2: 97%   Weight: (!) 150 kg (330 lb 6.4 oz)   Height: 175.3 cm (69.02\")     Estimated body mass index is 48.77 kg/m² as calculated from the following:    Height as of this encounter: 175.3 cm (69.02\").    Weight as of this encounter: 150 kg (330 lb 6.4 oz).    Physical Exam  Constitutional:       General: He is not in acute distress.     Appearance: He is well-developed.   HENT:      Nose: Nose normal.   Eyes:      General: No scleral icterus.     Conjunctiva/sclera: Conjunctivae normal.   Neck:      Thyroid: No thyromegaly.      Trachea: No tracheal deviation.   Cardiovascular:      Rate and Rhythm: Normal rate and regular rhythm.      Heart sounds: No murmur heard.    No friction rub.   Pulmonary:      Effort: No respiratory distress.      Breath sounds: No wheezing or rales.   Abdominal:      General: There is no distension.      Palpations: Abdomen is soft. There is no mass.      Tenderness: There is no abdominal tenderness. There is no guarding.   Musculoskeletal:         General: Deformity present. Normal range of motion.   Lymphadenopathy:      Cervical: No cervical adenopathy.   Skin:     General: Skin is warm and dry.      Findings: No erythema or rash.      Comments: New dialysis graft in rt forearm   Neurological:      Mental Status: He is alert and oriented to person, place, and time.      Cranial Nerves: No cranial nerve deficit.      Coordination: Coordination normal.      Gait: Gait abnormal.      Deep Tendon Reflexes: Reflexes are normal and symmetric.   Psychiatric:         Behavior: Behavior normal.         Thought Content: Thought content normal.         Judgment: Judgment normal.              Does the patient have evidence of cognitive impairment?   No            HEALTH RISK " ASSESSMENT    Smoking Status:  Social History     Tobacco Use   Smoking Status Former    Packs/day: 3.00    Years: 34.00    Pack years: 102.00    Types: Cigarettes    Quit date:     Years since quittin.7    Passive exposure: Past   Smokeless Tobacco Never     Alcohol Consumption:  Social History     Substance and Sexual Activity   Alcohol Use No     Fall Risk Screen:    VIDAL Fall Risk Assessment was completed, and patient is at LOW risk for falls.Assessment completed on:10/5/2023    Depression Screening:      10/5/2023     4:03 PM   PHQ-2/PHQ-9 Depression Screening   Little Interest or Pleasure in Doing Things 0-->not at all   Feeling Down, Depressed or Hopeless 1-->several days   PHQ-9: Brief Depression Severity Measure Score 1       Health Habits and Functional and Cognitive Screening:      10/5/2023     4:04 PM   Functional & Cognitive Status   Do you have difficulty preparing food and eating? Yes   Do you have difficulty bathing yourself, getting dressed or grooming yourself? No   Do you have difficulty using the toilet? No   Do you have difficulty moving around from place to place? No   Do you have trouble with steps or getting out of a bed or a chair? No   Current Diet Other   Dental Exam Up to date   Eye Exam Up to date   Exercise (times per week) Other   Current Exercises Include No Regular Exercise;Walking   Do you need help using the phone?  No   Are you deaf or do you have serious difficulty hearing?  Yes   Do you need help to go to places out of walking distance? Yes   Do you need help shopping? Yes   Do you need help preparing meals?  No   Do you need help with housework?  No   Do you need help with laundry? No   Do you need help taking your medications? Yes   Do you need help managing money? Yes   Do you ever drive or ride in a car without wearing a seat belt? Yes   Have you felt unusual stress, anger or loneliness in the last month? No   Who do you live with? Spouse   If you need help, do  you have trouble finding someone available to you? No   Have you been bothered in the last four weeks by sexual problems? No   Do you have difficulty concentrating, remembering or making decisions? Yes       Age-appropriate Screening Schedule:  Refer to the list below for future screening recommendations based on patient's age, sex and/or medical conditions. Orders for these recommended tests are listed in the plan section. The patient has been provided with a written plan.    Health Maintenance   Topic Date Due    AAA SCREEN (ONE-TIME)  Never done    DIABETIC EYE EXAM  06/15/2023    HEMOGLOBIN A1C  06/19/2023    INFLUENZA VACCINE  08/01/2023    COVID-19 Vaccine (5 - 2023-24 season) 09/01/2023    ANNUAL WELLNESS VISIT  09/01/2023    URINE MICROALBUMIN  09/01/2023    BMI FOLLOWUP  09/01/2023    TDAP/TD VACCINES (2 - Td or Tdap) 12/29/2023 (Originally 10/9/2022)    COLORECTAL CANCER SCREENING  11/08/2023    DIABETIC FOOT EXAM  12/16/2023    LIPID PANEL  12/19/2023    HEPATITIS C SCREENING  Completed    Pneumococcal Vaccine 65+  Completed    ZOSTER VACCINE  Addressed                CMS Preventative Services Quick Reference  Risk Factors Identified During Encounter:    Chronic Pain: sees pain specialist  Polypharmacy: Medication List reviewed and Medications are appropriate for patient    The above risks/problems have been discussed with the patient.  Pertinent information has been shared with the patient in the After Visit Summary.    Diagnoses and all orders for this visit:    1. Flu vaccine need (Primary)  -     Fluzone High-Dose 65+yrs ([id number])    2. Type 2 diabetes mellitus without complication, without long-term current use of insulin continue with the dietary restrictions and the current insulin regimen check A1c    3. Paroxysmal atrial fibrillation stable rate control okay continue with beta-blockers    4. Obstructive sleep apnea syndrome compliant with CPAP use denies daytime somnolence        Follow Up:    Next Medicare Wellness visit to be scheduled in 1 year.      An After Visit Summary and PPPS were made available to the patient.

## 2023-10-06 DIAGNOSIS — R79.89 LOW TESTOSTERONE LEVEL IN MALE: ICD-10-CM

## 2023-10-06 DIAGNOSIS — E29.1 HYPOGONADISM IN MALE: ICD-10-CM

## 2023-10-06 RX ORDER — TESTOSTERONE CYPIONATE 200 MG/ML
INJECTION, SOLUTION INTRAMUSCULAR
Qty: 2 ML | Refills: 0 | Status: SHIPPED | OUTPATIENT
Start: 2023-10-06

## 2023-10-30 DIAGNOSIS — Z79.4 TYPE 2 DIABETES MELLITUS WITHOUT COMPLICATION, WITH LONG-TERM CURRENT USE OF INSULIN: ICD-10-CM

## 2023-10-30 DIAGNOSIS — E11.9 TYPE 2 DIABETES MELLITUS WITHOUT COMPLICATION, WITH LONG-TERM CURRENT USE OF INSULIN: ICD-10-CM

## 2023-10-31 RX ORDER — TADALAFIL 20 MG/1
20 TABLET ORAL DAILY PRN
Qty: 20 TABLET | Refills: 2 | Status: SHIPPED | OUTPATIENT
Start: 2023-10-31

## 2023-10-31 RX ORDER — LINAGLIPTIN 5 MG/1
5 TABLET, FILM COATED ORAL DAILY
Qty: 90 TABLET | Refills: 0 | Status: SHIPPED | OUTPATIENT
Start: 2023-10-31

## 2023-11-07 ENCOUNTER — TELEPHONE (OUTPATIENT)
Dept: INTERNAL MEDICINE | Facility: CLINIC | Age: 73
End: 2023-11-07
Payer: MEDICARE

## 2023-11-07 NOTE — TELEPHONE ENCOUNTER
Alia-patient's spouse states that [facility] did not receive statement for therapeutic shoes. Re-faxed forms to [phone]. Success confirmation printed.

## 2023-11-08 ENCOUNTER — TELEPHONE (OUTPATIENT)
Dept: UROLOGY | Facility: CLINIC | Age: 73
End: 2023-11-08
Payer: MEDICARE

## 2023-11-08 DIAGNOSIS — R79.89 LOW TESTOSTERONE LEVEL IN MALE: ICD-10-CM

## 2023-11-08 DIAGNOSIS — E29.1 HYPOGONADISM IN MALE: ICD-10-CM

## 2023-11-08 RX ORDER — TESTOSTERONE CYPIONATE 200 MG/ML
100 INJECTION, SOLUTION INTRAMUSCULAR
Qty: 2 ML | Refills: 0 | Status: SHIPPED | OUTPATIENT
Start: 2023-11-08

## 2023-11-08 NOTE — TELEPHONE ENCOUNTER
Caller: Alia Yeboah    Relationship: Emergency Contact    Best call back number:[phone]    Requested Prescriptions: TESTOSTERONE, SYRINGE AND NEEDLES  Requested Prescriptions      No prescriptions requested or ordered in this encounter        Pharmacy where request should be sent: [facility]    Last office visit with prescribing clinician: 11/29/2021   Last telemedicine visit with prescribing clinician: Visit date not found   Next office visit with prescribing clinician: Visit date not found         Does the patient have less than a 3 day supply:  [x] Yes  [] No    Would you like a call back once the refill request has been completed: [] Yes [x] No    If the office needs to give you a call back, can they leave a voicemail: [] Yes [x] No    Angel Cross Rep   11/08/23 08:35 EST

## 2023-11-30 DIAGNOSIS — R06.02 SHORTNESS OF BREATH: Primary | ICD-10-CM

## 2023-11-30 DIAGNOSIS — E66.2 OBESITY HYPOVENTILATION SYNDROME: ICD-10-CM

## 2023-11-30 RX ORDER — TIOTROPIUM BROMIDE AND OLODATEROL 3.124; 2.736 UG/1; UG/1
SPRAY, METERED RESPIRATORY (INHALATION)
Qty: 8 G | Refills: 2 | Status: SHIPPED | OUTPATIENT
Start: 2023-11-30

## 2023-12-07 ENCOUNTER — TELEPHONE (OUTPATIENT)
Dept: UROLOGY | Facility: CLINIC | Age: 73
End: 2023-12-07

## 2023-12-07 DIAGNOSIS — R79.89 LOW TESTOSTERONE LEVEL IN MALE: ICD-10-CM

## 2023-12-07 DIAGNOSIS — E29.1 HYPOGONADISM IN MALE: ICD-10-CM

## 2023-12-07 RX ORDER — TESTOSTERONE CYPIONATE 200 MG/ML
100 INJECTION, SOLUTION INTRAMUSCULAR
Qty: 2 ML | Refills: 0 | Status: SHIPPED | OUTPATIENT
Start: 2023-12-07

## 2023-12-07 NOTE — TELEPHONE ENCOUNTER
Caller: Rosita Yeboahh    Relationship: Emergency Contact    Best call back number: [phone]    Requested Prescriptions: TESTOSTERONE,NEEDLES,SYRINGES  Requested Prescriptions      No prescriptions requested or ordered in this encounter        Pharmacy where request should be sent:  [facility]    Last office visit with prescribing clinician: 6/23/2023   Last telemedicine visit with prescribing clinician: Visit date not found   Next office visit with prescribing clinician: 1/30/2024         Does the patient have less than a 3 day supply:  [x] Yes  [] No    Would you like a call back once the refill request has been completed: [] Yes [x] No    If the office needs to give you a call back, can they leave a voicemail: [] Yes [x] No    Angel Cross Rep   12/07/23 09:50 EST

## 2024-01-05 ENCOUNTER — OFFICE VISIT (OUTPATIENT)
Dept: INTERNAL MEDICINE | Facility: CLINIC | Age: 74
End: 2024-01-05
Payer: MEDICARE

## 2024-01-05 VITALS
BODY MASS INDEX: 46.65 KG/M2 | WEIGHT: 315 LBS | HEIGHT: 69 IN | TEMPERATURE: 98.4 F | SYSTOLIC BLOOD PRESSURE: 105 MMHG | OXYGEN SATURATION: 91 % | DIASTOLIC BLOOD PRESSURE: 61 MMHG | HEART RATE: 81 BPM

## 2024-01-05 DIAGNOSIS — E03.9 ACQUIRED HYPOTHYROIDISM: ICD-10-CM

## 2024-01-05 DIAGNOSIS — E11.9 TYPE 2 DIABETES MELLITUS WITHOUT COMPLICATION, WITHOUT LONG-TERM CURRENT USE OF INSULIN: ICD-10-CM

## 2024-01-05 DIAGNOSIS — I10 BENIGN ESSENTIAL HYPERTENSION: ICD-10-CM

## 2024-01-05 DIAGNOSIS — N18.32 STAGE 3B CHRONIC KIDNEY DISEASE: ICD-10-CM

## 2024-01-05 DIAGNOSIS — I48.0 PAROXYSMAL ATRIAL FIBRILLATION: Primary | ICD-10-CM

## 2024-01-05 DIAGNOSIS — G47.33 OBSTRUCTIVE SLEEP APNEA SYNDROME: ICD-10-CM

## 2024-01-05 RX ORDER — METOLAZONE 2.5 MG/1
2.5 TABLET ORAL 2 TIMES DAILY
COMMUNITY
Start: 2023-12-11

## 2024-01-05 NOTE — PROGRESS NOTES
"Subjective  Freddie Yeboah Jr. is a 73 y.o. male    HPI coming in for follow-up patient with a history of type 2 diabetes with CKD he now has a fistula in his left forearm for possible dialysis in the future he has sleep apnea and A-fib.  Has managed weight loss with dietary restrictions    The following portions of the patient's history were reviewed and updated as appropriate: allergies, current medications, past family history, past medical history, past social history, past surgical history, and problem list.     Review of Systems   Constitutional:  Positive for fatigue. Negative for activity change, appetite change and fever.   HENT:  Negative for congestion, ear discharge, ear pain and trouble swallowing.    Eyes:  Negative for photophobia and visual disturbance.   Respiratory:  Negative for cough and shortness of breath.    Cardiovascular:  Negative for chest pain and palpitations.   Gastrointestinal:  Negative for abdominal distention, abdominal pain, constipation, diarrhea, nausea and vomiting.   Endocrine: Negative.    Genitourinary:  Negative for dysuria, hematuria and urgency.   Musculoskeletal:  Positive for arthralgias. Negative for back pain, joint swelling and myalgias.   Skin:  Negative for color change and rash.   Allergic/Immunologic: Negative.    Neurological:  Negative for dizziness, weakness, light-headedness and headaches.   Hematological:  Negative for adenopathy. Does not bruise/bleed easily.   Psychiatric/Behavioral:  Negative for agitation, confusion and dysphoric mood. The patient is not nervous/anxious.        Visit Vitals  /61   Pulse 81   Temp 98.4 °F (36.9 °C)   Ht 175.3 cm (69.02\")   Wt (!) 147 kg (325 lb)   SpO2 91%   BMI 47.97 kg/m²       Objective  Physical Exam  Constitutional:       General: He is not in acute distress.     Appearance: He is well-developed.   HENT:      Nose: Nose normal.   Eyes:      General: No scleral icterus.     Conjunctiva/sclera: Conjunctivae normal. "   Neck:      Thyroid: No thyromegaly.      Trachea: No tracheal deviation.   Cardiovascular:      Rate and Rhythm: Normal rate and regular rhythm.      Heart sounds: No murmur heard.     No friction rub.   Pulmonary:      Effort: No respiratory distress.      Breath sounds: No wheezing or rales.   Abdominal:      General: There is no distension.      Palpations: Abdomen is soft. There is no mass.      Tenderness: There is no abdominal tenderness. There is no guarding.   Musculoskeletal:         General: Deformity present. Normal range of motion.      Right lower leg: Edema present.      Left lower leg: Edema present.   Lymphadenopathy:      Cervical: No cervical adenopathy.   Skin:     General: Skin is warm and dry.      Findings: No erythema or rash.   Neurological:      Mental Status: He is alert and oriented to person, place, and time.      Cranial Nerves: No cranial nerve deficit.      Coordination: Coordination normal.      Deep Tendon Reflexes: Reflexes are normal and symmetric.   Psychiatric:         Behavior: Behavior normal.         Thought Content: Thought content normal.         Judgment: Judgment normal.       Diagnoses and all orders for this visit:    Paroxysmal atrial fibrillation stable rate control okay continue with current medications    Benign essential hypertension discussed low-sodium diet BP control okay denies lightheadedness dizziness    Obstructive sleep apnea syndrome compliant with CPAP use denies daytime somnolence    Acquired hypothyroidism clinically euthyroid follow TSH    Stage 3b chronic kidney disease continue with adequate hydration.  Avoid NSAIDs    Other orders  -     metOLazone (ZAROXOLYN) 2.5 MG tablet; Take 1 tablet by mouth 2 (Two) Times a Day.

## 2024-01-06 LAB
ALBUMIN SERPL-MCNC: 4.5 G/DL (ref 3.5–5.2)
ALBUMIN/CREAT UR: 83 MG/G CREAT (ref 0–29)
ALBUMIN/GLOB SERPL: 1.7 G/DL
ALP SERPL-CCNC: 87 U/L (ref 39–117)
ALT SERPL-CCNC: 20 U/L (ref 1–41)
AST SERPL-CCNC: 19 U/L (ref 1–40)
BILIRUB SERPL-MCNC: 0.8 MG/DL (ref 0–1.2)
BUN SERPL-MCNC: 62 MG/DL (ref 8–23)
BUN/CREAT SERPL: 20.9 (ref 7–25)
CALCIUM SERPL-MCNC: 10 MG/DL (ref 8.6–10.5)
CHLORIDE SERPL-SCNC: 92 MMOL/L (ref 98–107)
CO2 SERPL-SCNC: 34.5 MMOL/L (ref 22–29)
CREAT SERPL-MCNC: 2.97 MG/DL (ref 0.76–1.27)
CREAT UR-MCNC: 68.4 MG/DL
EGFRCR SERPLBLD CKD-EPI 2021: 21.5 ML/MIN/1.73
GLOBULIN SER CALC-MCNC: 2.7 GM/DL
GLUCOSE SERPL-MCNC: 162 MG/DL (ref 65–99)
HBA1C MFR BLD: 8.8 % (ref 4.8–5.6)
MICROALBUMIN UR-MCNC: 56.9 UG/ML
POTASSIUM SERPL-SCNC: 5.2 MMOL/L (ref 3.5–5.2)
PROT SERPL-MCNC: 7.2 G/DL (ref 6–8.5)
SODIUM SERPL-SCNC: 138 MMOL/L (ref 136–145)
TSH SERPL DL<=0.005 MIU/L-ACNC: 4.35 UIU/ML (ref 0.27–4.2)

## 2024-01-08 DIAGNOSIS — E29.1 HYPOGONADISM IN MALE: ICD-10-CM

## 2024-01-08 DIAGNOSIS — R79.89 LOW TESTOSTERONE LEVEL IN MALE: ICD-10-CM

## 2024-01-08 RX ORDER — TESTOSTERONE CYPIONATE 200 MG/ML
100 INJECTION, SOLUTION INTRAMUSCULAR
Qty: 2 ML | Refills: 0 | Status: SHIPPED | OUTPATIENT
Start: 2024-01-08

## 2024-01-08 NOTE — TELEPHONE ENCOUNTER
Caller: Alia Yeboah     Relationship: [employer]    Best call back number:[phone]    Requested Prescriptions:   Requested Prescriptions      No prescriptions requested or ordered in this encounter        Pharmacy where request should be sent:      Last office visit with prescribing clinician: 6/23/2023   Last telemedicine visit with prescribing clinician: Visit date not found   Next office visit with prescribing clinician: 3/12/2024     Additional details provided by patient:     Does the patient have less than a 3 day supply:  [x] Yes  [] No    Would you like a call back once the refill request has been completed: [x] Yes [] No    If the office needs to give you a call back, can they leave a voicemail: [x] Yes [] No    Angel Uribe Rep   01/08/24 11:41 EST

## 2024-01-10 ENCOUNTER — OFFICE VISIT (OUTPATIENT)
Dept: PULMONOLOGY | Facility: CLINIC | Age: 74
End: 2024-01-10
Payer: MEDICARE

## 2024-01-10 VITALS
SYSTOLIC BLOOD PRESSURE: 122 MMHG | DIASTOLIC BLOOD PRESSURE: 74 MMHG | OXYGEN SATURATION: 80 % | WEIGHT: 315 LBS | RESPIRATION RATE: 18 BRPM | HEIGHT: 69 IN | HEART RATE: 103 BPM | BODY MASS INDEX: 46.65 KG/M2

## 2024-01-10 DIAGNOSIS — R09.02 HYPOXIA: ICD-10-CM

## 2024-01-10 DIAGNOSIS — E66.01 MORBID OBESITY, UNSPECIFIED OBESITY TYPE: ICD-10-CM

## 2024-01-10 DIAGNOSIS — G47.33 OBSTRUCTIVE SLEEP APNEA: Primary | ICD-10-CM

## 2024-01-10 DIAGNOSIS — J44.9 CHRONIC OBSTRUCTIVE PULMONARY DISEASE, UNSPECIFIED COPD TYPE: ICD-10-CM

## 2024-01-10 DIAGNOSIS — Z87.891 PERSONAL HISTORY OF NICOTINE DEPENDENCE: ICD-10-CM

## 2024-01-10 NOTE — PROGRESS NOTES
"Chief Complaint   Patient presents with    Sleeping Problem    Follow-up         Subjective   Freddie Yeboah Jr. is a 73 y.o. male.   The patient comes in today for follow-up of shortness of breath and sleep issues.    He has not done titration sleep study.  He states he had a lot of appointments and was trying to schedule it around all of his other appointments.    Symptoms have been stable since the last clinic visit. Patient reports no recent exacerbations.     Patient is using medications, as prescribed. He uses Stiolto daily.     He has diaysis catheter place in left arm since his last visit.  He states he has been told he does not need dialysis now but will eventually.    Exercise tolerance has also remained stable.     Quit smoking 20 years ago.       The following portions of the patient's history were reviewed and updated as appropriate: allergies, current medications, past family history, past medical history, past social history, and past surgical history.      Review of Systems   HENT:  Negative for sinus pressure, sneezing and sore throat.    Respiratory:  Positive for shortness of breath. Negative for cough, chest tightness and wheezing.    Cardiovascular:  Negative for palpitations and leg swelling.   Psychiatric/Behavioral:  Positive for sleep disturbance.        Objective   Visit Vitals  /74   Pulse 103   Resp 18   Ht 175.3 cm (69.02\") Comment: pt reported   Wt (!) 150 kg (330 lb 12.8 oz)   SpO2 (!) 80% Comment: on RA at rest   BMI 48.83 kg/m²   95% oxygen saturation at 2 LPM      Physical Exam  Vitals reviewed.   HENT:      Head: Atraumatic.      Mouth/Throat:      Mouth: Mucous membranes are moist.      Comments: Crowded oropharynx.  Dentures noted.  Eyes:      Extraocular Movements: Extraocular movements intact.   Cardiovascular:      Rate and Rhythm: Normal rate and regular rhythm.   Pulmonary:      Effort: Pulmonary effort is normal. No respiratory distress.      Comments: Somewhat " decreased air entry without wheezing.  Abdominal:      Comments: Obese abdomen.   Skin:     General: Skin is warm.   Neurological:      Mental Status: He is alert and oriented to person, place, and time.         Assessment & Plan   Diagnoses and all orders for this visit:    1. Obstructive sleep apnea (Primary)    2. Morbid obesity, unspecified obesity type    3. Chronic obstructive pulmonary disease, unspecified COPD type    4. Personal history of nicotine dependence    5. Hypoxia           Return in about 6 months (around 7/10/2024) for Recheck, For Dr. Hu, Titration study.    DISCUSSION (if any):  PFT 6/2023 consistent with moderate obstruction.     He should continue using oxygen at 2 L/min continuously.    No change to the current medications has been made.  Continue Stiolto daily.    Compliance with medications stressed.     Side effects of prescribed medications discussed with the patient    He does not meet guidelines for LDCT screening.     I cannot find his last sleep study but I will asked the office staff to request one of the previous sleep studies from either the [facility] or the physician's office.     Dr. Kay mention severe obstructive sleep apnea in a previous office note.     He is not  compliant with AutoPAP.  He states he is natalie to sleep 3 to 4 hours a night.  He is on AutoPap 12-20.  Unfortunately his residual AHI is 24/h.    He states his CPAP machine cuts off. I asked him to take it to his machine to [facility].  He states this is the machine he was sent to when his previous machine was recalled.    I have expressed the importance of getting a titration study completed as soon as possible.  He was under the impression that I had ordered BiPAP at the last visit.  I explained that a titration study has to be completed for Medicare will cover BiPAP.  Since he is on oxygen, a titration study will be needed to determine how much oxygen he will need with PAP therapy also.  During the titration  study he will be tried on BiPAP to see if he tolerates this better since BiPAP is a different machine than CPAP.    I have asked the office staff to give the patient the phone number to schedule his sleep study.  I have told the patient to give the number to his wife and have her schedule the sleep study as soon as possible.  He does see a lot of other specialist and has a lot of other health issues and I understand he may have to schedule around other visits.    He is up-to-date on pneumonia vaccination as he has had pneumococcal 13 and pneumococcal 23 both after the age of 65.      Dictated utilizing Dragon dictation.    This document was electronically signed by SHIRA Loomis January 10, 2024  11:43 EST

## 2024-01-22 DIAGNOSIS — N40.0 BENIGN PROSTATIC HYPERPLASIA, UNSPECIFIED WHETHER LOWER URINARY TRACT SYMPTOMS PRESENT: ICD-10-CM

## 2024-01-22 DIAGNOSIS — E29.1 HYPOGONADISM IN MALE: ICD-10-CM

## 2024-01-23 DIAGNOSIS — E11.9 TYPE 2 DIABETES MELLITUS WITHOUT COMPLICATION, WITH LONG-TERM CURRENT USE OF INSULIN: ICD-10-CM

## 2024-01-23 DIAGNOSIS — Z79.4 TYPE 2 DIABETES MELLITUS WITHOUT COMPLICATION, WITH LONG-TERM CURRENT USE OF INSULIN: ICD-10-CM

## 2024-01-23 RX ORDER — TADALAFIL 20 MG/1
20 TABLET ORAL DAILY PRN
Qty: 20 TABLET | Refills: 2 | Status: SHIPPED | OUTPATIENT
Start: 2024-01-23

## 2024-01-23 NOTE — TELEPHONE ENCOUNTER
Received refill request from [facility]'s Tradjenta. 90 day supply sent to pharmacy.

## 2024-01-24 RX ORDER — ANASTROZOLE 1 MG/1
1 TABLET ORAL WEEKLY
Qty: 12 TABLET | Refills: 3 | Status: SHIPPED | OUTPATIENT
Start: 2024-01-24

## 2024-01-25 NOTE — TELEPHONE ENCOUNTER
Caller: Rosita Yeboahh    Relationship: Emergency Contact    Best call back number: [phone]    Requested Prescriptions:   Requested Prescriptions     Pending Prescriptions Disp Refills    Insulin Lispro, 1 Unit Dial, (HUMALOG) 100 UNIT/ML solution pen-injector 30 mL 3        Pharmacy where request should be sent: [facility] [id number] [address] AT [facility] [phone] [facility] [phone] FX     Last office visit with prescribing clinician: 1/5/2024   Last telemedicine visit with prescribing clinician: Visit date not found   Next office visit with prescribing clinician: 7/5/2024     Additional details provided by patient: PATIENT WOULD NEED NEEDLES THAT GOES WITH THIS MEDICATION PLEASE    Does the patient have less than a 3 day supply:  [x] Yes  [] No    Would you like a call back once the refill request has been completed: [x] Yes [] No    If the office needs to give you a call back, can they leave a voicemail: [x] Yes [] No    Angel Pruitt Rep   01/25/24 14:40 EST

## 2024-01-26 RX ORDER — INSULIN LISPRO 100 [IU]/ML
10 INJECTION, SOLUTION INTRAVENOUS; SUBCUTANEOUS 3 TIMES DAILY
Qty: 30 ML | Refills: 3 | Status: SHIPPED | OUTPATIENT
Start: 2024-01-26

## 2024-01-26 RX ORDER — PEN NEEDLE, DIABETIC 31 GX5/16"
NEEDLE, DISPOSABLE MISCELLANEOUS
Qty: 200 EACH | Refills: 11 | Status: SHIPPED | OUTPATIENT
Start: 2024-01-26

## 2024-01-26 NOTE — TELEPHONE ENCOUNTER
Rx Refill Note  Requested Prescriptions     Pending Prescriptions Disp Refills    Insulin Lispro, 1 Unit Dial, (HUMALOG) 100 UNIT/ML solution pen-injector 30 mL 3     Sig: Inject 10 Units under the skin into the appropriate area as directed 3 (Three) Times a Day.    Insulin Pen Needle (K[employer]ger Pen Needles 31G) 31G X 8 MM misc 200 each 11     Sig: USE TO INJECT INSULIN FIVE TIMES A DAY      Last office visit with prescribing clinician: 1/5/2024   Last telemedicine visit with prescribing clinician: Visit date not found   Next office visit with prescribing clinician: 7/5/2024                         Bridget Oliva MA  01/26/24, 08:44 EST

## 2024-02-06 ENCOUNTER — TELEPHONE (OUTPATIENT)
Dept: UROLOGY | Facility: CLINIC | Age: 74
End: 2024-02-06

## 2024-02-06 DIAGNOSIS — E29.1 HYPOGONADISM IN MALE: ICD-10-CM

## 2024-02-06 DIAGNOSIS — R79.89 LOW TESTOSTERONE LEVEL IN MALE: ICD-10-CM

## 2024-02-06 RX ORDER — TESTOSTERONE CYPIONATE 200 MG/ML
100 INJECTION, SOLUTION INTRAMUSCULAR
Qty: 2 ML | Refills: 0 | Status: SHIPPED | OUTPATIENT
Start: 2024-02-06

## 2024-02-06 NOTE — TELEPHONE ENCOUNTER
"  Caller: LISHA CROWE    Relationship: WIFE     Best call back number: [phone]    Requested Prescriptions: Testosterone Cypionate (DEPOTESTOTERONE CYPIONATE) 200 MG/ML injection    Syringe, Disposable, 3 ML misc    Needle, Disp, 18G X 1-1/2\" misc     Requested Prescriptions      No prescriptions requested or ordered in this encounter        Pharmacy where request should be sent:  [facility] [id number] - [place], [place] - [address] [place], [place] [address]MOND [facility] [phone] [facility] [phone]   [address] [place] [address], [facility] [address]  Phone: [phone]  Fax: [phone]       Last office visit with prescribing clinician: 6/23/2023   Last telemedicine visit with prescribing clinician: Visit date not found   Next office visit with prescribing clinician: 3/12/2024     Additional details provided by patient: PATIENTS WIFE LISHA CALLED IN TO GET A REFILL FOR PT'S TESTOSTERONE AND SYRINGES AND NEEDLES.     Does the patient have less than a 3 day supply:  [x] Yes  [] No    Would you like a call back once the refill request has been completed: [x] Yes [] No    If the office needs to give you a call back, can they leave a voicemail: [x] Yes [] No    Win Hawkins   02/06/24 13:00 EST         "

## 2024-02-13 DIAGNOSIS — Z79.4 TYPE 2 DIABETES MELLITUS WITHOUT COMPLICATION, WITH LONG-TERM CURRENT USE OF INSULIN: ICD-10-CM

## 2024-02-13 DIAGNOSIS — E11.9 TYPE 2 DIABETES MELLITUS WITHOUT COMPLICATION, WITH LONG-TERM CURRENT USE OF INSULIN: ICD-10-CM

## 2024-02-14 RX ORDER — INSULIN GLARGINE 100 [IU]/ML
INJECTION, SOLUTION SUBCUTANEOUS
Qty: 30 ML | Refills: 5 | Status: SHIPPED | OUTPATIENT
Start: 2024-02-14

## 2024-02-28 DIAGNOSIS — R79.89 LOW TESTOSTERONE LEVEL IN MALE: ICD-10-CM

## 2024-02-28 DIAGNOSIS — E29.1 HYPOGONADISM IN MALE: ICD-10-CM

## 2024-02-28 RX ORDER — TESTOSTERONE CYPIONATE 200 MG/ML
INJECTION, SOLUTION INTRAMUSCULAR
Qty: 2 ML | Refills: 0 | Status: SHIPPED | OUTPATIENT
Start: 2024-02-28

## 2024-03-06 ENCOUNTER — TELEPHONE (OUTPATIENT)
Dept: UROLOGY | Facility: CLINIC | Age: 74
End: 2024-03-06
Payer: MEDICARE

## 2024-03-07 ENCOUNTER — LAB (OUTPATIENT)
Dept: LAB | Facility: HOSPITAL | Age: 74
End: 2024-03-07
Payer: MEDICARE

## 2024-03-07 DIAGNOSIS — N40.0 BENIGN PROSTATIC HYPERPLASIA, UNSPECIFIED WHETHER LOWER URINARY TRACT SYMPTOMS PRESENT: ICD-10-CM

## 2024-03-07 DIAGNOSIS — E29.1 HYPOGONADISM IN MALE: ICD-10-CM

## 2024-03-07 LAB — PSA SERPL-MCNC: 4.14 NG/ML (ref 0–4)

## 2024-03-07 PROCEDURE — 85014 HEMATOCRIT: CPT | Performed by: NURSE PRACTITIONER

## 2024-03-07 PROCEDURE — 36415 COLL VENOUS BLD VENIPUNCTURE: CPT

## 2024-03-07 PROCEDURE — 84403 ASSAY OF TOTAL TESTOSTERONE: CPT | Performed by: NURSE PRACTITIONER

## 2024-03-07 PROCEDURE — 84153 ASSAY OF PSA TOTAL: CPT

## 2024-03-07 PROCEDURE — 85018 HEMOGLOBIN: CPT | Performed by: NURSE PRACTITIONER

## 2024-03-12 ENCOUNTER — OFFICE VISIT (OUTPATIENT)
Dept: UROLOGY | Facility: CLINIC | Age: 74
End: 2024-03-12
Payer: MEDICARE

## 2024-03-12 VITALS
HEART RATE: 86 BPM | DIASTOLIC BLOOD PRESSURE: 54 MMHG | HEIGHT: 69 IN | BODY MASS INDEX: 46.09 KG/M2 | SYSTOLIC BLOOD PRESSURE: 126 MMHG | WEIGHT: 311.2 LBS | OXYGEN SATURATION: 92 %

## 2024-03-12 DIAGNOSIS — R97.20 ELEVATED PROSTATE SPECIFIC ANTIGEN (PSA): Primary | ICD-10-CM

## 2024-03-12 DIAGNOSIS — E29.1 HYPOGONADISM IN MALE: ICD-10-CM

## 2024-03-12 NOTE — PROGRESS NOTES
Office Visit Established Male Patient     Patient Name: Freddie Yeboah Jr.  : 1950   MRN: [id number]     Chief Complaint:   Chief Complaint   Patient presents with    Follow-up     No concerns         History of Present Illness: Mr. Freddie Yeboah Jr. is a 74 y.o. male who presents today for follow up with hypogonadism.   Was in [place] got dehydrated and had pneumonia then had his labs drawn quickly after this for testosterone and PSA  Feels great on testosterone wife does injection for him weekly     Subjective      Review of System:   As noted in HPI    Past Medical History:   Past Medical History:   Diagnosis Date    Arthritis     shoulder, knee. multi joint     Atrial fibrillation     Back pain     CHF (congestive heart failure)     Chronic kidney disease     stage III due to diabetes    Diabetes mellitus     several years; checks sugars at home-usually run 100-112    Diabetic nephropathy     Disease of thyroid gland     hypothyroidism    GERD (gastroesophageal reflux disease)     Hypertension     Low testosterone     Sleep apnea     setting of 11    Urinary tract infection     most recent diagnosis 17 in the ER (+3 bacteria)-started on Macrobid and rechecked by PCP on 17       Past Surgical History:   Past Surgical History:   Procedure Laterality Date    ANTERIOR CERVICAL FUSION      APPENDECTOMY      BACK SURGERY      CARPAL TUNNEL RELEASE Bilateral     CATARACT EXTRACTION      CHOLECYSTECTOMY      COLONOSCOPY      FINGER/THUMB ARTHROPLASTY Left     thumb replacement    NM ARTHROPLASTY GLENOHUMERAL JOINT TOTAL SHOULDER Left 2017    Procedure: TOTAL SHOULDER ARTHROPLASTY LEFT;  Surgeon: William Ray MD;  [facility];  Service: Orthopedics    SHOULDER SURGERY      TOTAL KNEE ARTHROPLASTY Left        Family History:   Family History   Problem Relation Age of Onset    Heart attack Mother     Diabetes Mother     Heart attack Father     Diabetes Sister     Thyroid disease Sister      COPD Brother     Stroke Brother     Cancer Brother        Social History:   Social History     Socioeconomic History    Marital status:    Tobacco Use    Smoking status: Former     Current packs/day: 0.00     Average packs/day: 3.0 packs/day for 34.0 years (102.0 ttl pk-yrs)     Types: Cigarettes     Start date:      Quit date:      Years since quittin.2     Passive exposure: Past    Smokeless tobacco: Never   Vaping Use    Vaping status: Never Used   Substance and Sexual Activity    Alcohol use: No    Drug use: No    Sexual activity: Yes     Partners: Female       Medications:     Current Outpatient Medications:     Alcohol Swabs pads, Clean area prior to injection, Disp: 100 each, Rfl: 11    anastrozole (ARIMIDEX) 1 MG tablet, TAKE ONE TABLET BY MOUTH ONCE WEEKLY, Disp: 12 tablet, Rfl: 3    aspirin 81 MG tablet, Take 1 tablet by mouth Daily., Disp: , Rfl:     bumetanide (BUMEX) 2 MG tablet, , Disp: , Rfl:     calcipotriene (DOVONEX) 0.005 % cream, , Disp: , Rfl:     colchicine 0.6 MG tablet, Take 1 tablet by mouth 2 (Two) Times a Day., Disp: 6 tablet, Rfl: 1    febuxostat (ULORIC) 40 MG tablet, , Disp: , Rfl:     glucose blood test strip, Use to test blood sugar level 3 times daily. DX: E11.9, Disp: 300 each, Rfl: 3    glucose monitor monitoring kit, Use meter to check blood sugar levels twice daily., Disp: 1 each, Rfl: 0    Insulin Glargine (Lantus SoloStar) 100 UNIT/ML injection pen, INECT 100 UNITS UNDER THE SKIN DAILY, Disp: 30 mL, Rfl: 5    Insulin Lispro, 1 Unit Dial, (HUMALOG) 100 UNIT/ML solution pen-injector, Inject 10 Units under the skin into the appropriate area as directed 3 (Three) Times a Day., Disp: 30 mL, Rfl: 3    Insulin Pen Needle (K[employer]ger Pen Needles 31G) 31G X 8 MM misc, USE TO INJECT INSULIN FIVE TIMES A DAY, Disp: 200 each, Rfl: 11    levothyroxine (SYNTHROID, LEVOTHROID) 75 MCG tablet, Take 1 tablet by mouth Daily., Disp: 90 tablet, Rfl: 3    linagliptin (Tradjenta) 5  "MG tablet tablet, Take 1 tablet by mouth Daily., Disp: 90 tablet, Rfl: 0    metOLazone (ZAROXOLYN) 2.5 MG tablet, Take 1 tablet by mouth 2 (Two) Times a Day., Disp: , Rfl:     metoprolol tartrate (LOPRESSOR) 100 MG tablet, Take 1 tablet by mouth 2 (Two) Times a Day., Disp: , Rfl:     Needle, Disp, 18G X 1-1/2\" misc, Use for drawing up the medication, Disp: 50 each, Rfl: 3    Needle, Disp, 23G X 1-1/2\" misc, Use 1 Device 1 (One) Time Per Week., Disp: 30 each, Rfl: 11    omeprazole (priLOSEC) 40 MG capsule, Take 1 capsule by mouth Daily., Disp: 90 capsule, Rfl: 3    OneTouch Delica Lancets 33G misc, Use to test blood sugar level 3 times daily. DX: E11.9, Disp: 300 each, Rfl: 3    oxyCODONE-acetaminophen (PERCOCET)  MG per tablet, Take  tablets by mouth As Needed. 3 times a day, Disp: , Rfl:     simvastatin (ZOCOR) 20 MG tablet, Take 1 tablet by mouth Every Night., Disp: 90 tablet, Rfl: 3    spironolactone (ALDACTONE) 25 MG tablet, Take 1 tablet by mouth Daily., Disp: 90 tablet, Rfl: 3    Syringe, Disposable, 3 ML misc, 1 syringe 1 (One) Time Per Week., Disp: 100 each, Rfl: 11    Syringe, Disposable, 3 ML misc, Use 1 syringe 1 (One) Time Per Week., Disp: 100 each, Rfl: 11    tadalafil (CIALIS) 20 MG tablet, TAKE 1 TABLET BY MOUTH DAILY AS NEEDED FOR ERECTILE DYSFUNCTION, Disp: 20 tablet, Rfl: 2    telmisartan (MICARDIS) 20 MG tablet, Take 1 tablet by mouth Daily., Disp: , Rfl:     Testosterone Cypionate (DEPOTESTOTERONE CYPIONATE) 200 MG/ML injection, INJECT 0.5 ML INTO THE APPROPRIATE MUSCLE AS DIRECTED BY PRESCRIBER EVERY 7 DAYS, Disp: 2 mL, Rfl: 0    tiotropium bromide-olodaterol (Stiolto Respimat) 2.5-2.5 MCG/ACT aerosol solution inhaler, INHALE TWO INHALATION(S) BY MOUTH DAILY, Disp: 8 g, Rfl: 2    Morphine (MSIR) 15 MG tablet, Take 1 tablet by mouth 2 (Two) Times a Day. 3 tabs AM and 3 tabs PM, Disp: , Rfl:     Allergies:   Allergies   Allergen Reactions    Duloxetine Itching and Rash    " "Sulfamethoxazole-Trimethoprim Other (See Comments)     Had to be hospitalized in ICU; renal impairment        Objective     Physical Exam:   Vital Signs:   Vitals:    03/12/24 1402   BP: 126/54   Pulse: 86   SpO2: 92%  Comment: 2 lpm   Weight: (!) 141 kg (311 lb 3.2 oz)   Height: 175.3 cm (69.02\")     Body mass index is 45.93 kg/m².     Physical Exam  Constitutional: NAD, WDWN.   Neurological: A + O x 3.   Psychiatric:  Normal mood and affect    Genitourinary  Rectal:  Normal tone, no masses  Prostate:   Mildly enlarged symmetric, non-tender, anodular and no induration    Labs  Brief Urine Lab Results  (Last result in the past 365 days)        Color   Clarity   Blood   Leuk Est   Nitrite   Protein   CREAT   Urine HCG        01/05/24 1141             68.4                 Lab Results   Component Value Date    GLUCOSE 162 (H) 01/05/2024    CALCIUM 10.0 01/05/2024     01/05/2024    K 5.2 01/05/2024    CO2 34.5 (H) 01/05/2024    CL 92 (L) 01/05/2024    BUN 62 (H) 01/05/2024    CREATININE 2.97 (H) 01/05/2024    EGFRIFAFRI 46 (L) 01/18/2022    EGFRIFNONA 38 (L) 01/18/2022    BCR 20.9 01/05/2024    ANIONGAP 10.0 01/01/2023       Lab Results   Component Value Date    WBC 9.84 05/04/2023    HGB 13.5 03/07/2024    HCT 45.0 03/07/2024    MCV 72.4 (L) 05/04/2023     05/04/2023            Radiographic Studies  No Images in the past 120 days found..    I have reviewed the above labs and imaging.     Assessment / Plan      Assessment/Plan:   Diagnoses and all orders for this visit:    1. Elevated prostate specific antigen (PSA) (Primary)  -     PSA DIAGNOSTIC; Future    2. Hypogonadism in male    74-year-old male here for hypogonadism and follow-up lab with incidental finding of mildly elevated PSA which is a diagnosis with unknown clinical significance.  We discussed risk associated with testosterone therapy and worsening a prostate cancer previously PSA levels have been within normal limits, patient was battling an " illness immediately prior to lab draw.  Discussed recommendation for repeating PSA in 1 month and will further discuss if testosterone therapy should be discontinued.  ALBERTO was normal today also reviewed testosterone levels are within a therapeutic range hematocrit and hemoglobin are within normal limits    Repeat PSA 4 weeks        Discussed possibly discontinuing testosterone therapy  Continue testosterone cypionate 100 mg weekly        Obtain TT, estradiol hematocrit and hemoglobin 6 months    Follow Up:   Return in about 6 weeks (around 4/23/2024) for Follow up Courtney.    SHIRA Alcaraz,NP-C  [facility] [place]

## 2024-04-08 DIAGNOSIS — E29.1 HYPOGONADISM IN MALE: ICD-10-CM

## 2024-04-08 DIAGNOSIS — R79.89 LOW TESTOSTERONE LEVEL IN MALE: ICD-10-CM

## 2024-04-12 ENCOUNTER — TELEPHONE (OUTPATIENT)
Dept: UROLOGY | Facility: CLINIC | Age: 74
End: 2024-04-12
Payer: MEDICARE

## 2024-04-12 DIAGNOSIS — E29.1 HYPOGONADISM IN MALE: ICD-10-CM

## 2024-04-12 DIAGNOSIS — R79.89 LOW TESTOSTERONE LEVEL IN MALE: ICD-10-CM

## 2024-04-12 RX ORDER — TESTOSTERONE CYPIONATE 200 MG/ML
INJECTION, SOLUTION INTRAMUSCULAR
Qty: 2 ML | OUTPATIENT
Start: 2024-04-12

## 2024-04-12 RX ORDER — TESTOSTERONE CYPIONATE 200 MG/ML
100 INJECTION, SOLUTION INTRAMUSCULAR WEEKLY
Qty: 2 ML | Refills: 0 | Status: SHIPPED | OUTPATIENT
Start: 2024-04-12

## 2024-04-12 NOTE — TELEPHONE ENCOUNTER
Caller: NIYAH    Relationship to patient: SELF    Best call back number: [phone]    Patient is needing: [facility] IN [place] REQUIRES PAPERWORK BE SENT PRIOR TO FILLING PRESCRIPTION FOR TESTOTERONE.

## 2024-04-15 NOTE — TELEPHONE ENCOUNTER
I called and left vm letting patient know his rx was sent to pharmacy and that ceci wants a repeat PSA before his appt on April 26th.    Pramod, GENNY

## 2024-04-18 ENCOUNTER — TELEPHONE (OUTPATIENT)
Dept: UROLOGY | Facility: CLINIC | Age: 74
End: 2024-04-18
Payer: MEDICARE

## 2024-04-19 ENCOUNTER — LAB (OUTPATIENT)
Dept: LAB | Facility: HOSPITAL | Age: 74
End: 2024-04-19
Payer: MEDICARE

## 2024-04-19 DIAGNOSIS — E29.1 HYPOGONADISM IN MALE: ICD-10-CM

## 2024-04-19 LAB
ESTRADIOL SERPL HS-MCNC: 50.8 PG/ML
HCT VFR BLD AUTO: 40.4 % (ref 37.5–51)
HGB BLD-MCNC: 12.7 G/DL (ref 13–17.7)
TESTOST SERPL-MCNC: 1438 NG/DL (ref 193–740)

## 2024-04-19 PROCEDURE — 84153 ASSAY OF PSA TOTAL: CPT | Performed by: NURSE PRACTITIONER

## 2024-04-19 PROCEDURE — 84403 ASSAY OF TOTAL TESTOSTERONE: CPT

## 2024-04-19 PROCEDURE — 82670 ASSAY OF TOTAL ESTRADIOL: CPT

## 2024-04-19 PROCEDURE — 85018 HEMOGLOBIN: CPT

## 2024-04-19 PROCEDURE — 36415 COLL VENOUS BLD VENIPUNCTURE: CPT

## 2024-04-19 PROCEDURE — 85014 HEMATOCRIT: CPT

## 2024-04-23 DIAGNOSIS — Z79.4 TYPE 2 DIABETES MELLITUS WITHOUT COMPLICATION, WITH LONG-TERM CURRENT USE OF INSULIN: ICD-10-CM

## 2024-04-23 DIAGNOSIS — E11.9 TYPE 2 DIABETES MELLITUS WITHOUT COMPLICATION, WITH LONG-TERM CURRENT USE OF INSULIN: ICD-10-CM

## 2024-04-23 RX ORDER — LINAGLIPTIN 5 MG/1
5 TABLET, FILM COATED ORAL DAILY
Qty: 90 TABLET | Refills: 0 | Status: SHIPPED | OUTPATIENT
Start: 2024-04-23

## 2024-04-26 ENCOUNTER — OFFICE VISIT (OUTPATIENT)
Dept: UROLOGY | Facility: CLINIC | Age: 74
End: 2024-04-26
Payer: MEDICARE

## 2024-04-26 VITALS
DIASTOLIC BLOOD PRESSURE: 68 MMHG | SYSTOLIC BLOOD PRESSURE: 128 MMHG | BODY MASS INDEX: 46.06 KG/M2 | WEIGHT: 311 LBS | HEIGHT: 69 IN

## 2024-04-26 DIAGNOSIS — E29.1 HYPOGONADISM IN MALE: Primary | ICD-10-CM

## 2024-04-26 DIAGNOSIS — R97.20 ELEVATED PROSTATE SPECIFIC ANTIGEN (PSA): ICD-10-CM

## 2024-04-26 RX ORDER — OMEPRAZOLE 40 MG/1
40 CAPSULE, DELAYED RELEASE ORAL DAILY
Qty: 90 CAPSULE | Refills: 3 | Status: SHIPPED | OUTPATIENT
Start: 2024-04-26

## 2024-04-26 RX ORDER — TADALAFIL 20 MG/1
20 TABLET ORAL DAILY PRN
Qty: 20 TABLET | Refills: 2 | Status: SHIPPED | OUTPATIENT
Start: 2024-04-26

## 2024-04-26 RX ORDER — LEVOTHYROXINE SODIUM 0.07 MG/1
75 TABLET ORAL DAILY
Qty: 90 TABLET | Refills: 3 | Status: SHIPPED | OUTPATIENT
Start: 2024-04-26

## 2024-04-26 RX ORDER — APIXABAN 2.5 MG/1
TABLET, FILM COATED ORAL
COMMUNITY
Start: 2024-03-28

## 2024-04-26 RX ORDER — INSULIN LISPRO 100 [IU]/ML
INJECTION, SOLUTION INTRAVENOUS; SUBCUTANEOUS
COMMUNITY

## 2024-04-26 NOTE — PROGRESS NOTES
Office Visit Established Male Patient     Patient Name: Freddie Yeboah Jr.  : 1950   MRN: [id number]     Chief Complaint:   Chief Complaint   Patient presents with    Follow-up     Hypogonadism and PSA       History of Present Illness: Mr. Freddie Yeboah Jr. is a 74 y.o. male who presents today for follow up with lab results to discuss repeat PSA results.  He did take his testosterone injection the 1-2 days before his labs         Subjective      Review of System:   As noted in HPI    Past Medical History:   Past Medical History:   Diagnosis Date    Arthritis     shoulder, knee. multi joint     Atrial fibrillation     Back pain     CHF (congestive heart failure)     Chronic kidney disease     stage III due to diabetes    Diabetes mellitus     several years; checks sugars at home-usually run 100-112    Diabetic nephropathy     Disease of thyroid gland     hypothyroidism    GERD (gastroesophageal reflux disease)     Hypertension     Low testosterone     Sleep apnea     setting of 11    Urinary tract infection     most recent diagnosis 17 in the ER (+3 bacteria)-started on Macrobid and rechecked by PCP on 17       Past Surgical History:   Past Surgical History:   Procedure Laterality Date    ANTERIOR CERVICAL FUSION      APPENDECTOMY      BACK SURGERY      CARPAL TUNNEL RELEASE Bilateral     CATARACT EXTRACTION      CHOLECYSTECTOMY      COLONOSCOPY      FINGER/THUMB ARTHROPLASTY Left     thumb replacement    DE ARTHROPLASTY GLENOHUMERAL JOINT TOTAL SHOULDER Left 2017    Procedure: TOTAL SHOULDER ARTHROPLASTY LEFT;  Surgeon: William Ray MD;  Location: [facility];  Service: Orthopedics    SHOULDER SURGERY      TOTAL KNEE ARTHROPLASTY Left        Family History:   Family History   Problem Relation Age of Onset    Heart attack Mother     Diabetes Mother     Heart attack Father     Diabetes Sister     Thyroid disease Sister     COPD Brother     Stroke Brother     Cancer Brother        Social  History:   Social History     Socioeconomic History    Marital status:    Tobacco Use    Smoking status: Former     Current packs/day: 0.00     Average packs/day: 3.0 packs/day for 34.0 years (102.0 ttl pk-yrs)     Types: Cigarettes     Start date:      Quit date:      Years since quittin.3     Passive exposure: Past    Smokeless tobacco: Never   Vaping Use    Vaping status: Never Used   Substance and Sexual Activity    Alcohol use: No    Drug use: No    Sexual activity: Yes     Partners: Female       Medications:     Current Outpatient Medications:     Alcohol Swabs pads, Clean area prior to injection, Disp: 100 each, Rfl: 11    anastrozole (ARIMIDEX) 1 MG tablet, TAKE ONE TABLET BY MOUTH ONCE WEEKLY, Disp: 12 tablet, Rfl: 3    aspirin 81 MG tablet, Take 1 tablet by mouth Daily., Disp: , Rfl:     bumetanide (BUMEX) 2 MG tablet, , Disp: , Rfl:     calcipotriene (DOVONEX) 0.005 % cream, , Disp: , Rfl:     colchicine 0.6 MG tablet, Take 1 tablet by mouth 2 (Two) Times a Day., Disp: 6 tablet, Rfl: 1    Eliquis 2.5 MG tablet tablet, , Disp: , Rfl:     febuxostat (ULORIC) 40 MG tablet, , Disp: , Rfl:     glucose blood test strip, Use to test blood sugar level 3 times daily. DX: E11.9, Disp: 300 each, Rfl: 3    glucose monitor monitoring kit, Use meter to check blood sugar levels twice daily., Disp: 1 each, Rfl: 0    Insulin Glargine (Lantus SoloStar) 100 UNIT/ML injection pen, INECT 100 UNITS UNDER THE SKIN DAILY, Disp: 30 mL, Rfl: 5    Insulin Lispro (HumaLOG) 100 UNIT/ML injection, 30 units sq after meals, Disp: , Rfl:     Insulin Lispro, 1 Unit Dial, (HUMALOG) 100 UNIT/ML solution pen-injector, Inject 10 Units under the skin into the appropriate area as directed 3 (Three) Times a Day., Disp: 30 mL, Rfl: 3    Insulin Pen Needle (K[employer]ger Pen Needles 31G) 31G X 8 MM misc, USE TO INJECT INSULIN FIVE TIMES A DAY, Disp: 200 each, Rfl: 11    levothyroxine (SYNTHROID, LEVOTHROID) 75 MCG tablet, TAKE ONE  "TABLET BY MOUTH DAILY, Disp: 90 tablet, Rfl: 3    metOLazone (ZAROXOLYN) 2.5 MG tablet, Take 1 tablet by mouth 2 (Two) Times a Day., Disp: , Rfl:     metoprolol tartrate (LOPRESSOR) 100 MG tablet, Take 1 tablet by mouth 2 (Two) Times a Day., Disp: , Rfl:     Morphine (MSIR) 15 MG tablet, Take 1 tablet by mouth 2 (Two) Times a Day. 3 tabs AM and 3 tabs PM, Disp: , Rfl:     Needle, Disp, 18G X 1-1/2\" misc, Use for drawing up the medication, Disp: 50 each, Rfl: 3    Needle, Disp, 23G X 1-1/2\" misc, Use 1 Device 1 (One) Time Per Week., Disp: 30 each, Rfl: 11    O2 (OXYGEN), 2 LPM VIA NC, Disp: , Rfl:     omeprazole (priLOSEC) 40 MG capsule, TAKE ONE CAPSULE BY MOUTH DAILY, Disp: 90 capsule, Rfl: 3    OneTouch Delica Lancets 33G misc, Use to test blood sugar level 3 times daily. DX: E11.9, Disp: 300 each, Rfl: 3    oxyCODONE-acetaminophen (PERCOCET)  MG per tablet, Take  tablets by mouth As Needed. 3 times a day, Disp: , Rfl:     simvastatin (ZOCOR) 20 MG tablet, Take 1 tablet by mouth Every Night., Disp: 90 tablet, Rfl: 3    spironolactone (ALDACTONE) 25 MG tablet, Take 1 tablet by mouth Daily., Disp: 90 tablet, Rfl: 3    Syringe, Disposable, 3 ML misc, 1 syringe 1 (One) Time Per Week., Disp: 100 each, Rfl: 11    Syringe, Disposable, 3 ML misc, Use 1 syringe 1 (One) Time Per Week., Disp: 100 each, Rfl: 11    tadalafil (CIALIS) 20 MG tablet, TAKE 1 TABLET BY MOUTH DAILY AS NEEDED FOR ERECTILE DYSFUNCTION, Disp: 20 tablet, Rfl: 2    telmisartan (MICARDIS) 20 MG tablet, Take 1 tablet by mouth Daily., Disp: , Rfl:     Testosterone Cypionate (DEPOTESTOTERONE CYPIONATE) 200 MG/ML injection, Inject 0.5 mL into the appropriate muscle as directed by prescriber 1 (One) Time Per Week., Disp: 2 mL, Rfl: 0    tiotropium bromide-olodaterol (Stiolto Respimat) 2.5-2.5 MCG/ACT aerosol solution inhaler, INHALE TWO INHALATION(S) BY MOUTH DAILY, Disp: 8 g, Rfl: 2    Tradjenta 5 MG tablet tablet, TAKE 1 TABLET BY MOUTH DAILY, " "Disp: 90 tablet, Rfl: 0    Allergies:   Allergies   Allergen Reactions    Duloxetine Itching and Rash    Sulfamethoxazole-Trimethoprim Other (See Comments)     Had to be hospitalized in ICU; renal impairment        Objective     Physical Exam:   Vital Signs:   Vitals:    04/26/24 1033   BP: 128/68   BP Location: Right arm   Patient Position: Sitting   Cuff Size: Adult   Weight: (!) 141 kg (311 lb)   Height: 175.3 cm (69\")     Body mass index is 45.93 kg/m².     Physical Exam  Constitutional: NAD, WDWN.   Neurological: A + O x 3.   Psychiatric:  Normal mood and affect    Labs  Brief Urine Lab Results  (Last result in the past 365 days)        Color   Clarity   Blood   Leuk Est   Nitrite   Protein   CREAT   Urine HCG        01/05/24 1141             68.4                 Lab Results   Component Value Date    GLUCOSE 162 (H) 01/05/2024    CALCIUM 10.0 01/05/2024     01/05/2024    K 5.2 01/05/2024    CO2 34.5 (H) 01/05/2024    CL 92 (L) 01/05/2024    BUN 62 (H) 01/05/2024    CREATININE 2.97 (H) 01/05/2024    EGFRIFAFRI 46 (L) 01/18/2022    EGFRIFNONA 38 (L) 01/18/2022    BCR 20.9 01/05/2024    ANIONGAP 10.0 01/01/2023       Lab Results   Component Value Date    WBC 9.84 05/04/2023    HGB 12.7 (L) 04/19/2024    HCT 40.4 04/19/2024    MCV 72.4 (L) 05/04/2023     05/04/2023            Radiographic Studies  No Images in the past 120 days found..    I have reviewed the above labs and imaging.     Assessment / Plan      Assessment/Plan:   Diagnoses and all orders for this visit:    1. Hypogonadism in male (Primary)  -     Hemoglobin & Hematocrit, Blood; Future  -     Testosterone; Future    2. Elevated prostate specific antigen (PSA)    74-year-old male with hypogonadism had a mildly increased PSA with a normal ALBERTO.  We repeated labs in 4 weeks and PSA has decreased to 2.590 we discussed repeating in 6 months with a 4K score if he has 3 consecutive rises in his PSA we can consider prostate MRI. Will continue " testosterone at this time we did discuss the risk of testosterone and prostate ca.     Continue testosterone cypionate 100 mg weekly and anastrozole 1 mg weekly  Obtain TT, hematocrit and hemoglobin 6 months  Obtain 4K score 6 months    Follow Up:   Return in about 6 months (around 10/26/2024) for Follow up SHIRA Foley,NP-C  [facility] [place]

## 2024-05-13 DIAGNOSIS — E29.1 HYPOGONADISM IN MALE: ICD-10-CM

## 2024-05-13 DIAGNOSIS — R79.89 LOW TESTOSTERONE LEVEL IN MALE: ICD-10-CM

## 2024-05-14 RX ORDER — TESTOSTERONE CYPIONATE 200 MG/ML
INJECTION, SOLUTION INTRAMUSCULAR
Qty: 2 ML | Refills: 0 | Status: SHIPPED | OUTPATIENT
Start: 2024-05-14

## 2024-05-20 ENCOUNTER — TRANSCRIBE ORDERS (OUTPATIENT)
Dept: LAB | Facility: HOSPITAL | Age: 74
End: 2024-05-20
Payer: MEDICARE

## 2024-05-20 ENCOUNTER — LAB (OUTPATIENT)
Dept: LAB | Facility: HOSPITAL | Age: 74
End: 2024-05-20
Payer: MEDICARE

## 2024-05-20 DIAGNOSIS — N17.9 ACUTE RENAL FAILURE, UNSPECIFIED ACUTE RENAL FAILURE TYPE: Primary | ICD-10-CM

## 2024-05-20 DIAGNOSIS — N25.81 SECONDARY HYPERPARATHYROIDISM: ICD-10-CM

## 2024-05-20 DIAGNOSIS — N17.9 ACUTE RENAL FAILURE, UNSPECIFIED ACUTE RENAL FAILURE TYPE: ICD-10-CM

## 2024-05-20 LAB
ALBUMIN SERPL-MCNC: 4.4 G/DL (ref 3.5–5.2)
ALBUMIN/GLOB SERPL: 1.4 G/DL
ALP SERPL-CCNC: 73 U/L (ref 39–117)
ALT SERPL W P-5'-P-CCNC: 12 U/L (ref 1–41)
ANION GAP SERPL CALCULATED.3IONS-SCNC: 8 MMOL/L (ref 5–15)
AST SERPL-CCNC: 10 U/L (ref 1–40)
BILIRUB SERPL-MCNC: 0.5 MG/DL (ref 0–1.2)
BUN SERPL-MCNC: 59 MG/DL (ref 8–23)
BUN/CREAT SERPL: 20.7 (ref 7–25)
CALCIUM SPEC-SCNC: 9.4 MG/DL (ref 8.6–10.5)
CHLORIDE SERPL-SCNC: 95 MMOL/L (ref 98–107)
CO2 SERPL-SCNC: 34 MMOL/L (ref 22–29)
CREAT SERPL-MCNC: 2.85 MG/DL (ref 0.76–1.27)
EGFRCR SERPLBLD CKD-EPI 2021: 22.5 ML/MIN/1.73
GLOBULIN UR ELPH-MCNC: 3.2 GM/DL
GLUCOSE SERPL-MCNC: 147 MG/DL (ref 65–99)
POTASSIUM SERPL-SCNC: 4.4 MMOL/L (ref 3.5–5.2)
PROT SERPL-MCNC: 7.6 G/DL (ref 6–8.5)
SODIUM SERPL-SCNC: 137 MMOL/L (ref 136–145)

## 2024-05-20 PROCEDURE — 36415 COLL VENOUS BLD VENIPUNCTURE: CPT

## 2024-05-20 PROCEDURE — 80053 COMPREHEN METABOLIC PANEL: CPT

## 2024-05-20 PROCEDURE — 83970 ASSAY OF PARATHORMONE: CPT

## 2024-05-21 LAB — PTH-INTACT SERPL-MCNC: 157 PG/ML (ref 15–65)

## 2024-06-12 DIAGNOSIS — E29.1 HYPOGONADISM IN MALE: ICD-10-CM

## 2024-06-12 DIAGNOSIS — R79.89 LOW TESTOSTERONE LEVEL IN MALE: ICD-10-CM

## 2024-06-12 RX ORDER — TESTOSTERONE CYPIONATE 200 MG/ML
100 INJECTION, SOLUTION INTRAMUSCULAR WEEKLY
Qty: 4 ML | Refills: 0 | Status: SHIPPED | OUTPATIENT
Start: 2024-06-12

## 2024-07-11 ENCOUNTER — OFFICE VISIT (OUTPATIENT)
Dept: PULMONOLOGY | Facility: CLINIC | Age: 74
End: 2024-07-11
Payer: MEDICARE

## 2024-07-11 VITALS
HEART RATE: 68 BPM | HEIGHT: 69 IN | DIASTOLIC BLOOD PRESSURE: 76 MMHG | OXYGEN SATURATION: 85 % | RESPIRATION RATE: 18 BRPM | WEIGHT: 315 LBS | BODY MASS INDEX: 46.65 KG/M2 | SYSTOLIC BLOOD PRESSURE: 130 MMHG

## 2024-07-11 DIAGNOSIS — G47.33 OBSTRUCTIVE SLEEP APNEA: Primary | ICD-10-CM

## 2024-07-11 DIAGNOSIS — J44.9 CHRONIC OBSTRUCTIVE PULMONARY DISEASE, UNSPECIFIED COPD TYPE: ICD-10-CM

## 2024-07-11 DIAGNOSIS — E66.01 MORBID OBESITY, UNSPECIFIED OBESITY TYPE: ICD-10-CM

## 2024-07-11 DIAGNOSIS — R09.02 HYPOXIA: ICD-10-CM

## 2024-07-11 PROCEDURE — 3078F DIAST BP <80 MM HG: CPT | Performed by: INTERNAL MEDICINE

## 2024-07-11 PROCEDURE — 3075F SYST BP GE 130 - 139MM HG: CPT | Performed by: INTERNAL MEDICINE

## 2024-07-11 PROCEDURE — 99214 OFFICE O/P EST MOD 30 MIN: CPT | Performed by: INTERNAL MEDICINE

## 2024-07-12 ENCOUNTER — OFFICE VISIT (OUTPATIENT)
Dept: INTERNAL MEDICINE | Facility: CLINIC | Age: 74
End: 2024-07-12
Payer: MEDICARE

## 2024-07-12 VITALS
HEART RATE: 66 BPM | WEIGHT: 315 LBS | DIASTOLIC BLOOD PRESSURE: 76 MMHG | BODY MASS INDEX: 45.1 KG/M2 | RESPIRATION RATE: 14 BRPM | TEMPERATURE: 97.6 F | HEIGHT: 70 IN | OXYGEN SATURATION: 97 % | SYSTOLIC BLOOD PRESSURE: 118 MMHG

## 2024-07-12 DIAGNOSIS — E11.9 TYPE 2 DIABETES MELLITUS WITHOUT COMPLICATION, WITH LONG-TERM CURRENT USE OF INSULIN: ICD-10-CM

## 2024-07-12 DIAGNOSIS — N18.32 STAGE 3B CHRONIC KIDNEY DISEASE: ICD-10-CM

## 2024-07-12 DIAGNOSIS — I48.0 PAROXYSMAL ATRIAL FIBRILLATION: ICD-10-CM

## 2024-07-12 DIAGNOSIS — E03.9 ACQUIRED HYPOTHYROIDISM: Primary | ICD-10-CM

## 2024-07-12 DIAGNOSIS — Z79.4 TYPE 2 DIABETES MELLITUS WITHOUT COMPLICATION, WITH LONG-TERM CURRENT USE OF INSULIN: ICD-10-CM

## 2024-07-12 PROBLEM — E11.65 TYPE 2 DIABETES MELLITUS WITH HYPERGLYCEMIA: Status: ACTIVE | Noted: 2017-05-16

## 2024-07-12 LAB
EXPIRATION DATE: ABNORMAL
HBA1C MFR BLD: 6.6 % (ref 4.5–5.7)
Lab: ABNORMAL

## 2024-07-12 PROCEDURE — G2211 COMPLEX E/M VISIT ADD ON: HCPCS | Performed by: INTERNAL MEDICINE

## 2024-07-12 PROCEDURE — 1160F RVW MEDS BY RX/DR IN RCRD: CPT | Performed by: INTERNAL MEDICINE

## 2024-07-12 PROCEDURE — 3074F SYST BP LT 130 MM HG: CPT | Performed by: INTERNAL MEDICINE

## 2024-07-12 PROCEDURE — 1125F AMNT PAIN NOTED PAIN PRSNT: CPT | Performed by: INTERNAL MEDICINE

## 2024-07-12 PROCEDURE — 3044F HG A1C LEVEL LT 7.0%: CPT | Performed by: INTERNAL MEDICINE

## 2024-07-12 PROCEDURE — 83036 HEMOGLOBIN GLYCOSYLATED A1C: CPT | Performed by: INTERNAL MEDICINE

## 2024-07-12 PROCEDURE — 1159F MED LIST DOCD IN RCRD: CPT | Performed by: INTERNAL MEDICINE

## 2024-07-12 PROCEDURE — 99214 OFFICE O/P EST MOD 30 MIN: CPT | Performed by: INTERNAL MEDICINE

## 2024-07-12 PROCEDURE — 3078F DIAST BP <80 MM HG: CPT | Performed by: INTERNAL MEDICINE

## 2024-07-12 NOTE — PROGRESS NOTES
"Subjective  Freddie Yeboah Jr. is a 74 y.o. male    HPI coming in for follow-up patient with multiple medical problems which include CKD 3 with a creatinine of more than 2.5 has paroxysmal A-fib and hypothyroidism recent weight gain noted, history of COPD and pulmonary hypertension on O2 via nasal cannula    The following portions of the patient's history were reviewed and updated as appropriate: allergies, current medications, past family history, past medical history, past social history, past surgical history, and problem list.     Review of Systems   Constitutional: Negative.  Positive for fatigue. Negative for activity change, appetite change and fever.   HENT:  Negative for congestion, ear discharge, ear pain and trouble swallowing.    Eyes:  Negative for photophobia and visual disturbance.   Respiratory:  Negative for cough and shortness of breath.         On O2 via NC   Cardiovascular:  Negative for chest pain and palpitations.   Gastrointestinal:  Negative for abdominal distention, abdominal pain, constipation, diarrhea, nausea and vomiting.   Endocrine: Negative.    Genitourinary:  Negative for dysuria, hematuria and urgency.   Musculoskeletal:  Positive for arthralgias. Negative for back pain, joint swelling and myalgias.   Skin:  Negative for color change and rash.   Allergic/Immunologic: Negative.    Neurological:  Negative for dizziness, weakness, light-headedness and headaches.   Hematological:  Negative for adenopathy. Does not bruise/bleed easily.   Psychiatric/Behavioral:  Negative for agitation, confusion and dysphoric mood. The patient is not nervous/anxious.        Visit Vitals  /76   Pulse 66   Temp 97.6 °F (36.4 °C) (Infrared)   Resp 14   Ht 177.8 cm (70\")   Wt (!) 154 kg (340 lb)   SpO2 97%   BMI 48.78 kg/m²       Objective  Physical Exam  Constitutional:       General: He is not in acute distress.     Appearance: He is well-developed.   HENT:      Nose: Nose normal.   Eyes:      General: " No scleral icterus.     Conjunctiva/sclera: Conjunctivae normal.   Neck:      Thyroid: No thyromegaly.      Trachea: No tracheal deviation.   Cardiovascular:      Rate and Rhythm: Normal rate and regular rhythm.      Heart sounds: No murmur heard.     No friction rub.   Pulmonary:      Effort: No respiratory distress.      Breath sounds: No wheezing or rales.      Comments: O2 via NC  Abdominal:      General: There is no distension.      Palpations: Abdomen is soft. There is no mass.      Tenderness: There is no abdominal tenderness. There is no guarding.   Musculoskeletal:         General: Deformity present. Normal range of motion.   Lymphadenopathy:      Cervical: No cervical adenopathy.   Skin:     General: Skin is warm and dry.      Findings: No erythema or rash.   Neurological:      Mental Status: He is alert and oriented to person, place, and time.      Cranial Nerves: No cranial nerve deficit.      Coordination: Coordination normal.      Deep Tendon Reflexes: Reflexes are normal and symmetric.   Psychiatric:         Behavior: Behavior normal.         Thought Content: Thought content normal.         Judgment: Judgment normal.       Diagnoses and all orders for this visit:    Acquired hypothyroidism clinically euthyroid follow TSH    Paroxysmal atrial fibrillation stable on anticoagulant therapy    Stage 3b chronic kidney disease following up with nephrology avoiding NSAIDs continue with Bumex

## 2024-07-22 DIAGNOSIS — E29.1 HYPOGONADISM IN MALE: ICD-10-CM

## 2024-07-22 DIAGNOSIS — R79.89 LOW TESTOSTERONE LEVEL IN MALE: ICD-10-CM

## 2024-07-23 DIAGNOSIS — E11.9 TYPE 2 DIABETES MELLITUS WITHOUT COMPLICATION, WITH LONG-TERM CURRENT USE OF INSULIN: ICD-10-CM

## 2024-07-23 DIAGNOSIS — Z79.4 TYPE 2 DIABETES MELLITUS WITHOUT COMPLICATION, WITH LONG-TERM CURRENT USE OF INSULIN: ICD-10-CM

## 2024-07-23 RX ORDER — TESTOSTERONE CYPIONATE 200 MG/ML
INJECTION, SOLUTION INTRAMUSCULAR
Qty: 4 ML | Refills: 0 | Status: SHIPPED | OUTPATIENT
Start: 2024-07-23

## 2024-07-23 RX ORDER — LINAGLIPTIN 5 MG/1
5 TABLET, FILM COATED ORAL DAILY
Qty: 90 TABLET | Refills: 3 | Status: SHIPPED | OUTPATIENT
Start: 2024-07-23

## 2024-07-23 NOTE — TELEPHONE ENCOUNTER
Rx Refill Note  Requested Prescriptions     Pending Prescriptions Disp Refills    linagliptin (Tradjenta) 5 MG tablet tablet [Pharmacy Med Name: TRADJENTA 5 MG TABLET] 90 tablet 3     Sig: TAKE 1 TABLET BY MOUTH DAILY      Last office visit with prescribing clinician: 7/12/2024   Last telemedicine visit with prescribing clinician: Visit date not found   Next office visit with prescribing clinician: 10/14/2024                         Would you like a call back once the refill request has been completed: [] Yes [] No    If the office needs to give you a call back, can they leave a voicemail: [] Yes [] No    Gnia Resendiz MA  07/23/24, 10:22 EDT

## 2024-08-07 ENCOUNTER — HOSPITAL ENCOUNTER (OUTPATIENT)
Dept: SLEEP MEDICINE | Facility: HOSPITAL | Age: 74
Discharge: HOME | End: 2024-08-07
Admitting: INTERNAL MEDICINE
Payer: MEDICARE

## 2024-08-07 DIAGNOSIS — J44.9 CHRONIC OBSTRUCTIVE PULMONARY DISEASE, UNSPECIFIED COPD TYPE: ICD-10-CM

## 2024-08-07 DIAGNOSIS — G47.33 OBSTRUCTIVE SLEEP APNEA: ICD-10-CM

## 2024-08-07 DIAGNOSIS — E66.01 MORBID OBESITY, UNSPECIFIED OBESITY TYPE: ICD-10-CM

## 2024-08-07 PROCEDURE — 95811 POLYSOM 6/>YRS CPAP 4/> PARM: CPT | Performed by: INTERNAL MEDICINE

## 2024-08-07 PROCEDURE — 95811 POLYSOM 6/>YRS CPAP 4/> PARM: CPT

## 2024-08-12 DIAGNOSIS — E11.9 TYPE 2 DIABETES MELLITUS WITHOUT COMPLICATION, WITH LONG-TERM CURRENT USE OF INSULIN: ICD-10-CM

## 2024-08-12 DIAGNOSIS — Z79.4 TYPE 2 DIABETES MELLITUS WITHOUT COMPLICATION, WITH LONG-TERM CURRENT USE OF INSULIN: ICD-10-CM

## 2024-08-13 ENCOUNTER — TELEPHONE (OUTPATIENT)
Dept: INTERNAL MEDICINE | Facility: CLINIC | Age: 74
End: 2024-08-13
Payer: MEDICARE

## 2024-08-13 ENCOUNTER — HOSPITAL ENCOUNTER (EMERGENCY)
Facility: HOSPITAL | Age: 74
Discharge: HOME | End: 2024-08-13
Attending: STUDENT IN AN ORGANIZED HEALTH CARE EDUCATION/TRAINING PROGRAM
Payer: MEDICARE

## 2024-08-13 VITALS
BODY MASS INDEX: 45.1 KG/M2 | DIASTOLIC BLOOD PRESSURE: 66 MMHG | OXYGEN SATURATION: 94 % | WEIGHT: 315 LBS | SYSTOLIC BLOOD PRESSURE: 114 MMHG | RESPIRATION RATE: 22 BRPM | HEIGHT: 70 IN | HEART RATE: 75 BPM | TEMPERATURE: 97.8 F

## 2024-08-13 DIAGNOSIS — S81.802A WOUND OF LEFT LOWER EXTREMITY, INITIAL ENCOUNTER: Primary | ICD-10-CM

## 2024-08-13 PROCEDURE — 25010000002 TETANUS-DIPHTH-ACELL PERTUSSIS 5-2.5-18.5 LF-MCG/0.5 SUSPENSION PREFILLED SYRINGE: Performed by: STUDENT IN AN ORGANIZED HEALTH CARE EDUCATION/TRAINING PROGRAM

## 2024-08-13 PROCEDURE — 90471 IMMUNIZATION ADMIN: CPT | Performed by: STUDENT IN AN ORGANIZED HEALTH CARE EDUCATION/TRAINING PROGRAM

## 2024-08-13 PROCEDURE — 99283 EMERGENCY DEPT VISIT LOW MDM: CPT

## 2024-08-13 PROCEDURE — 90715 TDAP VACCINE 7 YRS/> IM: CPT | Performed by: STUDENT IN AN ORGANIZED HEALTH CARE EDUCATION/TRAINING PROGRAM

## 2024-08-13 RX ORDER — INSULIN GLARGINE 100 [IU]/ML
INJECTION, SOLUTION SUBCUTANEOUS
Qty: 30 ML | Refills: 5 | Status: SHIPPED | OUTPATIENT
Start: 2024-08-13

## 2024-08-13 RX ORDER — CEPHALEXIN 250 MG/1
500 CAPSULE ORAL ONCE
Status: COMPLETED | OUTPATIENT
Start: 2024-08-13 | End: 2024-08-13

## 2024-08-13 RX ORDER — CEPHALEXIN 500 MG/1
500 CAPSULE ORAL 2 TIMES DAILY
Qty: 10 CAPSULE | Refills: 0 | Status: SHIPPED | OUTPATIENT
Start: 2024-08-13 | End: 2024-08-18

## 2024-08-13 RX ADMIN — TETANUS TOXOID, REDUCED DIPHTHERIA TOXOID AND ACELLULAR PERTUSSIS VACCINE, ADSORBED 0.5 ML: 5; 2.5; 8; 8; 2.5 SUSPENSION INTRAMUSCULAR at 07:13

## 2024-08-13 RX ADMIN — CEPHALEXIN 500 MG: 250 CAPSULE ORAL at 07:12

## 2024-08-13 NOTE — ED PROVIDER NOTES
[facility] EMERGENCY DEPARTMENT  Emergency Department Encounter  Emergency Medicine Physician Note       Pt Name: Freddie Yeboah Jr.  MRN: [id number]  Pt :   1950  Room Number:  10/10  Date of encounter:  2024  PCP: Damián Centeno MD  ED Provider: Renato Tenorio MD    Historian: Patient      HPI:  Chief Complaint: Wound        Context: Freddie Yeboah Jr. is a 74 y.o. male who presents to the ED for wound check. Patient has wound to right left lower extremity. He struck it on a car door last Wednesday. He states the wound has been oozing some and it felt warm and red the other day. No fevers. No significant pain. Family member has been applying neosporin and wrapping the leg. He has chronic edema of the affected extremity.      PAST MEDICAL HISTORY  Past Medical History:   Diagnosis Date    Arthritis     shoulder, knee. multi joint     Atrial fibrillation     Back pain     CHF (congestive heart failure)     Chronic kidney disease     stage III due to diabetes    Diabetes mellitus     several years; checks sugars at home-usually run 100-112    Diabetic nephropathy     Disease of thyroid gland     hypothyroidism    GERD (gastroesophageal reflux disease)     Hypertension     Low testosterone     Sleep apnea     setting of 11    Urinary tract infection     most recent diagnosis 17 in the ER (+3 bacteria)-started on Macrobid and rechecked by PCP on 17         PAST SURGICAL HISTORY  Past Surgical History:   Procedure Laterality Date    ANTERIOR CERVICAL FUSION      APPENDECTOMY      BACK SURGERY      CARPAL TUNNEL RELEASE Bilateral     CATARACT EXTRACTION      CHOLECYSTECTOMY      COLONOSCOPY      FINGER/THUMB ARTHROPLASTY Left     thumb replacement    AL ARTHROPLASTY GLENOHUMERAL JOINT TOTAL SHOULDER Left 2017    Procedure: TOTAL SHOULDER ARTHROPLASTY LEFT;  Surgeon: William Ray MD;  Location: [facility];  Service: Orthopedics    SHOULDER SURGERY      TOTAL KNEE  ARTHROPLASTY Left          FAMILY HISTORY  Family History   Problem Relation Age of Onset    Heart attack Mother     Diabetes Mother     Heart attack Father     Diabetes Sister     Thyroid disease Sister     COPD Brother     Stroke Brother     Cancer Brother          SOCIAL HISTORY  Social History     Socioeconomic History    Marital status:    Tobacco Use    Smoking status: Former     Current packs/day: 0.00     Average packs/day: 3.0 packs/day for 34.0 years (102.0 ttl pk-yrs)     Types: Cigarettes     Start date:      Quit date:      Years since quittin.6     Passive exposure: Past    Smokeless tobacco: Never   Vaping Use    Vaping status: Never Used   Substance and Sexual Activity    Alcohol use: No    Drug use: No    Sexual activity: Yes     Partners: Female         ALLERGIES  Duloxetine and Sulfamethoxazole-trimethoprim        REVIEW OF SYSTEMS  Systems reviewed and negative      PHYSICAL EXAM    I have reviewed the triage vital signs and nursing notes.    ED Triage Vitals [24 0621]   Temp Heart Rate Resp BP SpO2   97.8 °F (36.6 °C) 75 22 (!) 149/120 92 %      Temp src Heart Rate Source Patient Position BP Location FiO2 (%)   Oral Monitor Sitting Other (Comment) --       Physical Exam  Constitutional:       General: He is not in acute distress.  Pulmonary:      Effort: Pulmonary effort is normal.   Musculoskeletal:      Right lower leg: Edema present.      Left lower leg: Edema present.   Skin:     Comments: Skin tear x 2 to the anterior left lower extremity at the level of the shin. Healing appropriately with some serosanguineous discharge noted at the margins. Surrounding area is red and slightly warm to touch. No purulence.   Neurological:      Mental Status: He is alert.         LAB RESULTS  No results found for this or any previous visit (from the past 24 hour(s)).    If labs were ordered, I independently reviewed the results and considered them in treating the  patient.        RADIOLOGY  No Radiology Exams Resulted Within Past 24 Hours    PROCEDURES    Procedures    No orders to display       MEDICATIONS GIVEN IN ER    Medications   Tetanus-Diphth-Acell Pertussis (BOOSTRIX) injection 0.5 mL (0.5 mL Intramuscular Given 8/13/24 0713)   cephalexin (KEFLEX) capsule 500 mg (500 mg Oral Given 8/13/24 0712)         MEDICAL DECISION MAKING, PROGRESS, and CONSULTS    All labs, if obtained, have been independently reviewed by me.  All radiology studies, if obtained, have been reviewed by me and the radiologist dictating the report.  All EKG's, if obtained, have been independently viewed and interpreted by me.      Discussion below represents my analysis of pertinent findings related to patient's condition, differential diagnosis, treatment plan and final disposition.                         Differential diagnosis:    Cellulitis, wound, skin tear, laceration, abscess, fracture, others.      Additional sources:    - Discussed/ obtained information from independent historians:  Family member at bedside      - External (non-ED) record review:  Pulmonology progress note 07/12/2024    - Chronic or social conditions impacting care:  Chronic O2 dependence, chronic edema    - Shared decision making:        Orders placed during this visit:  No orders of the defined types were placed in this encounter.        Additional orders considered but not ordered:      ED Course/MDM Discussion:    Patient is a 74-year-old male with multiple comorbidities presented for wound to the left lower extremity after striking it on a door approximately a week ago.  He has 2 skin tears to the left lower extremity at the level of the shin.  There is no abscess.  There is surrounding redness and warmth though no gross purulence.  Suspect most of this is reactive however will place on antibiotics for cellulitis.  Tetanus was updated.  Offered x-rays to rule out fracture however patient declined. Compartments soft no  suspicion for compartment syndrome. Patient deemed stable and appropriate for continued outpatient management. Local wound care instructed.                    Consultants:      Shared Decision Making:  After my consideration of clinical presentation and any laboratory/radiology studies obtained, I discussed the findings with the patient/patient representative who is in agreement with the treatment plan and the final disposition.   Risks and benefits of discharge and/or observation/admission were discussed.       AS OF 08:23 EDT VITALS:    BP - 114/66  HR - 75  TEMP - 97.8 °F (36.6 °C) (Oral)  O2 SATS - 94%                  DIAGNOSIS  Final diagnoses:   Wound of left lower extremity, initial encounter         DISPOSITION  ED Disposition       ED Disposition   Discharge    Condition   Stable    Comment   --                   Please note that portions of this document were completed with voice recognition software.       Renato Tenorio MD  08/13/24 7066

## 2024-08-13 NOTE — TELEPHONE ENCOUNTER
Rx Refill Note  Requested Prescriptions     Pending Prescriptions Disp Refills    Lantus SoloStar 100 UNIT/ML injection pen [Pharmacy Med Name: LANTUS SOLOSTAR 100 UNIT/ML] 30 mL 5     Sig: INJECT 100 UNITS UNDER THE SKIN DAILY      Last office visit with prescribing clinician: 7/12/2024   Last telemedicine visit with prescribing clinician: Visit date not found   Next office visit with prescribing clinician: 10/14/2024                         Would you like a call back once the refill request has been completed: [] Yes [] No    If the office needs to give you a call back, can they leave a voicemail: [] Yes [] No    Gina Resendiz MA  08/13/24, 08:43 EDT

## 2024-08-14 RX ORDER — TADALAFIL 20 MG/1
20 TABLET ORAL DAILY PRN
Qty: 20 TABLET | Refills: 2 | Status: SHIPPED | OUTPATIENT
Start: 2024-08-14

## 2024-08-16 DIAGNOSIS — G47.33 OSA (OBSTRUCTIVE SLEEP APNEA): Primary | ICD-10-CM

## 2024-09-09 ENCOUNTER — TELEPHONE (OUTPATIENT)
Dept: UROLOGY | Facility: CLINIC | Age: 74
End: 2024-09-09
Payer: MEDICARE

## 2024-09-09 DIAGNOSIS — R79.89 LOW TESTOSTERONE LEVEL IN MALE: ICD-10-CM

## 2024-09-09 DIAGNOSIS — E29.1 HYPOGONADISM IN MALE: ICD-10-CM

## 2024-09-09 RX ORDER — TESTOSTERONE CYPIONATE 200 MG/ML
100 INJECTION, SOLUTION INTRAMUSCULAR
Qty: 4 ML | Refills: 0 | Status: SHIPPED | OUTPATIENT
Start: 2024-09-09

## 2024-09-09 NOTE — TELEPHONE ENCOUNTER
Caller: NIYAH CROWE    Relationship: PT    Best call back number: [phone]     Requested Prescriptions: TESTOSTERONE  Requested Prescriptions      No prescriptions requested or ordered in this encounter        Pharmacy where request should be sent:  [facility]    Last office visit with prescribing clinician: 4/26/2024     Next office visit with prescribing clinician: 10/11/2024     Additional details provided by patient: PT IS OUT OF MEDS    Does the patient have less than a 3 day supply:  [x] Yes  [] No    Would you like a call back once the refill request has been completed: [x] Yes [] No    If the office needs to give you a call back, can they leave a voicemail: [x] Yes [] No    Angel Donaldson   09/09/24 09:10 EDT

## 2024-09-10 ENCOUNTER — LAB (OUTPATIENT)
Dept: UROLOGY | Facility: CLINIC | Age: 74
End: 2024-09-10
Payer: MEDICARE

## 2024-09-19 DIAGNOSIS — R06.02 SHORTNESS OF BREATH: ICD-10-CM

## 2024-09-19 DIAGNOSIS — E66.2 OBESITY HYPOVENTILATION SYNDROME: ICD-10-CM

## 2024-09-19 RX ORDER — TIOTROPIUM BROMIDE AND OLODATEROL 3.124; 2.736 UG/1; UG/1
SPRAY, METERED RESPIRATORY (INHALATION)
Qty: 8 G | Refills: 2 | Status: SHIPPED | OUTPATIENT
Start: 2024-09-19

## 2024-09-23 RX ORDER — SPIRONOLACTONE 25 MG/1
25 TABLET ORAL DAILY
Qty: 90 TABLET | Refills: 3 | Status: SHIPPED | OUTPATIENT
Start: 2024-09-23

## 2024-09-23 RX ORDER — SIMVASTATIN 20 MG
20 TABLET ORAL NIGHTLY
Qty: 90 TABLET | Refills: 3 | Status: SHIPPED | OUTPATIENT
Start: 2024-09-23

## 2024-10-04 ENCOUNTER — HOSPITAL ENCOUNTER (EMERGENCY)
Facility: HOSPITAL | Age: 74
Discharge: HOME | End: 2024-10-04
Attending: STUDENT IN AN ORGANIZED HEALTH CARE EDUCATION/TRAINING PROGRAM
Payer: MEDICARE

## 2024-10-04 ENCOUNTER — APPOINTMENT (OUTPATIENT)
Dept: GENERAL RADIOLOGY | Facility: HOSPITAL | Age: 74
End: 2024-10-04
Payer: MEDICARE

## 2024-10-04 VITALS
SYSTOLIC BLOOD PRESSURE: 131 MMHG | TEMPERATURE: 98.1 F | HEIGHT: 70 IN | OXYGEN SATURATION: 93 % | BODY MASS INDEX: 45.1 KG/M2 | DIASTOLIC BLOOD PRESSURE: 74 MMHG | RESPIRATION RATE: 16 BRPM | WEIGHT: 315 LBS | HEART RATE: 69 BPM

## 2024-10-04 DIAGNOSIS — M25.561 ACUTE PAIN OF RIGHT KNEE: Primary | ICD-10-CM

## 2024-10-04 PROCEDURE — 99283 EMERGENCY DEPT VISIT LOW MDM: CPT

## 2024-10-04 PROCEDURE — 73562 X-RAY EXAM OF KNEE 3: CPT

## 2024-10-04 NOTE — ED PROVIDER NOTES
Subjective:  History of Present Illness:    Patient is a 74-year-old male without contributing health history.  Presents to the ER today with right knee pain status post injury.  Patient reports that he slipped fell on tile 1 week ago injuring knee.  Reports ecchymosis and edema to right knee.  Reports that he is able to bear weight.  Range of motion is intact.  Distal neurovascular intact.  Denies OTC medication or home remedy.  Denies alleviating exacerbating factors.    Nurses Notes reviewed and agree, including vitals, allergies, social history and prior medical history.     REVIEW OF SYSTEMS: All systems reviewed and not pertinent unless noted.  Review of Systems   Musculoskeletal:         Right knee pain   All other systems reviewed and are negative.      Past Medical History:   Diagnosis Date    Arthritis     shoulder, knee. multi joint     Atrial fibrillation     Back pain     CHF (congestive heart failure)     Chronic kidney disease     stage III due to diabetes    Diabetes mellitus     several years; checks sugars at home-usually run 100-112    Diabetic nephropathy     Disease of thyroid gland     hypothyroidism    GERD (gastroesophageal reflux disease)     Hypertension     Low testosterone     Sleep apnea     setting of 11    Urinary tract infection     most recent diagnosis 8/29/17 in the ER (+3 bacteria)-started on Macrobid and rechecked by PCP on 9/5/17       Allergies:    Duloxetine, Sulfamethoxazole-trimethoprim, and Wellbutrin [bupropion]      Past Surgical History:   Procedure Laterality Date    ANTERIOR CERVICAL FUSION      APPENDECTOMY      BACK SURGERY      CARPAL TUNNEL RELEASE Bilateral     CATARACT EXTRACTION      CHOLECYSTECTOMY      COLONOSCOPY  2013    FINGER/THUMB ARTHROPLASTY Left     thumb replacement    GA ARTHROPLASTY GLENOHUMERAL JOINT TOTAL SHOULDER Left 9/11/2017    Procedure: TOTAL SHOULDER ARTHROPLASTY LEFT;  Surgeon: William Ray MD;  Location: [facility];  Service: Orthopedics  "   SHOULDER SURGERY      TOTAL KNEE ARTHROPLASTY Left          Social History     Socioeconomic History    Marital status:    Tobacco Use    Smoking status: Former     Current packs/day: 0.00     Average packs/day: 3.0 packs/day for 34.0 years (102.0 ttl pk-yrs)     Types: Cigarettes     Start date:      Quit date:      Years since quittin.7     Passive exposure: Past    Smokeless tobacco: Never   Vaping Use    Vaping status: Never Used   Substance and Sexual Activity    Alcohol use: No    Drug use: No    Sexual activity: Yes     Partners: Female         Family History   Problem Relation Age of Onset    Heart attack Mother     Diabetes Mother     Heart attack Father     Diabetes Sister     Thyroid disease Sister     COPD Brother     Stroke Brother     Cancer Brother        Objective  Physical Exam:  /74   Pulse 69   Temp 98.1 °F (36.7 °C) (Oral)   Resp 16   Ht 177.8 cm (70\")   Wt (!) 154 kg (340 lb)   SpO2 93%   BMI 48.78 kg/m²      Physical Exam  Vitals and nursing note reviewed.   Constitutional:       Appearance: Normal appearance. He is normal weight.   HENT:      Head: Normocephalic and atraumatic.      Nose: Nose normal.      Mouth/Throat:      Mouth: Mucous membranes are moist.      Pharynx: Oropharynx is clear.   Eyes:      Extraocular Movements: Extraocular movements intact.      Conjunctiva/sclera: Conjunctivae normal.      Pupils: Pupils are equal, round, and reactive to light.   Cardiovascular:      Rate and Rhythm: Normal rate.   Pulmonary:      Effort: Pulmonary effort is normal.   Abdominal:      General: Abdomen is flat.   Musculoskeletal:         General: Normal range of motion.      Cervical back: Normal range of motion and neck supple.      Comments: Posterior drawer sign is medial joint line pain with medial rotation.  No pain with lateral rotation..  Small lump consistent with hematoma.   Skin:     General: Skin is warm and dry.      Capillary Refill: Capillary " refill takes less than 2 seconds.   Neurological:      General: No focal deficit present.      Mental Status: He is alert and oriented to person, place, and time. Mental status is at baseline.   Psychiatric:         Mood and Affect: Mood normal.         Behavior: Behavior normal.         Thought Content: Thought content normal.         Judgment: Judgment normal.         Procedures    ED Course:         Lab Results (last 24 hours)       ** No results found for the last 24 hours. **             XR Knee 3 View Right    Result Date: 10/4/2024  RIGHT KNEE SERIES  HISTORY: Right knee pain.  FINDINGS: 3 views show no evidence of an acute, displaced fracture or dislocation of the visualized bony architecture. There are vascular calcifications. There is tricompartmental joint space narrowing. No soft tissue abnormality is seen. No effusion identified.      Impression: Degenerative change as above..      Images were reviewed, interpreted, and dictated by Dr. Patricia Fritz MD Transcribed by Ceferino Spencer PA-C.  This report was signed and finalized on 10/4/2024 10:07 AM by Patricia Fritz MD.          MDM      Initial impression of presenting illness: Patient is a 74-year-old male without contributing health history.  Presents to the ER today with right knee pain status post injury.  Patient reports that he slipped fell on tile 1 week ago injuring knee.  Reports ecchymosis and edema to right knee.  Reports that he is able to bear weight.  Range of motion is intact.  Distal neurovascular intact.  Denies OTC medication or home remedy.  Denies alleviating exacerbating factors.    DDX: includes but is not limited to: Sprain fracture or other    Patient arrives stable with vitals interpreted by myself.     Pertinent features from physical exam: Posterior drawer sign is medial joint line pain with medial rotation.  No pain with lateral rotation..  Small lump consistent with hematoma..    Initial diagnostic plan: X-ray of right  knee    Results from initial plan were reviewed and interpreted by me revealing x-ray of right knee with the following impression no acute findings.  Degenerative changes    Diagnostic information from other sources: Chart review    Interventions / Re-evaluation: Vital signs stable throughout counter    Results/clinical rationale were discussed with patient    Consultations/Discussion of results with other physicians: N/A    Disposition plan:  patient is hemodynamically stable nontoxic-appearing appropriate discharge.  Will have patient follow-up with PCP 1 week.  Follow-up with orthopedics if not improving in 1 week.  -----        Final diagnoses:   Acute pain of right knee          Ahsan Camejo, APRN  10/04/24 1255     Yes

## 2024-10-04 NOTE — DISCHARGE INSTRUCTIONS
Lease follow-up with orthopedics if not improving in 1 week.  Follow-up ER for new or worse symptoms.  Please continue to monitor hematoma for signs of infection further swelling, hot to touch, pustule drainage.

## 2024-10-09 ENCOUNTER — TELEPHONE (OUTPATIENT)
Dept: PULMONOLOGY | Facility: CLINIC | Age: 74
End: 2024-10-09
Payer: MEDICARE

## 2024-10-18 ENCOUNTER — TELEPHONE (OUTPATIENT)
Dept: PULMONOLOGY | Facility: CLINIC | Age: 74
End: 2024-10-18
Payer: MEDICARE

## 2024-10-18 NOTE — TELEPHONE ENCOUNTER
Spoke with patient and he requested [facility] since they cover his insurance. We  have requested a copy of sleep study from [facility] Since we don't have record neither does [facility].

## 2024-10-24 ENCOUNTER — LAB (OUTPATIENT)
Dept: LAB | Facility: HOSPITAL | Age: 74
End: 2024-10-24
Payer: MEDICARE

## 2024-10-24 PROCEDURE — 84403 ASSAY OF TOTAL TESTOSTERONE: CPT | Performed by: NURSE PRACTITIONER

## 2024-10-24 PROCEDURE — 85014 HEMATOCRIT: CPT | Performed by: NURSE PRACTITIONER

## 2024-10-24 PROCEDURE — 85018 HEMOGLOBIN: CPT | Performed by: NURSE PRACTITIONER

## 2024-11-04 RX ORDER — TADALAFIL 20 MG/1
20 TABLET ORAL DAILY PRN
Qty: 20 TABLET | Refills: 2 | Status: SHIPPED | OUTPATIENT
Start: 2024-11-04

## 2024-11-06 DIAGNOSIS — E29.1 HYPOGONADISM IN MALE: ICD-10-CM

## 2024-11-06 DIAGNOSIS — R79.89 LOW TESTOSTERONE LEVEL IN MALE: ICD-10-CM

## 2024-11-06 RX ORDER — TESTOSTERONE CYPIONATE 200 MG/ML
INJECTION, SOLUTION INTRAMUSCULAR
Qty: 4 ML | OUTPATIENT
Start: 2024-11-06

## 2024-11-06 NOTE — TELEPHONE ENCOUNTER
Caller: Alia Yeboah     Relationship: [facility]    Best call back number: [phone]     Requested Prescriptions: TESTOSTERONE  Requested Prescriptions     Pending Prescriptions Disp Refills    Testosterone Cypionate (DEPOTESTOTERONE CYPIONATE) 200 MG/ML injection [Pharmacy Med Name: TESTOSTERONE  MG/ML] 4 mL      Sig: INJECT 0.5 MLS INTO THE APPROPRIATE MUSCLE AS DIRECTED BY PROVIDER EVERY 7 DAYS. CALL FOR REFILLS ON LAST INJECTION. DISCARD VIAL AFTER EACH USE.        Pharmacy where request should be sent: [facility] [id number] [address] AT [place] [phone] [facility] [phone] FX     Last office visit with prescribing clinician: 4/26/2024   Last telemedicine visit with prescribing clinician: Visit date not found   Next office visit with prescribing clinician: Visit date not found     Additional details provided by patient: PT TAKES SHOTS ON MONDAYS BUT HE MISSED HIS SHOT BECAUSE HE WAS OUT.    Does the patient have less than a 3 day supply:  [x] Yes  [] No    Would you like a call back once the refill request has been completed: [] Yes [x] No    If the office needs to give you a call back, can they leave a voicemail: [] Yes [x] No    Angel Zavala Rep   11/06/24 13:43 EST

## 2024-11-08 DIAGNOSIS — R79.89 LOW TESTOSTERONE LEVEL IN MALE: ICD-10-CM

## 2024-11-08 DIAGNOSIS — E29.1 HYPOGONADISM IN MALE: ICD-10-CM

## 2024-11-08 RX ORDER — TESTOSTERONE CYPIONATE 200 MG/ML
INJECTION, SOLUTION INTRAMUSCULAR
Qty: 4 ML | OUTPATIENT
Start: 2024-11-08

## 2024-11-11 ENCOUNTER — TELEPHONE (OUTPATIENT)
Dept: UROLOGY | Facility: CLINIC | Age: 74
End: 2024-11-11

## 2024-11-15 ENCOUNTER — OFFICE VISIT (OUTPATIENT)
Dept: INTERNAL MEDICINE | Facility: CLINIC | Age: 74
End: 2024-11-15
Payer: MEDICARE

## 2024-11-15 VITALS
WEIGHT: 315 LBS | HEART RATE: 72 BPM | HEIGHT: 70 IN | RESPIRATION RATE: 16 BRPM | TEMPERATURE: 97.7 F | SYSTOLIC BLOOD PRESSURE: 138 MMHG | DIASTOLIC BLOOD PRESSURE: 90 MMHG | BODY MASS INDEX: 45.1 KG/M2 | OXYGEN SATURATION: 93 %

## 2024-11-15 DIAGNOSIS — G47.33 OBSTRUCTIVE SLEEP APNEA SYNDROME: ICD-10-CM

## 2024-11-15 DIAGNOSIS — I10 BENIGN ESSENTIAL HYPERTENSION: ICD-10-CM

## 2024-11-15 DIAGNOSIS — E11.65 TYPE 2 DIABETES MELLITUS WITH HYPERGLYCEMIA, WITHOUT LONG-TERM CURRENT USE OF INSULIN: Primary | ICD-10-CM

## 2024-11-15 DIAGNOSIS — E03.9 ACQUIRED HYPOTHYROIDISM: ICD-10-CM

## 2024-11-15 PROCEDURE — 1159F MED LIST DOCD IN RCRD: CPT | Performed by: INTERNAL MEDICINE

## 2024-11-15 PROCEDURE — 1125F AMNT PAIN NOTED PAIN PRSNT: CPT | Performed by: INTERNAL MEDICINE

## 2024-11-15 PROCEDURE — 1160F RVW MEDS BY RX/DR IN RCRD: CPT | Performed by: INTERNAL MEDICINE

## 2024-11-15 PROCEDURE — 3075F SYST BP GE 130 - 139MM HG: CPT | Performed by: INTERNAL MEDICINE

## 2024-11-15 PROCEDURE — G0439 PPPS, SUBSEQ VISIT: HCPCS | Performed by: INTERNAL MEDICINE

## 2024-11-15 PROCEDURE — 3044F HG A1C LEVEL LT 7.0%: CPT | Performed by: INTERNAL MEDICINE

## 2024-11-15 PROCEDURE — 1170F FXNL STATUS ASSESSED: CPT | Performed by: INTERNAL MEDICINE

## 2024-11-15 PROCEDURE — 3080F DIAST BP >= 90 MM HG: CPT | Performed by: INTERNAL MEDICINE

## 2024-11-15 NOTE — PROGRESS NOTES
The ABCs of the Annual Wellness Visit  Subsequent Medicare Wellness Visit    Subjective      Freddie Yeboah Jr. is a 74 y.o. male who presents for a Subsequent Medicare Wellness Visit.    Review of Systems   Constitutional: Negative.  Positive for fatigue. Negative for activity change, appetite change and fever.   HENT:  Negative for congestion, ear discharge, ear pain and trouble swallowing.    Eyes:  Negative for photophobia and visual disturbance.   Respiratory:  Positive for cough and shortness of breath.    Cardiovascular:  Negative for chest pain and palpitations.   Gastrointestinal:  Negative for abdominal distention, abdominal pain, constipation, diarrhea, nausea and vomiting.   Endocrine: Negative.    Genitourinary:  Negative for dysuria, hematuria and urgency.   Musculoskeletal:  Positive for arthralgias. Negative for back pain, joint swelling and myalgias.   Skin:  Negative for color change and rash.   Allergic/Immunologic: Negative.    Neurological:  Negative for dizziness, weakness, light-headedness and headaches.   Hematological:  Negative for adenopathy. Does not bruise/bleed easily.   Psychiatric/Behavioral:  Negative for agitation, confusion and dysphoric mood. The patient is not nervous/anxious.         The following portions of the patient's history were reviewed and   updated as appropriate: allergies, current medications, past family history, past medical history, past social history, past surgical history, and problem list.    Compared to one year ago, the patient feels his physical   health is worse.    Compared to one year ago, the patient feels his mental   health is the same.    Recent Hospitalizations:  He was not admitted to the hospital during the last year.       Current Medical Providers:  Patient Care Team:  Damián Centeno MD as PCP - General (Internal Medicine)  Von Cochran MD as Consulting Physician (Internal Medicine)  Vikas Rodriguez MD as Consulting Physician  "(Interventional Cardiology)  Kendell Almodovar MD as Consulting Physician (Urology)  Valerie Hu MD as Consulting Physician (Pulmonary Disease)  Bravo Marquis DPM as Consulting Physician (Podiatry)    Outpatient Medications Prior to Visit   Medication Sig Dispense Refill    Alcohol Swabs pads Clean area prior to injection 100 each 11    anastrozole (ARIMIDEX) 1 MG tablet TAKE ONE TABLET BY MOUTH ONCE WEEKLY 12 tablet 3    aspirin 81 MG tablet Take 1 tablet by mouth Daily.      bumetanide (BUMEX) 2 MG tablet       calcipotriene (DOVONEX) 0.005 % cream       colchicine 0.6 MG tablet Take 1 tablet by mouth 2 (Two) Times a Day. 6 tablet 1    Eliquis 2.5 MG tablet tablet       febuxostat (ULORIC) 40 MG tablet       glucose blood test strip Use to test blood sugar level 3 times daily. DX: E11.9 300 each 3    glucose monitor monitoring kit Use meter to check blood sugar levels twice daily. 1 each 0    Insulin Glargine (Lantus SoloStar) 100 UNIT/ML injection pen INJECT 100 UNITS UNDER THE SKIN DAILY 30 mL 5    Insulin Lispro (HumaLOG) 100 UNIT/ML injection 30 units sq after meals      Insulin Lispro, 1 Unit Dial, (HUMALOG) 100 UNIT/ML solution pen-injector Inject 10 Units under the skin into the appropriate area as directed 3 (Three) Times a Day. 30 mL 3    Insulin Pen Needle ([employer] Pen Needles 31G) 31G X 8 MM misc USE TO INJECT INSULIN FIVE TIMES A  each 11    levothyroxine (SYNTHROID, LEVOTHROID) 75 MCG tablet TAKE ONE TABLET BY MOUTH DAILY 90 tablet 3    metOLazone (ZAROXOLYN) 2.5 MG tablet Take 1 tablet by mouth 2 (Two) Times a Day.      metoprolol tartrate (LOPRESSOR) 100 MG tablet Take 1 tablet by mouth 2 (Two) Times a Day.      Morphine (MSIR) 15 MG tablet Take 1 tablet by mouth 2 (Two) Times a Day. 3 tabs AM and 3 tabs PM      Needle, Disp, 18G X 1-1/2\" misc Use for drawing up the medication 50 each 3    Needle, Disp, 23G X 1-1/2\" misc Use 1 Device 1 (One) Time Per Week. 30 each 11    O2 " (OXYGEN) 2 LPM VIA NC      omeprazole (priLOSEC) 40 MG capsule TAKE ONE CAPSULE BY MOUTH DAILY 90 capsule 3    OneTouch Delica Lancets 33G misc Use to test blood sugar level 3 times daily. DX: E11.9 300 each 3    oxyCODONE-acetaminophen (PERCOCET)  MG per tablet Take  tablets by mouth As Needed. 3 times a day      simvastatin (ZOCOR) 20 MG tablet TAKE ONE TABLET BY MOUTH ONCE NIGHTLY 90 tablet 3    spironolactone (ALDACTONE) 25 MG tablet TAKE 1 TABLET BY MOUTH DAILY 90 tablet 3    Stiolto Respimat 2.5-2.5 MCG/ACT aerosol solution inhaler INHALE 2 INHALATION(S) BY MOUTH DAILY 8 g 2    Syringe, Disposable, 3 ML misc 1 syringe 1 (One) Time Per Week. 100 each 11    Syringe, Disposable, 3 ML misc Use 1 syringe 1 (One) Time Per Week. 100 each 11    tadalafil (CIALIS) 20 MG tablet TAKE 1 TABLET BY MOUTH DAILY AS NEEDED FOR ERECTILE DYSFUNCTION 20 tablet 2    telmisartan (MICARDIS) 20 MG tablet Take 1 tablet by mouth Daily.      Tradjenta 5 MG tablet tablet TAKE 1 TABLET BY MOUTH DAILY 90 tablet 3     No facility-administered medications prior to visit.       Opioid medication/s are on active medication list.  and I have evaluated his active treatment plan and pain score trends (see table).  Vitals:    11/15/24 1516   PainSc:   8   PainLoc: Generalized     I have reviewed the chart for potential of high risk medication and harmful drug interactions in the elderly.          Aspirin is on active medication list. Aspirin use is indicated based on review of current medical condition/s. Pros and cons of this therapy have been discussed today. Benefits of this medication outweigh potential harm.  Patient has been encouraged to continue taking this medication.  .      Patient Active Problem List   Diagnosis    Asthma    Atrial fibrillation    Benign essential hypertension    Benign prostatic hyperplasia    Gastroesophageal reflux disease    Hyperlipidemia    Acquired hypothyroidism    Obstructive sleep apnea syndrome  "   Carotid bruit    Cor pulmonale    Pulmonary HTN    Type 2 diabetes mellitus with hyperglycemia    Degeneration of cervical intervertebral disc    Obesity due to excess calories with serious comorbidity     Advance Care Planning  Advance Directive is not on file.  ACP discussion was held with the patient during this visit. Patient does not have an advance directive, information provided.     Objective    Vitals:    11/15/24 1516   BP: 138/90   Pulse: 72   Resp: 16   Temp: 97.7 °F (36.5 °C)   SpO2: 93%   Weight: (!) 154 kg (340 lb)   Height: 177.8 cm (70\")   PF: (!) 2 L/min   PainSc:   8   PainLoc: Generalized     Estimated body mass index is 48.78 kg/m² as calculated from the following:    Height as of this encounter: 177.8 cm (70\").    Weight as of this encounter: 154 kg (340 lb).    Physical Exam  Constitutional:       General: He is not in acute distress.     Appearance: He is well-developed.   HENT:      Nose: Nose normal.   Eyes:      General: No scleral icterus.     Conjunctiva/sclera: Conjunctivae normal.   Neck:      Thyroid: No thyromegaly.      Trachea: No tracheal deviation.   Cardiovascular:      Rate and Rhythm: Normal rate and regular rhythm.      Heart sounds: No murmur heard.     No friction rub.   Pulmonary:      Effort: No respiratory distress.      Breath sounds: No wheezing or rales.      Comments: O2 via NC at 2l/min  Abdominal:      General: There is no distension.      Palpations: Abdomen is soft. There is no mass.      Tenderness: There is no abdominal tenderness. There is no guarding.   Musculoskeletal:         General: Deformity present. Normal range of motion.   Lymphadenopathy:      Cervical: No cervical adenopathy.   Skin:     General: Skin is warm and dry.      Findings: No erythema or rash.   Neurological:      Mental Status: He is alert and oriented to person, place, and time.      Cranial Nerves: No cranial nerve deficit.      Coordination: Coordination normal.      Deep Tendon " Reflexes: Reflexes are normal and symmetric.   Psychiatric:         Behavior: Behavior normal.         Thought Content: Thought content normal.         Judgment: Judgment normal.              Does the patient have evidence of cognitive impairment?   No            HEALTH RISK ASSESSMENT    Smoking Status:  Social History     Tobacco Use   Smoking Status Former    Current packs/day: 0.00    Average packs/day: 3.0 packs/day for 34.0 years (102.0 ttl pk-yrs)    Types: Cigarettes    Start date:     Quit date:     Years since quittin.8    Passive exposure: Past   Smokeless Tobacco Never     Alcohol Consumption:  Social History     Substance and Sexual Activity   Alcohol Use No     Fall Risk Screen:    STEADI Fall Risk Assessment was completed, and patient is at HIGH risk for falls. Assessment completed on:11/15/2024    Depression Screenin/15/2024     3:21 PM   PHQ-2/PHQ-9 Depression Screening   Little interest or pleasure in doing things Not at all   Feeling down, depressed, or hopeless Not at all   How difficult have these problems made it for you to do your work, take care of things at home, or get along with other people? Not difficult at all       Health Habits and Functional and Cognitive Screenin/15/2024     3:19 PM   Functional & Cognitive Status   Do you have difficulty preparing food and eating? No   Do you have difficulty bathing yourself, getting dressed or grooming yourself? No   Do you have difficulty using the toilet? No   Do you have difficulty moving around from place to place? No   Do you have trouble with steps or getting out of a bed or a chair? No   Current Diet Unhealthy Diet   Dental Exam Up to date   Eye Exam Up to date   Exercise (times per week) 0 times per week   Current Exercises Include No Regular Exercise   Do you need help using the phone?  No   Are you deaf or do you have serious difficulty hearing?  No   Do you need help to go to places out of walking  distance? No   Do you need help shopping? No   Do you need help preparing meals?  No   Do you need help with housework?  No   Do you need help with laundry? No   Do you need help taking your medications? No   Do you need help managing money? No   Do you ever drive or ride in a car without wearing a seat belt? Yes   Have you felt unusual stress, anger or loneliness in the last month? No   Who do you live with? Spouse   If you need help, do you have trouble finding someone available to you? No   Have you been bothered in the last four weeks by sexual problems? No   Do you have difficulty concentrating, remembering or making decisions? No       Age-appropriate Screening Schedule:  Refer to the list below for future screening recommendations based on patient's age, sex and/or medical conditions. Orders for these recommended tests are listed in the plan section. The patient has been provided with a written plan.    Health Maintenance   Topic Date Due    AAA SCREEN (ONE-TIME)  Never done    COLORECTAL CANCER SCREENING  11/08/2023    LIPID PANEL  12/19/2023    ANNUAL WELLNESS VISIT  10/05/2024    HEMOGLOBIN A1C  01/12/2025    COVID-19 Vaccine (5 - 2024-25 season) 02/04/2025 (Originally 9/1/2024)    INFLUENZA VACCINE  03/31/2025 (Originally 8/1/2024)    BMI FOLLOWUP  07/12/2025    DIABETIC EYE EXAM  08/20/2025    TDAP/TD VACCINES (3 - Td or Tdap) 08/13/2034    HEPATITIS C SCREENING  Completed    Pneumococcal Vaccine 65+  Completed    ZOSTER VACCINE  Addressed    URINE MICROALBUMIN  Discontinued    LUNG CANCER SCREENING  Discontinued                CMS Preventative Services Quick Reference  Risk Factors Identified During Encounter:    Chronic Pain: Natural history and expected course discussed. Questions answered.  Polypharmacy: Medication List reviewed and Medications are appropriate for patient    The above risks/problems have been discussed with the patient.  Pertinent information has been shared with the patient in the  After Visit Summary.    Diagnoses and all orders for this visit:    1. Type 2 diabetes mellitus with hyperglycemia, without long-term current use of insulin (Primary) continue with the dietary restriction on insulin quick-acting and basal.  Follow A1c    2. Benign essential hypertension stable with current meds and low-salt diet    3. Acquired hypothyroidism clinically euthyroid follow TSH    4. Obstructive sleep apnea syndrome compliant with CPAP use denies daytime somnolence        Follow Up:   Next Medicare Wellness visit to be scheduled in 1 year.      An After Visit Summary and PPPS were made available to the patient.

## 2024-12-02 ENCOUNTER — TELEPHONE (OUTPATIENT)
Dept: UROLOGY | Facility: CLINIC | Age: 74
End: 2024-12-02

## 2024-12-02 NOTE — TELEPHONE ENCOUNTER
Hub staff attempted to follow warm transfer process and was unsuccessful     Caller: Alia Yeboah     Relationship to patient: [employer]OUSE    Best call back number: [phone]     Patient is needing: PT'S WIFE CALLED TO VERIFY HIS NEXT APPT BUT IT WAS LAST WEEK AND PT MISSED IT.    SHE STATES PT IS OUT OF HIS TESTOSTERONE AND NEEDS AN APPT ASAP. PLEASE GIVE HER A CALL BACK TO SCHEDULE HIS APPT.    HE USES   [facility] [id number] - [place], [place] - [address] AT [facility] [phone] [facility] [phone] FX

## 2024-12-03 NOTE — TELEPHONE ENCOUNTER
Tried to call patient spouse back and was unable to reach her with an appointment for patient.  Patient will need to get his labs done a week before his appointment    Patient is schedule 12/30/2024 @ 01:40 PM    OKAY FOR HUB TO RELAY MESSAGE    Lor JESSICA

## 2024-12-11 ENCOUNTER — OFFICE VISIT (OUTPATIENT)
Dept: PULMONOLOGY | Facility: CLINIC | Age: 74
End: 2024-12-11
Payer: MEDICARE

## 2024-12-11 VITALS
OXYGEN SATURATION: 90 % | SYSTOLIC BLOOD PRESSURE: 124 MMHG | DIASTOLIC BLOOD PRESSURE: 75 MMHG | HEART RATE: 63 BPM | RESPIRATION RATE: 18 BRPM | BODY MASS INDEX: 45.1 KG/M2 | HEIGHT: 70 IN | WEIGHT: 315 LBS

## 2024-12-11 DIAGNOSIS — G47.33 OSA (OBSTRUCTIVE SLEEP APNEA): Primary | ICD-10-CM

## 2024-12-11 DIAGNOSIS — E66.01 MORBID OBESITY WITH BMI OF 45.0-49.9, ADULT: ICD-10-CM

## 2024-12-11 DIAGNOSIS — R06.02 SHORTNESS OF BREATH: ICD-10-CM

## 2024-12-11 DIAGNOSIS — J44.9 CHRONIC OBSTRUCTIVE PULMONARY DISEASE, UNSPECIFIED COPD TYPE: ICD-10-CM

## 2024-12-11 PROCEDURE — 3074F SYST BP LT 130 MM HG: CPT | Performed by: NURSE PRACTITIONER

## 2024-12-11 PROCEDURE — 99214 OFFICE O/P EST MOD 30 MIN: CPT | Performed by: NURSE PRACTITIONER

## 2024-12-11 PROCEDURE — 3078F DIAST BP <80 MM HG: CPT | Performed by: NURSE PRACTITIONER

## 2024-12-11 RX ORDER — TIOTROPIUM BROMIDE AND OLODATEROL 3.124; 2.736 UG/1; UG/1
2 SPRAY, METERED RESPIRATORY (INHALATION) DAILY
Qty: 12 G | Refills: 2 | Status: SHIPPED | OUTPATIENT
Start: 2024-12-11

## 2024-12-11 RX ORDER — ALBUTEROL SULFATE 90 UG/1
2 INHALANT RESPIRATORY (INHALATION) EVERY 4 HOURS PRN
Qty: 18 G | Refills: 3 | Status: SHIPPED | OUTPATIENT
Start: 2024-12-11

## 2024-12-11 NOTE — PROGRESS NOTES
"Chief Complaint   Patient presents with    Follow-up    Shortness of Breath         Subjective   Freddie Yeboah Jr. is a 74 y.o. male.   The patient is here for follow-up of shortness of breath and sleep issues.    I reviewed the patient's titration study from August and discussed the results with him today.  The study was a BiPAP titration and the patient tolerated BiPAP better.  He was noted to have a good response to a final pressure of BiPAP 20/10.  He did have persistent nocturnal hypoxia.    It was noted he had a poor REM sleep percentage.  Somewhat incomplete response to BiPAP titration.    He has been set up with BiPAP 20/10 and has 2 L of oxygen bled into his machine. He states he tolerates BiPAP and uses it much better. He feels he sleeps better and longer. He gets up to urinate multiple times at night. He states he has some kidney failure.    He states he only sleeps between 3-4 hours.     He continues using Stiolto daily.    He quit smoking 20 years ago.    He uses oxygen 2 LPM continuously.       The following portions of the patient's history were reviewed and updated as appropriate: allergies, current medications, past family history, past medical history, past social history, and past surgical history.      Review of Systems   HENT:  Negative for sinus pressure, sneezing and sore throat.    Respiratory:  Positive for chest tightness and shortness of breath. Negative for cough and wheezing.    Psychiatric/Behavioral:  Negative for sleep disturbance.        Objective   Visit Vitals  /75   Pulse 63   Resp 18   Ht 177.8 cm (70\") Comment: pt reported   Wt (!) 153 kg (337 lb 9.6 oz)   SpO2 90% Comment: on RA at rest   BMI 48.44 kg/m²         Physical Exam  Vitals reviewed.   HENT:      Head: Atraumatic.      Mouth/Throat:      Mouth: Mucous membranes are moist.      Comments: Crowded oropharynx.  Eyes:      Extraocular Movements: Extraocular movements intact.   Cardiovascular:      Rate and Rhythm: " Normal rate and regular rhythm.   Pulmonary:      Effort: Pulmonary effort is normal. No respiratory distress.      Comments: Minimal crackles noted.  Abdominal:      Comments: Obese abdomen.    Skin:     General: Skin is warm.   Neurological:      Mental Status: He is alert and oriented to person, place, and time.           Assessment & Plan   Diagnoses and all orders for this visit:    1. LUIS FERNANDO (obstructive sleep apnea) (Primary)  -     BIPAP / CPAP Adjustment    2. Chronic obstructive pulmonary disease, unspecified COPD type    3. Morbid obesity with BMI of 45.0-49.9, adult    4. Shortness of breath  -     tiotropium bromide-olodaterol (Stiolto Respimat) 2.5-2.5 MCG/ACT aerosol solution inhaler; Inhale 2 puffs Daily.  Dispense: 12 g; Refill: 2    Other orders  -     albuterol sulfate HFA (Ventolin HFA) 108 (90 Base) MCG/ACT inhaler; Inhale 2 puffs Every 4 (Four) Hours As Needed for Wheezing or Shortness of Air.  Dispense: 18 g; Refill: 3           Return in about 5 months (around 5/11/2025) for Recheck, For Dr. Hu.    DISCUSSION (if any):  PFT 6/2023 consistent with moderate obstruction.     No change to the current medications has been made.  Continue Stiolto daily.  I have added a rescue inhaler and discussed with the patient when he should use the rescue medication.    Compliance with medications stressed.     Side effects of prescribed medications discussed with the patient    Severe sleep apnea noted in previous pulmonology notes.  No sleep study result available.    BiPAP titration study August 2024.    DME company: [employer]     Current PAP settings: 20/10  Current mask type: FFM    Continue using oxygen bled into BiPAP nightly.     Continue treatment with BiPAP, with a full-face mask.    Patient's compliance data was reviewed and he is not compliant. His mask is leaking consistently. Residual AHI improved but still elevated at 13/hour.  Elevated AHI is likely due to today consistent high leak from the  mask.    The patient states he feels the leak nightly and feels it is due to his beard.    I will place order to increase BiPAP to 22/12 cm.    Humidification setup, hose and mask care discussed.    Weight loss advised.    Use every night for at least 4 hours stressed.       Dictated utilizing Dragon dictation.    This document was electronically signed by SHIRA Loomis December 11, 2024  12:49 EST

## 2024-12-12 ENCOUNTER — OFFICE VISIT (OUTPATIENT)
Dept: UROLOGY | Facility: CLINIC | Age: 74
End: 2024-12-12
Payer: MEDICARE

## 2024-12-12 VITALS
TEMPERATURE: 97 F | OXYGEN SATURATION: 96 % | HEIGHT: 70 IN | HEART RATE: 64 BPM | DIASTOLIC BLOOD PRESSURE: 80 MMHG | SYSTOLIC BLOOD PRESSURE: 128 MMHG | WEIGHT: 315 LBS | BODY MASS INDEX: 45.1 KG/M2

## 2024-12-12 DIAGNOSIS — E29.1 HYPOGONADISM IN MALE: Primary | ICD-10-CM

## 2024-12-12 DIAGNOSIS — R97.20 ELEVATED PROSTATE SPECIFIC ANTIGEN (PSA): ICD-10-CM

## 2024-12-12 RX ORDER — TESTOSTERONE CYPIONATE 200 MG/ML
100 INJECTION, SOLUTION INTRAMUSCULAR
Qty: 4 ML | Refills: 0 | Status: SHIPPED | OUTPATIENT
Start: 2024-12-12

## 2024-12-15 NOTE — PROGRESS NOTES
Office Visit Established Male Patient     Patient Name: Freddie Yeboah Jr.  : 1950   MRN: [id number]     Chief Complaint:   Chief Complaint   Patient presents with    Follow-up     Hypogonadism and PSA        History of Present Illness: Mr. Freddie Yeboah Jr. is a 74 y.o. male who presents today for follow up with Elevated PSA and hypogonadism.  He wants to restart his testosterone therapy he has been off for a few weeks and feels fatigue. He just wants to feel better again.       Subjective      Review of System:   As noted in HPI    Past Medical History:   Past Medical History:   Diagnosis Date    Arthritis     shoulder, knee. multi joint     Atrial fibrillation     Back pain     CHF (congestive heart failure)     Chronic kidney disease     stage III due to diabetes    Diabetes mellitus     several years; checks sugars at home-usually run 100-112    Diabetic nephropathy     Disease of thyroid gland     hypothyroidism    GERD (gastroesophageal reflux disease)     Hypertension     Low testosterone     Sleep apnea     setting of 11    Urinary tract infection     most recent diagnosis 17 in the ER (+3 bacteria)-started on Macrobid and rechecked by PCP on 17       Past Surgical History:   Past Surgical History:   Procedure Laterality Date    ANTERIOR CERVICAL FUSION      APPENDECTOMY      BACK SURGERY      CARPAL TUNNEL RELEASE Bilateral     CATARACT EXTRACTION      CHOLECYSTECTOMY      COLONOSCOPY      FINGER/THUMB ARTHROPLASTY Left     thumb replacement    WA ARTHROPLASTY GLENOHUMERAL JOINT TOTAL SHOULDER Left 2017    Procedure: TOTAL SHOULDER ARTHROPLASTY LEFT;  Surgeon: William Ray MD;  Location: [facility];  Service: Orthopedics    SHOULDER SURGERY      TOTAL KNEE ARTHROPLASTY Left        Family History:   Family History   Problem Relation Age of Onset    Heart attack Mother     Diabetes Mother     Heart attack Father     Diabetes Sister     Thyroid disease Sister     COPD Brother      Stroke Brother     Cancer Brother        Social History:   Social History     Socioeconomic History    Marital status:    Tobacco Use    Smoking status: Former     Current packs/day: 0.00     Average packs/day: 3.0 packs/day for 34.0 years (102.0 ttl pk-yrs)     Types: Cigarettes     Start date:      Quit date:      Years since quittin.9     Passive exposure: Past    Smokeless tobacco: Never   Vaping Use    Vaping status: Never Used   Substance and Sexual Activity    Alcohol use: No    Drug use: No    Sexual activity: Yes     Partners: Female       Medications:     Current Outpatient Medications:     albuterol sulfate HFA (Ventolin HFA) 108 (90 Base) MCG/ACT inhaler, Inhale 2 puffs Every 4 (Four) Hours As Needed for Wheezing or Shortness of Air., Disp: 18 g, Rfl: 3    Alcohol Swabs pads, Clean area prior to injection, Disp: 100 each, Rfl: 11    anastrozole (ARIMIDEX) 1 MG tablet, TAKE ONE TABLET BY MOUTH ONCE WEEKLY, Disp: 12 tablet, Rfl: 3    aspirin 81 MG tablet, Take 1 tablet by mouth Daily., Disp: , Rfl:     bumetanide (BUMEX) 2 MG tablet, , Disp: , Rfl:     calcipotriene (DOVONEX) 0.005 % cream, , Disp: , Rfl:     colchicine 0.6 MG tablet, Take 1 tablet by mouth 2 (Two) Times a Day., Disp: 6 tablet, Rfl: 1    Eliquis 2.5 MG tablet tablet, , Disp: , Rfl:     febuxostat (ULORIC) 40 MG tablet, , Disp: , Rfl:     glucose blood test strip, Use to test blood sugar level 3 times daily. DX: E11.9, Disp: 300 each, Rfl: 3    glucose monitor monitoring kit, Use meter to check blood sugar levels twice daily., Disp: 1 each, Rfl: 0    Insulin Glargine (Lantus SoloStar) 100 UNIT/ML injection pen, INJECT 100 UNITS UNDER THE SKIN DAILY, Disp: 30 mL, Rfl: 5    Insulin Lispro (HumaLOG) 100 UNIT/ML injection, 30 units sq after meals, Disp: , Rfl:     Insulin Lispro, 1 Unit Dial, (HUMALOG) 100 UNIT/ML solution pen-injector, Inject 10 Units under the skin into the appropriate area as directed 3 (Three) Times a  "Day., Disp: 30 mL, Rfl: 3    Insulin Pen Needle (Kroger Pen Needles 31G) 31G X 8 MM misc, USE TO INJECT INSULIN FIVE TIMES A DAY, Disp: 200 each, Rfl: 11    levothyroxine (SYNTHROID, LEVOTHROID) 75 MCG tablet, TAKE ONE TABLET BY MOUTH DAILY, Disp: 90 tablet, Rfl: 3    metOLazone (ZAROXOLYN) 2.5 MG tablet, Take 1 tablet by mouth 2 (Two) Times a Day., Disp: , Rfl:     metoprolol tartrate (LOPRESSOR) 100 MG tablet, Take 1 tablet by mouth 2 (Two) Times a Day., Disp: , Rfl:     Morphine (MSIR) 15 MG tablet, Take 1 tablet by mouth 2 (Two) Times a Day. 3 tabs AM and 3 tabs PM, Disp: , Rfl:     Needle, Disp, 18G X 1-1/2\" misc, Use for drawing up the medication, Disp: 50 each, Rfl: 3    Needle, Disp, 23G X 1-1/2\" misc, Use 1 Device 1 (One) Time Per Week., Disp: 30 each, Rfl: 11    O2 (OXYGEN), 2 LPM VIA NC, Disp: , Rfl:     omeprazole (priLOSEC) 40 MG capsule, TAKE ONE CAPSULE BY MOUTH DAILY, Disp: 90 capsule, Rfl: 3    OneTouch Delica Lancets 33G misc, Use to test blood sugar level 3 times daily. DX: E11.9, Disp: 300 each, Rfl: 3    oxyCODONE-acetaminophen (PERCOCET)  MG per tablet, Take  tablets by mouth As Needed. 3 times a day, Disp: , Rfl:     simvastatin (ZOCOR) 20 MG tablet, TAKE ONE TABLET BY MOUTH ONCE NIGHTLY, Disp: 90 tablet, Rfl: 3    spironolactone (ALDACTONE) 25 MG tablet, TAKE 1 TABLET BY MOUTH DAILY, Disp: 90 tablet, Rfl: 3    Syringe, Disposable, 3 ML misc, 1 syringe 1 (One) Time Per Week., Disp: 100 each, Rfl: 11    Syringe, Disposable, 3 ML misc, Use 1 syringe 1 (One) Time Per Week., Disp: 100 each, Rfl: 11    tadalafil (CIALIS) 20 MG tablet, TAKE 1 TABLET BY MOUTH DAILY AS NEEDED FOR ERECTILE DYSFUNCTION, Disp: 20 tablet, Rfl: 2    telmisartan (MICARDIS) 20 MG tablet, Take 1 tablet by mouth Daily., Disp: , Rfl:     tiotropium bromide-olodaterol (Stiolto Respimat) 2.5-2.5 MCG/ACT aerosol solution inhaler, Inhale 2 puffs Daily., Disp: 12 g, Rfl: 2    Tradjenta 5 MG tablet tablet, TAKE 1 TABLET " "BY MOUTH DAILY, Disp: 90 tablet, Rfl: 3    Testosterone Cypionate (Depo-Testosterone) 200 MG/ML injection, Inject 0.5 mL into the appropriate muscle as directed by prescriber Every 7 (Seven) Days. Draw up 0.5mL to inject, waste the remainder of vial. Call for refills with last injection, Disp: 4 mL, Rfl: 0    Allergies:   Allergies   Allergen Reactions    Duloxetine Itching and Rash    Sulfamethoxazole-Trimethoprim Other (See Comments)     Had to be hospitalized in ICU; renal impairment     Wellbutrin [Bupropion] Itching and Swelling       Objective     Physical Exam:   Vital Signs:   Vitals:    12/12/24 1620   BP: 128/80   BP Location: Right arm   Patient Position: Sitting   Cuff Size: Adult   Pulse: 64   Temp: 97 °F (36.1 °C)   TempSrc: Temporal   SpO2: 96%   Weight: (!) 153 kg (337 lb)   Height: 177.8 cm (70\")     Body mass index is 48.35 kg/m².     Physical Exam  Vitals and nursing note reviewed.   Constitutional:       General: He is not in acute distress.     Appearance: Normal appearance. He is morbidly obese. He is not ill-appearing.   Pulmonary:      Effort: Pulmonary effort is normal. No respiratory distress.      Comments: O2 NC  Skin:     General: Skin is warm and dry.   Neurological:      General: No focal deficit present.      Mental Status: He is alert and oriented to person, place, and time.   Psychiatric:         Mood and Affect: Mood normal.         Behavior: Behavior normal.        Labs  Brief Urine Lab Results  (Last result in the past 365 days)        Color   Clarity   Blood   Leuk Est   Nitrite   Protein   CREAT   Urine HCG        01/05/24 1141             68.4                 Lab Results   Component Value Date    GLUCOSE 147 (H) 05/20/2024    CALCIUM 9.4 05/20/2024     05/20/2024    K 4.4 05/20/2024    CO2 34.0 (H) 05/20/2024    CL 95 (L) 05/20/2024    BUN 59 (H) 05/20/2024    CREATININE 2.85 (H) 05/20/2024    EGFRIFAFRI 46 (L) 01/18/2022    EGFRIFNONA 38 (L) 01/18/2022    BCR 20.7 " 05/20/2024    ANIONGAP 8.0 05/20/2024       Lab Results   Component Value Date    WBC 9.84 05/04/2023    HGB 13.1 10/24/2024    HCT 44.4 10/24/2024    MCV 72.4 (L) 05/04/2023     05/04/2023            Radiographic Studies  XR Knee 3 View Right    Result Date: 10/4/2024  Degenerative change as above..      Images were reviewed, interpreted, and dictated by Dr. Patricia Fritz MD Transcribed by Ceferino Spencer PA-C.  This report was signed and finalized on 10/4/2024 10:07 AM by Patricia Fritz MD.        I have reviewed the above labs and imaging.      Assessment / Plan      Assessment/Plan:   Diagnoses and all orders for this visit:    1. Hypogonadism in male (Primary)  -     Testosterone Cypionate (Depo-Testosterone) 200 MG/ML injection; Inject 0.5 mL into the appropriate muscle as directed by prescriber Every 7 (Seven) Days. Draw up 0.5mL to inject, waste the remainder of vial. Call for refills with last injection  Dispense: 4 mL; Refill: 0    2. Elevated prostate specific antigen (PSA)  -     MRI prostate w wo contrast; Future     75 yo male PMH DMII, LUIS FERNANDO, CHF, CKDIII, and A fib here for discussion of 4K score results. We reviewed his score is intermediate this puts him at a 19.6% risk of finding an aggressive prostate Ca if he moves to biopsy. He agrees to a prostate MRI.  We also has a discussion of the risk of testosterone worsening an undiagnosed prostate ca. Due to his chronic health morbidities he accepts the risk an will continue taking until he gets his MRI results then will reevaluate continuing testosterone.     Restart testosterone 100 mg weekly  Obtain Prostate MRI    Follow Up:   Return in about 4 weeks (around 1/9/2025) for Follow up Thais.    SHIRA Alcaraz,NP-C  [facility]

## 2024-12-17 ENCOUNTER — TELEPHONE (OUTPATIENT)
Dept: UROLOGY | Facility: CLINIC | Age: 74
End: 2024-12-17
Payer: MEDICARE

## 2024-12-17 NOTE — TELEPHONE ENCOUNTER
I have scheduled this patient & notified by phone & mail concerning his MRI & fu appt with Thais..

## 2024-12-30 ENCOUNTER — TRANSCRIBE ORDERS (OUTPATIENT)
Dept: LAB | Facility: HOSPITAL | Age: 74
End: 2024-12-30
Payer: MEDICARE

## 2024-12-30 ENCOUNTER — LAB (OUTPATIENT)
Dept: LAB | Facility: HOSPITAL | Age: 74
End: 2024-12-30
Payer: MEDICARE

## 2024-12-30 DIAGNOSIS — N25.81 SECONDARY HYPERPARATHYROIDISM OF RENAL ORIGIN: ICD-10-CM

## 2024-12-30 DIAGNOSIS — E11.21 DIABETIC GLOMERULOPATHY: ICD-10-CM

## 2024-12-30 DIAGNOSIS — M19.90 SENILE ARTHRITIS: ICD-10-CM

## 2024-12-30 DIAGNOSIS — N18.4 CHRONIC KIDNEY DISEASE, STAGE IV (SEVERE): Primary | ICD-10-CM

## 2024-12-30 DIAGNOSIS — N18.4 CHRONIC KIDNEY DISEASE, STAGE IV (SEVERE): ICD-10-CM

## 2024-12-30 LAB
ALBUMIN SERPL-MCNC: 4.1 G/DL (ref 3.5–5.2)
ALBUMIN/GLOB SERPL: 1.2 G/DL
ALP SERPL-CCNC: 86 U/L (ref 39–117)
ALT SERPL W P-5'-P-CCNC: 26 U/L (ref 1–41)
ANION GAP SERPL CALCULATED.3IONS-SCNC: 10 MMOL/L (ref 5–15)
AST SERPL-CCNC: 31 U/L (ref 1–40)
BASOPHILS # BLD AUTO: 0.04 10*3/MM3 (ref 0–0.2)
BASOPHILS NFR BLD AUTO: 0.5 % (ref 0–1.5)
BILIRUB SERPL-MCNC: 0.5 MG/DL (ref 0–1.2)
BUN SERPL-MCNC: 79 MG/DL (ref 8–23)
BUN/CREAT SERPL: 24.5 (ref 7–25)
CALCIUM SPEC-SCNC: 10.3 MG/DL (ref 8.6–10.5)
CHLORIDE SERPL-SCNC: 96 MMOL/L (ref 98–107)
CO2 SERPL-SCNC: 32 MMOL/L (ref 22–29)
CREAT SERPL-MCNC: 3.22 MG/DL (ref 0.76–1.27)
DEPRECATED RDW RBC AUTO: 53.2 FL (ref 37–54)
EGFRCR SERPLBLD CKD-EPI 2021: 19.4 ML/MIN/1.73
EOSINOPHIL # BLD AUTO: 0.25 10*3/MM3 (ref 0–0.4)
EOSINOPHIL NFR BLD AUTO: 3.2 % (ref 0.3–6.2)
ERYTHROCYTE [DISTWIDTH] IN BLOOD BY AUTOMATED COUNT: 17.9 % (ref 12.3–15.4)
GLOBULIN UR ELPH-MCNC: 3.3 GM/DL
GLUCOSE SERPL-MCNC: 203 MG/DL (ref 65–99)
HBA1C MFR BLD: 8.7 % (ref 4.8–5.6)
HCT VFR BLD AUTO: 42.7 % (ref 37.5–51)
HGB BLD-MCNC: 13.5 G/DL (ref 13–17.7)
IMM GRANULOCYTES # BLD AUTO: 0.06 10*3/MM3 (ref 0–0.05)
IMM GRANULOCYTES NFR BLD AUTO: 0.8 % (ref 0–0.5)
LYMPHOCYTES # BLD AUTO: 3.29 10*3/MM3 (ref 0.7–3.1)
LYMPHOCYTES NFR BLD AUTO: 42.7 % (ref 19.6–45.3)
MCH RBC QN AUTO: 26.6 PG (ref 26.6–33)
MCHC RBC AUTO-ENTMCNC: 31.6 G/DL (ref 31.5–35.7)
MCV RBC AUTO: 84.1 FL (ref 79–97)
MONOCYTES # BLD AUTO: 0.82 10*3/MM3 (ref 0.1–0.9)
MONOCYTES NFR BLD AUTO: 10.6 % (ref 5–12)
NEUTROPHILS NFR BLD AUTO: 3.24 10*3/MM3 (ref 1.7–7)
NEUTROPHILS NFR BLD AUTO: 42.2 % (ref 42.7–76)
NRBC BLD AUTO-RTO: 0 /100 WBC (ref 0–0.2)
PLATELET # BLD AUTO: 157 10*3/MM3 (ref 140–450)
PMV BLD AUTO: 10.5 FL (ref 6–12)
POTASSIUM SERPL-SCNC: 4.6 MMOL/L (ref 3.5–5.2)
PROT SERPL-MCNC: 7.4 G/DL (ref 6–8.5)
PTH-INTACT SERPL-MCNC: 98.7 PG/ML (ref 15–65)
RBC # BLD AUTO: 5.08 10*6/MM3 (ref 4.14–5.8)
SODIUM SERPL-SCNC: 138 MMOL/L (ref 136–145)
URATE SERPL-MCNC: 6.7 MG/DL (ref 3.4–7)
WBC NRBC COR # BLD AUTO: 7.7 10*3/MM3 (ref 3.4–10.8)

## 2024-12-30 PROCEDURE — 36415 COLL VENOUS BLD VENIPUNCTURE: CPT

## 2024-12-30 PROCEDURE — 80053 COMPREHEN METABOLIC PANEL: CPT

## 2024-12-30 PROCEDURE — 85025 COMPLETE CBC W/AUTO DIFF WBC: CPT

## 2024-12-30 PROCEDURE — 83970 ASSAY OF PARATHORMONE: CPT

## 2024-12-30 PROCEDURE — 83036 HEMOGLOBIN GLYCOSYLATED A1C: CPT

## 2024-12-30 PROCEDURE — 84550 ASSAY OF BLOOD/URIC ACID: CPT

## 2025-01-04 DIAGNOSIS — E29.1 HYPOGONADISM IN MALE: ICD-10-CM

## 2025-01-07 ENCOUNTER — HOSPITAL ENCOUNTER (OUTPATIENT)
Dept: MRI IMAGING | Facility: HOSPITAL | Age: 75
Discharge: HOME | End: 2025-01-07
Admitting: NURSE PRACTITIONER
Payer: MEDICARE

## 2025-01-07 DIAGNOSIS — R97.20 ELEVATED PROSTATE SPECIFIC ANTIGEN (PSA): ICD-10-CM

## 2025-01-07 PROCEDURE — 72195 MRI PELVIS W/O DYE: CPT

## 2025-01-07 RX ORDER — ANASTROZOLE 1 MG/1
1 TABLET ORAL WEEKLY
Qty: 12 TABLET | Refills: 0 | Status: SHIPPED | OUTPATIENT
Start: 2025-01-07 | End: 2025-01-08 | Stop reason: SDDI

## 2025-01-08 ENCOUNTER — TELEPHONE (OUTPATIENT)
Dept: UROLOGY | Facility: CLINIC | Age: 75
End: 2025-01-08
Payer: MEDICARE

## 2025-01-08 NOTE — TELEPHONE ENCOUNTER
Please call [facility] pharmacy and cancel his testosterone and anastrozole prescriptions. He will have to have his Mri to continue his medications due to his elevated PSA.

## 2025-01-14 ENCOUNTER — OFFICE VISIT (OUTPATIENT)
Dept: UROLOGY | Facility: CLINIC | Age: 75
End: 2025-01-14
Payer: MEDICARE

## 2025-01-14 VITALS
TEMPERATURE: 96.9 F | HEIGHT: 70 IN | WEIGHT: 315 LBS | HEART RATE: 63 BPM | BODY MASS INDEX: 45.1 KG/M2 | RESPIRATION RATE: 22 BRPM | OXYGEN SATURATION: 94 %

## 2025-01-14 DIAGNOSIS — E66.01 MORBID (SEVERE) OBESITY DUE TO EXCESS CALORIES: ICD-10-CM

## 2025-01-14 DIAGNOSIS — Z99.81 OXYGEN DEPENDENT: ICD-10-CM

## 2025-01-14 DIAGNOSIS — R97.20 ELEVATED PROSTATE SPECIFIC ANTIGEN (PSA): Primary | ICD-10-CM

## 2025-01-15 PROBLEM — I87.309 VENOUS HYPERTENSION OF LOWER EXTREMITY: Status: ACTIVE | Noted: 2020-09-12

## 2025-01-15 PROBLEM — D50.8 IRON DEFICIENCY ANEMIA SECONDARY TO INADEQUATE DIETARY IRON INTAKE: Status: ACTIVE | Noted: 2021-11-05

## 2025-01-15 PROBLEM — I50.812 CHRONIC RIGHT-SIDED HEART FAILURE: Status: ACTIVE | Noted: 2020-09-12

## 2025-01-15 NOTE — PROGRESS NOTES
Office Visit Established Male Patient     Patient Name: Freddie Yeboah Jr.  : 1950   MRN: [id number]     Chief Complaint:   Chief Complaint   Patient presents with    Results    Follow-up     History of Present Illness: Mr. Freddie Yeboah Jr. is a 74 y.o. male with history elevated PSA and hypogonadism.  PSA increased from 0.961 ng/mL in  to 4.140 ng/mL in 2024 but subsequently declined in April to 2.590.   He had a 4K score done in September which was 10.2 and correlates with a 19.7% risk of having an aggressive prostate cancer if biopsy were to be performed.     At his previous visit a prostate MRI was ordered.  He was not able to complete the prostate MRI due to severe claustrophobia.  A single lesion was seen but was indeterminate due to limited views.  PSA density is 0.12. He states that if he is to do a prostate MRI again he will need sedated in some way.      Subjective      Review of System: ROS reviewed by me and is negative unless otherwise noted in HPI.      Past Medical History:   Past Medical History:   Diagnosis Date    Arthritis     shoulder, knee. multi joint     Atrial fibrillation     Back pain     CHF (congestive heart failure)     Chronic kidney disease     stage III due to diabetes    Diabetes mellitus     several years; checks sugars at home-usually run 100-112    Diabetic nephropathy     Disease of thyroid gland     hypothyroidism    GERD (gastroesophageal reflux disease)     Hypertension     Low testosterone     Sleep apnea     setting of 11    Urinary tract infection     most recent diagnosis 17 in the ER (+3 bacteria)-started on Macrobid and rechecked by PCP on 17     Past Surgical History:   Past Surgical History:   Procedure Laterality Date    ANTERIOR CERVICAL FUSION      APPENDECTOMY      BACK SURGERY      CARPAL TUNNEL RELEASE Bilateral     CATARACT EXTRACTION      CHOLECYSTECTOMY      COLONOSCOPY  2013    FINGER/THUMB ARTHROPLASTY Left     thumb  replacement    AL ARTHROPLASTY GLENOHUMERAL JOINT TOTAL SHOULDER Left 2017    Procedure: TOTAL SHOULDER ARTHROPLASTY LEFT;  Surgeon: William Ray MD;  Location: [facility];  Service: Orthopedics    SHOULDER SURGERY      TOTAL KNEE ARTHROPLASTY Left        Family History:   Family History   Problem Relation Age of Onset    Heart attack Mother     Diabetes Mother     Heart attack Father     Diabetes Sister     Thyroid disease Sister     COPD Brother     Stroke Brother     Cancer Brother      Social History:   Social History     Socioeconomic History    Marital status:    Tobacco Use    Smoking status: Former     Current packs/day: 0.00     Average packs/day: 3.0 packs/day for 34.0 years (102.0 ttl pk-yrs)     Types: Cigarettes     Start date:      Quit date:      Years since quittin.0     Passive exposure: Past    Smokeless tobacco: Never   Vaping Use    Vaping status: Never Used   Substance and Sexual Activity    Alcohol use: No    Drug use: No    Sexual activity: Yes     Partners: Female     Medications:     Current Outpatient Medications:     albuterol sulfate HFA (Ventolin HFA) 108 (90 Base) MCG/ACT inhaler, Inhale 2 puffs Every 4 (Four) Hours As Needed for Wheezing or Shortness of Air., Disp: 18 g, Rfl: 3    Alcohol Swabs pads, Clean area prior to injection, Disp: 100 each, Rfl: 11    aspirin 81 MG tablet, Take 1 tablet by mouth Daily., Disp: , Rfl:     bumetanide (BUMEX) 2 MG tablet, , Disp: , Rfl:     calcipotriene (DOVONEX) 0.005 % cream, , Disp: , Rfl:     colchicine 0.6 MG tablet, Take 1 tablet by mouth 2 (Two) Times a Day., Disp: 6 tablet, Rfl: 1    Eliquis 2.5 MG tablet tablet, , Disp: , Rfl:     febuxostat (ULORIC) 40 MG tablet, , Disp: , Rfl:     glucose blood test strip, Use to test blood sugar level 3 times daily. DX: E11.9, Disp: 300 each, Rfl: 3    glucose monitor monitoring kit, Use meter to check blood sugar levels twice daily., Disp: 1 each, Rfl: 0    Insulin Glargine  "(Lantus SoloStar) 100 UNIT/ML injection pen, INJECT 100 UNITS UNDER THE SKIN DAILY, Disp: 30 mL, Rfl: 5    Insulin Lispro (HumaLOG) 100 UNIT/ML injection, 30 units sq after meals, Disp: , Rfl:     Insulin Lispro, 1 Unit Dial, (HUMALOG) 100 UNIT/ML solution pen-injector, Inject 10 Units under the skin into the appropriate area as directed 3 (Three) Times a Day., Disp: 30 mL, Rfl: 3    Insulin Pen Needle (K[employer]ger Pen Needles 31G) 31G X 8 MM misc, USE TO INJECT INSULIN FIVE TIMES A DAY, Disp: 200 each, Rfl: 11    levothyroxine (SYNTHROID, LEVOTHROID) 75 MCG tablet, TAKE ONE TABLET BY MOUTH DAILY, Disp: 90 tablet, Rfl: 3    metOLazone (ZAROXOLYN) 2.5 MG tablet, Take 1 tablet by mouth 2 (Two) Times a Day., Disp: , Rfl:     metoprolol tartrate (LOPRESSOR) 100 MG tablet, Take 1 tablet by mouth 2 (Two) Times a Day., Disp: , Rfl:     Needle, Disp, 18G X 1-1/2\" misc, Use for drawing up the medication, Disp: 50 each, Rfl: 3    Needle, Disp, 23G X 1-1/2\" misc, Use 1 Device 1 (One) Time Per Week., Disp: 30 each, Rfl: 11    O2 (OXYGEN), 2 LPM VIA NC, Disp: , Rfl:     omeprazole (priLOSEC) 40 MG capsule, TAKE ONE CAPSULE BY MOUTH DAILY, Disp: 90 capsule, Rfl: 3    OneTouch Delica Lancets 33G misc, Use to test blood sugar level 3 times daily. DX: E11.9, Disp: 300 each, Rfl: 3    oxyCODONE-acetaminophen (PERCOCET)  MG per tablet, Take  tablets by mouth As Needed. 3 times a day, Disp: , Rfl:     simvastatin (ZOCOR) 20 MG tablet, TAKE ONE TABLET BY MOUTH ONCE NIGHTLY, Disp: 90 tablet, Rfl: 3    spironolactone (ALDACTONE) 25 MG tablet, TAKE 1 TABLET BY MOUTH DAILY, Disp: 90 tablet, Rfl: 3    Syringe, Disposable, 3 ML misc, 1 syringe 1 (One) Time Per Week., Disp: 100 each, Rfl: 11    Syringe, Disposable, 3 ML misc, Use 1 syringe 1 (One) Time Per Week., Disp: 100 each, Rfl: 11    tadalafil (CIALIS) 20 MG tablet, TAKE 1 TABLET BY MOUTH DAILY AS NEEDED FOR ERECTILE DYSFUNCTION, Disp: 20 tablet, Rfl: 2    telmisartan (MICARDIS) " "20 MG tablet, Take 1 tablet by mouth Daily., Disp: , Rfl:     tiotropium bromide-olodaterol (Stiolto Respimat) 2.5-2.5 MCG/ACT aerosol solution inhaler, Inhale 2 puffs Daily., Disp: 12 g, Rfl: 2    Tradjenta 5 MG tablet tablet, TAKE 1 TABLET BY MOUTH DAILY, Disp: 90 tablet, Rfl: 3    Morphine (MSIR) 15 MG tablet, Take 1 tablet by mouth 2 (Two) Times a Day. 3 tabs AM and 3 tabs PM (Patient not taking: Reported on 1/14/2025), Disp: , Rfl:     Allergies:   Allergies   Allergen Reactions    Duloxetine Itching and Rash    Sulfamethoxazole-Trimethoprim Other (See Comments)     Had to be hospitalized in ICU; renal impairment     Wellbutrin [Bupropion] Itching and Swelling       Objective     Physical Exam:   Vital Signs:   Vitals:    01/14/25 1339   Pulse: 63   Resp: 22   Temp: 96.9 °F (36.1 °C)   TempSrc: Temporal   SpO2: 94%   Weight: (!) 149 kg (328 lb 9.6 oz)   Height: 177.8 cm (70\")     Body mass index is 47.15 kg/m².   Physical Exam  Vitals and nursing note reviewed.   Constitutional:       General: He is not in acute distress.     Appearance: Normal appearance. He is morbidly obese. He is not ill-appearing.   HENT:      Head: Normocephalic and atraumatic.   Pulmonary:      Effort: Pulmonary effort is normal. No tachypnea.      Comments: 2LNC  Skin:     General: Skin is warm and dry.   Neurological:      General: No focal deficit present.      Mental Status: He is alert and oriented to person, place, and time.   Psychiatric:         Mood and Affect: Mood normal.         Behavior: Behavior normal.      Labs  Brief Urine Lab Results       None          Lab Results   Component Value Date    GLUCOSE 203 (H) 12/30/2024    CALCIUM 10.3 12/30/2024     12/30/2024    K 4.6 12/30/2024    CO2 32.0 (H) 12/30/2024    CL 96 (L) 12/30/2024    BUN 79 (H) 12/30/2024    CREATININE 3.22 (H) 12/30/2024    EGFRIFAFRI 46 (L) 01/18/2022    EGFRIFNONA 38 (L) 01/18/2022    BCR 24.5 12/30/2024    ANIONGAP 10.0 12/30/2024       Lab " Results   Component Value Date    WBC 7.70 12/30/2024    HGB 13.5 12/30/2024    HCT 42.7 12/30/2024    MCV 84.1 12/30/2024     12/30/2024     Radiographic Studies  MRI Prostate Without Contrast  Result Date: 1/9/2025  Impression: 1. Limited exam due to claustrophobia with DWI/ADC and DCE images not obtained. 2. Full PI-RADS assessment not possible given absence of both DWI and DCE. 3. Lesion in right peripheral zone mid gland to apex measuring 7 x 4 mm. Consider repeat exam with premedication or alternatively follow-up MRI in 6 to 12 months, or biopsy if further concern clinically. 4. No findings of EPE 5. No suspicious lymphadenopathy in the pelvis. Electronically Signed: Dez Loredo MD  1/9/2025 4:42 PM EST  Workstation ID: [id number]    XR Knee 3 View Right  Result Date: 10/4/2024  Degenerative change as above..      Images were reviewed, interpreted, and dictated by Dr. Patricia Fritz MD Transcribed by Ceferino Spencer PA-C.  This report was signed and finalized on 10/4/2024 10:07 AM by Patricia Fritz MD.      I have reviewed the above labs and imaging.     Assessment / Plan      Assessment/Plan:   Mr. Freddie Yeboah Jr. is a 74 y.o. male with history elevated PSA and hypogonadism.  PSA increased from 0.961 ng/mL in 2021 to 4.140 ng/mL in March of 2024 but subsequently declined in April to 2.590.   He had a 4K score done in September which was 10.2 and correlates with a 19.7% risk of having an aggressive prostate cancer if biopsy were to be performed.   He has continued TRT during this time.      At his previous visit a prostate MRI was ordered.  He was not able to complete the prostate MRI due to severe claustrophobia.  A single lesion was seen but was indeterminate due to limited views.  PSA density is 0.12 which is reassuring. He also had a quick decline of his PSA after elevation however it was not back at previous PSA baseline 3 years ago.      Discussed results of MRI with Mr. Yeboah.  He reports that he is  not able to tolerate repeat MRI unless he is sedated.  We discussed the risks and benefits vs proceeding with a prostate biopsy.  Mr. Yeboah does have a history of heart failure, COPD, and is on chronic O2 therapy.  It would be risky to sedate Mr. Yeboah as he has requested.  He is not interested in prostate biopsy.  He may be interested in repeating prostate MRI in 6 months with an anxiolytic if PSA increases upon recheck.      Will have Mr. Yeboah return for follow up as scheduled with Courtney with a repeat PSA at that time.  If elevated will discuss repeat MRI with anxiolytic prior.  He may continue TRT as his PSA has decreased to normal even while on TRT.  Follow up with me if prostate MRI is necessary/PSA increases. Otherwise he may see Courtney going forward.      Plan:  1) Continue TRT per Courtney  2) Repeat PSA at next follow up visit.  If elevated repeat prostate MRI with anxiolytic prior.     Diagnoses and all orders for this visit:    1. Elevated prostate specific antigen (PSA) (Primary)  -     PSA DIAGNOSTIC; Future    2. Morbid (severe) obesity due to excess calories    3. Oxygen dependent     Follow Up:   Return for Keep scheduled follow up with Courtney in June. .    I spent a total of 34 minutes with the patient and the chart engaging in data gathering and interpretation, patient interaction, as well as counseling on the risks, benefits, and alternatives of the therapy and coordinating care.     Thais Mahoney, APRN, MSN, FNP-C  [facility]

## 2025-01-16 LAB — CREAT BLDA-MCNC: 3.5 MG/DL (ref 0.6–1.3)

## 2025-01-21 RX ORDER — TADALAFIL 20 MG/1
20 TABLET ORAL DAILY PRN
Qty: 90 TABLET | Refills: 1 | Status: SHIPPED | OUTPATIENT
Start: 2025-01-21

## 2025-02-03 DIAGNOSIS — Z79.4 TYPE 2 DIABETES MELLITUS WITHOUT COMPLICATION, WITH LONG-TERM CURRENT USE OF INSULIN: ICD-10-CM

## 2025-02-03 DIAGNOSIS — E11.9 TYPE 2 DIABETES MELLITUS WITHOUT COMPLICATION, WITH LONG-TERM CURRENT USE OF INSULIN: ICD-10-CM

## 2025-02-03 RX ORDER — INSULIN GLARGINE 100 [IU]/ML
INJECTION, SOLUTION SUBCUTANEOUS
Qty: 30 ML | Refills: 5 | Status: SHIPPED | OUTPATIENT
Start: 2025-02-03

## 2025-02-24 DIAGNOSIS — E29.1 HYPOGONADISM IN MALE: Primary | ICD-10-CM

## 2025-02-24 RX ORDER — TESTOSTERONE CYPIONATE 200 MG/ML
100 INJECTION, SOLUTION INTRAMUSCULAR
Qty: 4 ML | Refills: 0 | Status: SHIPPED | OUTPATIENT
Start: 2025-02-24

## 2025-03-03 DIAGNOSIS — E29.1 HYPOGONADISM IN MALE: Primary | ICD-10-CM

## 2025-03-03 RX ORDER — PEN NEEDLE, DIABETIC 31 GX5/16"
NEEDLE, DISPOSABLE MISCELLANEOUS
Qty: 100 EACH | Refills: 3 | Status: SHIPPED | OUTPATIENT
Start: 2025-03-03

## 2025-03-03 RX ORDER — SYRINGE WITH NEEDLE, 1 ML 25GX5/8"
SYRINGE, EMPTY DISPOSABLE MISCELLANEOUS
Qty: 12 EACH | Refills: 11 | Status: SHIPPED | OUTPATIENT
Start: 2025-03-03

## 2025-03-04 ENCOUNTER — LAB (OUTPATIENT)
Dept: LAB | Facility: HOSPITAL | Age: 75
End: 2025-03-04
Payer: MEDICARE

## 2025-03-04 DIAGNOSIS — R97.20 ELEVATED PROSTATE SPECIFIC ANTIGEN (PSA): ICD-10-CM

## 2025-03-04 PROCEDURE — 84153 ASSAY OF PSA TOTAL: CPT

## 2025-03-04 PROCEDURE — 36415 COLL VENOUS BLD VENIPUNCTURE: CPT

## 2025-03-05 LAB — PSA SERPL-MCNC: 1.72 NG/ML (ref 0–4)

## 2025-04-01 ENCOUNTER — TRANSCRIBE ORDERS (OUTPATIENT)
Dept: LAB | Facility: HOSPITAL | Age: 75
End: 2025-04-01
Payer: MEDICARE

## 2025-04-01 ENCOUNTER — LAB (OUTPATIENT)
Dept: LAB | Facility: HOSPITAL | Age: 75
End: 2025-04-01
Payer: MEDICARE

## 2025-04-01 DIAGNOSIS — D63.1 ERYTHROPOIETIN DEFICIENCY ANEMIA: ICD-10-CM

## 2025-04-01 DIAGNOSIS — D63.1 ANEMIA IN END-STAGE RENAL DISEASE: ICD-10-CM

## 2025-04-01 DIAGNOSIS — M10.9 GOUT, UNSPECIFIED CAUSE, UNSPECIFIED CHRONICITY, UNSPECIFIED SITE: ICD-10-CM

## 2025-04-01 DIAGNOSIS — N18.4 CHRONIC KIDNEY DISEASE, STAGE IV (SEVERE): Primary | ICD-10-CM

## 2025-04-01 DIAGNOSIS — E11.21 DIABETIC GLOMERULOPATHY: ICD-10-CM

## 2025-04-01 DIAGNOSIS — N18.4 CHRONIC KIDNEY DISEASE, STAGE IV (SEVERE): ICD-10-CM

## 2025-04-01 DIAGNOSIS — N18.6 ANEMIA IN END-STAGE RENAL DISEASE: ICD-10-CM

## 2025-04-01 LAB
HBA1C MFR BLD: 8.4 % (ref 4.8–5.6)
PTH-INTACT SERPL-MCNC: 148 PG/ML (ref 15–65)

## 2025-04-01 PROCEDURE — 83036 HEMOGLOBIN GLYCOSYLATED A1C: CPT

## 2025-04-01 PROCEDURE — 85025 COMPLETE CBC W/AUTO DIFF WBC: CPT

## 2025-04-01 PROCEDURE — 83970 ASSAY OF PARATHORMONE: CPT

## 2025-04-01 PROCEDURE — 82043 UR ALBUMIN QUANTITATIVE: CPT

## 2025-04-01 PROCEDURE — 84550 ASSAY OF BLOOD/URIC ACID: CPT

## 2025-04-01 PROCEDURE — 36415 COLL VENOUS BLD VENIPUNCTURE: CPT

## 2025-04-01 PROCEDURE — 82570 ASSAY OF URINE CREATININE: CPT

## 2025-04-02 LAB
ALBUMIN UR-MCNC: 11.5 MG/DL
BASOPHILS # BLD AUTO: 0.06 10*3/MM3 (ref 0–0.2)
BASOPHILS NFR BLD AUTO: 0.7 % (ref 0–1.5)
CREAT UR-MCNC: 44.6 MG/DL
DEPRECATED RDW RBC AUTO: 48.9 FL (ref 37–54)
EOSINOPHIL # BLD AUTO: 0.21 10*3/MM3 (ref 0–0.4)
EOSINOPHIL NFR BLD AUTO: 2.3 % (ref 0.3–6.2)
ERYTHROCYTE [DISTWIDTH] IN BLOOD BY AUTOMATED COUNT: 15.8 % (ref 12.3–15.4)
HCT VFR BLD AUTO: 42.7 % (ref 37.5–51)
HGB BLD-MCNC: 13.2 G/DL (ref 13–17.7)
IMM GRANULOCYTES # BLD AUTO: 0.05 10*3/MM3 (ref 0–0.05)
IMM GRANULOCYTES NFR BLD AUTO: 0.6 % (ref 0–0.5)
LYMPHOCYTES # BLD AUTO: 2.89 10*3/MM3 (ref 0.7–3.1)
LYMPHOCYTES NFR BLD AUTO: 32 % (ref 19.6–45.3)
MCH RBC QN AUTO: 26.4 PG (ref 26.6–33)
MCHC RBC AUTO-ENTMCNC: 30.9 G/DL (ref 31.5–35.7)
MCV RBC AUTO: 85.4 FL (ref 79–97)
MICROALBUMIN/CREAT UR: 257.8 MG/G (ref 0–29)
MONOCYTES # BLD AUTO: 1.33 10*3/MM3 (ref 0.1–0.9)
MONOCYTES NFR BLD AUTO: 14.7 % (ref 5–12)
NEUTROPHILS NFR BLD AUTO: 4.49 10*3/MM3 (ref 1.7–7)
NEUTROPHILS NFR BLD AUTO: 49.7 % (ref 42.7–76)
NRBC BLD AUTO-RTO: 0.3 /100 WBC (ref 0–0.2)
PLATELET # BLD AUTO: 182 10*3/MM3 (ref 140–450)
PMV BLD AUTO: 10.3 FL (ref 6–12)
RBC # BLD AUTO: 5 10*6/MM3 (ref 4.14–5.8)
URATE SERPL-MCNC: 8.6 MG/DL (ref 3.4–7)
WBC NRBC COR # BLD AUTO: 9.03 10*3/MM3 (ref 3.4–10.8)

## 2025-04-08 DIAGNOSIS — E29.1 HYPOGONADISM IN MALE: ICD-10-CM

## 2025-04-08 RX ORDER — TESTOSTERONE CYPIONATE 200 MG/ML
INJECTION, SOLUTION INTRAMUSCULAR
Qty: 4 ML | Refills: 0 | Status: SHIPPED | OUTPATIENT
Start: 2025-04-08

## 2025-04-14 ENCOUNTER — LAB (OUTPATIENT)
Dept: LAB | Facility: HOSPITAL | Age: 75
End: 2025-04-14
Payer: MEDICARE

## 2025-04-14 DIAGNOSIS — D63.1 ANEMIA IN END-STAGE RENAL DISEASE: ICD-10-CM

## 2025-04-14 DIAGNOSIS — E11.21 DIABETIC GLOMERULOPATHY: ICD-10-CM

## 2025-04-14 DIAGNOSIS — N18.4 CHRONIC KIDNEY DISEASE, STAGE IV (SEVERE): ICD-10-CM

## 2025-04-14 DIAGNOSIS — D63.1 ERYTHROPOIETIN DEFICIENCY ANEMIA: ICD-10-CM

## 2025-04-14 DIAGNOSIS — N18.6 ANEMIA IN END-STAGE RENAL DISEASE: ICD-10-CM

## 2025-04-14 DIAGNOSIS — M10.9 GOUT, UNSPECIFIED CAUSE, UNSPECIFIED CHRONICITY, UNSPECIFIED SITE: ICD-10-CM

## 2025-04-14 LAB
ALBUMIN SERPL-MCNC: 4.1 G/DL (ref 3.5–5.2)
ALBUMIN/GLOB SERPL: 1.3 G/DL
ALP SERPL-CCNC: 81 U/L (ref 39–117)
ALT SERPL W P-5'-P-CCNC: 14 U/L (ref 1–41)
ANION GAP SERPL CALCULATED.3IONS-SCNC: 13 MMOL/L (ref 5–15)
AST SERPL-CCNC: 17 U/L (ref 1–40)
BILIRUB SERPL-MCNC: 0.5 MG/DL (ref 0–1.2)
BUN SERPL-MCNC: 56 MG/DL (ref 8–23)
BUN/CREAT SERPL: 17.7 (ref 7–25)
CALCIUM SPEC-SCNC: 9.8 MG/DL (ref 8.6–10.5)
CHLORIDE SERPL-SCNC: 93 MMOL/L (ref 98–107)
CO2 SERPL-SCNC: 31 MMOL/L (ref 22–29)
CREAT SERPL-MCNC: 3.16 MG/DL (ref 0.76–1.27)
EGFRCR SERPLBLD CKD-EPI 2021: 19.7 ML/MIN/1.73
GLOBULIN UR ELPH-MCNC: 3.2 GM/DL
GLUCOSE SERPL-MCNC: 238 MG/DL (ref 65–99)
POTASSIUM SERPL-SCNC: 4.9 MMOL/L (ref 3.5–5.2)
PROT SERPL-MCNC: 7.3 G/DL (ref 6–8.5)
SODIUM SERPL-SCNC: 137 MMOL/L (ref 136–145)

## 2025-04-14 PROCEDURE — 80053 COMPREHEN METABOLIC PANEL: CPT

## 2025-04-14 PROCEDURE — 36415 COLL VENOUS BLD VENIPUNCTURE: CPT

## 2025-04-20 DIAGNOSIS — E29.1 HYPOGONADISM IN MALE: ICD-10-CM

## 2025-04-22 ENCOUNTER — OFFICE VISIT (OUTPATIENT)
Dept: INTERNAL MEDICINE | Facility: CLINIC | Age: 75
End: 2025-04-22
Payer: MEDICARE

## 2025-04-22 VITALS
BODY MASS INDEX: 45.1 KG/M2 | WEIGHT: 315 LBS | RESPIRATION RATE: 20 BRPM | SYSTOLIC BLOOD PRESSURE: 134 MMHG | HEIGHT: 70 IN | DIASTOLIC BLOOD PRESSURE: 66 MMHG | TEMPERATURE: 98.1 F | HEART RATE: 66 BPM | OXYGEN SATURATION: 90 %

## 2025-04-22 DIAGNOSIS — I73.9 PERIPHERAL VASCULAR DISEASE: ICD-10-CM

## 2025-04-22 DIAGNOSIS — M54.50 ACUTE MIDLINE LOW BACK PAIN WITHOUT SCIATICA: Primary | ICD-10-CM

## 2025-04-22 LAB
BILIRUB BLD-MCNC: NEGATIVE MG/DL
CLARITY, POC: CLEAR
COLOR UR: YELLOW
EXPIRATION DATE: ABNORMAL
GLUCOSE UR STRIP-MCNC: NEGATIVE MG/DL
KETONES UR QL: NEGATIVE
LEUKOCYTE EST, POC: ABNORMAL
Lab: ABNORMAL
NITRITE UR-MCNC: NEGATIVE MG/ML
PH UR: 6.5 [PH] (ref 5–8)
PROT UR STRIP-MCNC: NEGATIVE MG/DL
RBC # UR STRIP: NEGATIVE /UL
SP GR UR: 1.01 (ref 1–1.03)
UROBILINOGEN UR QL: ABNORMAL

## 2025-04-22 PROCEDURE — 3052F HG A1C>EQUAL 8.0%<EQUAL 9.0%: CPT | Performed by: NURSE PRACTITIONER

## 2025-04-22 PROCEDURE — 3078F DIAST BP <80 MM HG: CPT | Performed by: NURSE PRACTITIONER

## 2025-04-22 PROCEDURE — 1125F AMNT PAIN NOTED PAIN PRSNT: CPT | Performed by: NURSE PRACTITIONER

## 2025-04-22 PROCEDURE — 81003 URINALYSIS AUTO W/O SCOPE: CPT | Performed by: NURSE PRACTITIONER

## 2025-04-22 PROCEDURE — 99213 OFFICE O/P EST LOW 20 MIN: CPT | Performed by: NURSE PRACTITIONER

## 2025-04-22 PROCEDURE — 1160F RVW MEDS BY RX/DR IN RCRD: CPT | Performed by: NURSE PRACTITIONER

## 2025-04-22 PROCEDURE — 3075F SYST BP GE 130 - 139MM HG: CPT | Performed by: NURSE PRACTITIONER

## 2025-04-22 PROCEDURE — 1159F MED LIST DOCD IN RCRD: CPT | Performed by: NURSE PRACTITIONER

## 2025-04-22 RX ORDER — METHOCARBAMOL 500 MG/1
500 TABLET, FILM COATED ORAL 3 TIMES DAILY PRN
Qty: 45 TABLET | Refills: 0 | Status: SHIPPED | OUTPATIENT
Start: 2025-04-22

## 2025-04-22 RX ORDER — ANASTROZOLE 1 MG/1
TABLET ORAL DAILY
COMMUNITY

## 2025-04-22 RX ORDER — ANASTROZOLE 1 MG/1
1 TABLET ORAL WEEKLY
Qty: 12 TABLET | Refills: 0 | OUTPATIENT
Start: 2025-04-22

## 2025-04-22 RX ORDER — LORATADINE 10 MG/1
CAPSULE, LIQUID FILLED ORAL
COMMUNITY

## 2025-04-22 RX ORDER — BUPRENORPHINE 7.5 UG/H
1 PATCH TRANSDERMAL
COMMUNITY

## 2025-04-22 NOTE — PATIENT INSTRUCTIONS
Low Back Strain With Rehab  A strain is an injury in which a tendon or muscle is torn. The muscles and tendons of the lower back are vulnerable to strains. However, these muscles and tendons are very strong and require a great force to be injured. Strains are classified into three categories. Grade 1 strains cause pain, but the tendon is not lengthened. Grade 2 strains include a lengthened ligament, due to the ligament being stretched or partially ruptured. With grade 2 strains there is still function, although the function may be decreased. Grade 3 strains involve a complete tear of the tendon or muscle, and function is usually impaired.  SYMPTOMS   Pain in the lower back.  Pain that affects one side more than the other.  Pain that gets worse with movement and may be felt in the hip, buttocks, or back of the thigh.  Muscle spasms of the muscles in the back.  Swelling along the muscles of the back.  Loss of strength of the back muscles.  Crackling sound (crepitation) when the muscles are touched.  CAUSES   Lower back strains occur when a force is placed on the muscles or tendons that is greater than they can handle. Common causes of injury include:  Prolonged overuse of the muscle-tendon units in the lower back, usually from incorrect posture.  A single violent injury or force applied to the back.  RISK INCREASES WITH:  Sports that involve twisting forces on the spine or a lot of bending at the waist (football, rugby, weightlifting, bowling, golf, tennis, speed skating, racquetball, swimming, running, gymnastics, diving).  Poor strength and flexibility.  Failure to warm up properly before activity.  Family history of lower back pain or disk disorders.  Previous back injury or surgery (especially fusion).  Poor posture with lifting, especially heavy objects.  Prolonged sitting, especially with poor posture.  PREVENTION   Learn and use proper posture when sitting or lifting (maintain proper posture when sitting, lift  using the knees and legs, not at the waist).  Warm up and stretch properly before activity.  Allow for adequate recovery between workouts.  Maintain physical fitness:  Strength, flexibility, and endurance.  Cardiovascular fitness.  PROGNOSIS   If treated properly, lower back strains usually heal within 6 weeks.  RELATED COMPLICATIONS   Recurring symptoms, resulting in a chronic problem.  Chronic inflammation, scarring, and partial muscle-tendon tear.  Delayed healing or resolution of symptoms.  Prolonged disability.  TREATMENT   Treatment first involves the use of ice and medicine, to reduce pain and inflammation. The use of strengthening and stretching exercises may help reduce pain with activity. These exercises may be performed at home or with a therapist. Severe injuries may require referral to a therapist for further evaluation and treatment, such as ultrasound. Your caregiver may advise that you wear a back brace or corset, to help reduce pain and discomfort. Often, prolonged bed rest results in greater harm then benefit. Corticosteroid injections may be recommended. However, these should be reserved for the most serious cases. It is important to avoid using your back when lifting objects. At night, sleep on your back on a firm mattress with a pillow placed under your knees. If non-surgical treatment is unsuccessful, surgery may be needed.   MEDICATION   If pain medicine is needed, nonsteroidal anti-inflammatory medicines (aspirin and ibuprofen), or other minor pain relievers (acetaminophen), are often advised.  Do not take pain medicine for 7 days before surgery.  Prescription pain relievers may be given, if your caregiver thinks they are needed. Use only as directed and only as much as you need.  Ointments applied to the skin may be helpful.  Corticosteroid injections may be given by your caregiver. These injections should be reserved for the most serious cases, because they may only be given a certain  number of times.  HEAT AND COLD  Cold treatment (icing) should be applied for 10 to 15 minutes every 2 to 3 hours for inflammation and pain, and immediately after activity that aggravates your symptoms. Use ice packs or an ice massage.  Heat treatment may be used before performing stretching and strengthening activities prescribed by your caregiver, physical therapist, or . Use a heat pack or a warm water soak.  SEEK MEDICAL CARE IF:   Symptoms get worse or do not improve in 2 to 4 weeks, despite treatment.  You develop numbness, weakness, or loss of bowel or bladder function.  New, unexplained symptoms develop. (Drugs used in treatment may produce side effects.)      EXERCISES   RANGE OF MOTION (ROM) AND STRETCHING EXERCISES - Low Back Strain  Most people with lower back pain will find that their symptoms get worse with excessive bending forward (flexion) or arching at the lower back (extension). The exercises which will help resolve your symptoms will focus on the opposite motion.   Your physician, physical therapist or  will help you determine which exercises will be most helpful to resolve your lower back pain. Do not complete any exercises without first consulting with your caregiver. Discontinue any exercises which make your symptoms worse until you speak to your caregiver.   If you have pain, numbness or tingling which travels down into your buttocks, leg or foot, the goal of the therapy is for these symptoms to move closer to your back and eventually resolve. Sometimes, these leg symptoms will get better, but your lower back pain may worsen. This is typically an indication of progress in your rehabilitation. Be very alert to any changes in your symptoms and the activities in which you participated in the 24 hours prior to the change. Sharing this information with your caregiver will allow him/her to most efficiently treat your condition.  These exercises may help you when  beginning to rehabilitate your injury. Your symptoms may resolve with or without further involvement from your physician, physical therapist or . While completing these exercises, remember:  Restoring tissue flexibility helps normal motion to return to the joints. This allows healthier, less painful movement and activity.  An effective stretch should be held for at least 30 seconds.  A stretch should never be painful. You should only feel a gentle lengthening or release in the stretched tissue.  FLEXION RANGE OF MOTION AND STRETCHING EXERCISES:  STRETCH - Flexion, Single Knee to Chest   Lie on a firm bed or floor with both legs extended in front of you.  Keeping one leg in contact with the floor, bring your opposite knee to your chest. Hold your leg in place by either grabbing behind your thigh or at your knee.  Pull until you feel a gentle stretch in your lower back. Hold 5-10 seconds.  Slowly release your grasp and repeat the exercise with the opposite side.  Repeat 3-5 times. Complete this exercise 2 times per day.   STRETCH - Flexion, Double Knee to Chest   Lie on a firm bed or floor with both legs extended in front of you.  Keeping one leg in contact with the floor, bring your opposite knee to your chest.  Tense your stomach muscles to support your back and then lift your other knee to your chest. Hold your legs in place by either grabbing behind your thighs or at your knees.  Pull both knees toward your chest until you feel a gentle stretch in your lower back. Hold 5-10 seconds.  Tense your stomach muscles and slowly return one leg at a time to the floor.  Repeat 3-5 times. Complete this exercise 2 times per day.   STRETCH - Low Trunk Rotation  Lie on a firm bed or floor. Keeping your legs in front of you, bend your knees so they are both pointed toward the ceiling and your feet are flat on the floor.  Extend your arms out to the side. This will stabilize your upper body by keeping your  shoulders in contact with the floor.  Gently and slowly drop both knees together to one side until you feel a gentle stretch in your lower back. Hold for 5-10 seconds.  Tense your stomach muscles to support your lower back as you bring your knees back to the starting position. Repeat the exercise to the other side.  Repeat 3-5 times. Complete this exercise 2 times per day   EXTENSION RANGE OF MOTION AND FLEXIBILITY EXERCISES:  STRETCH - Extension, Prone on Elbows   Lie on your stomach on the floor, a bed will be too soft. Place your palms about shoulder width apart and at the height of your head.  Place your elbows under your shoulders. If this is too painful, stack pillows under your chest.  Allow your body to relax so that your hips drop lower and make contact more completely with the floor.  Hold this position for 5-10 seconds.  Slowly return to lying flat on the floor.  Repeat 3-5 times. Complete this exercise 2 times per day.   RANGE OF MOTION - Extension, Prone Press Ups  Lie on your stomach on the floor, a bed will be too soft. Place your palms about shoulder width apart and at the height of your head.  Keeping your back as relaxed as possible, slowly straighten your elbows while keeping your hips on the floor. You may adjust the placement of your hands to maximize your comfort. As you gain motion, your hands will come more underneath your shoulders.  Hold this position 5-10 seconds.  Slowly return to lying flat on the floor.  Repeat 3-5 times. Complete this exercise 2 times per day.   RANGE OF MOTION- Quadruped, Neutral Spine   Assume a hands and knees position on a firm surface. Keep your hands under your shoulders and your knees under your hips. You may place padding under your knees for comfort.  Drop your head and point your tail bone toward the ground below you. This will round out your lower back like an angry cat. Hold this position for 5-10 seconds.  Slowly lift your head and release your tail bone  "so that your back sags into a large arch, like an old horse.  Hold this position for 5-10 seconds.  Repeat this until you feel limber in your lower back.  Now, find your \"sweet spot.\" This will be the most comfortable position somewhere between the two previous positions. This is your neutral spine. Once you have found this position, tense your stomach muscles to support your lower back.  Hold this position for 5-10 seconds.  Repeat 3-5 times. Complete this exercise 2 times per day.       STRENGTHENING EXERCISES - Low Back Strain  These exercises may help you when beginning to rehabilitate your injury. These exercises should be done near your \"sweet spot.\" This is the neutral, low-back arch, somewhere between fully rounded and fully arched, that is your least painful position. When performed in this safe range of motion, these exercises can be used for people who have either a flexion or extension based injury. These exercises may resolve your symptoms with or without further involvement from your physician, physical therapist or . While completing these exercises, remember:   Muscles can gain both the endurance and the strength needed for everyday activities through controlled exercises.  Complete these exercises as instructed by your physician, physical therapist or . Increase the resistance and repetitions only as guided.  You may experience muscle soreness or fatigue, but the pain or discomfort you are trying to eliminate should never worsen during these exercises. If this pain does worsen, stop and make certain you are following the directions exactly. If the pain is still present after adjustments, discontinue the exercise until you can discuss the trouble with your caregiver.  STRENGTHENING - Deep Abdominals, Pelvic Tilt  Lie on a firm bed or floor. Keeping your legs in front of you, bend your knees so they are both pointed toward the ceiling and your feet are flat on the " floor.  Tense your lower abdominal muscles to press your lower back into the floor. This motion will rotate your pelvis so that your tail bone is scooping upwards rather than pointing at your feet or into the floor.  With a gentle tension and even breathing, hold this position for 5 seconds.  Repeat 5 times. Complete this exercise 1 times per day.   STRENGTHENING - Abdominals, Crunches   Lie on a firm bed or floor. Keeping your legs in front of you, bend your knees so they are both pointed toward the ceiling and your feet are flat on the floor. Cross your arms over your chest.  Slightly tip your chin down without bending your neck.  Tense your abdominals and slowly lift your trunk high enough to just clear your shoulder blades. Lifting higher can put excessive stress on the lower back and does not further strengthen your abdominal muscles.  Control your return to the starting position.  Repeat 5-10 times. Complete this exercise 1 times per day.   STRENGTHENING - Quadruped, Opposite UE/LE Lift   Assume a hands and knees position on a firm surface. Keep your hands under your shoulders and your knees under your hips. You may place padding under your knees for comfort.  Find your neutral spine and gently tense your abdominal muscles so that you can maintain this position. Your shoulders and hips should form a rectangle that is parallel with the floor and is not twisted.  Keeping your trunk steady, lift your right hand no higher than your shoulder and then your left leg no higher than your hip. Make sure you are not holding your breath. Hold this position 5-10 seconds.  Continuing to keep your abdominal muscles tense and your back steady, slowly return to your starting position. Repeat with the opposite arm and leg.  Repeat 3-5 times. Complete this exercise 2 times per day.   STRENGTHENING - Lower Abdominals, Double Knee Lift  Lie on a firm bed or floor. Keeping your legs in front of you, bend your knees so they are both  pointed toward the ceiling and your feet are flat on the floor.  Tense your abdominal muscles to brace your lower back and slowly lift both of your knees until they come over your hips. Be certain not to hold your breath.  Hold 5-10 seconds. Using your abdominal muscles, return to the starting position in a slow and controlled manner.  Repeat 3-5 times. Complete this exercise 2 times per day.   POSTURE AND BODY MECHANICS CONSIDERATIONS - Low Back Strain  Keeping correct posture when sitting, standing or completing your activities will reduce the stress put on different body tissues, allowing injured tissues a chance to heal and limiting painful experiences. The following are general guidelines for improved posture. Your physician or physical therapist will provide you with any instructions specific to your needs. While reading these guidelines, remember:  The exercises prescribed by your provider will help you have the flexibility and strength to maintain correct postures.  The correct posture provides the best environment for your joints to work. All of your joints have less wear and tear when properly supported by a spine with good posture. This means you will experience a healthier, less painful body.  Correct posture must be practiced with all of your activities, especially prolonged sitting and standing. Correct posture is as important when doing repetitive low-stress activities (typing) as it is when doing a single heavy-load activity (lifting).  RESTING POSITIONS  Consider which positions are most painful for you when choosing a resting position. If you have pain with flexion-based activities (sitting, bending, stooping, squatting), choose a position that allows you to rest in a less flexed posture. You would want to avoid curling into a fetal position on your side. If your pain worsens with extension-based activities (prolonged standing, working overhead), avoid resting in an extended position such as  sleeping on your stomach. Most people will find more comfort when they rest with their spine in a more neutral position, neither too rounded nor too arched. Lying on a non-sagging bed on your side with a pillow between your knees, or on your back with a pillow under your knees will often provide some relief. Keep in mind, being in any one position for a prolonged period of time, no matter how correct your posture, can still lead to stiffness.  PROPER SITTING POSTURE  In order to minimize stress and discomfort on your spine, you must sit with correct posture. Sitting with good posture should be effortless for a healthy body. Returning to good posture is a gradual process. Many people can work toward this most comfortably by using various supports until they have the flexibility and strength to maintain this posture on their own.  When sitting with proper posture, your ears will fall over your shoulders and your shoulders will fall over your hips. You should use the back of the chair to support your upper back. Your lower back will be in a neutral position, just slightly arched. You may place a small pillow or folded towel at the base of your lower back for support.   When working at a desk, create an environment that supports good, upright posture. Without extra support, muscles tire, which leads to excessive strain on joints and other tissues. Keep these recommendations in mind:  CHAIR:  A chair should be able to slide under your desk when your back makes contact with the back of the chair. This allows you to work closely.  The chair's height should allow your eyes to be level with the upper part of your monitor and your hands to be slightly lower than your elbows.  BODY POSITION  Your feet should make contact with the floor. If this is not possible, use a foot rest.  Keep your ears over your shoulders. This will reduce stress on your neck and lower back.  INCORRECT SITTING POSTURES   If you are feeling tired and  unable to assume a healthy sitting posture, do not slouch or slump. This puts excessive strain on your back tissues, causing more damage and pain. Healthier options include:  Using more support, like a lumbar pillow.  Switching tasks to something that requires you to be upright or walking.  Talking a brief walk.  Lying down to rest in a neutral-spine position.  PROLONGED STANDING WHILE SLIGHTLY LEANING FORWARD   When completing a task that requires you to lean forward while standing in one place for a long time, place either foot up on a stationary 2-4 inch high object to help maintain the best posture. When both feet are on the ground, the lower back tends to lose its slight inward curve. If this curve flattens (or becomes too large), then the back and your other joints will experience too much stress, tire more quickly, and can cause pain.  CORRECT STANDING POSTURES  Proper standing posture should be assumed with all daily activities, even if they only take a few moments, like when brushing your teeth. As in sitting, your ears should fall over your shoulders and your shoulders should fall over your hips. You should keep a slight tension in your abdominal muscles to brace your spine. Your tailbone should point down to the ground, not behind your body, resulting in an over-extended swayback posture.   INCORRECT STANDING POSTURES   Common incorrect standing postures include a forward head, locked knees and/or an excessive swayback.  WALKING  Walk with an upright posture. Your ears, shoulders and hips should all line-up.  PROLONGED ACTIVITY IN A FLEXED POSITION  When completing a task that requires you to bend forward at your waist or lean over a low surface, try to find a way to stabilize 3 out of 4 of your limbs. You can place a hand or elbow on your thigh or rest a knee on the surface you are reaching across. This will provide you more stability so that your muscles do not fatigue as quickly. By keeping your knees  relaxed, or slightly bent, you will also reduce stress across your lower back.  CORRECT LIFTING TECHNIQUES  DO :   Assume a wide stance. This will provide you more stability and the opportunity to get as close as possible to the object which you are lifting.  Tense your abdominals to brace your spine. Bend at the knees and hips. Keeping your back locked in a neutral-spine position, lift using your leg muscles. Lift with your legs, keeping your back straight.  Test the weight of unknown objects before attempting to lift them.  Try to keep your elbows locked down at your sides in order get the best strength from your shoulders when carrying an object.  Always ask for help when lifting heavy or awkward objects.  INCORRECT LIFTING TECHNIQUES  DO NOT:   Lock your knees when lifting, even if it is a small object.  Bend and twist. Pivot at your feet or move your feet when needing to change directions.  Assume that you can safely  even a paper clip without proper posture.     This information is not intended to replace advice given to you by your health care provider. Make sure you discuss any questions you have with your health care provider.     Document Released: 12/18/2006 Document Revised: 01/08/2016 Document Reviewed: 04/01/2010  [employer]® Patient Information ©2016 [employer].

## 2025-04-22 NOTE — PROGRESS NOTES
"  Office Visit      Patient Name: Freddie Yeboah Jr.  : 1950   MRN: [id number]   Care Team: Patient Care Team:  Damián Centeno MD as PCP - General (Internal Medicine)  Von Cochran MD as Consulting Physician (Internal Medicine)  Vikas Rodriguez MD as Consulting Physician (Interventional Cardiology)  Kendell Almodovar MD as Consulting Physician (Urology)  Valerie Hu MD as Consulting Physician (Pulmonary Disease)  Bravo Marquis DPM as Consulting Physician (Podiatry)    Chief Complaint  Back Pain (Started about a week ago) and Edema (Bilateral leg swelling. Started about a week ago)    Subjective     Subjective      Freddie Yeboah Jr. presents to [facility] for back pain and swelling.   Lower back pain started about 1 week ago.   Endorses lower back pain  Denies numbness/tingling of lower extremities, dysuria, hematuria, urinary urgency, urinary frequency, weakness of lower extremities, saddle anesthesia, recent fall/injury/trauma.  Has never had this problem before.   Has not tried anything for the problem. Seeing pain management for chronic back pain who recommended injections, he has previously declined. Recently morphine was changed to bustran patch. Unsure if this is related.     Lower extremity swelling in bilateral legs  Started eating potato chips and dip about 2 weeks ago, leg swelling started after this.   Denies warmth and pain. He does take eliquis which he is compliant with.   Compliant with scheduled daily bumex.    Objective     Objective   Vital Signs:   /66   Pulse 66   Temp 98.1 °F (36.7 °C) (Temporal)   Resp 20   Ht 177.8 cm (70\")   Wt (!) 149 kg (328 lb 6.4 oz)   SpO2 90%   BMI 47.12 kg/m²     Physical Exam  Vitals and nursing note reviewed.   Constitutional:       General: He is not in acute distress.     Appearance: Normal appearance. He is obese. He is ill-appearing (chronically ill appearing).   Cardiovascular:    "   Rate and Rhythm: Normal rate and regular rhythm.      Heart sounds: Normal heart sounds. No murmur heard.     Comments: - gricelda sign bilaterally    Pulmonary:      Effort: Pulmonary effort is normal.      Breath sounds: Normal breath sounds. No wheezing.   Abdominal:      General: Bowel sounds are normal.      Palpations: Abdomen is soft.      Tenderness: There is no right CVA tenderness or left CVA tenderness.   Musculoskeletal:         General: Deformity present. No tenderness.      Cervical back: Normal and normal range of motion. No muscular tenderness.      Thoracic back: Normal.      Lumbar back: Spasms present. No tenderness (no reproducible tenderness on exam). Negative right straight leg raise test and negative left straight leg raise test.      Right lower le+ Pitting Edema present.      Left lower le+ Pitting Edema present.      Comments:      Skin:     General: Skin is warm and dry.      Findings: Erythema (suhas discoloration to bilateral lower extremities) present. No rash.   Neurological:      Mental Status: He is alert.      Motor: No weakness.      Deep Tendon Reflexes: Reflexes normal.   Psychiatric:         Mood and Affect: Mood normal.         Behavior: Behavior normal.          Assessment / Plan      Assessment & Plan   Problem List Items Addressed This Visit    None  Visit Diagnoses         Acute midline low back pain without sciatica    -  Primary    Relevant Orders    POCT urinalysis dipstick, automated (Completed)        Brief Urine Lab Results  (Last result in the past 365 days)        Color   Clarity   Blood   Leuk Est   Nitrite   Protein   CREAT   Urine HCG        25 1314 Yellow   Clear   Negative   Trace   Negative   Negative                 Do not suspect acute UTI, symptoms consistent with musculoskeletal origin. Recommend heat, stretching, methocarbamol PRN for spasm, and follow-up with pain management. No red flags currently.       Peripheral vascular disease         Recommend elevation, DASH diet, compression stockings, and continue bumex.             Follow Up   Return if symptoms worsen or fail to improve.  Patient was given instructions and counseling regarding his condition or for health maintenance advice. Please see specific information pulled into the AVS if appropriate.     SHIRA Zendejas  [facility]

## 2025-04-26 LAB
BACTERIA UR CULT: ABNORMAL
BACTERIA UR CULT: ABNORMAL
OTHER ANTIBIOTIC SUSC ISLT: ABNORMAL

## 2025-04-30 RX ORDER — CIPROFLOXACIN 250 MG/1
250 TABLET, FILM COATED ORAL DAILY
Qty: 5 TABLET | Refills: 0 | Status: SHIPPED | OUTPATIENT
Start: 2025-04-30 | End: 2025-05-05

## 2025-05-05 DIAGNOSIS — E29.1 HYPOGONADISM IN MALE: Primary | ICD-10-CM

## 2025-05-06 DIAGNOSIS — E29.1 HYPOGONADISM IN MALE: Primary | ICD-10-CM

## 2025-05-06 RX ORDER — ANASTROZOLE 1 MG/1
1 TABLET ORAL WEEKLY
Qty: 12 TABLET | Refills: 3 | Status: SHIPPED | OUTPATIENT
Start: 2025-05-06

## 2025-05-06 RX ORDER — LEVOTHYROXINE SODIUM 75 UG/1
75 TABLET ORAL DAILY
Qty: 90 TABLET | Refills: 3 | Status: SHIPPED | OUTPATIENT
Start: 2025-05-06

## 2025-05-06 RX ORDER — METHOCARBAMOL 500 MG/1
500 TABLET, FILM COATED ORAL 3 TIMES DAILY PRN
Qty: 45 TABLET | Refills: 0 | OUTPATIENT
Start: 2025-05-06

## 2025-05-06 RX ORDER — OMEPRAZOLE 40 MG/1
40 CAPSULE, DELAYED RELEASE ORAL DAILY
Qty: 90 CAPSULE | Refills: 3 | Status: SHIPPED | OUTPATIENT
Start: 2025-05-06

## 2025-05-27 ENCOUNTER — OFFICE VISIT (OUTPATIENT)
Dept: PULMONOLOGY | Facility: CLINIC | Age: 75
End: 2025-05-27
Payer: MEDICARE

## 2025-05-27 VITALS
WEIGHT: 315 LBS | HEART RATE: 76 BPM | HEIGHT: 70 IN | SYSTOLIC BLOOD PRESSURE: 116 MMHG | DIASTOLIC BLOOD PRESSURE: 68 MMHG | BODY MASS INDEX: 45.1 KG/M2 | OXYGEN SATURATION: 88 % | RESPIRATION RATE: 18 BRPM

## 2025-05-27 DIAGNOSIS — E66.01 MORBID OBESITY WITH BMI OF 45.0-49.9, ADULT: ICD-10-CM

## 2025-05-27 DIAGNOSIS — R06.02 SHORTNESS OF BREATH: ICD-10-CM

## 2025-05-27 DIAGNOSIS — R09.02 HYPOXIA: ICD-10-CM

## 2025-05-27 DIAGNOSIS — J44.9 CHRONIC OBSTRUCTIVE PULMONARY DISEASE, UNSPECIFIED COPD TYPE: Primary | ICD-10-CM

## 2025-05-27 DIAGNOSIS — G47.33 OSA (OBSTRUCTIVE SLEEP APNEA): ICD-10-CM

## 2025-05-27 PROCEDURE — 3074F SYST BP LT 130 MM HG: CPT | Performed by: INTERNAL MEDICINE

## 2025-05-27 PROCEDURE — 99214 OFFICE O/P EST MOD 30 MIN: CPT | Performed by: INTERNAL MEDICINE

## 2025-05-27 PROCEDURE — 3078F DIAST BP <80 MM HG: CPT | Performed by: INTERNAL MEDICINE

## 2025-05-27 RX ORDER — TIOTROPIUM BROMIDE AND OLODATEROL 3.124; 2.736 UG/1; UG/1
2 SPRAY, METERED RESPIRATORY (INHALATION) DAILY
Qty: 12 G | Refills: 2 | Status: SHIPPED | OUTPATIENT
Start: 2025-05-27

## 2025-05-27 NOTE — PROGRESS NOTES
"  Chief Complaint   Patient presents with    Sleeping Problem    Follow-up         Subjective   Freddie Yeboah Jr. is a 75 y.o. male.   Patient comes in today to follow-up on COPD, obstructive sleep apnea and hypoxemia.    The patient says that since his last office visit, his pain physician has changed his pain medications and is currently on a patch.  Since then his symptoms have significantly worsened and the back pain causes him to wake up almost every hour or so.    He also is having issues with his \"kidneys\".  He actually takes diuretic therapy 3 times a day and wakes up multiple times for the night to urinate.    He feels that his overall health has also declined somewhat, mostly because his pain remains poorly controlled and his mobility has been affected significantly by it.    He was actually accompanied by his son today as he needed help coming in from the parking lot, which is extremely unusual.      The following portions of the patient's history were reviewed and updated as appropriate: allergies, current medications, past family history, past medical history, past social history, and past surgical history.    Review of Systems   HENT:  Negative for sinus pressure, sneezing and sore throat.    Respiratory:  Negative for cough, chest tightness, shortness of breath and wheezing.        Objective   Visit Vitals  /68   Pulse 76   Resp 18   Ht 177.8 cm (70\") Comment: pt reported   Wt (!) 156 kg (345 lb) Comment: pt reported   SpO2 (!) 88% Comment: on room air (REST)   BMI 49.50 kg/m²   O2:94% on 2LPM at rest.     BMI Readings from Last 8 Encounters:   05/27/25 49.50 kg/m²   04/22/25 47.12 kg/m²   01/14/25 47.15 kg/m²   12/12/24 48.35 kg/m²   12/11/24 48.44 kg/m²   11/15/24 48.78 kg/m²   10/04/24 48.78 kg/m²   08/13/24 47.35 kg/m²       Physical Exam  Vitals reviewed.   Constitutional:       Appearance: He is well-developed.   HENT:      Head: Normocephalic and atraumatic.   Eyes:      Extraocular " Movements: Extraocular movements intact.   Cardiovascular:      Rate and Rhythm: Normal rate.   Pulmonary:      Comments: Somewhat hyperresonant to percussion.  Somewhat decreased air entry.  No obvious wheezing noted.   Musculoskeletal:      Cervical back: Neck supple.      Comments: Used a wheelchair   Neurological:      Mental Status: He is alert.           Assessment & Plan   Diagnoses and all orders for this visit:    1. Chronic obstructive pulmonary disease, unspecified COPD type (Primary)    2. LUIS FERNANDO (obstructive sleep apnea)    3. Morbid obesity with BMI of 45.0-49.9, adult    4. Hypoxia    5. Shortness of breath  -     tiotropium bromide-olodaterol (Stiolto Respimat) 2.5-2.5 MCG/ACT aerosol solution inhaler; Inhale 2 puffs Daily.  Dispense: 12 g; Refill: 2           Return in about 4 months (around 9/27/2025) for Recheck, For Em Mon), ....Also 8 mths w/ Dr. Hu.    DISCUSSION (if any):  I have personally reviewed images of the last chest x-ray and available CT.  Last CT scan results was reviewed in great detail with the patient.  Results for orders placed during the hospital encounter of 10/23/20    CT Chest Without Contrast    Narrative  PROCEDURE: CT CHEST WO CONTRAST-    HISTORY: Restrictive lung disease    COMPARISON: 02/15/2016.    PROCEDURE:  Multiple axial CT images were obtained from the thoracic  inlet through the upper abdomen without the use of contrast.    FINDINGS:  Soft tissue windows reveal no axillary, hilar or mediastinal adenopathy.  Heart size is normal. The aorta is normal in caliber. There are no  pleural or pericardial effusions. Lung windows demonstrate no suspicious  infiltrate or nodules . There are scattered bilateral calcified  granulomas. The visualized upper abdomen is unremarkable. Bone windows  reveal no acute osseous abnormalities.    Impression  No acute process.    919.21 mGy.cm      This study was performed with techniques to keep radiation doses as low  as  reasonably achievable (ALARA). Individualized dose reduction  techniques using automated exposure control or adjustment of mA and/or  kV according to the patient size were employed.    This report was finalized on 10/23/2020 12:40 PM by Jennifer Antunez M.D..      I also reviewed his last echocardiogram and shared the results with him.   Results for orders placed in visit on 05/21/20    SCANNED - ECHOCARDIOGRAM      Laboratory workup also showed   Lab Results   Component Value Date    HGB 13.2 04/01/2025    HGB 13.5 12/30/2024    HGB 13.1 10/24/2024   ,   Lab Results   Component Value Date    HCT 42.7 04/01/2025    HCT 42.7 12/30/2024    HCT 44.4 10/24/2024       Lab Results   Component Value Date    EOSABS 0.21 04/01/2025    EOSABS 0.25 12/30/2024    EOSABS 0.15 01/01/2023    & Laboratory workup also showed   Lab Results   Component Value Date    CO2 31.0 (H) 04/14/2025   ,   Lab Results   Component Value Date    HGBA1C 8.40 (H) 04/01/2025    HGBA1C 8.70 (H) 12/30/2024    HGBA1C 6.6 (A) 07/12/2024   ,   Lab Results   Component Value Date    TSH 4.350 (H) 01/05/2024    TSH 6.020 (H) 05/04/2023    TSH 4.460 (H) 12/19/2022     Laboratory workup also showed   Lab Results   Component Value Date    PHART 7.397 10/16/2015    RVU4OYB 50.8 (H) 10/16/2015    FCOHB 1.5 10/16/2015    FMETHB 0.6 10/16/2015     ===========================  ===========================    In reviewing the patient's symptoms, his COPD seems to be under decent control and have advised him to continue Stiolto.    His last PFTs, June 2023, showed moderate obstruction.    He was also advised to use rescue inhaler as advised.    I feel that the patient's other issues are secondary to the pain which has clearly worsened since his medications have been switched around.    I told the patient to discuss this further with his primary care provider as well as pain clinic as his symptoms seem to have worsened since December 2024, which is when he was  switched from morphine ER to a patch.    He was advised to continue oxygen 24/7.    As far as sleep apnea is concerned, unfortunately he has been unable to use CPAP device on a consistent basis because of multiple issues mostly secondary to pain that keeps him up all night, along with some issues with nocturia.    I also told the patient to discuss the possibility of changing his diuretic therapy around to allow him to rest better at night.      Dictated utilizing Dragon dictation.    This document was electronically signed by Valerie Hu MD on 05/27/25 at 12:08 EDT

## 2025-05-30 ENCOUNTER — OFFICE VISIT (OUTPATIENT)
Dept: INTERNAL MEDICINE | Facility: CLINIC | Age: 75
End: 2025-05-30
Payer: MEDICARE

## 2025-05-30 VITALS
DIASTOLIC BLOOD PRESSURE: 60 MMHG | TEMPERATURE: 99.9 F | HEART RATE: 82 BPM | HEIGHT: 70 IN | OXYGEN SATURATION: 96 % | RESPIRATION RATE: 22 BRPM | BODY MASS INDEX: 45.1 KG/M2 | WEIGHT: 315 LBS | SYSTOLIC BLOOD PRESSURE: 96 MMHG

## 2025-05-30 DIAGNOSIS — I27.20 PULMONARY HTN: ICD-10-CM

## 2025-05-30 DIAGNOSIS — I10 BENIGN ESSENTIAL HYPERTENSION: ICD-10-CM

## 2025-05-30 DIAGNOSIS — I48.0 PAROXYSMAL ATRIAL FIBRILLATION: Primary | ICD-10-CM

## 2025-05-30 DIAGNOSIS — N18.4 STAGE 4 CHRONIC KIDNEY DISEASE: ICD-10-CM

## 2025-05-30 DIAGNOSIS — R60.0 BILATERAL LEG EDEMA: ICD-10-CM

## 2025-05-30 RX ORDER — LEVOFLOXACIN 750 MG/1
750 TABLET, FILM COATED ORAL
Qty: 5 TABLET | Refills: 0 | Status: SHIPPED | OUTPATIENT
Start: 2025-05-30

## 2025-05-30 NOTE — PROGRESS NOTES
"Subjective  Freddie Yeboah Jr. is a 75 y.o. male    HPI coming in for follow-up patient with a history of pulmonary hypertension and essential hypertension he has paroxysmal A-fib history of CKD now with a GFR of around 16.  Chronic lower extremity edema.  He says he gets good diuresis with his current diuretic therapy.  Has lower extremity edema with occasional weeping of the wounds.  He has had them covered for now.    The following portions of the patient's history were reviewed and updated as appropriate: allergies, current medications, past family history, past medical history, past social history, past surgical history, and problem list.     Review of Systems   Constitutional:  Positive for fatigue. Negative for activity change, appetite change and fever.   HENT:  Negative for congestion, ear discharge, ear pain and trouble swallowing.    Eyes:  Negative for photophobia and visual disturbance.   Respiratory:  Negative for cough and shortness of breath.    Cardiovascular:  Negative for chest pain and palpitations.   Gastrointestinal:  Negative for abdominal distention, abdominal pain, constipation, diarrhea, nausea and vomiting.   Endocrine: Negative.    Genitourinary:  Negative for dysuria, hematuria and urgency.   Musculoskeletal:  Positive for arthralgias. Negative for back pain, joint swelling and myalgias.   Skin:  Positive for rash and wound. Negative for color change.   Allergic/Immunologic: Negative.    Neurological:  Negative for dizziness, weakness, light-headedness and headaches.   Hematological:  Negative for adenopathy. Does not bruise/bleed easily.   Psychiatric/Behavioral:  Negative for agitation, confusion and dysphoric mood. The patient is not nervous/anxious.        Visit Vitals  BP 96/60   Pulse 82   Temp 99.9 °F (37.7 °C) (Infrared)   Resp 22   Ht 177.8 cm (70\")   Wt (!) 156 kg (345 lb)   SpO2 96%   BMI 49.50 kg/m²       Objective  Physical Exam  Constitutional:       General: He is not in " acute distress.     Appearance: He is well-developed.   HENT:      Nose: Nose normal.   Eyes:      General: No scleral icterus.     Conjunctiva/sclera: Conjunctivae normal.   Neck:      Thyroid: No thyromegaly.      Trachea: No tracheal deviation.   Cardiovascular:      Rate and Rhythm: Normal rate and regular rhythm.      Heart sounds: No murmur heard.     No friction rub.   Pulmonary:      Effort: No respiratory distress.      Breath sounds: No wheezing or rales.   Abdominal:      General: There is no distension.      Palpations: Abdomen is soft. There is no mass.      Tenderness: There is no abdominal tenderness. There is no guarding.   Musculoskeletal:         General: Swelling and deformity present. Normal range of motion.   Lymphadenopathy:      Cervical: No cervical adenopathy.   Skin:     General: Skin is warm and dry.      Findings: Erythema and rash present.   Neurological:      Mental Status: He is alert and oriented to person, place, and time.      Cranial Nerves: No cranial nerve deficit.      Coordination: Coordination normal.      Deep Tendon Reflexes: Reflexes are normal and symmetric.   Psychiatric:         Behavior: Behavior normal.         Thought Content: Thought content normal.         Judgment: Judgment normal.       The ASCVD Risk score ([place] DK, et al., 2019) failed to calculate for the following reasons:    The valid total cholesterol range is 130 to 320 mg/dL     Diagnoses and all orders for this visit:    Paroxysmal atrial fibrillation stable rate control okay Eliquis dose adjusted with his renal function.    Benign essential hypertension stable with current meds and low-salt diet    Pulmonary HTN    Stage 4 chronic kidney disease has appointment with his nephrologist.  In the meantime continue with the diuretic therapy  Lower extremity edema with superficial ulceration.  Suspect secondary infection for which she is being given levofloxacin dose adjusted with his renal function.  He is  setting up home health for wound care.  Discussed wound care with patient and wife also

## 2025-06-02 ENCOUNTER — TELEPHONE (OUTPATIENT)
Dept: INTERNAL MEDICINE | Facility: CLINIC | Age: 75
End: 2025-06-02
Payer: MEDICARE

## 2025-06-02 RX ORDER — INSULIN LISPRO 100 [IU]/ML
INJECTION, SOLUTION INTRAVENOUS; SUBCUTANEOUS
Qty: 30 ML | Refills: 3 | Status: SHIPPED | OUTPATIENT
Start: 2025-06-02

## 2025-06-02 NOTE — TELEPHONE ENCOUNTER
Eula at [facility] called and states that they can not accept pt for home care services due to staffing issues. Any questions, [phone]

## 2025-06-10 ENCOUNTER — TELEPHONE (OUTPATIENT)
Dept: INTERNAL MEDICINE | Facility: CLINIC | Age: 75
End: 2025-06-10
Payer: MEDICARE

## 2025-06-10 NOTE — TELEPHONE ENCOUNTER
Caller: [facility]    Relationship: NURSE    Best call back number: [phone]     What is your medical concern? HOME HEALTH NURSE IS WANTING TO MAKE SURE THAT HER RECOMMENDATION, IS SUFFICIENT WITH THE DOCTOR. RECOMMENDS UNNA BOOT TO THE RIGHT LOWER EXTREMITY AND TO BE CHANGED TWICE A WEEK. ALSO, LEFT LEG NEEDS SOME KIND OF LIGHT COMPRESSION WRAP. THERAPY WAS STARTED ON 6/10/25. PLEASE CALL [facility]

## 2025-06-12 ENCOUNTER — LAB (OUTPATIENT)
Dept: LAB | Facility: HOSPITAL | Age: 75
End: 2025-06-12
Payer: MEDICARE

## 2025-06-12 ENCOUNTER — OFFICE VISIT (OUTPATIENT)
Dept: UROLOGY | Facility: CLINIC | Age: 75
End: 2025-06-12
Payer: MEDICARE

## 2025-06-12 VITALS
SYSTOLIC BLOOD PRESSURE: 122 MMHG | BODY MASS INDEX: 45.1 KG/M2 | HEART RATE: 78 BPM | DIASTOLIC BLOOD PRESSURE: 88 MMHG | TEMPERATURE: 98.4 F | OXYGEN SATURATION: 99 % | HEIGHT: 70 IN | WEIGHT: 315 LBS

## 2025-06-12 DIAGNOSIS — E29.1 HYPOGONADISM IN MALE: ICD-10-CM

## 2025-06-12 PROCEDURE — 84403 ASSAY OF TOTAL TESTOSTERONE: CPT | Performed by: NURSE PRACTITIONER

## 2025-06-12 PROCEDURE — 82670 ASSAY OF TOTAL ESTRADIOL: CPT | Performed by: NURSE PRACTITIONER

## 2025-06-12 PROCEDURE — 85018 HEMOGLOBIN: CPT | Performed by: NURSE PRACTITIONER

## 2025-06-12 PROCEDURE — 85014 HEMATOCRIT: CPT | Performed by: NURSE PRACTITIONER

## 2025-06-12 RX ORDER — TESTOSTERONE CYPIONATE 200 MG/ML
100 INJECTION, SOLUTION INTRAMUSCULAR
Qty: 4 ML | Refills: 0 | Status: SHIPPED | OUTPATIENT
Start: 2025-06-12

## 2025-06-12 NOTE — PROGRESS NOTES
Office Visit     Patient Name: Freddie Yeboah Jr.  : 1950   MRN: [id number]     Patient or patient representative verbalized consent for the use of Ambient Listening during the visit with  SHIRA Kraus for chart documentation. 2025  15:18 EDT    Chief Complaint:   Chief Complaint   Patient presents with    Follow-up     Hypogonadism in male         Referring Provider: No ref. provider found    Primary Care Provider: Damián Centeno MD     History of Present Illness  The patient presents for hypogonadism.    Hypogonadism  - Reports feeling much better when he takes testosterone    Elevated PSA  - He wishes to discontinue testosterone therapy if PSA reaches very elevated levels will consider discontinuing testosterone.         Subjective   Review of System:   As noted in HPI.    Past Medical History:   Diagnosis Date    Arthritis     shoulder, knee. multi joint     Atrial fibrillation     Back pain     CHF (congestive heart failure)     Chronic kidney disease     stage III due to diabetes    Diabetes mellitus     several years; checks sugars at home-usually run 100-112    Diabetic nephropathy     Disease of thyroid gland     hypothyroidism    GERD (gastroesophageal reflux disease)     Hypertension     Low testosterone     Sleep apnea     setting of 11    Urinary tract infection     most recent diagnosis 17 in the ER (+3 bacteria)-started on Macrobid and rechecked by PCP on 17     Past Surgical History:   Procedure Laterality Date    ANTERIOR CERVICAL FUSION      APPENDECTOMY      BACK SURGERY      CARPAL TUNNEL RELEASE Bilateral     CATARACT EXTRACTION      CHOLECYSTECTOMY      COLONOSCOPY      FINGER/THUMB ARTHROPLASTY Left     thumb replacement    AK ARTHROPLASTY GLENOHUMERAL JOINT TOTAL SHOULDER Left 2017    Procedure: TOTAL SHOULDER ARTHROPLASTY LEFT;  Surgeon: William Ray MD;  Location: [facility];  Service: Orthopedics    SHOULDER SURGERY      TOTAL KNEE  ARTHROPLASTY Left      Family History   Problem Relation Age of Onset    Heart attack Mother     Diabetes Mother     Heart attack Father     Diabetes Sister     Thyroid disease Sister     COPD Brother     Stroke Brother     Cancer Brother      Social History     Socioeconomic History    Marital status:    Tobacco Use    Smoking status: Former     Current packs/day: 0.00     Average packs/day: 3.0 packs/day for 34.0 years (102.0 ttl pk-yrs)     Types: Cigarettes     Start date:      Quit date:      Years since quittin.4     Passive exposure: Past    Smokeless tobacco: Never   Vaping Use    Vaping status: Never Used   Substance and Sexual Activity    Alcohol use: No    Drug use: No    Sexual activity: Yes     Partners: Female       Current Outpatient Medications:     albuterol sulfate HFA (Ventolin HFA) 108 (90 Base) MCG/ACT inhaler, Inhale 2 puffs Every 4 (Four) Hours As Needed for Wheezing or Shortness of Air., Disp: 18 g, Rfl: 3    Alcohol Swabs pads, Clean area prior to injection, Disp: 100 each, Rfl: 11    anastrozole (ARIMIDEX) 1 MG tablet, TAKE 1 TABLET BY MOUTH ONCE WEEKLY, Disp: 12 tablet, Rfl: 3    aspirin 81 MG tablet, Take 1 tablet by mouth Daily., Disp: , Rfl:     bumetanide (BUMEX) 2 MG tablet, , Disp: , Rfl:     Buprenorphine (Butrans) patch weekly transdermal patch, Place 1 patch on the skin as directed by provider Every 7 (Seven) Days., Disp: , Rfl:     calcipotriene (DOVONEX) 0.005 % cream, , Disp: , Rfl:     colchicine 0.6 MG tablet, Take 1 tablet by mouth 2 (Two) Times a Day., Disp: 6 tablet, Rfl: 1    Eliquis 2.5 MG tablet tablet, , Disp: , Rfl:     febuxostat (ULORIC) 40 MG tablet, , Disp: , Rfl:     glucose blood test strip, Use to test blood sugar level 3 times daily. DX: E11.9, Disp: 300 each, Rfl: 3    glucose monitor monitoring kit, Use meter to check blood sugar levels twice daily., Disp: 1 each, Rfl: 0    HumaLOG KwikPen 100 UNIT/ML solution pen-injector, INJECT 10  "UNITS UNDER THE SKIN THREE TIMES A DAY, Disp: 30 mL, Rfl: 3    Insulin Lispro (HumaLOG) 100 UNIT/ML injection, 30 units sq after meals, Disp: , Rfl:     Insulin Pen Needle (B-D ULTRAFINE III SHORT PEN) 31G X 8 MM misc, USE TO INJECT INSULIN AS DIRECTED FIVE TIMES DAILY, Disp: 100 each, Rfl: 3    Lantus SoloStar 100 UNIT/ML injection pen, INJECT 100 UNITS UNDER THE SKIN DAILY, Disp: 30 mL, Rfl: 5    levoFLOXacin (Levaquin) 750 MG tablet, Take 1 tablet by mouth Every Other Day., Disp: 5 tablet, Rfl: 0    levothyroxine (SYNTHROID, LEVOTHROID) 75 MCG tablet, TAKE 1 TABLET BY MOUTH DAILY, Disp: 90 tablet, Rfl: 3    Loratadine 10 MG capsule, Take  by mouth., Disp: , Rfl:     methocarbamol (ROBAXIN) 500 MG tablet, Take 1 tablet by mouth 3 (Three) Times a Day As Needed for Muscle Spasms., Disp: 45 tablet, Rfl: 0    metOLazone (ZAROXOLYN) 2.5 MG tablet, Take 1 tablet by mouth 2 (Two) Times a Day., Disp: , Rfl:     metoprolol tartrate (LOPRESSOR) 100 MG tablet, Take 1 tablet by mouth 2 (Two) Times a Day., Disp: , Rfl:     Needle, Disp, (BD Disp Needles) 18G X 1-1/2\" misc, USE FOR DRAWING UP THE MEDICATION, Disp: 12 each, Rfl: 11    Needle, Disp, 23G X 1-1/2\" misc, Use 1 Device 1 (One) Time Per Week., Disp: 30 each, Rfl: 11    O2 (OXYGEN), 2 LPM VIA NC, Disp: , Rfl:     omeprazole (priLOSEC) 40 MG capsule, TAKE 1 CAPSULE BY MOUTH DAILY, Disp: 90 capsule, Rfl: 3    OneTouch Delica Lancets 33G misc, Use to test blood sugar level 3 times daily. DX: E11.9, Disp: 300 each, Rfl: 3    oxyCODONE-acetaminophen (PERCOCET)  MG per tablet, Take  tablets by mouth As Needed. 3 times a day, Disp: , Rfl:     simvastatin (ZOCOR) 20 MG tablet, TAKE ONE TABLET BY MOUTH ONCE NIGHTLY, Disp: 90 tablet, Rfl: 3    spironolactone (ALDACTONE) 25 MG tablet, TAKE 1 TABLET BY MOUTH DAILY, Disp: 90 tablet, Rfl: 3    Syringe, Disposable, 3 ML misc, 1 syringe 1 (One) Time Per Week., Disp: 100 each, Rfl: 11    Syringe, Disposable, 3 ML misc, Use 1 " "syringe 1 (One) Time Per Week., Disp: 100 each, Rfl: 11    Syringe/Needle, Disp, (B-D 3CC LUER-DAVID SYR 76IV6-7/2) 23G X 1-1/2\" 3 ML misc, USE AS DIRECTED WEEKLY, Disp: 12 each, Rfl: 11    tadalafil (CIALIS) 20 MG tablet, TAKE 1 TABLET BY MOUTH DAILY AS NEEDED FOR ERECTILE DYSFUNCTION, Disp: 90 tablet, Rfl: 1    telmisartan (MICARDIS) 20 MG tablet, Take 1 tablet by mouth Daily., Disp: , Rfl:     Testosterone Cypionate (DEPOTESTOTERONE CYPIONATE) 200 MG/ML injection, Inject 0.5 mL into the appropriate muscle as directed by prescriber Every 7 (Seven) Days., Disp: 4 mL, Rfl: 0    tiotropium bromide-olodaterol (Stiolto Respimat) 2.5-2.5 MCG/ACT aerosol solution inhaler, Inhale 2 puffs Daily., Disp: 12 g, Rfl: 2    Tradjenta 5 MG tablet tablet, TAKE 1 TABLET BY MOUTH DAILY, Disp: 90 tablet, Rfl: 3    Allergies   Allergen Reactions    Duloxetine Itching and Rash    Sulfamethoxazole-Trimethoprim Other (See Comments)     Had to be hospitalized in ICU; renal impairment     Wellbutrin [Bupropion] Itching and Swelling    Duloxetine Hcl Unknown - High Severity     Objective   Visit Vitals  /88 (BP Location: Right arm, Patient Position: Sitting, Cuff Size: Adult)   Pulse 78   Temp 98.4 °F (36.9 °C) (Temporal)   Ht 177.8 cm (70\")   Wt (!) 156 kg (345 lb)   SpO2 99%   BMI 49.50 kg/m²        Body mass index is 49.5 kg/m².     Physical Exam  Vitals and nursing note reviewed.   Constitutional:       General: He is not in acute distress.     Appearance: Normal appearance. He is morbidly obese. He is not ill-appearing.      Interventions: Nasal cannula in place.      Comments: Chronically ill   Pulmonary:      Effort: Pulmonary effort is normal. No respiratory distress.   Skin:     General: Skin is warm and dry.   Neurological:      General: No focal deficit present.      Mental Status: He is alert and oriented to person, place, and time.   Psychiatric:         Mood and Affect: Mood normal.         Behavior: Behavior normal.      " "    Labs  Lab Results   Component Value Date    COLORU Yellow 04/22/2025    CLARITYU Clear 04/22/2025    SPECGRAV 1.010 04/22/2025    PHUR 6.5 04/22/2025    LEUKOCYTESUR Trace (A) 04/22/2025    NITRITE Negative 04/22/2025    PROTEINPOCUA Negative 04/22/2025    GLUCOSEUR Negative 04/22/2025    KETONESU Negative 04/22/2025    UROBILINOGEN 0.2 E.U./dL 04/22/2025    BILIRUBINUR Negative 04/22/2025    RBCUR Negative 04/22/2025      Lab Results   Component Value Date    WBCUA Too Numerous to Count (A) 08/29/2017    RBCUA 6-12 (A) 08/29/2017    RBCUA 4-10 10/15/2015    BACTERIA 3+ (A) 08/29/2017    BACTERIA 4+ (A) 10/15/2015    HYALCASTU None Seen 08/29/2017    SQUAMEPIUA 0-2 08/29/2017      Urine Culture          4/22/2025    13:21   Urine Culture   Urine Culture Final report      Lab Results   Component Value Date    WBC 9.03 04/01/2025    HGB 13.2 04/01/2025    HCT 42.7 04/01/2025    MCV 85.4 04/01/2025     04/01/2025     Lab Results   Component Value Date    GLUCOSE 238 (H) 04/14/2025    CALCIUM 9.8 04/14/2025     04/14/2025    K 4.9 04/14/2025    CO2 31.0 (H) 04/14/2025    CL 93 (L) 04/14/2025    BUN 56 (H) 04/14/2025    BUN 79 (H) 12/30/2024    CREATININE 3.16 (H) 04/14/2025    CREATININE 3.50 (H) 01/07/2025    EGFR 19.7 (L) 04/14/2025    EGFR 19.4 (L) 12/30/2024    BCR 17.7 04/14/2025    ANIONGAP 13.0 04/14/2025    ALT 14 04/14/2025    AST 17 04/14/2025       Lab Results   Component Value Date    HGBA1C 8.40 (H) 04/01/2025        Lab Results   Component Value Date    PSA 1.720 03/04/2025    PSA 2.590 04/19/2024    PSA 4.140 (H) 03/07/2024       No results found for: \"URICACIDSTN\", \"PGVG8IUDYBV\", \"WDSC4BCAYQ\", \"LABMAGN\"  Lab Results   Component Value Date    FVFM48NJ 31.1 05/10/2017    .0 (H) 04/01/2025    URICACID 8.6 (H) 04/01/2025     Lab Results   Component Value Date    LABPH 5.5 10/15/2015       Lab Results   Component Value Date    TESTOSTEROTT 413 12/19/2022    TESTOSTEROTT >1500 (H) " 05/24/2022    TESTOSTEROTT 438 03/04/2021    TESTFRE 5.6 (L) 12/19/2022    PSA 1.720 03/04/2025    ESTRADIOL 50.8 04/19/2024       Lab Results   Component Value Date    SEXMONB 37.7 12/19/2022    TSH 4.350 (H) 01/05/2024    FREET4 1.61 05/04/2023    JXLA19HP 31.1 05/10/2017    TESTOSTEROTT 413 12/19/2022    TESTFRE 5.6 (L) 12/19/2022         Radiographic Studies  No Images in the past 120 days found..    I have reviewed the above labs and imaging.       Assessment / Plan      Diagnoses and all orders for this visit:    1. Hypogonadism in male  -     Testosterone Cypionate (DEPOTESTOTERONE CYPIONATE) 200 MG/ML injection; Inject 0.5 mL into the appropriate muscle as directed by prescriber Every 7 (Seven) Days.  Dispense: 4 mL; Refill: 0  -     Hemoglobin & Hematocrit, Blood; Future  -     Testosterone; Future         Assessment & Plan  1. Elevated PSA levels  - PSA decreased from 4.140 to 2.59 in April, and to 1.720 in March  - 4K score indicates 19% risk of aggressive prostate cancer  - Informed of prostate cancer risk and potential exacerbation by testosterone therapy  - Repeat PSA March 2026    2.  Hypogonadism  - Despite risks, patient chooses to continue testosterone therapy  - Prescription for testosterone will be sent to pharmacy  - Testosterone and estradiol levels will be checked today  - Advised to get labs done on day four post-injection for accurate results  - Obtain total testosterone and estradiol today due to lab did not draw this when he had labs drawn earlier this month  - Obtain TT, hematocrit and hemoglobin 6 months    Follow-up  - Follow-up in 6 months       Return in about 6 months (around 12/12/2025) for Courtney-testosterone follow-up.    Amber, MSN, APRN, FNP-C  [facility]

## 2025-06-16 ENCOUNTER — TELEPHONE (OUTPATIENT)
Dept: INTERNAL MEDICINE | Facility: CLINIC | Age: 75
End: 2025-06-16

## 2025-06-16 ENCOUNTER — OFFICE VISIT (OUTPATIENT)
Dept: INTERNAL MEDICINE | Facility: CLINIC | Age: 75
End: 2025-06-16
Payer: MEDICARE

## 2025-06-16 VITALS
DIASTOLIC BLOOD PRESSURE: 58 MMHG | RESPIRATION RATE: 16 BRPM | BODY MASS INDEX: 45.1 KG/M2 | SYSTOLIC BLOOD PRESSURE: 94 MMHG | HEART RATE: 80 BPM | WEIGHT: 315 LBS | OXYGEN SATURATION: 92 % | HEIGHT: 70 IN | TEMPERATURE: 99.1 F

## 2025-06-16 DIAGNOSIS — E11.65 TYPE 2 DIABETES MELLITUS WITH HYPERGLYCEMIA, WITHOUT LONG-TERM CURRENT USE OF INSULIN: ICD-10-CM

## 2025-06-16 DIAGNOSIS — I10 BENIGN ESSENTIAL HYPERTENSION: Primary | ICD-10-CM

## 2025-06-16 DIAGNOSIS — D50.8 IRON DEFICIENCY ANEMIA SECONDARY TO INADEQUATE DIETARY IRON INTAKE: ICD-10-CM

## 2025-06-16 PROCEDURE — 3078F DIAST BP <80 MM HG: CPT | Performed by: INTERNAL MEDICINE

## 2025-06-16 PROCEDURE — 3074F SYST BP LT 130 MM HG: CPT | Performed by: INTERNAL MEDICINE

## 2025-06-16 PROCEDURE — 3052F HG A1C>EQUAL 8.0%<EQUAL 9.0%: CPT | Performed by: INTERNAL MEDICINE

## 2025-06-16 PROCEDURE — G2211 COMPLEX E/M VISIT ADD ON: HCPCS | Performed by: INTERNAL MEDICINE

## 2025-06-16 PROCEDURE — 99214 OFFICE O/P EST MOD 30 MIN: CPT | Performed by: INTERNAL MEDICINE

## 2025-06-16 PROCEDURE — 1125F AMNT PAIN NOTED PAIN PRSNT: CPT | Performed by: INTERNAL MEDICINE

## 2025-06-16 NOTE — TELEPHONE ENCOUNTER
Caller: [facility]    Relationship: Home Health    Best call back number: [phone]    What orders are you requesting (i.e. lab or imaging): PATIENT WOULD LIKE TO HAVE PT SO SHE IS ASKING FOR PT EVALUATION ALSO, HE IS WANTING HIS LEFT LOWER LEG WRAPPED AS WELL AS RIGHT LEG HOWEVER, IT DOESN'T HAVE ANY OPEN WOUNDS SO SHE WAS THINKING MAYBE USING [employer]. PLEASE ADVISE    In what timeframe would the patient need to come in: ASAP    Where will you receive your lab/imaging services: HOME

## 2025-06-16 NOTE — PROGRESS NOTES
"Subjective  Freddie Yeboah Jr. is a 75 y.o. male    HPI patient with a history of end-stage renal disease with significant fluid overload has a history of hypertension COPD he is home O2 dependent he has atrial fibrillation and iron deficiency anemia.  Recent hospital admission for shortness of breath thought to be secondary to fluid overload.  Underwent hemodialysis there.  He is following up with his nephrologist and cardiologist has had about 15 pound weight loss since his hospital admission.    The following portions of the patient's history were reviewed and updated as appropriate: allergies, current medications, past family history, past medical history, past social history, past surgical history, and problem list.     Review of Systems   Constitutional:  Positive for fatigue. Negative for activity change, appetite change and fever.   HENT:  Negative for congestion, ear discharge, ear pain and trouble swallowing.    Eyes:  Negative for photophobia and visual disturbance.   Respiratory:  Negative for cough and shortness of breath.    Cardiovascular:  Negative for chest pain and palpitations.   Gastrointestinal:  Negative for abdominal distention, abdominal pain, constipation, diarrhea, nausea and vomiting.   Endocrine: Negative.    Genitourinary:  Negative for dysuria, hematuria and urgency.   Musculoskeletal:  Positive for arthralgias. Negative for back pain, joint swelling and myalgias.   Skin:  Negative for color change and rash.   Allergic/Immunologic: Negative.    Neurological:  Negative for dizziness, weakness, light-headedness and headaches.   Hematological:  Negative for adenopathy. Does not bruise/bleed easily.   Psychiatric/Behavioral:  Negative for agitation, confusion and dysphoric mood. The patient is not nervous/anxious.        Visit Vitals  BP 94/58   Pulse 80   Temp 99.1 °F (37.3 °C)   Resp 16   Ht 177.8 cm (70\")   Wt (!) 150 kg (330 lb)   SpO2 92%   PF (!) 2 L/min   BMI 47.35 kg/m² "       Objective  Physical Exam  Constitutional:       General: He is not in acute distress.     Appearance: He is well-developed.   HENT:      Nose: Nose normal.   Eyes:      General: No scleral icterus.     Conjunctiva/sclera: Conjunctivae normal.   Neck:      Thyroid: No thyromegaly.      Trachea: No tracheal deviation.   Cardiovascular:      Rate and Rhythm: Normal rate and regular rhythm.      Heart sounds: No murmur heard.     No friction rub.   Pulmonary:      Effort: No respiratory distress.      Breath sounds: No wheezing or rales.   Abdominal:      General: There is no distension.      Palpations: Abdomen is soft. There is no mass.      Tenderness: There is no abdominal tenderness. There is no guarding.   Musculoskeletal:         General: No deformity. Normal range of motion.      Right lower leg: Edema present.      Left lower leg: Edema present.   Lymphadenopathy:      Cervical: No cervical adenopathy.   Skin:     General: Skin is warm and dry.      Findings: No erythema or rash.   Neurological:      Mental Status: He is alert and oriented to person, place, and time.      Cranial Nerves: No cranial nerve deficit.      Coordination: Coordination normal.      Deep Tendon Reflexes: Reflexes are normal and symmetric.   Psychiatric:         Behavior: Behavior normal.         Thought Content: Thought content normal.         Judgment: Judgment normal.       The ASCVD Risk score (Leander DK, et al., 2019) failed to calculate for the following reasons:    The valid total cholesterol range is 130 to 320 mg/dL     Diagnoses and all orders for this visit:    Benign essential hypertension stable on current meds    Type 2 diabetes mellitus with hyperglycemia, without long-term current use of insulin continue with the dietary restrictions and current insulin regimen follow A1c    Iron deficiency anemia secondary to inadequate dietary iron intake continue iron supplement follow CBC

## 2025-06-18 ENCOUNTER — TELEPHONE (OUTPATIENT)
Dept: INTERNAL MEDICINE | Facility: CLINIC | Age: 75
End: 2025-06-18

## 2025-06-18 NOTE — TELEPHONE ENCOUNTER
Caller: [facility]    Relationship: Home Health    Best call back number: [phone]     What form or medical record are you requesting: ORDER TO DO WOUND CARE FOR BILATERAL LOWER EXTREMITIES WITH CHANGES 2 TIMES PER WEEK UNTIL HEALED    How would you like to receive the form or medical records (pick-up, mail, fax): FAX    If fax, what is the fax number: [phone]    Timeframe paperwork needed: ASAP

## 2025-06-19 DIAGNOSIS — L97.919 ULCERS OF BOTH LOWER LEGS: Primary | ICD-10-CM

## 2025-06-19 DIAGNOSIS — L97.929 ULCERS OF BOTH LOWER LEGS: Primary | ICD-10-CM

## 2025-06-24 ENCOUNTER — TELEPHONE (OUTPATIENT)
Dept: INTERNAL MEDICINE | Facility: CLINIC | Age: 75
End: 2025-06-24
Payer: MEDICARE

## 2025-06-24 ENCOUNTER — TELEPHONE (OUTPATIENT)
Dept: PULMONOLOGY | Facility: CLINIC | Age: 75
End: 2025-06-24
Payer: MEDICARE

## 2025-06-24 DIAGNOSIS — I27.20 PULMONARY HTN: ICD-10-CM

## 2025-06-24 DIAGNOSIS — L97.919 ULCERS OF BOTH LOWER LEGS: Primary | ICD-10-CM

## 2025-06-24 DIAGNOSIS — L97.929 ULCERS OF BOTH LOWER LEGS: Primary | ICD-10-CM

## 2025-06-24 DIAGNOSIS — N18.4 STAGE 4 CHRONIC KIDNEY DISEASE: ICD-10-CM

## 2025-06-24 DIAGNOSIS — I73.9 PERIPHERAL VASCULAR DISEASE: ICD-10-CM

## 2025-06-24 NOTE — TELEPHONE ENCOUNTER
Caller: [facility]    Relationship:     Best call back number: [phone]     What is the best time to reach you: ANY    Who are you requesting to speak with (clinical staff, provider,  specific staff member): NURSE    Do you know the name of the person who called: MAIRA    What was the call regarding: PATIENT IS WANTING TO KNOW ABOUT GETTING A CUSHION SEAT FOR HIS WHEELCHAIR.      HE WOULD ALSO LIKE PRESCRIPTION FOR EMLA CREAM TO USE BEFORE DIALYSIS.    PHARMACY:  [facility]    Is it okay if the provider responds through [employer]: CALL IF NEEDED

## 2025-06-24 NOTE — TELEPHONE ENCOUNTER
Provider: ECHO    Caller: Alia Yeboah    Relationship to Patient: Emergency Contact    Phone [phone]    Reason for Call: PT RECEIVED A LETTER TELLING HIM HE NEEDED TO SEE HIS DR ABOUT AN ABNORMAL FINDING ON IMAGING DONE AT [facility]  PLEASE CALL PT ASAP TO FOLLOW UP ON NEXT STEPS    When was the patient last seen: 05/27/2025    When did it start: JUNE 1    Where is it located: LUNG

## 2025-06-24 NOTE — TELEPHONE ENCOUNTER
Spoke with reinaldo they are needing an order for wheelchair seat cushion.     Would like something like lidocaine cream to use prior to dialysis.

## 2025-06-25 PROCEDURE — G0180 MD CERTIFICATION HHA PATIENT: HCPCS | Performed by: INTERNAL MEDICINE

## 2025-06-25 NOTE — TELEPHONE ENCOUNTER
Patient informed, per Dr. Hu, that  the lung nodules there is nothing to do for now as long as he keeps his appointment coming up in September.     He does have other abnormalities for which he should discuss with his primary care provider or cardiologist.    Patient states understanding of all information and is agreeable to course of action.

## 2025-06-26 ENCOUNTER — OUTSIDE FACILITY SERVICE (OUTPATIENT)
Dept: INTERNAL MEDICINE | Facility: CLINIC | Age: 75
End: 2025-06-26
Payer: MEDICARE

## 2025-07-11 DIAGNOSIS — Z79.4 TYPE 2 DIABETES MELLITUS WITHOUT COMPLICATION, WITH LONG-TERM CURRENT USE OF INSULIN: ICD-10-CM

## 2025-07-11 DIAGNOSIS — E11.9 TYPE 2 DIABETES MELLITUS WITHOUT COMPLICATION, WITH LONG-TERM CURRENT USE OF INSULIN: ICD-10-CM

## 2025-07-12 RX ORDER — LINAGLIPTIN 5 MG/1
5 TABLET, FILM COATED ORAL DAILY
Qty: 30 TABLET | Refills: 3 | Status: SHIPPED | OUTPATIENT
Start: 2025-07-12

## 2025-07-17 ENCOUNTER — TELEPHONE (OUTPATIENT)
Dept: INTERNAL MEDICINE | Facility: CLINIC | Age: 75
End: 2025-07-17
Payer: MEDICARE

## 2025-07-17 NOTE — TELEPHONE ENCOUNTER
Advised wife patient is to go through his nephrologist in regards to these patches. She voiced understanding.

## 2025-07-17 NOTE — TELEPHONE ENCOUNTER
Patient asking if he can get lidocaine 5% cream prescription to put over his fistula before dialysis.  He can get 4% OTC but would like the 5%  Gissell Garces please if you are able to send

## 2025-07-22 ENCOUNTER — TELEPHONE (OUTPATIENT)
Dept: INTERNAL MEDICINE | Facility: CLINIC | Age: 75
End: 2025-07-22
Payer: MEDICARE

## 2025-07-22 NOTE — TELEPHONE ENCOUNTER
Caller: [facility]    Relationship:     Best call back number: [phone]     What orders are you requesting (i.e. lab or imaging): PHYSICAL THERAPY AND LYMPHEDEMA LEG PUMPS    In what timeframe would the patient need to come in: ASAP    Where will you receive your lab/imaging services: HOME    Additional notes:

## 2025-08-04 RX ORDER — TADALAFIL 20 MG/1
20 TABLET ORAL DAILY PRN
Qty: 90 TABLET | Refills: 1 | Status: SHIPPED | OUTPATIENT
Start: 2025-08-04

## 2025-08-12 ENCOUNTER — LAB (OUTPATIENT)
Dept: PULMONOLOGY | Facility: CLINIC | Age: 75
End: 2025-08-12
Payer: MEDICARE

## 2025-08-21 ENCOUNTER — OUTSIDE FACILITY SERVICE (OUTPATIENT)
Dept: INTERNAL MEDICINE | Facility: CLINIC | Age: 75
End: 2025-08-21
Payer: MEDICARE

## 2025-08-22 DIAGNOSIS — E29.1 HYPOGONADISM IN MALE: ICD-10-CM

## 2025-08-22 RX ORDER — PEN NEEDLE, DIABETIC 31 GX5/16"
NEEDLE, DISPOSABLE MISCELLANEOUS
Qty: 100 EACH | Refills: 3 | Status: SHIPPED | OUTPATIENT
Start: 2025-08-22

## 2025-08-25 RX ORDER — TESTOSTERONE CYPIONATE 200 MG/ML
INJECTION, SOLUTION INTRAMUSCULAR
Qty: 2 ML | Refills: 0 | Status: SHIPPED | OUTPATIENT
Start: 2025-08-25

## 2026-01-27 ENCOUNTER — OFFICE VISIT (OUTPATIENT)
Dept: PULMONOLOGY | Facility: CLINIC | Age: 76
End: 2026-01-27
Payer: MEDICARE

## 2026-01-27 DIAGNOSIS — R06.02 SOB (SHORTNESS OF BREATH): Primary | ICD-10-CM

## (undated) DEVICE — T-MAX DISPOSABLE FACE MASK 8 PER BOX

## (undated) DEVICE — GLV SURG TRIUMPH LT PF LTX 8 STRL

## (undated) DEVICE — TP NDL SCORPION MULTIFIRE

## (undated) DEVICE — NERVE BLOCK SUPPORT KIT/BLUE: Brand: MEDLINE INDUSTRIES, INC.

## (undated) DEVICE — PAD ARMBRD SURG CONVOL 7.5X20X2IN

## (undated) DEVICE — SYS SKIN CLS DERMABOND PRINEO W/22CM MESH TP

## (undated) DEVICE — GLV SURG DERMASSURE GRN LF PF 7.0

## (undated) DEVICE — ST PIN FIX TEMP UNIVERS REVERS W/OSTEO/GUIDE/PIN 2.4MM STRL

## (undated) DEVICE — SHOULDER STABILIZATION KIT,                                    DISPOSABLE 12 PER BOX

## (undated) DEVICE — INTENDED USE FOR SURGICAL MARKING ON INTACT SKIN, ALSO PROVIDES A PERMANENT METHOD OF IDENTIFYING OBJECTS IN THE OPERATING ROOM: Brand: WRITESITE® REGULAR TIP SKIN MARKER

## (undated) DEVICE — DRSNG WND GZ PAD BORDERED 4X8IN STRL

## (undated) DEVICE — STRYKER PERFORMANCE SERIES SAGITTAL BLADE: Brand: STRYKER PERFORMANCE SERIES

## (undated) DEVICE — T4 PULLOVER TOGA, LARGE

## (undated) DEVICE — GLV SURG SIGNATURE TOUCH PF LTX 8 STRL BX/50

## (undated) DEVICE — PK MAJ SHLDR SPLT 10

## (undated) DEVICE — ANTIBACTERIAL UNDYED BRAIDED (POLYGLACTIN 910), SYNTHETIC ABSORBABLE SUTURE: Brand: COATED VICRYL

## (undated) DEVICE — ST NERV BLCK CONT CONTIPLEX ECHO CLSD 18G 4IN

## (undated) DEVICE — 3 BONE CEMENT MIXER: Brand: MIXEVAC

## (undated) DEVICE — KT PUMP PAIN ONQ CBLOC SELCTAFLO 400ML

## (undated) DEVICE — SYR SLP TP 10ML DISP

## (undated) DEVICE — CVR HNDL LT SURG ACCSSRY BLU STRL

## (undated) DEVICE — GLV SURG TRIUMPH ORTHO W/ALOE PF LTX 7.0